# Patient Record
Sex: MALE | Race: WHITE | NOT HISPANIC OR LATINO
[De-identification: names, ages, dates, MRNs, and addresses within clinical notes are randomized per-mention and may not be internally consistent; named-entity substitution may affect disease eponyms.]

---

## 2017-02-06 ENCOUNTER — APPOINTMENT (OUTPATIENT)
Dept: MRI IMAGING | Facility: CLINIC | Age: 57
End: 2017-02-06

## 2017-02-06 ENCOUNTER — OUTPATIENT (OUTPATIENT)
Dept: OUTPATIENT SERVICES | Facility: HOSPITAL | Age: 57
LOS: 1 days | End: 2017-02-06
Payer: COMMERCIAL

## 2017-02-06 DIAGNOSIS — Z00.8 ENCOUNTER FOR OTHER GENERAL EXAMINATION: ICD-10-CM

## 2017-02-06 PROCEDURE — 73721 MRI JNT OF LWR EXTRE W/O DYE: CPT

## 2017-05-11 ENCOUNTER — OUTPATIENT (OUTPATIENT)
Dept: OUTPATIENT SERVICES | Facility: HOSPITAL | Age: 57
LOS: 1 days | End: 2017-05-11
Payer: COMMERCIAL

## 2017-05-11 VITALS
OXYGEN SATURATION: 98 % | DIASTOLIC BLOOD PRESSURE: 80 MMHG | HEIGHT: 66 IN | TEMPERATURE: 89 F | SYSTOLIC BLOOD PRESSURE: 123 MMHG | WEIGHT: 315 LBS | HEART RATE: 73 BPM | RESPIRATION RATE: 14 BRPM

## 2017-05-11 DIAGNOSIS — S83.242D OTHER TEAR OF MEDIAL MENISCUS, CURRENT INJURY, LEFT KNEE, SUBSEQUENT ENCOUNTER: ICD-10-CM

## 2017-05-11 DIAGNOSIS — M17.12 UNILATERAL PRIMARY OSTEOARTHRITIS, LEFT KNEE: ICD-10-CM

## 2017-05-11 DIAGNOSIS — Z01.818 ENCOUNTER FOR OTHER PREPROCEDURAL EXAMINATION: ICD-10-CM

## 2017-05-11 DIAGNOSIS — S83.209A UNSPECIFIED TEAR OF UNSPECIFIED MENISCUS, CURRENT INJURY, UNSPECIFIED KNEE, INITIAL ENCOUNTER: ICD-10-CM

## 2017-05-11 LAB
ANION GAP SERPL CALC-SCNC: 7 MMOL/L — SIGNIFICANT CHANGE UP (ref 5–17)
BUN SERPL-MCNC: 14 MG/DL — SIGNIFICANT CHANGE UP (ref 7–23)
CALCIUM SERPL-MCNC: 9.4 MG/DL — SIGNIFICANT CHANGE UP (ref 8.4–10.5)
CHLORIDE SERPL-SCNC: 100 MMOL/L — SIGNIFICANT CHANGE UP (ref 96–108)
CO2 SERPL-SCNC: 30 MMOL/L — SIGNIFICANT CHANGE UP (ref 22–31)
CREAT SERPL-MCNC: 0.74 MG/DL — SIGNIFICANT CHANGE UP (ref 0.5–1.3)
GLUCOSE SERPL-MCNC: 186 MG/DL — HIGH (ref 70–99)
HBA1C BLD-MCNC: 7.8 % — HIGH (ref 4–5.6)
HCT VFR BLD CALC: 45.3 % — SIGNIFICANT CHANGE UP (ref 39–50)
HGB BLD-MCNC: 14.8 G/DL — SIGNIFICANT CHANGE UP (ref 13–17)
MCHC RBC-ENTMCNC: 25.8 PG — LOW (ref 27–34)
MCHC RBC-ENTMCNC: 32.7 GM/DL — SIGNIFICANT CHANGE UP (ref 32–36)
MCV RBC AUTO: 78.9 FL — LOW (ref 80–100)
PLATELET # BLD AUTO: 248 K/UL — SIGNIFICANT CHANGE UP (ref 150–400)
POTASSIUM SERPL-MCNC: 4.5 MMOL/L — SIGNIFICANT CHANGE UP (ref 3.5–5.3)
POTASSIUM SERPL-SCNC: 4.5 MMOL/L — SIGNIFICANT CHANGE UP (ref 3.5–5.3)
RBC # BLD: 5.74 M/UL — SIGNIFICANT CHANGE UP (ref 4.2–5.8)
RBC # FLD: 13.4 % — SIGNIFICANT CHANGE UP (ref 10.3–14.5)
SODIUM SERPL-SCNC: 137 MMOL/L — SIGNIFICANT CHANGE UP (ref 135–145)
WBC # BLD: 8.4 K/UL — SIGNIFICANT CHANGE UP (ref 3.8–10.5)
WBC # FLD AUTO: 8.4 K/UL — SIGNIFICANT CHANGE UP (ref 3.8–10.5)

## 2017-05-11 PROCEDURE — 85027 COMPLETE CBC AUTOMATED: CPT

## 2017-05-11 PROCEDURE — 83036 HEMOGLOBIN GLYCOSYLATED A1C: CPT

## 2017-05-11 PROCEDURE — 93005 ELECTROCARDIOGRAM TRACING: CPT

## 2017-05-11 PROCEDURE — G0463: CPT

## 2017-05-11 PROCEDURE — 93010 ELECTROCARDIOGRAM REPORT: CPT | Mod: NC

## 2017-05-11 PROCEDURE — 80048 BASIC METABOLIC PNL TOTAL CA: CPT

## 2017-05-11 PROCEDURE — 36415 COLL VENOUS BLD VENIPUNCTURE: CPT

## 2017-05-11 RX ORDER — METFORMIN HYDROCHLORIDE 850 MG/1
1 TABLET ORAL
Qty: 0 | Refills: 0 | COMMUNITY

## 2017-05-11 NOTE — H&P PST ADULT - PROBLEM SELECTOR PLAN 1
Left knee arthroscopy  Medical clearance   Pre op instruction   LENIN precautions   Finger stick on admit

## 2017-05-11 NOTE — H&P PST ADULT - PMH
Hypertension  Dx: 6-7 years ago  Obesity  s/p gastric bypass  LENIN (Obstructive Sleep Apnea)  Sleep study done 2008, uses CPAP at home  Type 2 diabetes mellitus Hypertension    Morbid obesity with BMI of 50.0-59.9, adult    LENIN (Obstructive Sleep Apnea)  Sleep study done 2015  uses CPAP at home  Type 2 diabetes mellitus    Venous insufficiency of both lower extremities

## 2017-05-11 NOTE — H&P PST ADULT - HISTORY OF PRESENT ILLNESS
This is a 57 y/o male who presents with complaint of left knee pain and buckling for the  past 5 months . pain is intermittent and increased pain with climbing stairs and activities  Tried cortisone injection with good relief , however pain continued , MRI revealed meniscus tear . scheduled for left knee arthroscopy

## 2017-05-11 NOTE — H&P PST ADULT - PSH
Gastric Bypass  1998.  Patient lost 225 lbs and has regained 80 lbs in the last 6 years.  H/O ventral hernia repair  08/24/12 incarcerated ventral hernia repair with mesh.  History of Abdominoplasty  2001  History of Appendectomy  1982  Incisional Hernia Repair  2005  S/P Cholecystectomy    Umbilical Hernia Repair  2002 Gastric Bypass  1998.  Patient lost 225 lbs and has regained 125 bs in the last 9years.  H/O ventral hernia repair  08/24/12 incarcerated ventral hernia repair with mesh.  History of Abdominoplasty  2001  History of Appendectomy  1982  Incisional Hernia Repair  2005  S/P Cholecystectomy    Umbilical Hernia Repair  2002

## 2017-05-11 NOTE — H&P PST ADULT - VENOUS THROMBOEMBOLISM CURRENT STATUS
major surgery, including arthroscopic and laparoscopic (greater than 1 hr)/swollen legs/varicose veins

## 2017-05-11 NOTE — H&P PST ADULT - FAMILY HISTORY
Mother  Still living? No  Family history of diabetes mellitus in mother, Age at diagnosis: Age Unknown     Father  Still living? Yes, Estimated age: 81-90  Family history of diabetes mellitus in father, Age at diagnosis: Age Unknown

## 2017-05-23 NOTE — ASU DISCHARGE PLAN (ADULT/PEDIATRIC). - SPECIAL INSTRUCTIONS
ice packs to surgical site 20 minutes on and 20 minutes off while awake for next 24 hours  m,eds  Take an over the counter stool softener like Colace to avoid constipation while taking a narcotic for pain Follow instructions in the attached brochure for bandage care, ice paks, and exercises.  You may walk with full weight on [ LEft ]  knee, use a cane / crutches to assist as needed.  See the Surgeon in the office for removal of your stitches in 10-14 days.  Do not drive a car when taking pain medication.  Call the Surgeon for fever, severe pain, or redness around the incisions, fall/injury to operative leg.

## 2017-05-23 NOTE — ASU DISCHARGE PLAN (ADULT/PEDIATRIC). - ACTIVITY LEVEL
elevate extremity/no sports/gym/weight bearing as tolerated/no heavy lifting weight bearing as tolerated/elevate extremity/no sports/gym/no exercise/no heavy lifting

## 2017-05-23 NOTE — ASU DISCHARGE PLAN (ADULT/PEDIATRIC). - MEDICATION SUMMARY - MEDICATIONS TO TAKE
I will START or STAY ON the medications listed below when I get home from the hospital:    acetaminophen-oxycodone 325 mg-5 mg oral tablet  -- 1 tab(s) by mouth every 4 to 6 hours, As Needed -Mild & Moderate Knee Pain. 2 tabs for severe pain MDD:5  -- Dx: S/P Left Knee Arthroscopy  -- Indication: For Surgical Pain    Ecotrin Adult Low Strength 81 mg oral delayed release tablet  -- 1 tab(s) by mouth once a day  -- Indication: For Heart    losartan 25 mg oral tablet  -- 1 tab(s) by mouth once a day  -- Indication: For BP    metFORMIN 500 mg oral tablet  -- 1 tab(s) by mouth once a day  -- Indication: For DM    MiraLax oral powder for reconstitution  -- 1 packet(s) by mouth once a day (at bedtime), As Needed only if constipated  -- Indication: For Constipation    Vitamin D3 5000 intl units oral capsule  -- 1 cap(s) by mouth once a day  -- Indication: For Vitamin

## 2017-05-26 NOTE — ASU PATIENT PROFILE, ADULT - PMH
Hypertension    Morbid obesity with BMI of 50.0-59.9, adult    LENIN (Obstructive Sleep Apnea)  Sleep study done 2015  uses CPAP at home  Type 2 diabetes mellitus    Venous insufficiency of both lower extremities

## 2017-05-26 NOTE — ASU PATIENT PROFILE, ADULT - PSH
Gastric Bypass  1998.  Patient lost 225 lbs and has regained 125 bs in the last 9years.  H/O ventral hernia repair  08/24/12 incarcerated ventral hernia repair with mesh.  History of Abdominoplasty  2001  History of Appendectomy  1982  Incisional Hernia Repair  2005  S/P Cholecystectomy    Umbilical Hernia Repair  2002

## 2017-05-30 ENCOUNTER — OUTPATIENT (OUTPATIENT)
Dept: OUTPATIENT SERVICES | Facility: HOSPITAL | Age: 57
LOS: 1 days | Discharge: HOME | End: 2017-05-30
Payer: COMMERCIAL

## 2017-05-30 ENCOUNTER — RESULT REVIEW (OUTPATIENT)
Age: 57
End: 2017-05-30

## 2017-05-30 ENCOUNTER — TRANSCRIPTION ENCOUNTER (OUTPATIENT)
Age: 57
End: 2017-05-30

## 2017-05-30 VITALS
SYSTOLIC BLOOD PRESSURE: 133 MMHG | DIASTOLIC BLOOD PRESSURE: 68 MMHG | RESPIRATION RATE: 20 BRPM | HEIGHT: 66 IN | TEMPERATURE: 98 F | WEIGHT: 315 LBS | HEART RATE: 79 BPM | OXYGEN SATURATION: 95 %

## 2017-05-30 VITALS — RESPIRATION RATE: 20 BRPM | OXYGEN SATURATION: 94 % | HEART RATE: 91 BPM

## 2017-05-30 DIAGNOSIS — S83.242D OTHER TEAR OF MEDIAL MENISCUS, CURRENT INJURY, LEFT KNEE, SUBSEQUENT ENCOUNTER: ICD-10-CM

## 2017-05-30 DIAGNOSIS — M17.12 UNILATERAL PRIMARY OSTEOARTHRITIS, LEFT KNEE: ICD-10-CM

## 2017-05-30 PROCEDURE — 29880 ARTHRS KNE SRG MNISECTMY M&L: CPT | Mod: LT

## 2017-05-30 PROCEDURE — 88304 TISSUE EXAM BY PATHOLOGIST: CPT | Mod: 26

## 2017-05-30 PROCEDURE — 88304 TISSUE EXAM BY PATHOLOGIST: CPT

## 2017-05-30 PROCEDURE — 97161 PT EVAL LOW COMPLEX 20 MIN: CPT | Mod: CI,CI

## 2017-05-30 RX ORDER — SODIUM CHLORIDE 9 MG/ML
1000 INJECTION, SOLUTION INTRAVENOUS
Qty: 0 | Refills: 0 | Status: DISCONTINUED | OUTPATIENT
Start: 2017-05-30 | End: 2017-05-30

## 2017-05-30 RX ORDER — MEPERIDINE HYDROCHLORIDE 50 MG/ML
12.5 INJECTION INTRAMUSCULAR; INTRAVENOUS; SUBCUTANEOUS
Qty: 0 | Refills: 0 | Status: DISCONTINUED | OUTPATIENT
Start: 2017-05-30 | End: 2017-05-30

## 2017-05-30 RX ORDER — HYDROMORPHONE HYDROCHLORIDE 2 MG/ML
0.5 INJECTION INTRAMUSCULAR; INTRAVENOUS; SUBCUTANEOUS
Qty: 0 | Refills: 0 | Status: DISCONTINUED | OUTPATIENT
Start: 2017-05-30 | End: 2017-05-30

## 2017-05-30 RX ORDER — ONDANSETRON 8 MG/1
4 TABLET, FILM COATED ORAL ONCE
Qty: 0 | Refills: 0 | Status: DISCONTINUED | OUTPATIENT
Start: 2017-05-30 | End: 2017-05-30

## 2017-05-30 RX ORDER — VANCOMYCIN HCL 1 G
2250 VIAL (EA) INTRAVENOUS ONCE
Qty: 0 | Refills: 0 | Status: COMPLETED | OUTPATIENT
Start: 2017-05-30 | End: 2017-05-30

## 2017-05-30 RX ORDER — OXYCODONE HYDROCHLORIDE 5 MG/1
1 TABLET ORAL
Qty: 60 | Refills: 0
Start: 2017-05-30

## 2017-05-30 RX ADMIN — HYDROMORPHONE HYDROCHLORIDE 0.5 MILLIGRAM(S): 2 INJECTION INTRAMUSCULAR; INTRAVENOUS; SUBCUTANEOUS at 15:50

## 2017-05-30 RX ADMIN — SODIUM CHLORIDE 100 MILLILITER(S): 9 INJECTION, SOLUTION INTRAVENOUS at 15:50

## 2017-05-30 RX ADMIN — HYDROMORPHONE HYDROCHLORIDE 0.5 MILLIGRAM(S): 2 INJECTION INTRAMUSCULAR; INTRAVENOUS; SUBCUTANEOUS at 16:00

## 2017-05-30 NOTE — PHYSICAL THERAPY INITIAL EVALUATION ADULT - ADDITIONAL COMMENTS
coop 1st floor. 2 BRIANNE w/ HR and no steps inside. Pt does not have any DME. Pt drives. Px grade 2/10.

## 2017-05-30 NOTE — BRIEF OPERATIVE NOTE - PROCEDURE
Partial lateral meniscectomy of left knee  05/30/2017    Active  SOVALDO  Partial medial meniscectomy of left knee  05/30/2017    Active  OSVALDO  Left knee arthroscopy with debridement  05/30/2017    Active  OSVALDO

## 2017-05-30 NOTE — BRIEF OPERATIVE NOTE - POST-OP DX
Acute lateral meniscal tear, left, sequela  05/30/2017    Active  Syd Woo  Acute medial meniscal tear, left, sequela  05/30/2017    Active  Syd Woo  Other secondary osteoarthritis of left knee  05/30/2017    Active  Syd Woo

## 2018-03-21 ENCOUNTER — APPOINTMENT (OUTPATIENT)
Dept: VASCULAR SURGERY | Facility: CLINIC | Age: 58
End: 2018-03-21
Payer: COMMERCIAL

## 2018-03-21 VITALS
TEMPERATURE: 97.3 F | DIASTOLIC BLOOD PRESSURE: 84 MMHG | SYSTOLIC BLOOD PRESSURE: 168 MMHG | HEIGHT: 66 IN | HEART RATE: 75 BPM | WEIGHT: 315 LBS | BODY MASS INDEX: 50.62 KG/M2

## 2018-03-21 DIAGNOSIS — I83.10 VARICOSE VEINS OF UNSPECIFIED LOWER EXTREMITY WITH INFLAMMATION: ICD-10-CM

## 2018-03-21 DIAGNOSIS — L85.3 XEROSIS CUTIS: ICD-10-CM

## 2018-03-21 PROCEDURE — 93970 EXTREMITY STUDY: CPT

## 2018-03-21 PROCEDURE — 99243 OFF/OP CNSLTJ NEW/EST LOW 30: CPT

## 2018-03-21 PROCEDURE — 93979 VASCULAR STUDY: CPT

## 2018-04-18 ENCOUNTER — APPOINTMENT (OUTPATIENT)
Dept: VASCULAR SURGERY | Facility: CLINIC | Age: 58
End: 2018-04-18

## 2018-11-26 PROBLEM — E11.9 TYPE 2 DIABETES MELLITUS WITHOUT COMPLICATIONS: Chronic | Status: ACTIVE | Noted: 2017-05-11

## 2018-11-26 PROBLEM — I87.2 VENOUS INSUFFICIENCY (CHRONIC) (PERIPHERAL): Chronic | Status: ACTIVE | Noted: 2017-05-11

## 2018-11-26 PROBLEM — E66.01 MORBID (SEVERE) OBESITY DUE TO EXCESS CALORIES: Chronic | Status: ACTIVE | Noted: 2017-05-11

## 2019-01-02 ENCOUNTER — APPOINTMENT (OUTPATIENT)
Dept: ORTHOPEDIC SURGERY | Facility: CLINIC | Age: 59
End: 2019-01-02

## 2019-01-21 ENCOUNTER — APPOINTMENT (OUTPATIENT)
Dept: ORTHOPEDIC SURGERY | Facility: CLINIC | Age: 59
End: 2019-01-21
Payer: COMMERCIAL

## 2019-01-21 VITALS — BODY MASS INDEX: 50.62 KG/M2 | WEIGHT: 315 LBS | HEIGHT: 66 IN

## 2019-01-21 DIAGNOSIS — K46.9 UNSPECIFIED ABDOMINAL HERNIA W/OUT OBSTRUCTION OR GANGRENE: ICD-10-CM

## 2019-01-21 DIAGNOSIS — Z86.79 PERSONAL HISTORY OF OTHER DISEASES OF THE CIRCULATORY SYSTEM: ICD-10-CM

## 2019-01-21 DIAGNOSIS — Z71.9 COUNSELING, UNSPECIFIED: ICD-10-CM

## 2019-01-21 DIAGNOSIS — Z87.19 PERSONAL HISTORY OF OTHER DISEASES OF THE DIGESTIVE SYSTEM: ICD-10-CM

## 2019-01-21 PROCEDURE — 20610 DRAIN/INJ JOINT/BURSA W/O US: CPT | Mod: LT

## 2019-01-21 PROCEDURE — 99214 OFFICE O/P EST MOD 30 MIN: CPT | Mod: 25

## 2019-01-21 PROCEDURE — 73562 X-RAY EXAM OF KNEE 3: CPT | Mod: LT

## 2019-04-08 ENCOUNTER — APPOINTMENT (OUTPATIENT)
Dept: ORTHOPEDIC SURGERY | Facility: CLINIC | Age: 59
End: 2019-04-08
Payer: COMMERCIAL

## 2019-04-08 PROCEDURE — 20611 DRAIN/INJ JOINT/BURSA W/US: CPT | Mod: LT

## 2019-04-08 PROCEDURE — 99214 OFFICE O/P EST MOD 30 MIN: CPT | Mod: 25

## 2019-04-08 PROCEDURE — 73562 X-RAY EXAM OF KNEE 3: CPT | Mod: RT

## 2019-04-29 ENCOUNTER — APPOINTMENT (OUTPATIENT)
Dept: ORTHOPEDIC SURGERY | Facility: CLINIC | Age: 59
End: 2019-04-29
Payer: COMMERCIAL

## 2019-04-29 VITALS — WEIGHT: 315 LBS | BODY MASS INDEX: 50.62 KG/M2 | HEIGHT: 66 IN

## 2019-04-29 PROCEDURE — 20611 DRAIN/INJ JOINT/BURSA W/US: CPT | Mod: RT

## 2019-04-29 NOTE — HISTORY OF PRESENT ILLNESS
[de-identified] : Patient presents with right knee pain and is here for a Monovisc injection into the right knee. He previously received a Monovisc injection into the left knee 3 weeks ago and he has started feeling relief. He is already feeling 50% better in the left knee. Meanwhile, he feels significant pain in the right knee that radiates into the shin area. He is a brittle diabetic, so we would like to avoid cortisone injections. He has been trying to lose weight.

## 2019-04-29 NOTE — DISCUSSION/SUMMARY
[de-identified] : He elected to receive a Monovisc injection injection into the right knee, he tolerated it well. He was instructed on ice and range of motion. He will avoid walking long distances. He is aware that he is a candidate for left total knee replacement. I emphasized the importance of weight loss, especially before receiving a total knee replacement. In the interim, he may use ice and anti-inflammatories for pain. He is a brittle diabetic, and we would like to avoid cortisone injections. Follow-up in 6 weeks.\par \par Planned surgery and surgical risks/benefits of the procedure have been discussed in detail with the patient. A model of the implant and brand/type of implant being used was discussed & demonstrated to the patient. Patient was given options to go to a rehab center or to go home after surgery. Recent studies show that going home after surgery has a higher success rate. \par The natural history and treatment of degenerative arthritis was discussed with the patient at length today. The spectrum of treatment including nonoperative modalities to surgical intervention was elucidated. Noninvasive and nonoperative treatment modalities include weight reduction, activity modification with low impact exercise, as needed use of acetaminophen or anti-inflammatory medications if tolerated, glucosamine/chondroitin supplements, and physical therapy. Further treatments can include corticosteroid injection and the use of viscosupplementation with hyaluronic acid injections. Definitive surgical treatment can certainly include total joint arthroplasty also. The risks and benefits of each treatment options was discussed and all questions were answered.\par In view of lack of adequate pain relief with conservative (non-surgical) management protocol including physical therapy, home exercises, weight loss, activity modification, NSAIDS; the patient is recommended to consider a staged bilateral Total Knee Replacement.\par The risks, benefits, alternatives, implications, complications including but not limited to pain, stiffness, bleeding, limp, wound breakdown, infection, bone fracture, nerve and vascular compromise, implant wear and durability issues and rehabilitation were discussed and relevant questions were addressed. The possibility of recurrent pain, no improvement in pain and actual worsening of the pain were also mentioned in conversation with the patient. Medical complications related to the patient's general medical health including deep vein thrombosis, pulmonary embolus, heart attack, stroke, death and other complications from anesthesia were discussed as well. The patient wishes to proceed and will undergo preoperative medical evaluation and clearance, attend the preoperative educational class and will schedule surgery appropriately.\par Anticoagulation prophylaxis medication options to address risks of deep vein thrombosis and pulmonary embolism were discussed and weighed against the risks of bleeding and wound healing complications. The patient elected aspirin/coumadin prophylaxis with mechanical modalities.\par \par \par

## 2019-04-29 NOTE — PHYSICAL EXAM
[de-identified] : Constitutional\par o Appearance : well-nourished, well developed, alert, in no acute distress \par Head and Face\par o Head :\par ¦ Inspection : atraumatic, normocephalic\par o Face :\par ¦ Inspection : no visible rash or discoloration\par Lymphatic\par o Epitrochlear Nodes : no lymphadenopathy \par o Popliteal Nodes : no palpable nodes present \par o Supraclavicular Nodes : no supraclavicular nodes present \par o Preauricular Nodes : no preauricular nodes present \par o Additional Nodes : no additional adenopathy present \par Skin and Subcutaneous Tissue \par o Head and Neck :\par ¦ Face : no facial lesions \par o Trunk :\par ¦ Back : no rashes present, no lesions present, no areas of discoloration \par Body Hair : body hair distribution normal for age\par Neurologic\par o Mental Status Examination :\par ¦ Orientation : alert and oriented X 3\par o Coordination : finger-to-nose-to-finger testing normal bilaterally, heel-shin cerebellar test within normal limits bilaterally \par Psychiatric\par o Mood and Affect: mood normal, affect appropriate \par Cardiovascular\par o Peripheral Vascular System :\par ¦ Femoral Artery : pulse 2+\par ¦ Dorsalis Pedis Artery : pulse 2+\par ¦ Posterior Tibial Artery : pulse 2+\par \par Right Lower Extremity\par o Buttock : no tenderness, swelling or deformities \par o Right Hip :\par ¦ Inspection/Palpation : no tenderness, swelling or deformities\par ¦ Range of Motion : full and painless in all planes, no crepitance\par ¦ Stability : joint stability intact\par ¦ Strength : extension, flexion, adduction, abduction, internal rotation and external rotation 4+/5 \par \par o Right Knee :\par ¦ Inspection/Palpation : medial compartment tenderness to palpation, no swelling, venous stasis change\par ¦ Range of Motion : active flexion and extension full with pain on full flexion, no crepitance\par ¦ Stability : no valgus or varus instability present on provocative testing\par ¦ Strength : flexion and extension 5/5\par ¦ Tests and Signs : negative Anterior Drawer, negative Lachman, negative Kade \par \par Left Lower Extremity\par o Buttock : no tenderness, swelling or deformities \par o Left Hip :\par ¦ Inspection/Palpation : no tenderness, no swelling or deformities\par ¦ Range of Motion : full and painless in all planes, no crepitance\par ¦ Stability : joint stability intact\par ¦ Strength : extension, flexion, adduction, abduction, internal rotation and external rotation 4+/5\par \par o Left Knee :\par ¦ Inspection/Palpation : mild medial and lateral compartment tenderness to palpation, no swelling, incisions well healed, venous stasis change\par ¦ Range of Motion : active flexion and extension, with pain on full flexion, no crepitance\par ¦ Stability : no valgus or varus instability present on provocative testing\par ¦ Strength : flexion and extension 5/5\par ¦ Tests and Signs : negative Anterior Drawer, negative Lachman, negative Kade\par \par Gait and Station:\par Gait: normal gait, no significant extremity swelling or lymphedema, trophic changes bilaterally\par \par Radiology:\par o Right Knee: Standing AP, lateral, and tunnel views were obtained and reveal moderate medial and patellofemoral osteoarthritis  \par \par Injection:\par o Knee : Indication- knee osteoarthritis, Anatomic location- right knee intra-articular joint space, Spray - area was sterilized with Betadine and alcohol and anesthetized with Ethyl Chloride , needle used-20G, Medications given- 4 cc's Monovisc, and patient tolerated it well. This was done under ultrasound guidance.

## 2019-05-20 ENCOUNTER — APPOINTMENT (OUTPATIENT)
Dept: ORTHOPEDIC SURGERY | Facility: CLINIC | Age: 59
End: 2019-05-20
Payer: COMMERCIAL

## 2019-05-20 VITALS — WEIGHT: 315 LBS | BODY MASS INDEX: 50.62 KG/M2 | HEIGHT: 66 IN

## 2019-05-20 PROCEDURE — 99214 OFFICE O/P EST MOD 30 MIN: CPT

## 2019-06-10 ENCOUNTER — APPOINTMENT (OUTPATIENT)
Dept: ORTHOPEDIC SURGERY | Facility: CLINIC | Age: 59
End: 2019-06-10
Payer: COMMERCIAL

## 2019-06-10 VITALS — HEIGHT: 66 IN | WEIGHT: 315 LBS | BODY MASS INDEX: 50.62 KG/M2

## 2019-06-10 PROCEDURE — 99213 OFFICE O/P EST LOW 20 MIN: CPT

## 2019-08-28 ENCOUNTER — APPOINTMENT (OUTPATIENT)
Dept: ORTHOPEDIC SURGERY | Facility: CLINIC | Age: 59
End: 2019-08-28
Payer: COMMERCIAL

## 2019-08-28 VITALS — WEIGHT: 315 LBS | BODY MASS INDEX: 50.62 KG/M2 | HEIGHT: 66 IN

## 2019-08-28 PROCEDURE — 20610 DRAIN/INJ JOINT/BURSA W/O US: CPT | Mod: LT

## 2019-08-28 PROCEDURE — 99213 OFFICE O/P EST LOW 20 MIN: CPT | Mod: 25

## 2019-08-28 NOTE — PHYSICAL EXAM
[de-identified] : Constitutional\par o Appearance : well-nourished, well developed, alert, in no acute distress \par Head and Face\par o Head :\par ¦ Inspection : atraumatic, normocephalic\par o Face :\par ¦ Inspection : no visible rash or discoloration\par Respiratory\par o Respiratory Effort: breathing unlabored \par Neurologic\par o Mental Status Examination :\par ¦ Orientation : alert and oriented X 3\par Psychiatric\par o Mood and Affect: mood normal, affect appropriate\par Cardiovascular\par o Observation/Palpation : - no swelling\par Lymphatic\par o Additional Nodes : No palpable lymph nodes present\par \par Right Lower Extremity\par o Buttock : no tenderness, swelling or deformities \par o Right Hip :\par    - Inspection/Palpation : no tenderness, no swelling or deformities\par    - Range of Motion : full and painless in all planes, no crepitance\par    - Stability : joint stability intact\par    - Strength : extension, flexion, adduction, abduction, internal rotation and external rotation 5/5 \par    - Tests: Diane’s test negative\par o Right Knee :\par ¦ Inspection/Palpation : mild medial compartment  tenderness to palpation, no swelling\par ¦ Range of Motion : active flexion and extension full and painless, no crepitance\par ¦ Stability : no valgus or varus instability present on provocative testing\par ¦ Strength : flexion and extension 5/5\par ¦ Tests and Signs : negative Anterior Drawer, negative Lachman, negative Kade\par \par Left Lower Extremity\par o Buttock : no tenderness, swelling or deformities \par o Left Hip :\par    - Inspection/Palpation : no tenderness, no swelling or deformities\par    - Range of Motion : full and painless in all planes, no crepitance\par    - Stability : joint stability intact\par    - Strength : extension, flexion, adduction, abduction, internal rotation and external rotation 5/5 \par    - Tests: Diane’s test negative\par o Left Knee :\par ¦ Inspection/Palpation : medial compartment  and lateral compartment tenderness to palpation, no swelling\par ¦ Range of Motion : 5-115 with pain. \par ¦ Stability : no valgus or varus instability present on provocative testing\par ¦ Strength : flexion and extension 5/5\par ¦ Tests and Signs : negative Anterior Drawer, negative Lachman, negative Kade\par \par Gait and Station:\par Gait: gait normal, no significant extremity swelling or lymphedema, good proprioception and balance\par \par \par o Knee injection : Indication- knee osteoarthritis, Anatomic location- left intra-articular joint space, Spray - area was sterilized with Betadine and alcohol and anesthetized with Ethyl Chloride , needle used-20G, Medications given- 5cc's lidocaine, 0.5cc's kenalog, 0.5 cc's dexamethasone, and patient tolerated it well.\par \par

## 2019-08-28 NOTE — HISTORY OF PRESENT ILLNESS
[de-identified] : 59 year old male presents with right knee pain. Pt received viscosupplementation into both of his knees. He received a viscosupplementation in his left knee on 4/8/2019 and another one in his right knee on 4/29/2019. He states significant improvement of his symptoms. Pt states that walking, ascending and descending stairs, and getting our of his car are all difficult. He c/o that his left knee is worse than his right one when he gets out of the car. He denies any swelling and feels much better. He is aware that he needs to loose weight. He has a hx of DM

## 2019-08-28 NOTE — DISCUSSION/SUMMARY
[de-identified] : I discussed the underlying pathophysiology of the patient's condition in great detail with the patient.  I gave the cortisone injection into the patient's left knee, and he tolerated it well. I told him  to take it easy for the rest of the day, and to continue ROM exercises. The use of ice and rest was also reviewed with the patient. I stressed the importance of continued weight loss and its benefits to the patient’s joints and overall health. \par \par FU 3 months

## 2019-08-28 NOTE — ADDENDUM
[FreeTextEntry1] : I, Tran Wheleer, acted solely as a scribe for Dr. Steve Manning on this date 08/28/2019 \par All medical record entries made by the Scribe were at my, Dr. Steve Manning, direction and personally dictated by me on 08/28/2019 . I have reviewed the chart and agree that the record accurately reflects my personal performance of the history, physical exam, assessment and plan. I have also personally directed, reviewed, and agreed with the chart.

## 2019-10-28 ENCOUNTER — APPOINTMENT (OUTPATIENT)
Dept: ORTHOPEDIC SURGERY | Facility: CLINIC | Age: 59
End: 2019-10-28
Payer: COMMERCIAL

## 2019-10-28 VITALS — HEIGHT: 66 IN | WEIGHT: 315 LBS | BODY MASS INDEX: 50.62 KG/M2

## 2019-10-28 PROCEDURE — 20610 DRAIN/INJ JOINT/BURSA W/O US: CPT | Mod: RT

## 2019-10-28 PROCEDURE — 99213 OFFICE O/P EST LOW 20 MIN: CPT | Mod: 25

## 2019-12-30 ENCOUNTER — APPOINTMENT (OUTPATIENT)
Dept: ORTHOPEDIC SURGERY | Facility: CLINIC | Age: 59
End: 2019-12-30
Payer: COMMERCIAL

## 2019-12-30 PROCEDURE — 20611 DRAIN/INJ JOINT/BURSA W/US: CPT | Mod: RT

## 2019-12-30 PROCEDURE — 99213 OFFICE O/P EST LOW 20 MIN: CPT | Mod: 25

## 2020-01-20 ENCOUNTER — APPOINTMENT (OUTPATIENT)
Dept: ORTHOPEDIC SURGERY | Facility: CLINIC | Age: 60
End: 2020-01-20
Payer: COMMERCIAL

## 2020-01-20 VITALS — HEIGHT: 66 IN | BODY MASS INDEX: 50.62 KG/M2 | WEIGHT: 315 LBS

## 2020-01-20 PROCEDURE — 20611 DRAIN/INJ JOINT/BURSA W/US: CPT | Mod: RT

## 2020-01-20 PROCEDURE — 99213 OFFICE O/P EST LOW 20 MIN: CPT | Mod: 25

## 2020-03-02 ENCOUNTER — APPOINTMENT (OUTPATIENT)
Dept: ORTHOPEDIC SURGERY | Facility: CLINIC | Age: 60
End: 2020-03-02
Payer: COMMERCIAL

## 2020-03-02 PROCEDURE — 99214 OFFICE O/P EST MOD 30 MIN: CPT

## 2020-04-20 ENCOUNTER — APPOINTMENT (OUTPATIENT)
Dept: ORTHOPEDIC SURGERY | Facility: CLINIC | Age: 60
End: 2020-04-20
Payer: COMMERCIAL

## 2020-04-20 PROCEDURE — 99214 OFFICE O/P EST MOD 30 MIN: CPT | Mod: 25

## 2020-04-20 PROCEDURE — 20610 DRAIN/INJ JOINT/BURSA W/O US: CPT | Mod: RT

## 2020-06-01 ENCOUNTER — APPOINTMENT (OUTPATIENT)
Dept: ORTHOPEDIC SURGERY | Facility: CLINIC | Age: 60
End: 2020-06-01

## 2020-09-23 ENCOUNTER — APPOINTMENT (OUTPATIENT)
Dept: ORTHOPEDIC SURGERY | Facility: CLINIC | Age: 60
End: 2020-09-23
Payer: COMMERCIAL

## 2020-09-23 PROCEDURE — 99214 OFFICE O/P EST MOD 30 MIN: CPT

## 2020-09-23 PROCEDURE — 73562 X-RAY EXAM OF KNEE 3: CPT | Mod: 50

## 2020-10-26 ENCOUNTER — APPOINTMENT (OUTPATIENT)
Dept: ORTHOPEDIC SURGERY | Facility: CLINIC | Age: 60
End: 2020-10-26
Payer: COMMERCIAL

## 2020-10-26 DIAGNOSIS — E66.01 MORBID (SEVERE) OBESITY DUE TO EXCESS CALORIES: ICD-10-CM

## 2020-10-26 PROCEDURE — 20611 DRAIN/INJ JOINT/BURSA W/US: CPT | Mod: RT

## 2020-10-26 PROCEDURE — 99072 ADDL SUPL MATRL&STAF TM PHE: CPT

## 2020-11-02 ENCOUNTER — APPOINTMENT (OUTPATIENT)
Dept: ORTHOPEDIC SURGERY | Facility: CLINIC | Age: 60
End: 2020-11-02
Payer: COMMERCIAL

## 2020-11-02 PROCEDURE — 20611 DRAIN/INJ JOINT/BURSA W/US: CPT | Mod: LT

## 2020-11-02 PROCEDURE — 99072 ADDL SUPL MATRL&STAF TM PHE: CPT

## 2020-12-14 ENCOUNTER — APPOINTMENT (OUTPATIENT)
Dept: ORTHOPEDIC SURGERY | Facility: CLINIC | Age: 60
End: 2020-12-14
Payer: COMMERCIAL

## 2020-12-14 VITALS — HEIGHT: 66 IN | BODY MASS INDEX: 50.62 KG/M2 | WEIGHT: 315 LBS

## 2020-12-14 PROCEDURE — 99072 ADDL SUPL MATRL&STAF TM PHE: CPT

## 2020-12-14 PROCEDURE — 99214 OFFICE O/P EST MOD 30 MIN: CPT

## 2021-03-15 ENCOUNTER — APPOINTMENT (OUTPATIENT)
Dept: ORTHOPEDIC SURGERY | Facility: CLINIC | Age: 61
End: 2021-03-15
Payer: COMMERCIAL

## 2021-03-15 PROCEDURE — 99072 ADDL SUPL MATRL&STAF TM PHE: CPT

## 2021-03-15 PROCEDURE — 99213 OFFICE O/P EST LOW 20 MIN: CPT

## 2021-03-15 NOTE — ADDENDUM
[FreeTextEntry1] : I, Carlos Larsen, acted solely as a scribe for Dr. Steve Manning on this date 03/15/2021.\par All medical record entries made by the Scribe were at my, Dr. Steve Manning, direction and personally dictated by me on 03/15/2021. I have reviewed the chart and agree that the record accurately reflects my personal performance of the history, physical exam, assessment and plan. I have also personally directed, reviewed, and agreed with the chart.

## 2021-03-15 NOTE — PHYSICAL EXAM
[de-identified] : Constitutional\par o Appearance : well-nourished, well developed, alert, in no acute distress \par Head and Face\par o Head :\par ¦ Inspection : atraumatic, normocephalic\par o Face :\par ¦ Inspection : no visible rash or discoloration\par Respiratory\par o Respiratory Effort: breathing unlabored \par Neurologic\par o Mental Status Examination :\par ¦ Orientation : alert and oriented X 3\par Psychiatric\par o Mood and Affect: mood normal, affect appropriate\par Cardiovascular\par o Observation/Palpation : - no swelling\par Lymphatic\par o Additional Nodes : No palpable lymph nodes present\par \par Right Lower Extremity\par o Buttock : no tenderness, swelling or deformities \par o Right Hip :\par    - Inspection/Palpation : no tenderness, no swelling or deformities\par    - Range of Motion : full and painless in all planes, no crepitance\par    - Stability : joint stability intact\par    - Strength : extension, flexion, adduction, abduction, internal rotation and external rotation, 4+/5 \par    - Tests: Diane’s test negative\par \par o Right Knee :\par ¦ Inspection/Palpation : moderate medial and no lateral compartment tenderness to palpation, no swelling\par ¦ Range of Motion : 0-130°, stiffness with full flexion, no crepitance, decreased patellofemoral glide \par ¦ Stability : no valgus or varus instability present on provocative testing\par ¦ Strength : flexion and extension 4+/5\par ¦ Tests and Signs : negative Anterior Drawer, negative Lachman, negative Kade\par \par Left Lower Extremity\par o Buttock : no tenderness, swelling or deformities \par o Left Hip :\par    - Inspection/Palpation : no tenderness, no swelling or deformities\par    - Range of Motion : full and painless in all planes, no crepitance\par    - Stability : joint stability intact\par    - Strength : extension, flexion, adduction, abduction, internal rotation and external rotation, 4+/5 \par    - Tests: Diane’s test negative\par \par o Left Knee :\par ¦ Inspection/Palpation : moderate medial and no lateral compartment tenderness to palpation, no swelling\par ¦ Range of Motion : 0-110° with pain on full flexion, decreased patellofemoral glide \par ¦ Stability : no valgus or varus instability present on provocative testing\par ¦ Strength : flexion and extension 4+/5\par ¦ Tests and Signs : negative Anterior Drawer, negative Lachman, negative Kade\par \par o Peripheral Vascular System :\par ¦ Dorsalis Pedis Artery : pulse 2+ bilaterally\par \par Gait and Station:\par Gait: heel/toe on the right, stiff on the left, no significant extremity swelling or lymphedema, trophic changes in both lower legs

## 2021-03-15 NOTE — DISCUSSION/SUMMARY
[de-identified] : I went over the pathophysiology of the patient's symptoms in great detail with the patient. We discussed the use of ice, Tylenol and anti-inflammatories to relieve pain. At-home strengthening, and stretching exercises were discussed and demonstrated with the patient. He should focus on light weight and high repetition exercises. I stressed the importance of weight loss and its benefits to the patient’s joints and overall health. Viscosupplementation was discussed as a solution to the patient's symptoms, and we are requesting authorization for Durolane for both knees. He is due for another right knee injection after 4/26/2021, and a left knee injection after 5/2/2021. All of his questions were answered. He understands and consents to the plan. \par \par FU 6 weeks.\par after viscosupplementation authorization\par after focusing on losing weight.\par \par The patient is an 60 year old male with bone on bone arthritis of their bilateral knees. Based upon the patient's continued symptoms and failure to respond to conservative treatment I have recommended a total knee replacement for this patient. A long discussion took place with the patient describing what a total joint replacement is and what the procedure would entail. A total knee model, similar to the implant that will be used during the operation was utilized to demonstrate and to discuss the various bearing surfaces of the implants. The hospitalization and post-operative care and rehabilitation were also discussed. The use of perioperative antibiotics and DVT prophylaxis were discussed. The risk, benefits and alternatives to a surgical intervention were discussed at length with the patient. The patient was also advised of risks related to the medical comorbidities and elevated body mass index (BMI). A lengthy discussion took place to review the most common complications including but not limited to: deep vein thrombosis, pulmonary embolus, heart attack, stroke, infection, wound breakdown, numbness, damage to nerves, tendon, muscles, arteries or other blood vessels, death and other possible complications from anesthesia. The patient was told that we will take steps to minimize these risks by using sterile technique, antibiotics and DVT prophylaxis when appropriate and follow the patient postoperatively in the office setting to monitor progress. The possibility of recurrent pain, no improvement in pain and actual worsening of pain were also discussed with the patient.\par \par The discharge plan of care focused on the patient going home following surgery. I encouraged the patient to make the necessary arrangements to have someone stay with them when they are discharged home. Following discharge, a home care nurse will visit the patient. They will open your home care case and request home physical therapy services. Home physical therapy will commence following discharge provided it is appropriate and covered by his health insurance benefit plan.\par \par The risks, benefits and alternative treatment options of total joint replacement were reviewed with the patient at great length. All questions were answered to the satisfaction of the patient. The patient participated in an interactive discussion of the total knee replacement implant planned for their surgery with questions answered. The patient agreed with the treatment plan, and has decided to move forward with the elective total knee replacement as planned.\par \par ESPINOZA CUNNINGHAM was seen face to face and needs a commode for bedside use as the patient has no ability to acces the bathroom in their home. The patient also requires a rolling walker as this is needed for activities of daily living within their home secondary to the diagnosis of osteoarthritis.

## 2021-03-15 NOTE — HISTORY OF PRESENT ILLNESS
[de-identified] : 60 year old male presents with bilateral knee pain, left worse than right. He had a right knee Durolane injection on 10/26/2020, and a left knee Durolane injection on 11/02/2020. He notes 40-50% relief after these injections, but feels that his previous injections may have been more helpful. He notes discomfort at rest and difficulty when standing from a seated position. He cannot fully bend his left knee. He denies any buckling, catching or swelling. He states that he is doing well and does not feel that a cortisone injection is needed at this time. He has been trying to lose weight but has not been that successful. Pmhx: He has a hx of DM.\par \par Radiology Results done 9/23/2020 revealed:\par o Right Knee : Standing AP,lateral and tunnel views of the knee were obtained and revealed essentially bone on bone in the medial compartment with severe patellofemoral arthritis.\par o Left Knee : Standing AP,lateral and tunnel views of the knee were obtained and revealed essentially bone on bone in the medial compartment with severe patellofemoral arthritis.

## 2021-07-26 ENCOUNTER — APPOINTMENT (OUTPATIENT)
Dept: ORTHOPEDIC SURGERY | Facility: CLINIC | Age: 61
End: 2021-07-26
Payer: COMMERCIAL

## 2021-07-26 VITALS
HEIGHT: 66 IN | SYSTOLIC BLOOD PRESSURE: 135 MMHG | BODY MASS INDEX: 48.21 KG/M2 | WEIGHT: 300 LBS | HEART RATE: 75 BPM | DIASTOLIC BLOOD PRESSURE: 85 MMHG

## 2021-07-26 DIAGNOSIS — S70.02XA CONTUSION OF LEFT HIP, INITIAL ENCOUNTER: ICD-10-CM

## 2021-07-26 DIAGNOSIS — M43.16 SPONDYLOLISTHESIS, LUMBAR REGION: ICD-10-CM

## 2021-07-26 PROCEDURE — 72170 X-RAY EXAM OF PELVIS: CPT

## 2021-07-26 PROCEDURE — 73562 X-RAY EXAM OF KNEE 3: CPT | Mod: LT

## 2021-07-26 PROCEDURE — 20611 DRAIN/INJ JOINT/BURSA W/US: CPT | Mod: RT

## 2021-07-26 PROCEDURE — 99214 OFFICE O/P EST MOD 30 MIN: CPT | Mod: 25

## 2021-07-26 PROCEDURE — 72100 X-RAY EXAM L-S SPINE 2/3 VWS: CPT

## 2021-07-26 PROCEDURE — 99072 ADDL SUPL MATRL&STAF TM PHE: CPT

## 2021-07-26 NOTE — ADDENDUM
[FreeTextEntry1] : I, Carlos Larsen, acted solely as a scribe for Dr. Steve Manning on this date 07/26/2021.\par All medical record entries made by the Scribe were at my, Dr. Steve Manning, direction and personally dictated by me on 07/26/2021. I have reviewed the chart and agree that the record accurately reflects my personal performance of the history, physical exam, assessment and plan. I have also personally directed, reviewed, and agreed with the chart.

## 2021-07-26 NOTE — PHYSICAL EXAM
[de-identified] : Constitutional\par o Appearance : well-nourished, well developed, alert, in no acute distress \par Head and Face\par o Head :\par ¦ Inspection : atraumatic, normocephalic\par o Face :\par ¦ Inspection : no visible rash or discoloration\par Respiratory\par o Respiratory Effort: breathing unlabored \par Neurologic\par o Mental Status Examination :\par ¦ Orientation : alert and oriented X 3\par Psychiatric\par o Mood and Affect: mood normal, affect appropriate\par Cardiovascular\par o Observation/Palpation : - no swelling\par Lymphatic\par o Additional Nodes : No palpable lymph nodes present\par \par Lumbosacral Spine\par o Inspection : no visible rash or discoloration\par o Palpation : moderate left paraspinal musculature tenderness, no sciatic notch tenderness\par o Range of Motion : extension and sidebending cause left paraspinal discomfort\par o Muscle Strength : paraspinal muscle strength and tone within normal limits\par o Muscle Tone : paraspinal muscle strength and tone within normal limits\par o Tests: straight leg test negative and ANISA test negative bilaterally \par \par Right Lower Extremity\par o Buttock : no tenderness, swelling or deformities \par o Right Hip :\par    - Inspection/Palpation : no tenderness, no swelling or deformities\par    - Range of Motion : full flexion, slight limitation of internal rotation, no crepitance\par    - Stability : joint stability intact\par    - Strength : extension, flexion, adduction, abduction, internal rotation and external rotation, 5/5 \par    - Tests: Diane’s test negative\par \par o Right Knee :\par ¦ Inspection/Palpation : moderate medial compartment tenderness to palpation, no swelling\par ¦ Range of Motion : 0-130° with pain on full flexion, no crepitance, decreased patellofemoral glide \par ¦ Stability : no valgus or varus instability present on provocative testing\par ¦ Strength : flexion and extension 5/5\par ¦ Tests and Signs : negative Anterior Drawer, negative Lachman, negative Kade\par \par Left Lower Extremity\par o Buttock : no tenderness, swelling or deformities \par o Left Hip :\par    - Inspection/Palpation : no tenderness, no swelling or deformities\par    - Range of Motion : full flexion, slight limitation of rotation, no crepitance\par    - Stability : joint stability intact\par    - Strength : extension, flexion, adduction, abduction, internal rotation and external rotation, 5/5 \par    - Tests: Diane’s test negative\par \par o Left Knee :\par ¦ Inspection/Palpation : moderate medial compartment tenderness to palpation, no swelling\par ¦ Range of Motion : 0-100° with pain on full flexion, decreased patellofemoral glide \par ¦ Stability : no valgus or varus instability present on provocative testing\par ¦ Strength : flexion and extension 5/5\par ¦ Tests and Signs : negative Anterior Drawer, negative Lachman, negative Kade\par \par o Peripheral Vascular System :\par ¦ Dorsalis Pedis Artery : pulse 2+ bilaterally\par \par Gait and Station:\par Gait: stiff on the left, no significant extremity swelling or lymphedema, trophic changes in both lower legs\par \par Radiology Results\par o Lumbosacral Spine : AP and lateral views were obtained and revealed a grade 1 spondylolisthesis of L5 on S1 with diffuse facet arthropathy, no obvious compression fracture.\par o Hip and Pelvis: AP pelvis was obtained and revealed mild degenerative arthritis of both hips.\par o Right Knee : Standing AP, lateral and tunnel views of the knee were obtained and revealed significant medial and patellofemoral arthritis.\par o Left Knee : Standing AP, lateral and tunnel views of the knee were obtained and revealed almost bone on bone in the medial compartment with moderate patellofemoral arthritis.\par \par o Right Knee injection : Indication- knee osteoarthritis, Anatomic location- right intra-articular joint space, Spray - area was sterilized with Betadine and alcohol and anesthetized with Ethyl Chloride, needle used-20G, Medications given- 3cc's Durolane under Ultrasound guidance. \par oLot# [id number]   oExp: 2023-11-30

## 2021-07-26 NOTE — DISCUSSION/SUMMARY
[de-identified] : I discussed the underlying pathophysiology of the patient's condition in great detail with the patient. I went over the patient's x-rays with them in great detail. The extent of the patient’s arthritis was discussed in great detail with them. We discussed the use of ice, Tylenol and anti-inflammatories to relieve pain. I stressed the importance of continued weight loss and its benefits to the patient’s joints and overall health. At this time, he will start a course of physical therapy for his low back for strengthening and flexibility. A prescription for physical therapy was provided. The patient elected to receive a Durolane injection into his right knee today, and tolerated it well. I instructed the pt on ROM exercises, and told them to take it easy. The use of ice and rest was reviewed with the patient. The patient may resume activities tomorrow. I reminded the patient that it takes 4 to 6 weeks after the injection to feel symptom relief. All of his questions were answered. He understands and consents to the plan.\par \par FU in 1 week.\par after a right knee Durolane injection today (07/26/2021).\par for a left knee Durolane injection.\par after undergoing PT for his low back.

## 2021-07-26 NOTE — HISTORY OF PRESENT ILLNESS
[de-identified] : 60 year old male presents with bilateral knee pain, right currently worse than left. He received bilateral knee Durolane injections over 8 months ago that gave him 40-50% relief. He presents today complaining of worsening right knee pain. His left knee is typically worse than his right knee. He notes right knee locking and clicking that started recently. He presents for a right knee Durolane injection today (07/26/2021). He is also complaining of intermittent left hip pain that started a couple of weeks go after he fell down 1-2 steps. He describes left-sided lower back pain. He is using ice and a TENS machine with relief. He understands the importance of weight loss. He states he has lost some more weight, and is down 40 pounds overall since he started coming to the office. Pmhx: He has a hx of DM. His BMI is 48.4 (weight= 300 pounds, height = 5 foot 6 inches). \par \par Radiology Results done 9/23/2020 revealed:\par o Right Knee : Standing AP,lateral and tunnel views of the knee were obtained and revealed essentially bone on bone in the medial compartment with severe patellofemoral arthritis.\par o Left Knee : Standing AP,lateral and tunnel views of the knee were obtained and revealed essentially bone on bone in the medial compartment with severe patellofemoral arthritis.

## 2021-08-04 ENCOUNTER — APPOINTMENT (OUTPATIENT)
Dept: ORTHOPEDIC SURGERY | Facility: CLINIC | Age: 61
End: 2021-08-04
Payer: COMMERCIAL

## 2021-08-04 PROCEDURE — 20611 DRAIN/INJ JOINT/BURSA W/US: CPT | Mod: LT

## 2021-08-04 NOTE — DISCUSSION/SUMMARY
[de-identified] : I went over the pathophysiology of the patient's symptoms in great detail with the patient. The patient elected to receive a Durolane injection into his left knee today, and tolerated it well. I instructed the pt on ROM exercises, and told them to take it easy. The use of ice and rest was reviewed with the patient. The patient may resume activities tomorrow. I reminded the patient that it takes 4 to 6 weeks after the injection to feel symptom relief. All of his questions were answered. He understands and consents to the plan. \par \par FU 6 weeks.\par after a left knee Durolane injection today (08/04/2021).

## 2021-08-04 NOTE — PHYSICAL EXAM
[de-identified] : Constitutional\par o Appearance : well-nourished, well developed, alert, in no acute distress \par Head and Face\par o Head :\par ¦ Inspection : atraumatic, normocephalic\par o Face :\par ¦ Inspection : no visible rash or discoloration\par Respiratory\par o Respiratory Effort: breathing unlabored \par Neurologic\par o Mental Status Examination :\par ¦ Orientation : alert and oriented X 3\par Psychiatric\par o Mood and Affect: mood normal, affect appropriate\par Cardiovascular\par o Observation/Palpation : - no swelling\par Lymphatic\par o Additional Nodes : No palpable lymph nodes present\par \par Lumbosacral Spine\par o Inspection : no visible rash or discoloration\par o Palpation : moderate left paraspinal musculature tenderness, no sciatic notch tenderness\par o Range of Motion : extension and sidebending cause left paraspinal discomfort\par o Muscle Strength : paraspinal muscle strength and tone within normal limits\par o Muscle Tone : paraspinal muscle strength and tone within normal limits\par o Tests: straight leg test negative and ANISA test negative bilaterally \par \par Right Lower Extremity\par o Buttock : no tenderness, swelling or deformities \par o Right Hip :\par    - Inspection/Palpation : no tenderness, no swelling or deformities\par    - Range of Motion : full flexion, slight limitation of internal rotation, no crepitance\par    - Stability : joint stability intact\par    - Strength : extension, flexion, adduction, abduction, internal rotation and external rotation, 5/5 \par    - Tests: Diane’s test negative\par \par o Right Knee :\par ¦ Inspection/Palpation : moderate medial compartment tenderness to palpation, no swelling\par ¦ Range of Motion : 0-130° with pain on full flexion, no crepitance, decreased patellofemoral glide \par ¦ Stability : no valgus or varus instability present on provocative testing\par ¦ Strength : flexion and extension 5/5\par ¦ Tests and Signs : negative Anterior Drawer, negative Lachman, negative Kade\par \par Left Lower Extremity\par o Buttock : no tenderness, swelling or deformities \par o Left Hip :\par    - Inspection/Palpation : no tenderness, no swelling or deformities\par    - Range of Motion : full flexion, slight limitation of rotation, no crepitance\par    - Stability : joint stability intact\par    - Strength : extension, flexion, adduction, abduction, internal rotation and external rotation, 5/5 \par    - Tests: Diane’s test negative\par \par o Left Knee :\par ¦ Inspection/Palpation : moderate medial compartment tenderness to palpation, no swelling\par ¦ Range of Motion : 0-100° with pain on full flexion, decreased patellofemoral glide \par ¦ Stability : no valgus or varus instability present on provocative testing\par ¦ Strength : flexion and extension 5/5\par ¦ Tests and Signs : negative Anterior Drawer, negative Lachman, negative Kade\par \par o Peripheral Vascular System :\par ¦ Dorsalis Pedis Artery : pulse 2+ bilaterally\par \par Gait and Station:\par Gait: stiff on the left, no significant extremity swelling or lymphedema, trophic changes in both lower legs\par \par o Left Knee injection : Indication- knee osteoarthritis, Anatomic location- left intra-articular joint space, Spray - area was sterilized with Betadine and alcohol and anesthetized with Ethyl Chloride, needle used-20G, Medications given- 3cc's Durolane under Ultrasound guidance. \par oLot# [id number]   oExp: 2023-11-30

## 2021-08-04 NOTE — HISTORY OF PRESENT ILLNESS
[de-identified] : 60 year old male presents with bilateral knee pain, right worse than left. He notes right knee locking and clicking that started recently. He received a right knee Durolane injection on 7/26/2021. He notes no symptoms from the injection. He notes no swelling or bucking. He presents for a left knee Durolane injection today (08/04/2021). He is also complaining of intermittent left hip pain that started a couple of weeks go after he fell down 1-2 steps. He describes left-sided lower back pain. He is using ice and a TENS machine with relief. He is starting PT for his low back. He understands the importance of weight loss. He states he has lost some more weight, and is down 40 pounds overall since he started coming to the office. His BMI is 48.4 (weight= 300 pounds, height = 5 foot 6 inches). Pmhx: He is diabetic.\par \par Radiology Results taken on 7/26/21:\par o Lumbosacral Spine : AP and lateral views were obtained and revealed a grade 1 spondylolisthesis of L5 on S1 with diffuse facet arthropathy, no obvious compression fracture.\par o Hip and Pelvis: AP pelvis was obtained and revealed mild degenerative arthritis of both hips.\par o Right Knee : Standing AP, lateral and tunnel views of the knee were obtained and revealed significant medial and patellofemoral arthritis.\par o Left Knee : Standing AP, lateral and tunnel views of the knee were obtained and revealed almost bone on bone in the medial compartment with moderate patellofemoral arthritis.

## 2021-08-04 NOTE — ADDENDUM
[FreeTextEntry1] : I, Carlos Larsen, acted solely as a scribe for Dr. Steve Manning on this date 08/04/2021.\par All medical record entries made by the Scribe were at my, Dr. Steve Manning, direction and personally dictated by me on 08/04/2021. I have reviewed the chart and agree that the record accurately reflects my personal performance of the history, physical exam, assessment and plan. I have also personally directed, reviewed, and agreed with the chart.

## 2021-09-23 ENCOUNTER — APPOINTMENT (OUTPATIENT)
Dept: ORTHOPEDIC SURGERY | Facility: CLINIC | Age: 61
End: 2021-09-23
Payer: COMMERCIAL

## 2021-09-23 PROCEDURE — 99213 OFFICE O/P EST LOW 20 MIN: CPT

## 2021-09-23 NOTE — HISTORY OF PRESENT ILLNESS
[de-identified] : 61 year old male presents with bilateral knee pain, left worse than right. He notes right knee locking and clicking. He received Monovisc injections into his right knee on 7/26/2021, and into his left knee on 8/4/2021. He reports 75% improvement from these injections. He notes his right knee "pops" every once in a while. It is more surprising than painful. He denies buckling or giving out. He understands the importance of weight loss. He states he has lost some more weight, and is down 40 pounds overall since he started coming to the office. His BMI is 48.4 (weight= 300 pounds, height = 5 foot 6 inches). Pmhx: He is diabetic.\par \par Radiology Results taken on 7/26/21:\par o Lumbosacral Spine : AP and lateral views were obtained and revealed a grade 1 spondylolisthesis of L5 on S1 with diffuse facet arthropathy, no obvious compression fracture.\par o Hip and Pelvis: AP pelvis was obtained and revealed mild degenerative arthritis of both hips.\par o Right Knee : Standing AP, lateral and tunnel views of the knee were obtained and revealed significant medial and patellofemoral arthritis.\par o Left Knee : Standing AP, lateral and tunnel views of the knee were obtained and revealed almost bone on bone in the medial compartment with moderate patellofemoral arthritis.

## 2021-09-23 NOTE — ADDENDUM
[FreeTextEntry1] : I, Carlos Larsen, acted solely as a scribe for Dr. Steve Manning on this date 09/23/2021.\par All medical record entries made by the Scribe were at my, Dr. Steve Manning, direction and personally dictated by me on 09/23/2021. I have reviewed the chart and agree that the record accurately reflects my personal performance of the history, physical exam, assessment and plan. I have also personally directed, reviewed, and agreed with the chart.

## 2021-09-23 NOTE — DISCUSSION/SUMMARY
[de-identified] : I went over the pathophysiology of the patient's symptoms in great detail with the patient. I advised him to keep himself active. I stressed the importance of continued weight loss and its benefits to the patient’s joints and overall health. He needs to avoid high-impact activities such as running and jumping. I recommend alternative activities such as riding a stationary bike on low tension, or the elliptical. All of his questions were answered. He understands and consents to the plan.\par \par FU 3 months.

## 2021-09-23 NOTE — PHYSICAL EXAM
[de-identified] : Constitutional\par o Appearance : well-nourished, well developed, alert, in no acute distress \par Head and Face\par o Head :\par ¦ Inspection : atraumatic, normocephalic\par o Face :\par ¦ Inspection : no visible rash or discoloration\par Respiratory\par o Respiratory Effort: breathing unlabored \par Neurologic\par o Mental Status Examination :\par ¦ Orientation : alert and oriented X 3\par Psychiatric\par o Mood and Affect: mood normal, affect appropriate\par Cardiovascular\par o Observation/Palpation : - no swelling\par Lymphatic\par o Additional Nodes : No palpable lymph nodes present\par \par Lumbosacral Spine\par o Inspection : no visible rash or discoloration\par o Palpation : moderate left paraspinal musculature tenderness, no sciatic notch tenderness\par o Range of Motion : extension and sidebending cause left paraspinal discomfort\par o Muscle Strength : paraspinal muscle strength and tone within normal limits\par o Muscle Tone : paraspinal muscle strength and tone within normal limits\par o Tests: straight leg test negative and ANISA test negative bilaterally \par \par Right Lower Extremity\par o Buttock : no tenderness, swelling or deformities \par o Right Hip :\par    - Inspection/Palpation : no tenderness, no swelling or deformities\par    - Range of Motion : full flexion, slight limitation of internal rotation, no crepitance\par    - Stability : joint stability intact\par    - Strength : extension, flexion, adduction, abduction, internal rotation and external rotation, 5/5 \par    - Tests: Diane’s test negative\par \par o Right Knee :\par ¦ Inspection/Palpation : no lateral or medial compartment tenderness to palpation, no swelling, trophic changes\par ¦ Range of Motion : FROM, pain with full flexion, no crepitance, decreased patellofemoral glide \par ¦ Stability : no valgus or varus instability present on provocative testing\par ¦ Strength : flexion and extension 5/5\par ¦ Tests and Signs : negative Anterior Drawer, negative Lachman, negative Kade\par \par Left Lower Extremity\par o Buttock : no tenderness, swelling or deformities \par o Left Hip :\par    - Inspection/Palpation : no tenderness, no swelling or deformities\par    - Range of Motion : full flexion, slight limitation of rotation, no crepitance\par    - Stability : joint stability intact\par    - Strength : extension, flexion, adduction, abduction, internal rotation and external rotation, 5/5 \par    - Tests: Diane’s test negative\par \par o Left Knee :\par ¦ Inspection/Palpation : medial compartment tenderness to palpation, no swelling, trophic changes\par ¦ Range of Motion : 0-110° with discomfort past 110°, no crepitance, good patellofemoral glide \par ¦ Stability : no valgus or varus instability present on provocative testing\par ¦ Strength : flexion and extension 5/5\par ¦ Tests and Signs : negative Anterior Drawer, negative Lachman, negative Kade\par \par o Peripheral Vascular System :\par ¦ Dorsalis Pedis Artery : pulse 2+ bilaterally\par \par Gait and Station:\par Gait: stiff on the left, no significant extremity swelling or lymphedema, trophic changes in both lower legs

## 2021-12-20 ENCOUNTER — NON-APPOINTMENT (OUTPATIENT)
Age: 61
End: 2021-12-20

## 2021-12-23 ENCOUNTER — APPOINTMENT (OUTPATIENT)
Dept: ORTHOPEDIC SURGERY | Facility: CLINIC | Age: 61
End: 2021-12-23
Payer: COMMERCIAL

## 2021-12-23 PROCEDURE — 99213 OFFICE O/P EST LOW 20 MIN: CPT

## 2021-12-23 NOTE — DISCUSSION/SUMMARY
[de-identified] : I went over the pathophysiology of the patient's symptoms in great detail with the patient. I informed him that he is due for another Monovisc injection in the right knee after 01/26/2022 and the left knee 08/04/2022. We are requesting authorization for Durolane for the patient's bilateral knees. I stressed the importance of continued weight loss and its benefits to the patient’s joints and overall health. All of his questions were answered. He understands and consents to the plan.\par \par FU any time after 01/26/2022 for right knee viscosupplementation after it is authorized.

## 2021-12-23 NOTE — ADDENDUM
[FreeTextEntry1] : I, Carlos Larsen, acted solely as a scribe for Dr. Steve Manning on this date 12/23/2021.\par All medical record entries made by the Scribe were at my, Dr. Steve Manning, direction and personally dictated by me on 12/23/2021. I have reviewed the chart and agree that the record accurately reflects my personal performance of the history, physical exam, assessment and plan. I have also personally directed, reviewed, and agreed with the chart.

## 2021-12-23 NOTE — HISTORY OF PRESENT ILLNESS
[de-identified] : 61 year old male presents with bilateral knee pain, left worse than right. He notes right knee locking and clicking. He received Durolane injections into his right knee on 7/26/21, and into his left knee on 8/4/21. He reports 75% improvement from these injections. He notes his right knee "pops" every once in a while. It is more surprising than painful. He denies buckling or giving out. He takes Tylenol PM occasionally. \par He understands the importance of weight loss. He states he has lost some more weight, and is down 60 pounds overall since he started coming to the office. His BMI is 42.8 (weight= 265 pounds, height = 5 foot 6 inches). Pmhx of DM.\par \par Radiology Results taken on 7/26/21:\par o Lumbosacral Spine : AP and lateral views were obtained and revealed a grade 1 spondylolisthesis of L5 on S1 with diffuse facet arthropathy, no obvious compression fracture.\par o Hip and Pelvis: AP pelvis was obtained and revealed mild degenerative arthritis of both hips.\par o Right Knee : Standing AP, lateral and tunnel views of the knee were obtained and revealed significant medial and patellofemoral arthritis.\par o Left Knee : Standing AP, lateral and tunnel views of the knee were obtained and revealed almost bone on bone in the medial compartment with moderate patellofemoral arthritis.

## 2021-12-23 NOTE — PHYSICAL EXAM
[de-identified] : Constitutional\par o Appearance : well-nourished, well developed, alert, in no acute distress \par Head and Face\par o Head :\par ¦ Inspection : atraumatic, normocephalic\par o Face :\par ¦ Inspection : no visible rash or discoloration\par Respiratory\par o Respiratory Effort: breathing unlabored \par Neurologic\par o Mental Status Examination :\par ¦ Orientation : alert and oriented X 3\par Psychiatric\par o Mood and Affect: mood normal, affect appropriate\par Cardiovascular\par o Observation/Palpation : - no swelling\par Lymphatic\par o Additional Nodes : No palpable lymph nodes present\par \par Lumbosacral Spine\par o Inspection : no visible rash or discoloration\par o Palpation : moderate left paraspinal musculature tenderness, no sciatic notch tenderness\par o Range of Motion : extension and sidebending cause left paraspinal discomfort\par o Muscle Strength : paraspinal muscle strength and tone within normal limits\par o Muscle Tone : paraspinal muscle strength and tone within normal limits\par o Tests: straight leg test negative and ANISA test negative bilaterally \par \par Right Lower Extremity\par o Buttock : no tenderness, swelling or deformities \par o Right Hip :\par    - Inspection/Palpation : no tenderness, no swelling or deformities\par    - Range of Motion : full flexion, slight limitation of internal rotation, no crepitance\par    - Stability : joint stability intact\par    - Strength : extension, flexion, adduction, abduction, internal rotation and external rotation, 5/5 \par    - Tests: Diane’s test negative\par \par o Right Knee :\par ¦ Inspection/Palpation : no lateral or medial compartment tenderness to palpation, no swelling, trophic changes\par ¦ Range of Motion : FROM, mild discomfort with full flexion, no crepitance, good patellofemoral glide \par ¦ Stability : no valgus or varus instability present on provocative testing\par ¦ Strength : flexion and extension 5/5\par ¦ Tests and Signs : negative Anterior Drawer, negative Lachman, negative Kade\par \par Left Lower Extremity\par o Buttock : no tenderness, swelling or deformities \par o Left Hip :\par    - Inspection/Palpation : no tenderness, no swelling or deformities\par    - Range of Motion : full flexion, slight limitation of rotation, no crepitance\par    - Stability : joint stability intact\par    - Strength : extension, flexion, adduction, abduction, internal rotation and external rotation, 5/5 \par    - Tests: Diane’s test negative\par \par o Left Knee :\par ¦ Inspection/Palpation : medial compartment tenderness to palpation, no swelling, trophic changes\par ¦ Range of Motion : 0-110° with discomfort past 110°, decreased patellofemoral glide with crepitance\par ¦ Stability : no valgus or varus instability present on provocative testing\par ¦ Strength : flexion and extension 5/5\par ¦ Tests and Signs : negative Anterior Drawer, negative Lachman, negative Kade\par \par o Peripheral Vascular System :\par ¦ Dorsalis Pedis Artery : pulse 2+ bilaterally\par \par Gait and Station:\par Gait: stiff on the left, no significant extremity swelling or lymphedema, trophic changes of both legs, no foot drop

## 2021-12-26 ENCOUNTER — FORM ENCOUNTER (OUTPATIENT)
Age: 61
End: 2021-12-26

## 2022-02-17 ENCOUNTER — INPATIENT (INPATIENT)
Facility: HOSPITAL | Age: 62
LOS: 0 days | Discharge: HOME | End: 2022-02-18
Attending: INTERNAL MEDICINE | Admitting: INTERNAL MEDICINE
Payer: COMMERCIAL

## 2022-02-17 VITALS
SYSTOLIC BLOOD PRESSURE: 142 MMHG | HEART RATE: 84 BPM | DIASTOLIC BLOOD PRESSURE: 82 MMHG | OXYGEN SATURATION: 96 % | RESPIRATION RATE: 16 BRPM | TEMPERATURE: 98 F

## 2022-02-17 DIAGNOSIS — R94.39 ABNORMAL RESULT OF OTHER CARDIOVASCULAR FUNCTION STUDY: ICD-10-CM

## 2022-02-17 LAB
ANION GAP SERPL CALC-SCNC: 10 MMOL/L — SIGNIFICANT CHANGE UP (ref 7–14)
BUN SERPL-MCNC: 13 MG/DL — SIGNIFICANT CHANGE UP (ref 7–23)
CALCIUM SERPL-MCNC: 9.3 MG/DL — SIGNIFICANT CHANGE UP (ref 8.4–10.5)
CHLORIDE SERPL-SCNC: 101 MMOL/L — SIGNIFICANT CHANGE UP (ref 98–107)
CO2 SERPL-SCNC: 28 MMOL/L — SIGNIFICANT CHANGE UP (ref 22–31)
CREAT SERPL-MCNC: 0.73 MG/DL — SIGNIFICANT CHANGE UP (ref 0.5–1.3)
GLUCOSE SERPL-MCNC: 139 MG/DL — HIGH (ref 70–99)
HCT VFR BLD CALC: 46.1 % — SIGNIFICANT CHANGE UP (ref 39–50)
HGB BLD-MCNC: 15.4 G/DL — SIGNIFICANT CHANGE UP (ref 13–17)
MCHC RBC-ENTMCNC: 28.3 PG — SIGNIFICANT CHANGE UP (ref 27–34)
MCHC RBC-ENTMCNC: 33.4 GM/DL — SIGNIFICANT CHANGE UP (ref 32–36)
MCV RBC AUTO: 84.7 FL — SIGNIFICANT CHANGE UP (ref 80–100)
NRBC # BLD: 0 /100 WBCS — SIGNIFICANT CHANGE UP
NRBC # FLD: 0 K/UL — SIGNIFICANT CHANGE UP
PLATELET # BLD AUTO: 200 K/UL — SIGNIFICANT CHANGE UP (ref 150–400)
POTASSIUM SERPL-MCNC: 3.8 MMOL/L — SIGNIFICANT CHANGE UP (ref 3.5–5.3)
POTASSIUM SERPL-SCNC: 3.8 MMOL/L — SIGNIFICANT CHANGE UP (ref 3.5–5.3)
RBC # BLD: 5.44 M/UL — SIGNIFICANT CHANGE UP (ref 4.2–5.8)
RBC # FLD: 13.7 % — SIGNIFICANT CHANGE UP (ref 10.3–14.5)
SODIUM SERPL-SCNC: 139 MMOL/L — SIGNIFICANT CHANGE UP (ref 135–145)
WBC # BLD: 6.42 K/UL — SIGNIFICANT CHANGE UP (ref 3.8–10.5)
WBC # FLD AUTO: 6.42 K/UL — SIGNIFICANT CHANGE UP (ref 3.8–10.5)

## 2022-02-17 PROCEDURE — 93010 ELECTROCARDIOGRAM REPORT: CPT

## 2022-02-17 RX ORDER — TICAGRELOR 90 MG/1
1 TABLET ORAL
Qty: 60 | Refills: 0
Start: 2022-02-17 | End: 2022-03-18

## 2022-02-17 RX ORDER — SODIUM CHLORIDE 9 MG/ML
500 INJECTION INTRAMUSCULAR; INTRAVENOUS; SUBCUTANEOUS
Refills: 0 | Status: DISCONTINUED | OUTPATIENT
Start: 2022-02-17 | End: 2022-02-18

## 2022-02-17 RX ORDER — DEXTROSE 50 % IN WATER 50 %
25 SYRINGE (ML) INTRAVENOUS ONCE
Refills: 0 | Status: DISCONTINUED | OUTPATIENT
Start: 2022-02-17 | End: 2022-02-18

## 2022-02-17 RX ORDER — POLYETHYLENE GLYCOL 3350 17 G/17G
1 POWDER, FOR SOLUTION ORAL
Qty: 0 | Refills: 0 | DISCHARGE

## 2022-02-17 RX ORDER — LOSARTAN POTASSIUM 100 MG/1
50 TABLET, FILM COATED ORAL DAILY
Refills: 0 | Status: DISCONTINUED | OUTPATIENT
Start: 2022-02-17 | End: 2022-02-18

## 2022-02-17 RX ORDER — ATORVASTATIN CALCIUM 80 MG/1
40 TABLET, FILM COATED ORAL AT BEDTIME
Refills: 0 | Status: DISCONTINUED | OUTPATIENT
Start: 2022-02-17 | End: 2022-02-18

## 2022-02-17 RX ORDER — DEXTROSE 50 % IN WATER 50 %
15 SYRINGE (ML) INTRAVENOUS ONCE
Refills: 0 | Status: DISCONTINUED | OUTPATIENT
Start: 2022-02-17 | End: 2022-02-18

## 2022-02-17 RX ORDER — SODIUM CHLORIDE 9 MG/ML
3 INJECTION INTRAMUSCULAR; INTRAVENOUS; SUBCUTANEOUS EVERY 8 HOURS
Refills: 0 | Status: DISCONTINUED | OUTPATIENT
Start: 2022-02-17 | End: 2022-02-18

## 2022-02-17 RX ORDER — GLUCAGON INJECTION, SOLUTION 0.5 MG/.1ML
1 INJECTION, SOLUTION SUBCUTANEOUS ONCE
Refills: 0 | Status: DISCONTINUED | OUTPATIENT
Start: 2022-02-17 | End: 2022-02-18

## 2022-02-17 RX ORDER — INSULIN LISPRO 100/ML
VIAL (ML) SUBCUTANEOUS
Refills: 0 | Status: DISCONTINUED | OUTPATIENT
Start: 2022-02-17 | End: 2022-02-18

## 2022-02-17 RX ORDER — SODIUM CHLORIDE 9 MG/ML
1000 INJECTION, SOLUTION INTRAVENOUS
Refills: 0 | Status: DISCONTINUED | OUTPATIENT
Start: 2022-02-17 | End: 2022-02-18

## 2022-02-17 RX ORDER — LOSARTAN POTASSIUM 100 MG/1
1 TABLET, FILM COATED ORAL
Qty: 0 | Refills: 0 | DISCHARGE

## 2022-02-17 RX ORDER — HYDROCHLOROTHIAZIDE 25 MG
12.5 TABLET ORAL DAILY
Refills: 0 | Status: DISCONTINUED | OUTPATIENT
Start: 2022-02-17 | End: 2022-02-18

## 2022-02-17 RX ORDER — METFORMIN HYDROCHLORIDE 850 MG/1
1 TABLET ORAL
Qty: 0 | Refills: 0 | DISCHARGE

## 2022-02-17 RX ORDER — DEXTROSE 50 % IN WATER 50 %
12.5 SYRINGE (ML) INTRAVENOUS ONCE
Refills: 0 | Status: DISCONTINUED | OUTPATIENT
Start: 2022-02-17 | End: 2022-02-18

## 2022-02-17 RX ORDER — ASPIRIN/CALCIUM CARB/MAGNESIUM 324 MG
81 TABLET ORAL DAILY
Refills: 0 | Status: DISCONTINUED | OUTPATIENT
Start: 2022-02-18 | End: 2022-02-18

## 2022-02-17 RX ORDER — TICAGRELOR 90 MG/1
90 TABLET ORAL EVERY 12 HOURS
Refills: 0 | Status: DISCONTINUED | OUTPATIENT
Start: 2022-02-18 | End: 2022-02-18

## 2022-02-17 RX ADMIN — SODIUM CHLORIDE 100 MILLILITER(S): 9 INJECTION INTRAMUSCULAR; INTRAVENOUS; SUBCUTANEOUS at 16:40

## 2022-02-17 RX ADMIN — SODIUM CHLORIDE 3 MILLILITER(S): 9 INJECTION INTRAMUSCULAR; INTRAVENOUS; SUBCUTANEOUS at 14:21

## 2022-02-17 NOTE — H&P CARDIOLOGY - REVIEW OF SYSTEMS
The patient denies chest pain, palpitations, dizziness, presyncope, syncope,  headache, visual disturbances, CVA, PE, DVT, LENIN, abdominal pain, N/V/D/C, hematochezia, melena, dysuria, hematuria, fever, chills.

## 2022-02-17 NOTE — H&P CARDIOLOGY - NSICDXPASTMEDICALHX_GEN_ALL_CORE_FT
PAST MEDICAL HISTORY:  Hypertension     Morbid obesity with BMI of 50.0-59.9, adult     LENIN (Obstructive Sleep Apnea) Sleep study done 2015  uses CPAP at home    Type 2 diabetes mellitus     Venous insufficiency of both lower extremities      PAST MEDICAL HISTORY:  Anemia     Hypertension     Morbid obesity with BMI of 50.0-59.9, adult     LENIN (Obstructive Sleep Apnea) Sleep study done 2015  uses CPAP at home    Type 2 diabetes mellitus     Venous insufficiency of both lower extremities

## 2022-02-17 NOTE — PROVIDER CONTACT NOTE (MEDICATION) - ACTION/TREATMENT ORDERED:
Test script sent to [facility], copay $30. The patient is aware and agrrees to pay.   Coupon for Brilinta is given.  Please fill the prescription to the pharmacy preferred by the patient.

## 2022-02-17 NOTE — CHART NOTE - NSCHARTNOTEFT_GEN_A_CORE
ESPINOZA CUNNINGHAM   61y Male s/p cath Rt radial site check. Dressing is clean/dry/intact. Site is without hematoma or bleeding. RUE: Radial & ulnar pulses 2+. Patient denies any pain, numbness, tingling, CP, SOB.   VSS. Will continue to monitor.     Vital Signs Last 24 Hrs  T(C): 36.8 (17 Feb 2022 18:44), Max: 36.8 (17 Feb 2022 18:44)  T(F): 98.2 (17 Feb 2022 18:44), Max: 98.2 (17 Feb 2022 18:44)  HR: 84 (17 Feb 2022 18:44) (84 - 84)  BP: 142/82 (17 Feb 2022 18:44) (142/82 - 142/82)  BP(mean): --  RR: 16 (17 Feb 2022 18:44) (16 - 16)  SpO2: 96% (17 Feb 2022 18:44) (96% - 96%)  Pulses palpable, cap refill <2 sec.     Magalis Reeves PA-C  Department of Medicine - Night Coverage  [facility]  In House Pager [phone] ESPINO[facility] CUNNING[place]AM   61y Male s/p cath - Rt radial site check. Dressing is clean/dry/intact. Site is without hematoma or bleeding. RUE: Radial & ulnar pulses 2+. Patient denies any pain, numbness, tingling, CP, SOB.   VSS. Will continue to monitor.     Vital Signs Last 24 Hrs  T(C): 36.8 (17 Feb 2022 18:44), Max: 36.8 (17 Feb 2022 18:44)  T(F): 98.2 (17 Feb 2022 18:44), Max: 98.2 (17 Feb 2022 18:44)  HR: 84 (17 Feb 2022 18:44) (84 - 84)  BP: 142/82 (17 Feb 2022 18:44) (142/82 - 142/82)  BP(mean): --  RR: 16 (17 Feb 2022 18:44) (16 - 16)  SpO2: 96% (17 Feb 2022 18:44) (96% - 96%)  Pulses palpable, cap refill <2 sec.     Magalis Reeves PA-C  Department of Medicine - Night Coverage  [facility]  In House Pager [phone]

## 2022-02-17 NOTE — H&P CARDIOLOGY - NSICDXFAMILYHX_GEN_ALL_CORE_FT
FAMILY HISTORY:  Father  Still living? Yes, Estimated age: 81-90  Family history of diabetes mellitus in father, Age at diagnosis: Age Unknown    Mother  Still living? No  Family history of diabetes mellitus in mother, Age at diagnosis: Age Unknown

## 2022-02-17 NOTE — H&P CARDIOLOGY - HISTORY OF PRESENT ILLNESS
61 y.o. male presents today for elective cardiac catheterization.  61 y.o. male presents today for elective cardiac catheterization. The patient c/o SOB with exertion (climbing stairs), started a few years ago, attributes the symptoms to being overweight. The patient denies chest pain,  palpitations, dizziness, presyncope, syncope,  headache, visual disturbances, CVA, PE, DVT, LENIN, abdominal pain, N/V/D/C, hematochezia, melena, dysuria, hematuria, fever, chills. The patient was evaluated by a cardiologist was found to have abnormal stress test, recommended to have cardiac catheterization.

## 2022-02-17 NOTE — PROVIDER CONTACT NOTE (MEDICATION) - SITUATION
The patient is s/p cardiac cath, LCx 90%=> stent x 1, Om1 80%=> stent x 1, needs to be on DAPT, ASA 81 mg PO daily and Brilinta 90 mg PO BID

## 2022-02-17 NOTE — H&P CARDIOLOGY - NSICDXPASTSURGICALHX_GEN_ALL_CORE_FT
PAST SURGICAL HISTORY:  Gastric Bypass 1998.  Patient lost 225 lbs and has regained 125 bs in the last 9years.    H/O ventral hernia repair 08/24/12 incarcerated ventral hernia repair with mesh.    History of Abdominoplasty 2001    History of Appendectomy 1982    Incisional Hernia Repair 2005    S/P Cholecystectomy     Umbilical Hernia Repair 2002

## 2022-02-18 ENCOUNTER — TRANSCRIPTION ENCOUNTER (OUTPATIENT)
Age: 62
End: 2022-02-18

## 2022-02-18 VITALS
HEART RATE: 70 BPM | DIASTOLIC BLOOD PRESSURE: 83 MMHG | OXYGEN SATURATION: 99 % | SYSTOLIC BLOOD PRESSURE: 136 MMHG | TEMPERATURE: 98 F | RESPIRATION RATE: 18 BRPM

## 2022-02-18 LAB
ANION GAP SERPL CALC-SCNC: 12 MMOL/L — SIGNIFICANT CHANGE UP (ref 7–14)
BUN SERPL-MCNC: 11 MG/DL — SIGNIFICANT CHANGE UP (ref 7–23)
CALCIUM SERPL-MCNC: 8.9 MG/DL — SIGNIFICANT CHANGE UP (ref 8.4–10.5)
CHLORIDE SERPL-SCNC: 104 MMOL/L — SIGNIFICANT CHANGE UP (ref 98–107)
CO2 SERPL-SCNC: 23 MMOL/L — SIGNIFICANT CHANGE UP (ref 22–31)
CREAT SERPL-MCNC: 0.67 MG/DL — SIGNIFICANT CHANGE UP (ref 0.5–1.3)
GLUCOSE SERPL-MCNC: 110 MG/DL — HIGH (ref 70–99)
HCT VFR BLD CALC: 43.3 % — SIGNIFICANT CHANGE UP (ref 39–50)
HGB BLD-MCNC: 14.4 G/DL — SIGNIFICANT CHANGE UP (ref 13–17)
MAGNESIUM SERPL-MCNC: 2 MG/DL — SIGNIFICANT CHANGE UP (ref 1.6–2.6)
MCHC RBC-ENTMCNC: 28.4 PG — SIGNIFICANT CHANGE UP (ref 27–34)
MCHC RBC-ENTMCNC: 33.3 GM/DL — SIGNIFICANT CHANGE UP (ref 32–36)
MCV RBC AUTO: 85.4 FL — SIGNIFICANT CHANGE UP (ref 80–100)
NRBC # BLD: 0 /100 WBCS — SIGNIFICANT CHANGE UP
NRBC # FLD: 0 K/UL — SIGNIFICANT CHANGE UP
PHOSPHATE SERPL-MCNC: 3.3 MG/DL — SIGNIFICANT CHANGE UP (ref 2.5–4.5)
PLATELET # BLD AUTO: 179 K/UL — SIGNIFICANT CHANGE UP (ref 150–400)
POTASSIUM SERPL-MCNC: 3.7 MMOL/L — SIGNIFICANT CHANGE UP (ref 3.5–5.3)
POTASSIUM SERPL-SCNC: 3.7 MMOL/L — SIGNIFICANT CHANGE UP (ref 3.5–5.3)
RBC # BLD: 5.07 M/UL — SIGNIFICANT CHANGE UP (ref 4.2–5.8)
RBC # FLD: 13.9 % — SIGNIFICANT CHANGE UP (ref 10.3–14.5)
SODIUM SERPL-SCNC: 139 MMOL/L — SIGNIFICANT CHANGE UP (ref 135–145)
WBC # BLD: 7.87 K/UL — SIGNIFICANT CHANGE UP (ref 3.8–10.5)
WBC # FLD AUTO: 7.87 K/UL — SIGNIFICANT CHANGE UP (ref 3.8–10.5)

## 2022-02-18 RX ORDER — ATORVASTATIN CALCIUM 80 MG/1
1 TABLET, FILM COATED ORAL
Qty: 30 | Refills: 0
Start: 2022-02-18 | End: 2022-03-19

## 2022-02-18 RX ORDER — METOPROLOL TARTRATE 50 MG
1 TABLET ORAL
Qty: 30 | Refills: 0
Start: 2022-02-18 | End: 2022-03-19

## 2022-02-18 RX ORDER — METFORMIN HYDROCHLORIDE 850 MG/1
1 TABLET ORAL
Qty: 0 | Refills: 0 | DISCHARGE

## 2022-02-18 RX ORDER — ATORVASTATIN CALCIUM 80 MG/1
1 TABLET, FILM COATED ORAL
Qty: 0 | Refills: 0 | DISCHARGE

## 2022-02-18 RX ORDER — METOPROLOL TARTRATE 50 MG
25 TABLET ORAL
Refills: 0 | Status: DISCONTINUED | OUTPATIENT
Start: 2022-02-18 | End: 2022-02-18

## 2022-02-18 RX ADMIN — Medication 25 MILLIGRAM(S): at 12:34

## 2022-02-18 RX ADMIN — Medication 81 MILLIGRAM(S): at 12:30

## 2022-02-18 RX ADMIN — SODIUM CHLORIDE 3 MILLILITER(S): 9 INJECTION INTRAMUSCULAR; INTRAVENOUS; SUBCUTANEOUS at 06:35

## 2022-02-18 NOTE — CONSULT NOTE ADULT - ASSESSMENT
[facility] 2/17/22:   LCx 90% s/p PCI, OM1 80% s/p PCI     a/p    61 y.o. male presents today for elective cardiac catheterization.     #CAD s/p PCI x2  -cv stable   -s/p LHC: LCx 90%=> stent x1, OM1 80%=> stent x1  -c/w asa, Brilinta, statin  -sbp stable - low dose Toprol XL started    #HTN  -sbp stable  -c/w current meds  -bb added as above    dvt ppx  plan discussed with ACP  DCP for today  pt to f/u w outpt cardio Dr. Diehl

## 2022-02-18 NOTE — DISCHARGE NOTE NURSING/CASE MANAGEMENT/SOCIAL WORK - NSDCPEFALRISK_GEN_ALL_CORE
For information on Fall & Injury Prevention, visit: [email or web address] OR  [email or web address] OR  https://www.cdc.gov/steadi/patient.html

## 2022-02-18 NOTE — CONSULT NOTE ADULT - TIME BILLING
Patient seen and examined.  Agree with above NP note.  cv stable post pci   cont DAP  cont bb  outpt f/u dr ortiz

## 2022-02-18 NOTE — CONSULT NOTE ADULT - SUBJECTIVE AND OBJECTIVE BOX
CARDIOLOGY CONSULT - Dr. Godinez         HPI:  61 y.o. male presents today for elective cardiac catheterization. The patient c/o SOB with exertion (climbing stairs), started a few years ago, attributes the symptoms to being overweight. The patient denies chest pain,  palpitations, dizziness, presyncope, syncope,  headache, visual disturbances, CVA, PE, DVT, LENIN, abdominal pain, N/V/D/C, hematochezia, melena, dysuria, hematuria, fever, chills. The patient was evaluated by a cardiologist was found to have abnormal stress test, recommended to have cardiac catheterization.    (17 Feb 2022 11:23)      PAST MEDICAL & SURGICAL HISTORY:  Hypertension    LENIN (Obstructive Sleep Apnea)  Sleep study done 2015  uses CPAP at home    Type 2 diabetes mellitus    Morbid obesity with BMI of 50.0-59.9, adult    Venous insufficiency of both lower extremities    Anemia    History of Appendectomy  1982    Gastric Bypass  1998.  Patient lost 225 lbs and has regained 125 bs in the last 9years.    Umbilical Hernia Repair  2002    History of Abdominoplasty  2001    Incisional Hernia Repair  2005    S/P Cholecystectomy    H/O ventral hernia repair  08/24/12 incarcerated ventral hernia repair with mesh.            PREVIOUS DIAGNOSTIC TESTING:    [ ] Echocardiogram:  [ ]  Catheterization:  [ ] Stress Test:  	    MEDICATIONS:  Home Medications:  atorvastatin 10 mg oral tablet: 1 tab(s) orally once a day (17 Feb 2022 14:09)  Ecotrin Adult Low Strength 81 mg oral delayed release tablet: 1 tab(s) orally once a day (17 Feb 2022 11:25)  losartan-hydrochlorothiazide 50 mg-12.5 mg oral tablet: 1 tab(s) orally once a day (17 Feb 2022 14:09)  metFORMIN 500 mg oral tablet: 1 tab(s) orally 2 times a day (17 Feb 2022 14:09)  Vitamin D3 5000 intl units oral capsule: 1 cap(s) orally once a day (17 Feb 2022 11:25)      MEDICATIONS  (STANDING):  aspirin enteric coated 81 milliGRAM(s) Oral daily  atorvastatin 40 milliGRAM(s) Oral at bedtime  dextrose 40% Gel 15 Gram(s) Oral once  dextrose 5%. 1000 milliLiter(s) (50 mL/Hr) IV Continuous <Continuous>  dextrose 5%. 1000 milliLiter(s) (100 mL/Hr) IV Continuous <Continuous>  dextrose 50% Injectable 25 Gram(s) IV Push once  dextrose 50% Injectable 12.5 Gram(s) IV Push once  dextrose 50% Injectable 25 Gram(s) IV Push once  glucagon  Injectable 1 milliGRAM(s) IntraMuscular once  hydrochlorothiazide 12.5 milliGRAM(s) Oral daily  insulin lispro (ADMELOG) corrective regimen sliding scale   SubCutaneous three times a day before meals  losartan 50 milliGRAM(s) Oral daily  metoprolol succinate ER 25 milliGRAM(s) Oral <User Schedule>  sodium chloride 0.9% lock flush 3 milliLiter(s) IV Push every 8 hours  sodium chloride 0.9%. 500 milliLiter(s) (100 mL/Hr) IV Continuous <Continuous>  ticagrelor 90 milliGRAM(s) Oral every 12 hours      FAMILY HISTORY:  Family history of diabetes mellitus in mother (Mother)  heart attack, ovarian cancer    Family history of diabetes mellitus in father (Father)        SOCIAL HISTORY:    [ ] Non-smoker  [ ] Smoker  [ ] Alcohol    Allergies    penicillin (Hives; Urticaria; Rash)    Intolerances    	    REVIEW OF SYSTEMS:  CONSTITUTIONAL: No fever, weight loss, or fatigue  EYES: No eye pain, visual disturbances, or discharge  ENMT:  No difficulty hearing, tinnitus, vertigo; No sinus or throat pain  NECK: No pain or stiffness  RESPIRATORY: No cough, wheezing, chills or hemoptysis; No Shortness of Breath  CARDIOVASCULAR: No chest pain, palpitations, passing out, dizziness, or leg swelling  GASTROINTESTINAL: No abdominal or epigastric pain. No nausea, vomiting, or hematemesis; No diarrhea or constipation. No melena or hematochezia.  GENITOURINARY: No dysuria, frequency, hematuria, or incontinence  NEUROLOGICAL: No headaches, memory loss, loss of strength, numbness, or tremors  SKIN: No itching, burning, rashes, or lesions   	    [ ] All others negative	  [ ] Unable to obtain    PHYSICAL EXAM:  T(C): 36.7 (02-18-22 @ 06:45), Max: 36.9 (02-18-22 @ 02:44)  HR: 75 (02-18-22 @ 06:45) (71 - 84)  BP: 140/86 (02-18-22 @ 06:45) (125/72 - 142/82)  RR: 18 (02-18-22 @ 06:45) (16 - 18)  SpO2: 97% (02-18-22 @ 06:45) (96% - 97%)  Wt(kg): --  I&O's Summary      Appearance: Normal	  Psychiatry: A & O x 3, Mood & affect appropriate  HEENT:   Normal oral mucosa, PERRL, EOMI	  Lymphatic: No lymphadenopathy  Cardiovascular: Normal S1 S2,RRR, No JVD, No murmurs  Respiratory: Lungs clear to auscultation	  Gastrointestinal:  Soft, Non-tender, + BS	  Skin: No rashes, No ecchymoses, No cyanosis	  Neurologic: Non-focal  Extremities: Normal range of motion, No clubbing, cyanosis or edema  Vascular: Peripheral pulses palpable 2+ bilaterally    TELEMETRY: 	    ECG:  	  RADIOLOGY:  OTHER: 	  	  LABS:	 	    CARDIAC MARKERS:                                  14.4   7.87  )-----------( 179      ( 18 Feb 2022 06:54 )             43.3     02-18    139  |  104  |  11  ----------------------------<  110<H>  3.7   |  23  |  0.67    Ca    8.9      18 Feb 2022 06:54  Phos  3.3     02-18  Mg     2.00     02-18        proBNP:   Lipid Profile:   HgA1c:   TSH:

## 2022-02-18 NOTE — DISCHARGE NOTE PROVIDER - NSDCMRMEDTOKEN_GEN_ALL_CORE_FT
atorvastatin 40 mg oral tablet: 1 tab(s) orally once a day (at bedtime)  Brilinta (ticagrelor) 90 mg oral tablet: 1 tab(s) orally 2 times a day   Ecotrin Adult Low Strength 81 mg oral delayed release tablet: 1 tab(s) orally once a day  losartan-hydrochlorothiazide 50 mg-12.5 mg oral tablet: 1 tab(s) orally once a day  metFORMIN 500 mg oral tablet: 1 tab(s) orally 2 times a day  restart 2/20  metoprolol succinate 25 mg oral tablet, extended release: 1 tab(s) orally once a day   Vitamin D3 5000 intl units oral capsule: 1 cap(s) orally once a day

## 2022-02-18 NOTE — DISCHARGE NOTE NURSING/CASE MANAGEMENT/SOCIAL WORK - PATIENT PORTAL LINK FT
You can access the FollowMyHealth Patient Portal offered by [facility] by registering at the following website: [email or web address]. By joining [facility]’s FollowMyHealth portal, you will also be able to view your health information using other applications (apps) compatible with our system.

## 2022-02-18 NOTE — DISCHARGE NOTE PROVIDER - NSDCCPCAREPLAN_GEN_ALL_CORE_FT
PRINCIPAL DISCHARGE DIAGNOSIS  Diagnosis: CAD (coronary artery disease)  Assessment and Plan of Treatment: You underwent a cardiac catherization during your stay with Dr. Godinez.  You had stents placed  Continue brilinta and aspirin  Increase lipitor dose to 40mg oral daily  Start metoprolol as ordered  Follow up with Dr. Diehl

## 2022-02-18 NOTE — DISCHARGE NOTE PROVIDER - HOSPITAL COURSE
61 y.o. male presents today for elective cardiac catheterization.     #CAD s/p PCI x2  -cv stable   -s/p LHC: LCx 90%=> stent x1, OM1 80%=> stent x1  -c/w asa, Brilinta, statin  -sbp stable - low dose Toprol XL started    #HTN  -sbp stable  -c/w current meds  -bb added as above    dvt ppx  plan discussed with ACP  DCP for today  pt to f/u w outpt cardio Dr. Diehl

## 2022-02-19 ENCOUNTER — EMERGENCY (EMERGENCY)
Facility: HOSPITAL | Age: 62
LOS: 1 days | Discharge: HOME | End: 2022-02-19
Attending: EMERGENCY MEDICINE | Admitting: EMERGENCY MEDICINE
Payer: COMMERCIAL

## 2022-02-19 VITALS
HEART RATE: 65 BPM | HEIGHT: 66 IN | SYSTOLIC BLOOD PRESSURE: 142 MMHG | OXYGEN SATURATION: 98 % | DIASTOLIC BLOOD PRESSURE: 83 MMHG | RESPIRATION RATE: 17 BRPM | TEMPERATURE: 97 F

## 2022-02-19 VITALS
HEART RATE: 79 BPM | DIASTOLIC BLOOD PRESSURE: 80 MMHG | TEMPERATURE: 98 F | RESPIRATION RATE: 16 BRPM | SYSTOLIC BLOOD PRESSURE: 135 MMHG | OXYGEN SATURATION: 98 %

## 2022-02-19 PROBLEM — D64.9 ANEMIA, UNSPECIFIED: Chronic | Status: ACTIVE | Noted: 2022-02-17

## 2022-02-19 LAB
ALBUMIN SERPL ELPH-MCNC: 3.8 G/DL — SIGNIFICANT CHANGE UP (ref 3.3–5)
ALP SERPL-CCNC: 69 U/L — SIGNIFICANT CHANGE UP (ref 40–120)
ALT FLD-CCNC: 19 U/L — SIGNIFICANT CHANGE UP (ref 4–41)
ANION GAP SERPL CALC-SCNC: 12 MMOL/L — SIGNIFICANT CHANGE UP (ref 7–14)
AST SERPL-CCNC: 14 U/L — SIGNIFICANT CHANGE UP (ref 4–40)
BASOPHILS # BLD AUTO: 0.05 K/UL — SIGNIFICANT CHANGE UP (ref 0–0.2)
BASOPHILS NFR BLD AUTO: 0.6 % — SIGNIFICANT CHANGE UP (ref 0–2)
BILIRUB SERPL-MCNC: 0.6 MG/DL — SIGNIFICANT CHANGE UP (ref 0.2–1.2)
BUN SERPL-MCNC: 17 MG/DL — SIGNIFICANT CHANGE UP (ref 7–23)
CALCIUM SERPL-MCNC: 9.3 MG/DL — SIGNIFICANT CHANGE UP (ref 8.4–10.5)
CHLORIDE SERPL-SCNC: 100 MMOL/L — SIGNIFICANT CHANGE UP (ref 98–107)
CO2 SERPL-SCNC: 24 MMOL/L — SIGNIFICANT CHANGE UP (ref 22–31)
CREAT SERPL-MCNC: 0.66 MG/DL — SIGNIFICANT CHANGE UP (ref 0.5–1.3)
EOSINOPHIL # BLD AUTO: 0.17 K/UL — SIGNIFICANT CHANGE UP (ref 0–0.5)
EOSINOPHIL NFR BLD AUTO: 2 % — SIGNIFICANT CHANGE UP (ref 0–6)
FLUAV AG NPH QL: SIGNIFICANT CHANGE UP
FLUBV AG NPH QL: SIGNIFICANT CHANGE UP
GLUCOSE SERPL-MCNC: 117 MG/DL — HIGH (ref 70–99)
HCT VFR BLD CALC: 46.7 % — SIGNIFICANT CHANGE UP (ref 39–50)
HGB BLD-MCNC: 15.6 G/DL — SIGNIFICANT CHANGE UP (ref 13–17)
IANC: 6.31 K/UL — SIGNIFICANT CHANGE UP (ref 1.5–8.5)
IMM GRANULOCYTES NFR BLD AUTO: 0.2 % — SIGNIFICANT CHANGE UP (ref 0–1.5)
LYMPHOCYTES # BLD AUTO: 1.29 K/UL — SIGNIFICANT CHANGE UP (ref 1–3.3)
LYMPHOCYTES # BLD AUTO: 15.3 % — SIGNIFICANT CHANGE UP (ref 13–44)
MCHC RBC-ENTMCNC: 28.4 PG — SIGNIFICANT CHANGE UP (ref 27–34)
MCHC RBC-ENTMCNC: 33.4 GM/DL — SIGNIFICANT CHANGE UP (ref 32–36)
MCV RBC AUTO: 84.9 FL — SIGNIFICANT CHANGE UP (ref 80–100)
MONOCYTES # BLD AUTO: 0.57 K/UL — SIGNIFICANT CHANGE UP (ref 0–0.9)
MONOCYTES NFR BLD AUTO: 6.8 % — SIGNIFICANT CHANGE UP (ref 2–14)
NEUTROPHILS # BLD AUTO: 6.31 K/UL — SIGNIFICANT CHANGE UP (ref 1.8–7.4)
NEUTROPHILS NFR BLD AUTO: 75.1 % — SIGNIFICANT CHANGE UP (ref 43–77)
NRBC # BLD: 0 /100 WBCS — SIGNIFICANT CHANGE UP
NRBC # FLD: 0 K/UL — SIGNIFICANT CHANGE UP
PLATELET # BLD AUTO: 199 K/UL — SIGNIFICANT CHANGE UP (ref 150–400)
POTASSIUM SERPL-MCNC: 3.8 MMOL/L — SIGNIFICANT CHANGE UP (ref 3.5–5.3)
POTASSIUM SERPL-SCNC: 3.8 MMOL/L — SIGNIFICANT CHANGE UP (ref 3.5–5.3)
PROT SERPL-MCNC: 6.8 G/DL — SIGNIFICANT CHANGE UP (ref 6–8.3)
RBC # BLD: 5.5 M/UL — SIGNIFICANT CHANGE UP (ref 4.2–5.8)
RBC # FLD: 13.5 % — SIGNIFICANT CHANGE UP (ref 10.3–14.5)
RSV RNA NPH QL NAA+NON-PROBE: SIGNIFICANT CHANGE UP
SARS-COV-2 RNA SPEC QL NAA+PROBE: SIGNIFICANT CHANGE UP
SODIUM SERPL-SCNC: 136 MMOL/L — SIGNIFICANT CHANGE UP (ref 135–145)
TROPONIN T, HIGH SENSITIVITY RESULT: 48 NG/L — SIGNIFICANT CHANGE UP
TROPONIN T, HIGH SENSITIVITY RESULT: 48 NG/L — SIGNIFICANT CHANGE UP
WBC # BLD: 8.41 K/UL — SIGNIFICANT CHANGE UP (ref 3.8–10.5)
WBC # FLD AUTO: 8.41 K/UL — SIGNIFICANT CHANGE UP (ref 3.8–10.5)

## 2022-02-19 PROCEDURE — 71045 X-RAY EXAM CHEST 1 VIEW: CPT | Mod: 26

## 2022-02-19 PROCEDURE — 99285 EMERGENCY DEPT VISIT HI MDM: CPT

## 2022-02-19 RX ORDER — SODIUM CHLORIDE 9 MG/ML
500 INJECTION INTRAMUSCULAR; INTRAVENOUS; SUBCUTANEOUS ONCE
Refills: 0 | Status: COMPLETED | OUTPATIENT
Start: 2022-02-19 | End: 2022-02-19

## 2022-02-19 RX ADMIN — SODIUM CHLORIDE 500 MILLILITER(S): 9 INJECTION INTRAMUSCULAR; INTRAVENOUS; SUBCUTANEOUS at 14:25

## 2022-02-19 NOTE — ED PROVIDER NOTE - OBJECTIVE STATEMENT
61 year old male with PMH CAD s/p stent x 2, HTN, DM presents with lightheadedness and fatigue today. Pt reports intermittent dizziness described as lightheadedness, fatigue, and yawning today. Denies any fevers, chest pain, shortness of breath, abdominal pain, vomiting, diarrhea, headache, vision change, numbness, weakness, or rash. Denies any worsening with position change or exertion. Denies any additional complaints.

## 2022-02-19 NOTE — ED PROVIDER NOTE - NS ED ROS FT
Review of Systems    Constitutional: (-) fever, (-) chills, (+) fatigue  HEENT: (-) sore throat, (-) hearing loss, (-) nasal congestion  Cardiovascular: (-) chest pain, (-) syncope  Respiratory: (-) cough, (-) shortness of breath  Gastrointestinal: (-) vomiting, (-) diarrhea, (-) abdominal pain  Musculoskeletal: (-) neck pain, (-) back pain, (-) joint pain  Integumentary: (-) rash, (-) edema, (-) wound  Neurological: (-) headache, (-) altered mental status    Except as documented in the HPI, all other systems are negative.

## 2022-02-19 NOTE — ED PROVIDER NOTE - PHYSICAL EXAMINATION
CONSTITUTIONAL: non-toxic, well appearing  SKIN: no rash, no petechiae.  EYES: pink conjunctiva, anicteric  NECK: Supple; no meningismus, no JVD  CARD: RRR, no murmurs, equal radial pulses bilaterally 2+  RESP: CTAB, no respiratory distress  ABD: Soft, non-tender, non-distended, no peritoneal signs  EXT: Normal ROM x4. No edema.   NEURO: Alert, oriented. Neuro exam nonfocal.  PSYCH: Cooperative, appropriate.

## 2022-02-19 NOTE — ED PROVIDER NOTE - ATTENDING CONTRIBUTION TO CARE
Da: I have seen and examined the patient face to face, have reviewed and addended the HPI, PE and a/p as necessary.     60 yo M with recent CAD s/p PCI to LCx 90% s/p PCI, OM1 80% s/p PCI on 2/18, HTN, DM, a/w lightheadedness and fatigue.  Pt reports feeling intermittent dizziness and lightheadedness with associated fatigue and yawning today.  Pt s/p cath on 2/18 as per Dr. Godinez was started on metoprolol on discharge.  Denies any fevers, chest pain, shortness of breath, abdominal pain, vomiting, diarrhea, headache, vision change, numbness, weakness, or rash. Denies any worsening with position change or exertion. Denies any additional complaints.    GEN - NAD; well appearing; A+O x3; non-toxic appearing  CARD -s1s2, RRR, no M,G,R;   PULM - CTA b/l, symmetric breath sounds;   ABD -  +BS, ND, NT, soft, no guarding, no rebound, no masses;   BACK - no CVA tenderness, Normal  spine;   EXT - symmetric pulses, 2+ dp, capillary refill < 2 seconds, no cyanosis, no edema;   NEURO - alert, CN 2-12 intact, reflexes nl, sensation nl, coordination nl, finger to nose nl, romberg negative, motor 5/5 RUE/LUE/RLE/LLE/EHL/Plantar flexion, no pronator drift, gait nl       60 yo M with recent CAD s/p PCI to LCx 90% s/p PCI, OM1 80% s/p PCI on 2/18, HTN, DM, a/w lightheadedness and fatigue, no findings on exam including neuro exam wnl, vital signs stable with no orthostatic symptoms. Likely 2/2 initiation of metoprolol.  Will check cbc, cmp, troponin.  Reassess.  Discussed case with Dr. Godinez who recommending d/c metoprolol with possible bradycardia and hypotension related to beta blocker initiation.

## 2022-02-19 NOTE — ED PROVIDER NOTE - NSFOLLOWUPINSTRUCTIONS_ED_ALL_ED_FT
Dizziness    Dizziness can manifest as a feeling of unsteadiness or light-headedness. You may feel like you are about to faint. This condition can be caused by a number of things, including medicines, dehydration, or illness. Drink enough fluid to keep your urine clear or pale yellow. Do not drink alcohol and limit your caffeine intake. Avoid quick or sudden movements.  Rise slowly from chairs and steady yourself until you feel okay. In the morning, first sit up on the side of the bed.    Stop taking metoprolol as discussed.     SEEK IMMEDIATE MEDICAL CARE IF YOU HAVE ANY OF THE FOLLOWING SYMPTOMS: vomiting, changes in your vision or speech, weakness in your arms or legs, trouble speaking or swallowing, chest pain, abdominal pain, shortness of breath, sweating, bleeding, headache, neck pain, or fever.

## 2022-02-19 NOTE — ED ADULT NURSE NOTE - OBJECTIVE STATEMENT
received pt in room 3, 61 yr/o male A+Ox4, ambulatory at baseline. PMH of HTN, DMII received pt in room 3, 61 yr/o male A+Ox4, ambulatory at baseline. PMH of HTN, DMII, and recent cardiac cath with 3 stents placed (discharged form [facility] yesterday). presented to the ED C/O lightheadedness and palpitations. pt states onset of symptoms were today, no neuro deficits noted. pt denies chest pain, states palpitations are intermittent and have no specific triggers, states he feels SOB at times that resolved with deep breathing. VSS, denies abdominal pain N/V/D/C. right AC 20g placed, labs drawn and sent. medications given as ordered. will continue to monitor.

## 2022-02-19 NOTE — CONSULT NOTE ADULT - ASSESSMENT
A/P    61 year old male with PMH CAD s/p stent x 2 2/17/22, HTN, DM presents with lightheadedness and fatigue today      #fatigue, lightheadedness  -sx likely secondary to new beta blocker, possible orthostatic issues   -also with likely higher vagal tone after recent cath and some anxiety  -in er sbp 130-150's, hr > 60  -resolved sx  -no chest pain, ecg abnl   -f/u initial labs, r/o e lyte abnl, low na  -will send home off toprol   -no headache  -no concern for acs, acute effusion based on sx and hemodynamics    d/w pt and roomate at bedside      has some mild dyspnea, intermittent likely secondary to brilinta  if persists will change to plavix    d/w er team       d/c home after labs      60 minutes spent on total encounter; more than 50% of the visit was spent counseling and/or coordinating care by the attending physician.

## 2022-02-19 NOTE — ED PROVIDER NOTE - CLINICAL SUMMARY MEDICAL DECISION MAKING FREE TEXT BOX
Javier-PGY3: 61 year old male with PMH CAD s/p stent x 2, HTN, DM presents with lightheadedness and fatigue today. Pt reports intermittent dizziness described as lightheadedness, fatigue, and yawning today. Denies any fevers, chest pain, shortness of breath, abdominal pain, vomiting, diarrhea, headache, vision change, numbness, weakness, or rash. Denies any worsening with position change or exertion. No evidence of a cardiac arrythmia such as Brugada, WPW, HOCM, long or short QT.  Neurologic exam is nonfocal, not c/w CVA or primary neurologic abnormality. Will obtain labs r/o anemia, symptomatic treatment, d/w cards with dispo pending workup.

## 2022-02-19 NOTE — CONSULT NOTE ADULT - SUBJECTIVE AND OBJECTIVE BOX
CARDIOLOGY CONSULT NOTE - DR. GERMAN    HPI:    61 year old male with PMH CAD s/p stent x 2, HTN, DM presents with lightheadedness and fatigue today. Pt reports intermittent dizziness described as lightheadedness, fatigue, and yawning today. Denies any fevers, chest pain, shortness of breath, abdominal pain, vomiting, diarrhea, headache, vision change, numbness, weakness, or rash. Denies any worsening with position change or exertion.       PAST MEDICAL & SURGICAL HISTORY:  Hypertension    LENIN (Obstructive Sleep Apnea)  Sleep study done 2015  uses CPAP at home    Type 2 diabetes mellitus    Morbid obesity with BMI of 50.0-59.9, adult    Venous insufficiency of both lower extremities    Anemia    History of Appendectomy  1982    Gastric Bypass  1998.  Patient lost 225 lbs and has regained 125 bs in the last 9years.    Umbilical Hernia Repair  2002    History of Abdominoplasty  2001    Incisional Hernia Repair  2005    S/P Cholecystectomy    H/O ventral hernia repair  08/24/12 incarcerated ventral hernia repair with mesh.          PREVIOUS DIAGNOSTIC TESTING:    [ ] Echocardiogram:  [ ]  Catheterization:  [ ] Stress Test:  	    MEDICATIONS:    Home Medications:  Ecotrin Adult Low Strength 81 mg oral delayed release tablet: 1 tab(s) orally once a day (17 Feb 2022 11:25)  losartan-hydrochlorothiazide 50 mg-12.5 mg oral tablet: 1 tab(s) orally once a day (17 Feb 2022 14:09)  metFORMIN 500 mg oral tablet: 1 tab(s) orally 2 times a day  restart 2/20 (18 Feb 2022 11:22)  Vitamin D3 5000 intl units oral capsule: 1 cap(s) orally once a day (17 Feb 2022 11:25)      MEDICATIONS  (STANDING):      FAMILY HISTORY:  Family history of diabetes mellitus in mother (Mother)  heart attack, ovarian cancer    Family history of diabetes mellitus in father (Father)        SOCIAL HISTORY:    [ ] Non-smoker  [ ] Smoker  [ ] Alcohol    Allergies    penicillin (Hives; Urticaria; Rash)    Intolerances    	    REVIEW OF SYSTEMS:  CONSTITUTIONAL: No fever, weight loss, or fatigue  EYES: No eye pain, visual disturbances, or discharge  ENMT:  No difficulty hearing, tinnitus, vertigo; No sinus or throat pain  NECK: No pain or stiffness  RESPIRATORY: No cough, wheezing, chills or hemoptysis; No Shortness of Breath  CARDIOVASCULAR: as HPI  GASTROINTESTINAL: No abdominal or epigastric pain. No nausea, vomiting, or hematemesis; No diarrhea or constipation. No melena or hematochezia.  GENITOURINARY: No dysuria, frequency, hematuria, or incontinence  NEUROLOGICAL: No headaches, memory loss, loss of strength, numbness, or tremors  SKIN: No itching, burning, rashes, or lesions   	  [ ] All others negative	  [ ] Unable to obtain    PHYSICAL EXAM:    T(C): 36.8 (02-19-22 @ 14:15), Max: 36.8 (02-19-22 @ 14:15)  HR: 79 (02-19-22 @ 14:15) (65 - 79)  BP: 135/80 (02-19-22 @ 14:15) (135/80 - 142/83)  RR: 16 (02-19-22 @ 14:15) (16 - 17)  SpO2: 98% (02-19-22 @ 14:15) (98% - 98%)  Wt(kg): --  I&O's Summary    Daily Height in cm: 167.64 (19 Feb 2022 13:45)    Daily     Appearance: Normal	  Psychiatry: A & O x 3, Mood & affect appropriate  HEENT:   Normal oral mucosa, PERRL, EOMI	  Lymphatic: No lymphadenopathy  Cardiovascular: Normal S1 S2,RRR, No JVD, No murmurs  Respiratory: Lungs clear to auscultation	  Gastrointestinal:  Soft, Non-tender, + BS	  Skin: No rashes, No ecchymoses, No cyanosis	  Neurologic: Non-focal  Extremities: Normal range of motion, No clubbing, cyanosis or edema  Vascular: Peripheral pulses palpable 2+ bilaterally  cath site c/d/i  2+ rad pulse    TELEMETRY: 	    ECG:  	sr, no acute ischemic abln   RADIOLOGY:  OTHER: 	  	  LABS:	 	    CARDIAC MARKERS:        proBNP:     Lipid Profile:   HgA1c:   TSH:                           15.6   8.41  )-----------( 199      ( 19 Feb 2022 14:37 )             46.7     02-19    136  |  100  |  x   ----------------------------<  x   3.8   |  x   |  x     Ca    8.9      18 Feb 2022 06:54  Phos  3.3     02-18  Mg     2.00     02-18          Creatinine, Serum: 0.67 mg/dL (02-18-22 @ 06:54)  Creatinine, Serum: 0.73 mg/dL (02-17-22 @ 11:44)        ASSESSMENT/PLAN:

## 2022-02-19 NOTE — ED PROVIDER NOTE - NSICDXPASTMEDICALHX_GEN_ALL_CORE_FT
PAST MEDICAL HISTORY:  Anemia     Hypertension     Morbid obesity with BMI of 50.0-59.9, adult     LENIN (Obstructive Sleep Apnea) Sleep study done 2015  uses CPAP at home    Type 2 diabetes mellitus     Venous insufficiency of both lower extremities

## 2022-02-19 NOTE — ED PROVIDER NOTE - PATIENT PORTAL LINK FT
You can access the FollowMyHealth Patient Portal offered by [facility] by registering at the following website: [email or web address]. By joining [employer]’s FollowMyHealth portal, you will also be able to view your health information using other applications (apps) compatible with our system.

## 2022-02-19 NOTE — ED PROVIDER NOTE - PROGRESS NOTE DETAILS
Javier-PGY3: pt seen by cardiologist Dr. Godinez, advised to hold beta blocker. Labs/imaging discussed with pt, troponin negative x 2. Will DC with cards follow up and return precautions. Pt understood and agreeable with plan.

## 2022-02-19 NOTE — ED ADULT NURSE NOTE - NSIMPLEMENTINTERV_GEN_ALL_ED
Implemented All Fall Risk Interventions:  [place] to call system. Call bell, personal items and telephone within reach. Instruct patient to call for assistance. Room bathroom lighting operational. Non-slip footwear when patient is off stretcher. Physically safe environment: no spills, clutter or unnecessary equipment. Stretcher in lowest position, wheels locked, appropriate side rails in place. Provide visual cue, wrist band, yellow gown, etc. Monitor gait and stability. Monitor for mental status changes and reorient to person, place, and time. Review medications for side effects contributing to fall risk. Reinforce activity limits and safety measures with patient and family.

## 2022-03-24 ENCOUNTER — APPOINTMENT (OUTPATIENT)
Dept: ORTHOPEDIC SURGERY | Facility: CLINIC | Age: 62
End: 2022-03-24
Payer: COMMERCIAL

## 2022-03-24 PROCEDURE — 73564 X-RAY EXAM KNEE 4 OR MORE: CPT | Mod: LT

## 2022-03-24 PROCEDURE — 99213 OFFICE O/P EST LOW 20 MIN: CPT | Mod: 25

## 2022-03-24 PROCEDURE — 20611 DRAIN/INJ JOINT/BURSA W/US: CPT | Mod: LT

## 2022-03-24 NOTE — HISTORY OF PRESENT ILLNESS
[de-identified] : 61 year old male presents with bilateral knee pain. He notes right knee locking and clicking. He notes his right knee "pops" every once in a while. It is more surprising than painful. He denies buckling or catching. He takes Tylenol PM occasionally. He presents for a left knee Durolane injection today (03/24/2022).\par He understands the importance of weight loss. He states he has lost some more weight, and is down 60 pounds overall since he started coming to the office. His BMI is 42.8 (weight= 265 pounds, height = 5 foot 6 inches). Pmhx of DM.\par \par Radiology Results taken on 7/26/21:\par o Lumbosacral Spine : AP and lateral views were obtained and revealed a grade 1 spondylolisthesis of L5 on S1 with diffuse facet arthropathy, no obvious compression fracture.\par o Hip and Pelvis: AP pelvis was obtained and revealed mild degenerative arthritis of both hips.

## 2022-03-24 NOTE — DISCUSSION/SUMMARY
[de-identified] : I discussed the underlying pathophysiology of the patient's condition in great detail with the patient. I went over the patient's x-rays with them in great detail. The patient elected to receive a Durolane injection into his knee today, and tolerated it well. I instructed the pt on ROM exercises, and told them to take it easy. The use of ice and rest was reviewed with the patient. The patient may resume activities tomorrow. I reminded the patient that it takes 4 to 6 weeks after the injection to feel symptom relief. All of his questions were answered. He understands and consents to the plan. \par \par FU next week for a right knee Durolane injection.\par after a left knee Durolane injection today (03/24/2022).

## 2022-03-24 NOTE — ADDENDUM
[FreeTextEntry1] : I, Carlos Larsen, acted solely as a scribe for Dr. Steve Manning on this date 03/24/2022.\par All medical record entries made by the Scribe were at my, Dr. Steve Manning, direction and personally dictated by me on 03/24/2022. I have reviewed the chart and agree that the record accurately reflects my personal performance of the history, physical exam, assessment and plan. I have also personally directed, reviewed, and agreed with the chart.

## 2022-03-24 NOTE — PHYSICAL EXAM
[de-identified] : Constitutional\par o Appearance : well-nourished, well developed, alert, in no acute distress \par Head and Face\par o Head :\par ¦ Inspection : atraumatic, normocephalic\par o Face :\par ¦ Inspection : no visible rash or discoloration\par Respiratory\par o Respiratory Effort: breathing unlabored \par Neurologic\par o Mental Status Examination :\par ¦ Orientation : alert and oriented X 3\par Psychiatric\par o Mood and Affect: mood normal, affect appropriate\par Cardiovascular\par o Observation/Palpation : - no swelling\par Lymphatic\par o Additional Nodes : No palpable lymph nodes present\par \par Lumbosacral Spine\par o Inspection : no visible rash or discoloration\par o Palpation : moderate left paraspinal musculature tenderness, no sciatic notch tenderness\par o Range of Motion : extension and sidebending cause left paraspinal discomfort\par o Muscle Strength : paraspinal muscle strength and tone within normal limits\par o Muscle Tone : paraspinal muscle strength and tone within normal limits\par o Tests: straight leg test negative and ANISA test negative bilaterally \par \par Right Lower Extremity\par o Buttock : no tenderness, swelling or deformities \par o Right Hip :\par    - Inspection/Palpation : no tenderness, no swelling or deformities\par    - Range of Motion : full flexion, slight limitation of internal rotation, no crepitance\par    - Stability : joint stability intact\par    - Strength : extension, flexion, adduction, abduction, internal rotation and external rotation, 5/5 \par    - Tests: Diane’s test negative\par \par o Right Knee :\par ¦ Inspection/Palpation : mild posteromedial joint line tenderness, no swelling, trophic changes\par ¦ Range of Motion : 0-120° without discomfort, no crepitance, good patellofemoral glide \par ¦ Stability : no valgus or varus instability present on provocative testing\par ¦ Strength : flexion and extension 5/5\par ¦ Tests and Signs : negative Anterior Drawer, negative Lachman, negative Kade\par \par Left Lower Extremity\par o Buttock : no tenderness, swelling or deformities \par o Left Hip :\par    - Inspection/Palpation : no tenderness, no swelling or deformities\par    - Range of Motion : full flexion, slight limitation of rotation, no crepitance\par    - Stability : joint stability intact\par    - Strength : extension, flexion, adduction, abduction, internal rotation and external rotation, 5/5 \par    - Tests: Diane’s test negative\par \par o Left Knee :\par ¦ Inspection/Palpation : mild posteromedial joint line tenderness to palpation, no swelling, trophic changes\par ¦ Range of Motion : 3-110° with pain, slightly decreased patellofemoral glide with crepitance\par ¦ Stability : no valgus or varus instability present on provocative testing\par ¦ Strength : flexion and extension 5/5\par ¦ Tests and Signs : negative Anterior Drawer, negative Lachman, negative Kade\par \par o Peripheral Vascular System :\par ¦ Dorsalis Pedis Artery : pulse 2+ bilaterally\par \par Gait and Station:\par Gait: stiff on the left, no significant extremity swelling or lymphedema, trophic changes of both legs, no foot drop\par \par Radiology Results:\par o Right Knee : Standing AP, lateral, tunnel, and skyline views of the knee were obtained and revealed significant medial compartment narrowing.\par o Left Knee : Standing AP, lateral, tunnel, and skyline views of the knee were obtained and revealed significant medial and patellofemoral arthritis.\par \par o Left Knee injection : Indication- knee osteoarthritis, Anatomic location- left intra-articular joint space, Spray - area was sterilized with Betadine and alcohol and anesthetized with Ethyl Chloride, needle used-20G, Medications given- 3cc's Durolane under Ultrasound guidance. The patient tolerated the procedure well.  Santyot# [id number]   oExp: 2024-10-31

## 2022-04-14 ENCOUNTER — APPOINTMENT (OUTPATIENT)
Dept: ORTHOPEDIC SURGERY | Facility: CLINIC | Age: 62
End: 2022-04-14
Payer: COMMERCIAL

## 2022-04-14 PROCEDURE — 20611 DRAIN/INJ JOINT/BURSA W/US: CPT | Mod: RT

## 2022-04-14 NOTE — PHYSICAL EXAM
[de-identified] : Constitutional\par o Appearance : well-nourished, well developed, alert, in no acute distress \par Head and Face\par o Head :\par ¦ Inspection : atraumatic, normocephalic\par o Face :\par ¦ Inspection : no visible rash or discoloration\par Respiratory\par o Respiratory Effort: breathing unlabored \par Neurologic\par o Mental Status Examination :\par ¦ Orientation : alert and oriented X 3\par Psychiatric\par o Mood and Affect: mood normal, affect appropriate\par Cardiovascular\par o Observation/Palpation : - no swelling\par Lymphatic\par o Additional Nodes : No palpable lymph nodes present\par \par Lumbosacral Spine\par o Inspection : no visible rash or discoloration\par o Palpation : moderate left paraspinal musculature tenderness, no sciatic notch tenderness\par o Range of Motion : extension and sidebending cause left paraspinal discomfort\par o Muscle Strength : paraspinal muscle strength and tone within normal limits\par o Muscle Tone : paraspinal muscle strength and tone within normal limits\par o Tests: straight leg test negative and ANISA test negative bilaterally \par \par Right Lower Extremity\par o Buttock : no tenderness, swelling or deformities \par o Right Hip :\par    - Inspection/Palpation : no tenderness, no swelling or deformities\par    - Range of Motion : full flexion, slight limitation of internal rotation, no crepitance\par    - Stability : joint stability intact\par    - Strength : extension, flexion, adduction, abduction, internal rotation and external rotation, 5/5 \par    - Tests: Diane’s test negative\par \par o Right Knee :\par ¦ Inspection/Palpation : mild posteromedial joint line tenderness, no swelling, trophic changes\par ¦ Range of Motion : FROM with discomfort on full flexion, no crepitance, good patellofemoral glide \par ¦ Stability : no valgus or varus instability present on provocative testing\par ¦ Strength : flexion and extension 5/5\par ¦ Tests and Signs : negative Anterior Drawer, negative Lachman, negative Kade\par \par Left Lower Extremity\par o Buttock : no tenderness, swelling or deformities \par o Left Hip :\par    - Inspection/Palpation : no tenderness, no swelling or deformities\par    - Range of Motion : full flexion, slight limitation of rotation, no crepitance\par    - Stability : joint stability intact\par    - Strength : extension, flexion, adduction, abduction, internal rotation and external rotation, 5/5 \par    - Tests: Diane’s test negative\par \par o Left Knee :\par ¦ Inspection/Palpation : mild posteromedial joint line tenderness to palpation, no swelling, trophic changes\par ¦ Range of Motion : 3-110° with pain, slightly decreased patellofemoral glide with crepitance\par ¦ Stability : no valgus or varus instability present on provocative testing\par ¦ Strength : flexion and extension 5/5\par ¦ Tests and Signs : negative Anterior Drawer, negative Lachman, negative Kade\par \par o Peripheral Vascular System :\par ¦ Dorsalis Pedis Artery : pulse 2+ bilaterally\par \par Gait and Station:\par Gait: stiff on the left, no significant extremity swelling or lymphedema, trophic changes of both legs, no foot drop\par \par o Right Knee injection : Indication- knee osteoarthritis, Anatomic location- right intra-articular joint space, Spray - area was sterilized with Betadine and alcohol and anesthetized with Ethyl Chloride, needle used-20G, Medications given- 3cc's Durolane under Ultrasound guidance. The patient tolerated the procedure well.  Daniel# [id number]    o Exp: 2024-08-31

## 2022-04-14 NOTE — HISTORY OF PRESENT ILLNESS
[de-identified] : 61 year old male presents with bilateral knee pain. He notes right knee locking and clicking. He notes his right knee "pops" every once in a while. It is more surprising than painful. He denies buckling or catching. He takes Tylenol PM occasionally. He received a left knee Durolane injection on 3/24/2022. He presents for a right knee Durolane injection today (04/14/2022).\par He understands the importance of weight loss. He states he has lost some more weight, and is down 60 pounds overall since he started coming to the office. His BMI is 42.8 (weight= 265 pounds, height = 5 foot 6 inches). Pmhx of DM. He had 3 stents put in about 2 months ago and has been started on Plavix in addition to baby aspirin.\par \par Radiology Results taken on 3/24/2022:\par o Right Knee : Standing AP, lateral, tunnel, and skyline views of the knee were obtained and revealed significant medial compartment narrowing.\par o Left Knee : Standing AP, lateral, tunnel, and skyline views of the knee were obtained and revealed significant medial and patellofemoral arthritis.\par \par Radiology Results taken on 7/26/21:\par o Lumbosacral Spine : AP and lateral views were obtained and revealed a grade 1 spondylolisthesis of L5 on S1 with diffuse facet arthropathy, no obvious compression fracture.\par o Hip and Pelvis: AP pelvis was obtained and revealed mild degenerative arthritis of both hips.

## 2022-04-14 NOTE — ADDENDUM
[FreeTextEntry1] : I, Carlos Larsen, acted solely as a scribe for Dr. Steve Manning on this date 04/14/2022.\par All medical record entries made by the Scribe were at my, Dr. Steve Manning, direction and personally dictated by me on 04/14/2022. I have reviewed the chart and agree that the record accurately reflects my personal performance of the history, physical exam, assessment and plan. I have also personally directed, reviewed, and agreed with the chart.

## 2022-05-04 ENCOUNTER — EMERGENCY (EMERGENCY)
Facility: HOSPITAL | Age: 62
LOS: 1 days | Discharge: HOME | End: 2022-05-04
Attending: EMERGENCY MEDICINE | Admitting: EMERGENCY MEDICINE
Payer: COMMERCIAL

## 2022-05-04 VITALS
RESPIRATION RATE: 18 BRPM | TEMPERATURE: 98 F | SYSTOLIC BLOOD PRESSURE: 129 MMHG | OXYGEN SATURATION: 99 % | HEART RATE: 68 BPM | DIASTOLIC BLOOD PRESSURE: 63 MMHG

## 2022-05-04 VITALS
HEART RATE: 87 BPM | TEMPERATURE: 98 F | DIASTOLIC BLOOD PRESSURE: 75 MMHG | RESPIRATION RATE: 16 BRPM | OXYGEN SATURATION: 96 % | SYSTOLIC BLOOD PRESSURE: 134 MMHG | HEIGHT: 66 IN

## 2022-05-04 LAB
ALBUMIN SERPL ELPH-MCNC: 3.9 G/DL — SIGNIFICANT CHANGE UP (ref 3.3–5)
ALP SERPL-CCNC: 62 U/L — SIGNIFICANT CHANGE UP (ref 40–120)
ALT FLD-CCNC: 23 U/L — SIGNIFICANT CHANGE UP (ref 4–41)
ANION GAP SERPL CALC-SCNC: 9 MMOL/L — SIGNIFICANT CHANGE UP (ref 7–14)
AST SERPL-CCNC: 19 U/L — SIGNIFICANT CHANGE UP (ref 4–40)
BASOPHILS # BLD AUTO: 0.03 K/UL — SIGNIFICANT CHANGE UP (ref 0–0.2)
BASOPHILS NFR BLD AUTO: 0.4 % — SIGNIFICANT CHANGE UP (ref 0–2)
BILIRUB SERPL-MCNC: 0.6 MG/DL — SIGNIFICANT CHANGE UP (ref 0.2–1.2)
BUN SERPL-MCNC: 17 MG/DL — SIGNIFICANT CHANGE UP (ref 7–23)
CALCIUM SERPL-MCNC: 9.4 MG/DL — SIGNIFICANT CHANGE UP (ref 8.4–10.5)
CHLORIDE SERPL-SCNC: 102 MMOL/L — SIGNIFICANT CHANGE UP (ref 98–107)
CK MB CFR SERPL CALC: 2 NG/ML — SIGNIFICANT CHANGE UP
CO2 SERPL-SCNC: 29 MMOL/L — SIGNIFICANT CHANGE UP (ref 22–31)
CREAT SERPL-MCNC: 0.64 MG/DL — SIGNIFICANT CHANGE UP (ref 0.5–1.3)
EGFR: 109 ML/MIN/1.73M2 — SIGNIFICANT CHANGE UP
EOSINOPHIL # BLD AUTO: 0.06 K/UL — SIGNIFICANT CHANGE UP (ref 0–0.5)
EOSINOPHIL NFR BLD AUTO: 0.8 % — SIGNIFICANT CHANGE UP (ref 0–6)
GLUCOSE SERPL-MCNC: 116 MG/DL — HIGH (ref 70–99)
HCT VFR BLD CALC: 45.2 % — SIGNIFICANT CHANGE UP (ref 39–50)
HGB BLD-MCNC: 15.2 G/DL — SIGNIFICANT CHANGE UP (ref 13–17)
IANC: 5.4 K/UL — SIGNIFICANT CHANGE UP (ref 1.8–7.4)
IMM GRANULOCYTES NFR BLD AUTO: 0.3 % — SIGNIFICANT CHANGE UP (ref 0–1.5)
LYMPHOCYTES # BLD AUTO: 1.58 K/UL — SIGNIFICANT CHANGE UP (ref 1–3.3)
LYMPHOCYTES # BLD AUTO: 20.7 % — SIGNIFICANT CHANGE UP (ref 13–44)
MCHC RBC-ENTMCNC: 28.5 PG — SIGNIFICANT CHANGE UP (ref 27–34)
MCHC RBC-ENTMCNC: 33.6 GM/DL — SIGNIFICANT CHANGE UP (ref 32–36)
MCV RBC AUTO: 84.6 FL — SIGNIFICANT CHANGE UP (ref 80–100)
MONOCYTES # BLD AUTO: 0.55 K/UL — SIGNIFICANT CHANGE UP (ref 0–0.9)
MONOCYTES NFR BLD AUTO: 7.2 % — SIGNIFICANT CHANGE UP (ref 2–14)
NEUTROPHILS # BLD AUTO: 5.4 K/UL — SIGNIFICANT CHANGE UP (ref 1.8–7.4)
NEUTROPHILS NFR BLD AUTO: 70.6 % — SIGNIFICANT CHANGE UP (ref 43–77)
NRBC # BLD: 0 /100 WBCS — SIGNIFICANT CHANGE UP
NRBC # FLD: 0 K/UL — SIGNIFICANT CHANGE UP
NT-PROBNP SERPL-SCNC: 106 PG/ML — SIGNIFICANT CHANGE UP
PLATELET # BLD AUTO: 187 K/UL — SIGNIFICANT CHANGE UP (ref 150–400)
POTASSIUM SERPL-MCNC: 4.1 MMOL/L — SIGNIFICANT CHANGE UP (ref 3.5–5.3)
POTASSIUM SERPL-SCNC: 4.1 MMOL/L — SIGNIFICANT CHANGE UP (ref 3.5–5.3)
PROT SERPL-MCNC: 7 G/DL — SIGNIFICANT CHANGE UP (ref 6–8.3)
RBC # BLD: 5.34 M/UL — SIGNIFICANT CHANGE UP (ref 4.2–5.8)
RBC # FLD: 13.4 % — SIGNIFICANT CHANGE UP (ref 10.3–14.5)
SARS-COV-2 RNA SPEC QL NAA+PROBE: SIGNIFICANT CHANGE UP
SODIUM SERPL-SCNC: 140 MMOL/L — SIGNIFICANT CHANGE UP (ref 135–145)
TROPONIN T, HIGH SENSITIVITY RESULT: 19 NG/L — SIGNIFICANT CHANGE UP
TROPONIN T, HIGH SENSITIVITY RESULT: 9 NG/L — SIGNIFICANT CHANGE UP
WBC # BLD: 7.64 K/UL — SIGNIFICANT CHANGE UP (ref 3.8–10.5)
WBC # FLD AUTO: 7.64 K/UL — SIGNIFICANT CHANGE UP (ref 3.8–10.5)

## 2022-05-04 PROCEDURE — 93010 ELECTROCARDIOGRAM REPORT: CPT | Mod: NC

## 2022-05-04 PROCEDURE — 71046 X-RAY EXAM CHEST 2 VIEWS: CPT | Mod: 26

## 2022-05-04 PROCEDURE — 99285 EMERGENCY DEPT VISIT HI MDM: CPT | Mod: 25

## 2022-05-04 RX ORDER — SODIUM CHLORIDE 9 MG/ML
1000 INJECTION INTRAMUSCULAR; INTRAVENOUS; SUBCUTANEOUS ONCE
Refills: 0 | Status: COMPLETED | OUTPATIENT
Start: 2022-05-04 | End: 2022-05-04

## 2022-05-04 RX ORDER — LOSARTAN POTASSIUM 100 MG/1
1 TABLET, FILM COATED ORAL
Qty: 14 | Refills: 0
Start: 2022-05-04 | End: 2022-05-17

## 2022-05-04 RX ADMIN — SODIUM CHLORIDE 1000 MILLILITER(S): 9 INJECTION INTRAMUSCULAR; INTRAVENOUS; SUBCUTANEOUS at 18:36

## 2022-05-04 NOTE — ED PROVIDER NOTE - CRANIAL NERVE AND PUPILLARY EXAM
no ischemic changes./cranial nerves 2-12 intact/cough reflex intact/corneal reflex intact/central and peripheral vision intact/central vision intact

## 2022-05-04 NOTE — ED PROVIDER NOTE - OBJECTIVE STATEMENT
this is a 61 y.o male with a PMhx of morbidly obese, DM, HTN, HLD, CAD on aspirin plavix s/p 3 stents, presented to the ED complaining of having a episode of lightheadedness earlier today, he states that he was at work sitting when he had the episode, it lasted approx an hour and then resolved, he went to the  where he had a ekg and was told to come to the ED. Patient had a angiogram 3 months prior where he had 3 stents placed. Patient denies having any chest pain, SOB, HUDSON, fever, chills, bodyaches, headaches, or dizziness, Pt states that the dizziness is similar to the last time he had the stent placed.

## 2022-05-04 NOTE — ED PROVIDER NOTE - PROGRESS NOTE DETAILS
Juhi BAIRD: Patient states he is feeling much better. Juhi BAIRD: Delta trop is 10. Plan to discuss with Dr. Godinez as patient wants to be discharged and states he feels fine. Ford, PGY3 - trop 9> 19. pt reassessed, states feels better, denies cp dizziness lightheadedness sob palpitations. ambulating w/ stable gait in the ED. pt wants to go home. discussed delta trop w/ Dr. Rogelio Godinez, okay w/ discharge, recommend hold HCTZ, continue losartan until f/u w/ Dr. Diehl (cards) in the next 48 hours. pt verbalized understanding agrees with plan all questions answered

## 2022-05-04 NOTE — ED PROVIDER NOTE - NS ED ATTENDING STATEMENT MOD
This was a shared visit with the MONICA. I reviewed and verified the documentation and independently performed the documented:

## 2022-05-04 NOTE — ED ADULT TRIAGE NOTE - CHIEF COMPLAINT QUOTE
Pt arrives via ambulance c/o episode of dizziness earlier today. Pt reports he went to urgent care to be evaluated-was told he had abnormal EKG and was sent to ER for further eval. Pt currently denies dizziness, CP, SOB, blurry vision, n/v, numbness/tingling. PMHx: 3 cardiac stents on plavix, DM2. Pt arrives via ambulance c/o episode of dizziness earlier today. Pt reports he went to urgent care to be evaluated-was told he had abnormal EKG and was sent to ER for further eval. Pt currently denies dizziness, CP, SOB, blurry vision, n/v, numbness/tingling. PMHx: 3 cardiac stents on plavix, DM2.  ,pt has 22 G in left anterior hand placed by ems.

## 2022-05-04 NOTE — ED PROVIDER NOTE - CARE PROVIDER_API CALL
You Diehl (MD)  Cardiovascular Disease; Internal Medicine  [address], [address]  [place] [address]  Phone: [phone]  Fax: [phone]  Follow Up Time: Urgent

## 2022-05-04 NOTE — ED ADULT NURSE NOTE - NSIMPLEMENTINTERV_GEN_ALL_ED
Implemented All Universal Safety Interventions:  [place] to call system. Call bell, personal items and telephone within reach. Instruct patient to call for assistance. Room bathroom lighting operational. Non-slip footwear when patient is off stretcher. Physically safe environment: no spills, clutter or unnecessary equipment. Stretcher in lowest position, wheels locked, appropriate side rails in place.

## 2022-05-04 NOTE — ED ADULT NURSE NOTE - OBJECTIVE STATEMENT
Receive pt. in ER room 21 alert and oriented x 4, presenting to the ER with complaints of dizziness. Pt. stated " I was sitting at my desk today and I felt dizzy". Place on cardiac monitor labs sent, medicated as ordered. No c/o dizziness at present, call bell place within reach.

## 2022-05-04 NOTE — ED PROVIDER NOTE - ATTENDING APP SHARED VISIT CONTRIBUTION OF CARE
Patient is a 60 yo M with history of HTN, type 2 DM, LENIN with CPAP, venous insufficiency, CAD with 3 stents, gastric bypass, most recently had stent placement in Feb 2022 (p LHC: LCx 90%=> stent x1, OM1 80%=> stent x1), here for evaluation of lightheadedness. Patient states he was at work when he felt lightheaded. He became concerned and went to urgent care and was sent in for evaluation. Patient reports he felt lightheaded for 1.5 hours. No chest pain, shortness of breath, fevers, vomiting. He states he felt lightheaded in Feb and ended up having stents so wanted to get checked out.    VS noted  Gen. no acute distress, Non toxic   HEENT: EOMI, mmm  Lungs: CTAB/L no C/ W /R   CVS: RRR   Abd; Soft non tender, non distended   Ext: no edema, chronic lower extremity venous insufficiency skin changes, no calf tenderness  Skin: no rash  Neuro AAOx3 non focal clear speech  a/p: lightheadedness - ekg is non-ischemic. Plan for labs including trop, CXR. Will check orthostatics and give IVF.   - Juhi BAIRD

## 2022-05-04 NOTE — ED PROVIDER NOTE - NSFOLLOWUPINSTRUCTIONS_ED_ALL_ED_FT
Rest and stay well hydrated.     Follow-up with Dr. Diehl in the next 48 hours for recheck. Bring your results from today with you. Call to discuss.     Hold your hydrochlorothiazide until seen by Dr. Diehl. Continue losartan as prescribed.     Return to the ED for any dizziness, chest pain, difficulty breathing or any new concerning symptoms.

## 2022-05-04 NOTE — ED PROVIDER NOTE - PATIENT PORTAL LINK FT
You can access the FollowMyHealth Patient Portal offered by [facility] by registering at the following website: [email or web address]. By joining [employer]’s FollowMyHealth portal, you will also be able to view your health information using other applications (apps) compatible with our system.

## 2022-05-04 NOTE — ED PROVIDER NOTE - NS ED MD DISPO DISCHARGE CCDA
Daily Note     Today's date: 3/7/2022  Patient name: Ladonna Collier  : 1936  MRN: [id number]  Referring provider: Tamir Vieira MD  Dx:   Encounter Diagnosis     ICD-10-CM    1  Balance disorder  R26 89    2  Fall, initial encounter  [address]  XXXA        Start Time: 4  2/15 2/21 3/2 3/7                                                             Patient/Caregiver provided printed discharge information.

## 2022-05-04 NOTE — ED PROVIDER NOTE - CLINICAL SUMMARY MEDICAL DECISION MAKING FREE TEXT BOX
this is a 61 y.o male with a PMhx of morbidly obese, DM, HTN, HLD, CAD on aspirin plavix s/p 3 stents, presented to the ED complaining of having a episode of lightheadedness earlier [id number], trop, chest xray, labs, reassess

## 2022-05-04 NOTE — ED ADULT NURSE NOTE - CHIEF COMPLAINT QUOTE
Pt arrives via ambulance c/o episode of dizziness earlier today. Pt reports he went to urgent care to be evaluated-was told he had abnormal EKG and was sent to ER for further eval. Pt currently denies dizziness, CP, SOB, blurry vision, n/v, numbness/tingling. PMHx: 3 cardiac stents on plavix, DM2.  ,pt has 22 G in left anterior hand placed by ems.

## 2022-05-04 NOTE — ED PROVIDER NOTE - NSFOLLOWUPCLINICS_GEN_ALL_ED_FT
[facility] - Primary Care  Primary Care  [address] [place] [place] [address]  Phone: [phone]  Fax:

## 2022-05-26 ENCOUNTER — APPOINTMENT (OUTPATIENT)
Dept: ORTHOPEDIC SURGERY | Facility: CLINIC | Age: 62
End: 2022-05-26
Payer: COMMERCIAL

## 2022-05-26 DIAGNOSIS — I87.8 OTHER SPECIFIED DISORDERS OF VEINS: ICD-10-CM

## 2022-05-26 DIAGNOSIS — M47.816 SPONDYLOSIS W/OUT MYELOPATHY OR RADICULOPATHY, LUMBAR REGION: ICD-10-CM

## 2022-05-26 PROCEDURE — 99214 OFFICE O/P EST MOD 30 MIN: CPT

## 2022-05-26 NOTE — DISCUSSION/SUMMARY
[de-identified] : I went over the pathophysiology of the patient's symptoms in great detail with the patient. I informed him that he is due for Durolane in the left knee after 09/24/2022 and right after 10/14/2022. I advised him to keep his legs strong, keep exercising, and keep working on losing weight. He should follow-up in 3-4 months. All of his questions were answered. He understands and consents to the plan.\par \par FU in 3-4 months.

## 2022-05-26 NOTE — PHYSICAL EXAM
[de-identified] : Constitutional\par o Appearance : well-nourished, well developed, alert, in no acute distress \par Head and Face\par o Head :\par ¦ Inspection : atraumatic, normocephalic\par o Face :\par ¦ Inspection : no visible rash or discoloration\par Respiratory\par o Respiratory Effort: breathing unlabored \par Neurologic\par o Mental Status Examination :\par ¦ Orientation : alert and oriented X 3\par Psychiatric\par o Mood and Affect: mood normal, affect appropriate\par Cardiovascular\par o Observation/Palpation : - no swelling\par Lymphatic\par o Additional Nodes : No palpable lymph nodes present\par \par Lumbosacral Spine\par o Inspection : no visible rash or discoloration\par o Palpation : moderate left paraspinal musculature tenderness, no sciatic notch tenderness\par o Range of Motion : extension and sidebending cause left paraspinal discomfort\par o Muscle Strength : paraspinal muscle strength and tone within normal limits\par o Muscle Tone : paraspinal muscle strength and tone within normal limits\par o Tests: straight leg test negative and ANISA test negative bilaterally \par \par Right Lower Extremity\par o Buttock : no tenderness, swelling or deformities \par o Right Hip :\par    - Inspection/Palpation : no tenderness, no swelling or deformities\par    - Range of Motion : full flexion, slight limitation of internal rotation, no crepitance\par    - Stability : joint stability intact\par    - Strength : extension, flexion, adduction, abduction, internal rotation and external rotation, 5/5 \par    - Tests: Diane’s test negative\par \par o Right Knee :\par ¦ Inspection/Palpation : minimal medial compartment tenderness, no swelling, trophic changes\par ¦ Range of Motion : 0-120°, no crepitance, good patellofemoral glide \par ¦ Stability : no valgus or varus instability present on provocative testing\par ¦ Strength : flexion and extension 5/5\par ¦ Tests and Signs : negative Anterior Drawer, negative Lachman, negative Kade\par \par Left Lower Extremity\par o Buttock : no tenderness, swelling or deformities \par o Left Hip :\par    - Inspection/Palpation : no tenderness, no swelling or deformities\par    - Range of Motion : full flexion, slight limitation of rotation, no crepitance\par    - Stability : joint stability intact\par    - Strength : extension, flexion, adduction, abduction, internal rotation and external rotation, 5/5 \par    - Tests: Diane’s test negative\par \par o Left Knee :\par ¦ Inspection/Palpation : mild medial compartment tenderness, no swelling, trophic changes\par ¦ Range of Motion : 0-115°, slightly decreased patellofemoral glide with crepitance\par ¦ Stability : no valgus or varus instability present on provocative testing\par ¦ Strength : flexion and extension 5/5\par ¦ Tests and Signs : negative Anterior Drawer, negative Lachman, negative Kade\par \par o Peripheral Vascular System :\par ¦ Dorsalis Pedis Artery : pulse 2+ bilaterally\par \par Gait and Station:\par Gait: stiff on the left, no significant extremity swelling or lymphedema, trophic changes of both legs, no foot drop

## 2022-05-26 NOTE — ADDENDUM
[FreeTextEntry1] : I, Carlos Larsen, acted solely as a scribe for Dr. Steve Manning on this date 05/26/2022.\par All medical record entries made by the Scribe were at my, Dr. Steve Manning, direction and personally dictated by me on 05/26/2022. I have reviewed the chart and agree that the record accurately reflects my personal performance of the history, physical exam, assessment and plan. I have also personally directed, reviewed, and agreed with the chart.

## 2022-05-26 NOTE — HISTORY OF PRESENT ILLNESS
[de-identified] : 61 year old male presents with bilateral knee pain, left worse than right. He denies buckling or catching. He takes Tylenol PM occasionally. He received Durolane in the left knee on 03/24/22 and in the right knee on 04/14/22. He thinks the gel shots helped and both knees feel about 20% better overall. He can live with his current symptoms.\par He understands the importance of weight loss. He states he has lost some more weight. His BMI is 40.3 weight= 250 pounds, height = 5 foot 6 inches). Pmhx of DM. He had 3 stents put a few months ago and has been started on Plavix in addition to baby aspirin.\par \par Radiology Results taken on 3/24/2022:\par o Right Knee : Standing AP, lateral, tunnel, and skyline views of the knee were obtained and revealed significant medial compartment narrowing.\par o Left Knee : Standing AP, lateral, tunnel, and skyline views of the knee were obtained and revealed significant medial and patellofemoral arthritis.\par \par Radiology Results taken on 7/26/21:\par o Lumbosacral Spine : AP and lateral views were obtained and revealed a grade 1 spondylolisthesis of L5 on S1 with diffuse facet arthropathy, no obvious compression fracture.\par o Hip and Pelvis: AP pelvis was obtained and revealed mild degenerative arthritis of both hips.

## 2022-09-28 ENCOUNTER — APPOINTMENT (OUTPATIENT)
Dept: ORTHOPEDIC SURGERY | Facility: CLINIC | Age: 62
End: 2022-09-28

## 2022-09-28 PROCEDURE — 99213 OFFICE O/P EST LOW 20 MIN: CPT

## 2022-09-28 PROCEDURE — 73564 X-RAY EXAM KNEE 4 OR MORE: CPT | Mod: 50

## 2022-09-28 NOTE — ADDENDUM
[FreeTextEntry1] : I, Carlos Larsen, acted solely as a scribe for Dr. Steve Manning on this date 09/28/2022.\par \par All medical record entries made by the Scribe were at my, Dr. Steve Manning, direction and personally dictated by me on 09/28/2022. I have reviewed the chart and agree that the record accurately reflects my personal performance of the history, physical exam, assessment and plan. I have also personally directed, reviewed, and agreed with the chart.

## 2022-09-28 NOTE — DISCUSSION/SUMMARY
[de-identified] : I discussed the underlying pathophysiology of the patient's condition in great detail with them. I went over the patient's x-rays with them in great detail. We discussed the use of ice to relieve pain. I advised him to keep his legs strong and keep working on losing weight. I informed him that he is eligible for Durolane injections every 6 months as per insurance guidelines (left knee after 09/24/22 and right knee after 10/14/22). We are requesting authorization for bilateral knee Durolane injections. Follow-up after prior authorization is obtained. All of their questions were answered. They understand and consent to the plan. \par \par FU after viscosupplementation authorization.

## 2022-09-28 NOTE — HISTORY OF PRESENT ILLNESS
[de-identified] : 62 year old male presents with bilateral knee pain, L>R. He notes the left knee is always bad and the right is starting to catch up. He reports buckling episodes of the right knee. He received Durolane injections 5-6 months ago (L 3/24/22; R 4/14/22). He thinks the gel shots gave him significant symptom relief. He required less use of Tylenol and could comfortably perform ADLs. The relief is wearing off and he is interested in repeat Durolane injections. He is diabetic and notes that cortisone injections caused a blood sugar spike in the past. \par He understands the importance of weight loss. He notes gaining 8-10 lbs over the summer. His BMI is around 42.0 (260 lbs, 5' 6"). PMHx: DM; 3 stents and was started on Plavix in addition to ASA 81mg.\par

## 2022-09-28 NOTE — PHYSICAL EXAM
[de-identified] : Constitutional\par o Appearance : well-nourished, well developed, alert, in no acute distress \par Head and Face\par o Head :\par ¦ Inspection : atraumatic, normocephalic\par o Face :\par ¦ Inspection : no visible rash or discoloration\par Respiratory\par o Respiratory Effort: breathing unlabored \par Neurologic\par o Mental Status Examination :\par ¦ Orientation : alert and oriented X 3\par Psychiatric\par o Mood and Affect: mood normal, affect appropriate\par Cardiovascular\par o Observation/Palpation : - no swelling\par Lymphatic\par o Additional Nodes : No palpable lymph nodes present\par \par Lumbosacral Spine\par o Inspection : no visible rash or discoloration\par o Palpation : moderate left paraspinal musculature tenderness, no sciatic notch tenderness\par o Range of Motion : extension and sidebending cause left paraspinal discomfort\par o Muscle Strength : paraspinal muscle strength and tone within normal limits\par o Muscle Tone : paraspinal muscle strength and tone within normal limits\par o Tests: straight leg test negative and ANISA test negative bilaterally \par \par Right Lower Extremity\par o Buttock : no tenderness, swelling or deformities \par o Right Hip :\par    - Inspection/Palpation : no tenderness, no swelling or deformities\par    - Range of Motion : full flexion, slight limitation of internal rotation, no crepitance\par    - Stability : joint stability intact\par    - Strength : extension, flexion, adduction, abduction, internal rotation and external rotation, 5/5 \par    - Tests: Diane’s test negative\par \par o Right Knee :\par ¦ Inspection/Palpation : medial compartment tenderness, no swelling, trophic changes\par ¦ Range of Motion : 0-115°, no crepitance, good patellofemoral glide \par ¦ Stability : no valgus or varus instability present on provocative testing\par ¦ Strength : flexion and extension 5/5\par ¦ Tests and Signs : negative Anterior Drawer, negative Lachman, negative Kade\par \par Left Lower Extremity\par o Buttock : no tenderness, swelling or deformities \par o Left Hip :\par    - Inspection/Palpation : no tenderness, no swelling or deformities\par    - Range of Motion : full flexion, slight limitation of rotation, no crepitance\par    - Stability : joint stability intact\par    - Strength : extension, flexion, adduction, abduction, internal rotation and external rotation, 5/5 \par    - Tests: Diane’s test negative\par \par o Left Knee :\par ¦ Inspection/Palpation : medial compartment tenderness, no swelling, trophic changes\par ¦ Range of Motion : 0-115°, slightly decreased patellofemoral glide with crepitance\par ¦ Stability : no valgus or varus instability present on provocative testing\par ¦ Strength : flexion and extension 5/5\par ¦ Tests and Signs : negative Anterior Drawer, negative Lachman, negative Kade\par \par o Peripheral Vascular System :\par ¦ Dorsalis Pedis Artery : pulse 2+ bilaterally\par \par Gait and Station:\par Gait: stiff on the left, no significant extremity swelling or lymphedema, trophic changes of both legs, no foot drop\par \par Radiology Results:\par o Right Knee : Standing AP, lateral, tunnel, and skyline views of the knee were obtained and revealed significant medial and patellofemoral arthritis.\par o Left Knee : Standing AP, lateral, tunnel, and skyline views of the knee were obtained and revealed significant medial and patellofemoral arthritis.

## 2022-10-04 ENCOUNTER — FORM ENCOUNTER (OUTPATIENT)
Age: 62
End: 2022-10-04

## 2022-12-21 ENCOUNTER — APPOINTMENT (OUTPATIENT)
Dept: ORTHOPEDIC SURGERY | Facility: CLINIC | Age: 62
End: 2022-12-21

## 2022-12-21 PROCEDURE — 20611 DRAIN/INJ JOINT/BURSA W/US: CPT | Mod: RT

## 2022-12-21 PROCEDURE — 99213 OFFICE O/P EST LOW 20 MIN: CPT | Mod: 25

## 2022-12-28 ENCOUNTER — APPOINTMENT (OUTPATIENT)
Dept: ORTHOPEDIC SURGERY | Facility: CLINIC | Age: 62
End: 2022-12-28

## 2022-12-28 PROCEDURE — 20611 DRAIN/INJ JOINT/BURSA W/US: CPT | Mod: LT

## 2022-12-28 NOTE — HISTORY OF PRESENT ILLNESS
[de-identified] : 62 year old male presents for a LEFT knee Durolane injection today (12/28/22). He was last seen in the office 1 week ago at which time he received a Durolane injection to the right knee. He reports to have gotten relief from the right knee injection last week. \par He understands the importance of weight loss. His BMI is around 42.0 (260 lbs, 5' 6"). PMHx: DM; 3 stents and was started on Plavix in addition to ASA 81mg.\par \par Radiology Results 9/28/2022:\par o Right Knee : Standing AP, lateral, tunnel, and skyline views of the knee were obtained and revealed significant medial and patellofemoral arthritis.\par o Left Knee : Standing AP, lateral, tunnel, and skyline views of the knee were obtained and revealed significant medial and patellofemoral arthritis.

## 2022-12-28 NOTE — DISCUSSION/SUMMARY
[de-identified] : I went over the pathophysiology of the patient's symptoms in great detail with the patient. The patient elected to receive a Durolane injection into his left knee today, and tolerated it well. I instructed the pt on ROM exercises, and told them to take it easy. The use of ice and rest was reviewed with the patient. The patient may resume activities tomorrow. I reminded the patient that it takes 4 to 6 weeks after the injection to feel symptom relief.  He was instructed to continue with weight loss. All of his questions were answered. He understands and consents to the plan.\par \par FU in 6 weeks for bilateral knees after the LEFT Durolane injection today on 12/28/22.

## 2022-12-28 NOTE — PHYSICAL EXAM
[de-identified] : Constitutional\par o Appearance : well-nourished, well developed, alert, in no acute distress \par Head and Face\par o Head :\par ¦ Inspection : atraumatic, normocephalic\par o Face :\par ¦ Inspection : no visible rash or discoloration\par Respiratory\par o Respiratory Effort: breathing unlabored \par Neurologic\par o Mental Status Examination :\par ¦ Orientation : alert and oriented X 3\par Psychiatric\par o Mood and Affect: mood normal, affect appropriate\par Cardiovascular\par o Observation/Palpation : - no swelling\par Lymphatic\par o Additional Nodes : No palpable lymph nodes present\par \par Lumbosacral Spine\par o Inspection : no visible rash or discoloration\par o Palpation : moderate left paraspinal musculature tenderness, no sciatic notch tenderness\par o Range of Motion : extension and sidebending cause left paraspinal discomfort\par o Muscle Strength : paraspinal muscle strength and tone within normal limits\par o Muscle Tone : paraspinal muscle strength and tone within normal limits\par o Tests: straight leg test negative and ANISA test negative bilaterally \par \par Right Lower Extremity\par o Buttock : no tenderness, swelling or deformities \par o Right Hip :\par    - Inspection/Palpation : no tenderness, no swelling or deformities\par    - Range of Motion : full flexion, slight limitation of internal rotation, no crepitance\par    - Stability : joint stability intact\par    - Strength : extension, flexion, adduction, abduction, internal rotation and external rotation, 5/5 \par    - Tests: Daine’s test negative\par \par o Right Knee :\par ¦ Inspection/Palpation : medial compartment tenderness, no swelling, trophic changes\par ¦ Range of Motion : 0-125°, no crepitance, good patellofemoral glide \par ¦ Stability : no valgus or varus instability present on provocative testing\par ¦ Strength : flexion and extension 5/5\par ¦ Tests and Signs : negative Anterior Drawer, negative Lachman, negative Kade\par \par Left Lower Extremity\par o Buttock : no tenderness, swelling or deformities \par o Left Hip :\par    - Inspection/Palpation : no tenderness, no swelling or deformities\par    - Range of Motion : full flexion, slight limitation of rotation, no crepitance\par    - Stability : joint stability intact\par    - Strength : extension, flexion, adduction, abduction, internal rotation and external rotation, 5/5 \par    - Tests: Diane’s test negative\par \par o Left Knee :\par ¦ Inspection/Palpation : medial compartment tenderness, no swelling, trophic changes\par ¦ Range of Motion : 0-120°, slightly decreased patellofemoral glide with crepitance\par ¦ Stability : no valgus or varus instability present on provocative testing\par ¦ Strength : flexion and extension 5/5\par ¦ Tests and Signs : negative Anterior Drawer, negative Lachman, negative Kade\par \par o Peripheral Vascular System :\par ¦ Dorsalis Pedis Artery : pulse 2+ bilaterally\par \par Gait and Station:\par Gait: stiff on the left, no significant extremity swelling or lymphedema, trophic changes of both legs, no foot drop\par \par o Left Knee injection : Indication- knee osteoarthritis; Anatomic location- right intra-articular joint space; Spray - area was sterilized with Betadine and alcohol and anesthetized with Ethyl Chloride; needle used-20G, Medications given- 3cc's Durolane under Ultrasound guidance. The patient tolerated the procedure well.  ot#: [id number]  oExp: 2025-05-31

## 2022-12-28 NOTE — ADDENDUM
[FreeTextEntry1] : I, Malena Briseno wrote this note acting as a scribe for Dr. Steve Manning on Dec 28, 2022.\par \par All medical record entries made by the Scribe were at my,  Dr. Steve Manning MD., direction and personally dictated by me on 12/28/2022. I have personally reviewed the chart and agree that the record accurately reflects my personal performance of the history, physical exam, assessment and plan.\par \par

## 2023-02-15 ENCOUNTER — APPOINTMENT (OUTPATIENT)
Dept: ORTHOPEDIC SURGERY | Facility: CLINIC | Age: 63
End: 2023-02-15
Payer: COMMERCIAL

## 2023-02-15 PROCEDURE — 99213 OFFICE O/P EST LOW 20 MIN: CPT

## 2023-03-28 ENCOUNTER — INPATIENT (INPATIENT)
Facility: HOSPITAL | Age: 63
LOS: 1 days | Discharge: HOME | End: 2023-03-30
Attending: INTERNAL MEDICINE | Admitting: INTERNAL MEDICINE
Payer: COMMERCIAL

## 2023-03-28 VITALS
SYSTOLIC BLOOD PRESSURE: 156 MMHG | DIASTOLIC BLOOD PRESSURE: 82 MMHG | OXYGEN SATURATION: 98 % | TEMPERATURE: 98 F | RESPIRATION RATE: 16 BRPM | HEART RATE: 79 BPM

## 2023-03-28 DIAGNOSIS — R06.02 SHORTNESS OF BREATH: ICD-10-CM

## 2023-03-28 DIAGNOSIS — R07.9 CHEST PAIN, UNSPECIFIED: ICD-10-CM

## 2023-03-28 DIAGNOSIS — I10 ESSENTIAL (PRIMARY) HYPERTENSION: ICD-10-CM

## 2023-03-28 DIAGNOSIS — E78.5 HYPERLIPIDEMIA, UNSPECIFIED: ICD-10-CM

## 2023-03-28 DIAGNOSIS — R42 DIZZINESS AND GIDDINESS: ICD-10-CM

## 2023-03-28 DIAGNOSIS — Z29.9 ENCOUNTER FOR PROPHYLACTIC MEASURES, UNSPECIFIED: ICD-10-CM

## 2023-03-28 DIAGNOSIS — I25.10 ATHEROSCLEROTIC HEART DISEASE OF NATIVE CORONARY ARTERY WITHOUT ANGINA PECTORIS: ICD-10-CM

## 2023-03-28 DIAGNOSIS — E11.9 TYPE 2 DIABETES MELLITUS WITHOUT COMPLICATIONS: ICD-10-CM

## 2023-03-28 LAB
FLUAV AG NPH QL: SIGNIFICANT CHANGE UP
FLUBV AG NPH QL: SIGNIFICANT CHANGE UP
RSV RNA NPH QL NAA+NON-PROBE: SIGNIFICANT CHANGE UP
SARS-COV-2 RNA SPEC QL NAA+PROBE: SIGNIFICANT CHANGE UP
TROPONIN T, HIGH SENSITIVITY RESULT: 9 NG/L — SIGNIFICANT CHANGE UP

## 2023-03-28 PROCEDURE — 71046 X-RAY EXAM CHEST 2 VIEWS: CPT | Mod: 26

## 2023-03-28 PROCEDURE — 99223 1ST HOSP IP/OBS HIGH 75: CPT

## 2023-03-28 PROCEDURE — 99285 EMERGENCY DEPT VISIT HI MDM: CPT

## 2023-03-28 RX ORDER — LOSARTAN POTASSIUM 100 MG/1
50 TABLET, FILM COATED ORAL DAILY
Refills: 0 | Status: DISCONTINUED | OUTPATIENT
Start: 2023-03-28 | End: 2023-03-30

## 2023-03-28 RX ORDER — ACETAMINOPHEN 500 MG
650 TABLET ORAL EVERY 6 HOURS
Refills: 0 | Status: DISCONTINUED | OUTPATIENT
Start: 2023-03-28 | End: 2023-03-30

## 2023-03-28 RX ORDER — SODIUM CHLORIDE 9 MG/ML
1000 INJECTION, SOLUTION INTRAVENOUS
Refills: 0 | Status: DISCONTINUED | OUTPATIENT
Start: 2023-03-28 | End: 2023-03-30

## 2023-03-28 RX ORDER — CLOPIDOGREL BISULFATE 75 MG/1
75 TABLET, FILM COATED ORAL DAILY
Refills: 0 | Status: DISCONTINUED | OUTPATIENT
Start: 2023-03-28 | End: 2023-03-30

## 2023-03-28 RX ORDER — GLUCAGON INJECTION, SOLUTION 0.5 MG/.1ML
1 INJECTION, SOLUTION SUBCUTANEOUS ONCE
Refills: 0 | Status: DISCONTINUED | OUTPATIENT
Start: 2023-03-28 | End: 2023-03-30

## 2023-03-28 RX ORDER — CHOLECALCIFEROL (VITAMIN D3) 125 MCG
5000 CAPSULE ORAL DAILY
Refills: 0 | Status: DISCONTINUED | OUTPATIENT
Start: 2023-03-28 | End: 2023-03-30

## 2023-03-28 RX ORDER — LOSARTAN/HYDROCHLOROTHIAZIDE 100MG-25MG
1 TABLET ORAL
Qty: 0 | Refills: 0 | DISCHARGE

## 2023-03-28 RX ORDER — DEXTROSE 50 % IN WATER 50 %
15 SYRINGE (ML) INTRAVENOUS ONCE
Refills: 0 | Status: DISCONTINUED | OUTPATIENT
Start: 2023-03-28 | End: 2023-03-30

## 2023-03-28 RX ORDER — ENOXAPARIN SODIUM 100 MG/ML
40 INJECTION SUBCUTANEOUS EVERY 24 HOURS
Refills: 0 | Status: DISCONTINUED | OUTPATIENT
Start: 2023-03-28 | End: 2023-03-28

## 2023-03-28 RX ORDER — ATORVASTATIN CALCIUM 80 MG/1
40 TABLET, FILM COATED ORAL AT BEDTIME
Refills: 0 | Status: DISCONTINUED | OUTPATIENT
Start: 2023-03-28 | End: 2023-03-30

## 2023-03-28 RX ORDER — ASPIRIN/CALCIUM CARB/MAGNESIUM 324 MG
81 TABLET ORAL DAILY
Refills: 0 | Status: DISCONTINUED | OUTPATIENT
Start: 2023-03-28 | End: 2023-03-30

## 2023-03-28 RX ORDER — DEXTROSE 50 % IN WATER 50 %
25 SYRINGE (ML) INTRAVENOUS ONCE
Refills: 0 | Status: DISCONTINUED | OUTPATIENT
Start: 2023-03-28 | End: 2023-03-30

## 2023-03-28 RX ORDER — DEXTROSE 50 % IN WATER 50 %
12.5 SYRINGE (ML) INTRAVENOUS ONCE
Refills: 0 | Status: DISCONTINUED | OUTPATIENT
Start: 2023-03-28 | End: 2023-03-30

## 2023-03-28 RX ORDER — INSULIN LISPRO 100/ML
VIAL (ML) SUBCUTANEOUS
Refills: 0 | Status: DISCONTINUED | OUTPATIENT
Start: 2023-03-28 | End: 2023-03-30

## 2023-03-28 RX ORDER — METFORMIN HYDROCHLORIDE 850 MG/1
1 TABLET ORAL
Qty: 0 | Refills: 0 | DISCHARGE

## 2023-03-28 RX ORDER — INSULIN LISPRO 100/ML
VIAL (ML) SUBCUTANEOUS AT BEDTIME
Refills: 0 | Status: DISCONTINUED | OUTPATIENT
Start: 2023-03-28 | End: 2023-03-30

## 2023-03-28 RX ADMIN — Medication 650 MILLIGRAM(S): at 23:13

## 2023-03-28 RX ADMIN — CLOPIDOGREL BISULFATE 75 MILLIGRAM(S): 75 TABLET, FILM COATED ORAL at 23:12

## 2023-03-28 RX ADMIN — ATORVASTATIN CALCIUM 40 MILLIGRAM(S): 80 TABLET, FILM COATED ORAL at 23:12

## 2023-03-28 RX ADMIN — Medication 650 MILLIGRAM(S): at 23:43

## 2023-03-28 RX ADMIN — Medication 81 MILLIGRAM(S): at 23:12

## 2023-03-28 RX ADMIN — LOSARTAN POTASSIUM 50 MILLIGRAM(S): 100 TABLET, FILM COATED ORAL at 23:12

## 2023-03-28 NOTE — H&P ADULT - PROBLEM SELECTOR PLAN 7
DVT ppx: Lovenox  Diet: Consistent carb and DASH/TLC diet  Disposition: Pending clinical course  Code Status: Full code

## 2023-03-28 NOTE — H&P ADULT - RESPIRATORY RATE (BREATHS/MIN)
14 Spironolactone Counseling: Patient advised regarding risks of diarrhea, abdominal pain, hyperkalemia, birth defects (for female patients), liver toxicity and renal toxicity. The patient may need blood work to monitor liver and kidney function and potassium levels while on therapy. The patient verbalized understanding of the proper use and possible adverse effects of spironolactone.  All of the patient's questions and concerns were addressed.

## 2023-03-28 NOTE — ED ADULT NURSE NOTE - OBJECTIVE STATEMENT
Patient is A&OX4, awake and alert. pt coming to ED from home regardng SOB, headache, and dizziness that started 5 days ago. According to pt headache constant, generalized, denies any vision changes, weakness, no slurred speech noted. SOB occurs during rest and ambulation. Pt states he has intermitted lightheadedness during rest and ambulation for one year now,pt states not so much "dizzy" but rather feels lightheaded. Patient currently denies SOB and chest pain. PMH of three cardiac stents in placed. lung sounds clear BL, respirations are even and unlabored, on room air. heart tones audible and regular NSR on tele. ABD is soft, nontender, and nondistended. Bowel sounds present and active in all 4 quads. 20G IV placed to RAC.

## 2023-03-28 NOTE — ED PROVIDER NOTE - ATTENDING CONTRIBUTION TO CARE
THIS PATIENT'S CARE WAS PARTIALLY ON DOWNTIME PAPER CHARTING AND PARTIALLY IN THIS ELECTRONIC MEDICAL RECORD.  SEE BOTH FOR COMPLETE INFORMATION.    Dr. Cowan:  This is a 63 yo male with HTN, HLD, DM, CAD with stents, in ED with dizziness and SOB for 4 days.  Pt reports abdominal stress last week and need for cath, has not had yet.  On exam pt well appearing, in NAD, heart RRR, lungs CTAB, abd NTND, extremities without swelling, strength 5/5 in all extremities and skin without rash.  EKG without acute ischemic events.

## 2023-03-28 NOTE — H&P ADULT - ASSESSMENT
62M PMhx CAD s/p 2 stents (distal LCx and OM1 2/2022), HTN, T2DM, HLD presents with dizziness and headache of unclear etiology with additional complaint of headache. 62M PMHx CAD s/p 2 stents (distal LCx and OM1 2/2022), HTN, T2DM, HLD presents with dizziness and headache of unclear etiology with additional complaint of headache.

## 2023-03-28 NOTE — H&P ADULT - PROBLEM SELECTOR PLAN 2
- Intermittent, occurring without provocation regardless of activity. May be related to restrictive physiology due to body habitus  - CXR unremarkable, proBNP 54 - no clear cardiac or pulmonary pathology to account for symptoms  - Obtain results of recent outpatient stress test records ([facility] Cardiologist Dr. Diehl - [phone]). Pending cardiology evaluation (Dr. Godinez)  - Patient currently saturating well/not hypoxic; monitor respiratory status - Intermittent, occurring without provocation regardless of activity. May be related to restrictive physiology due to body habitus  - CXR unremarkable, proBNP 54 - no clear cardiac or pulmonary pathology to account for symptoms  - Will hold off on TTE given recent stress test. Will obtain results of recent outpatient stress test records ([facility] Cardiologist Dr. Diehl - [phone]). Pending cardiology evaluation (Dr. Godinez)  - Patient currently saturating well/not hypoxic; monitor respiratory status

## 2023-03-28 NOTE — H&P ADULT - PROBLEM SELECTOR PLAN 6
- Home regimen includes atorvastatin 40  - C/w atorvastatin  - Lipid profile in AM - Home regimen includes atorvastatin 40  - C/w atorvastatin 40  - Lipid profile in AM

## 2023-03-28 NOTE — H&P ADULT - HISTORY OF PRESENT ILLNESS
62M PMhx CAD s/p 2 stents (distal LCx and OM1 2/2022), HTN, T2DM, HLD presents with dizziness.    In the ED:  VS: -156/76-82, HR 65-79, RR 14-16, O2 Sat 97-98, Tmax 98.3  Labs: CBC WNL. Coags - INR 1.13. CMP - BUN/Cr 19/0.68. Trop 8, CK 2.2. RVP negative.  Imaging: CXR unremarkable. 62M PMhx CAD s/p 2 stents (distal LCx and OM1 2/2022), HTN, T2DM, HLD presents with dizziness. Patient describes the dizziness as a "brain fog" with intermittent sensation of blood draining from his head that is intermittent and not temporally related to any positional movement. He denies a sensation of "the room spinning" or feeling unsteady on his feet. He states that he has had this feeling since he had his stents placed last year; underwent evaluation with a Neurologist and obtaining an MRI shortly thereafter which was negative per him. The patient states that the symptoms acutely worsened 2 weeks ago after he had a stress test. He also complains of new headache starting over the same time period, described as a 5/10 dull pain at the top of the front of the head, usually occurring at night but occasionally during the day as well, partially relieved by Tylenol. He denies any neurological deficits or abnormal sensorium. Lastly, he indicates several episodes of acute SOB, sometimes without exertion. He denies CP, N/V, swelling, difficulties with lying flat, numbness, tingling, visual or hearing issues. The aforementioned stress test, he says, was abnormal and he requires an angiogram for further assessment.    In the ED:  VS: -156/76-82, HR 65-79, RR 14-16, O2 Sat 97-98, Tmax 98.3  Labs: CBC WNL. Coags - INR 1.13. CMP - BUN/Cr 19/0.68. Trop 8 --> 9, CK 2.2. RVP negative.  Imaging: CXR unremarkable.  EKG: NSR @ 75 bpm, QTc 417. 62M PMHx CAD s/p 2 stents (distal LCx and OM1 2/2022), HTN, T2DM, HLD presents with dizziness and headache. Patient describes the dizziness as a "brain fog" with intermittent sensation of blood draining from his head that is intermittent and not temporally related to any positional movement. He denies a sensation of "the room spinning" or feeling unsteady on his feet. He states that he has had this feeling since he had his stents placed last year; underwent evaluation with a Neurologist and obtaining an MRI shortly thereafter which was negative per him. The patient states that the symptoms acutely worsened 2 weeks ago after he had a stress test. He also complains of new headache starting over the same time period, described as a 5/10 dull pain at the top of the front of the head, usually occurring at night but occasionally during the day as well, partially relieved by Tylenol. He denies any neurological deficits or abnormal sensorium. Lastly, he indicates several episodes of acute SOB, sometimes without exertion. He denies CP, N/V, swelling, difficulties with lying flat, numbness, tingling, visual or hearing issues. The aforementioned stress test, he says, was abnormal and he requires an angiogram for further assessment.    In the ED:  VS: -156/76-82, HR 65-79, RR 14-16, O2 Sat 97-98, Tmax 98.3  Labs: CBC WNL. Coags - INR 1.13. CMP - BUN/Cr 19/0.68. Trop 8 --> 9, CK 2.2. RVP negative.  Imaging: CXR unremarkable.  EKG: NSR @ 75 bpm, QTc 417.

## 2023-03-28 NOTE — H&P ADULT - PROBLEM SELECTOR PLAN 1
- Complaint of dizziness described as chronic brain fog (s/p unremarkable w/u 2022) and flushing with acute onset of predominantly nocturnal headaches. No other neurological symptoms noted or present on examination  - Will obtain CT scan to rule out intracranial pathology (stroke, tumor)  - Monitor for persistent symptoms  - PT evaluation - Complaint of dizziness described as chronic brain fog (s/p unremarkable w/u 2022) and flushing with acute onset of predominantly nocturnal headaches. No other neurological symptoms noted or present on examination  - Will obtain CT scan to rule out intracranial pathology (stroke, tumor)  - Monitor for persistent symptoms  - PT evaluation given nature of complaint

## 2023-03-28 NOTE — ED PROVIDER NOTE - CONSIDERATION OF ADMISSION OBSERVATION
Consideration of Admission/Observation pt with abnormal stress test and need for angiogram, in ED with symptoms concerning for ACS; plan to check labs and likely admit for further cardiac treatment

## 2023-03-28 NOTE — H&P ADULT - PROBLEM SELECTOR PLAN 5
- On presentation, POCT FS 140s  - Home regimen includes metformin 500 daily  - Initiate ISS; monitor FS for goal 140-180  - A1c in AM

## 2023-03-28 NOTE — ED PROVIDER NOTE - DATE/TIME 1
28-Mar-2023 14:14
[facility] [id number] [place] [address]  Phone: [phone]  Fax:   Follow Up Time: Urgent

## 2023-03-28 NOTE — ED ADULT NURSE REASSESSMENT NOTE - NS ED NURSE REASSESS COMMENT FT1
Patient is A&OX4, awake and alert. Pt breathing spontaneous and unlabored. Denies SOB and chest pain at this time, blurred vision, weakness.  NSR on tele, on room air. Bed lowest position. call bell within reach. Will continue to monitor.

## 2023-03-28 NOTE — ED PROVIDER NOTE - OBJECTIVE STATEMENT
62-year-old male past medical history of hypertension, hyperlipidemia, diabetes, 3 cardiac stents on aspirin and Plavix presenting with 4 days of dizziness associated with some shortness of breath and headaches.  Denies dizziness to be more lightheadedness rather than vertiginous.  Denies any head trauma or syncopal episodes.  States he had a stress test done last week with Dr. Jose.  States he supposed to go for an angiogram this Thursday due to deep abnormal findings on stress test. 62-year-old male past medical history of hypertension, hyperlipidemia, diabetes, 3 cardiac stents on aspirin and Plavix presenting with 4 days of dizziness associated with some shortness of breath and headaches.  Denies dizziness to be more lightheadedness rather than vertiginous.  Denies any head trauma or syncopal episodes.  States he had a stress test done last week with Dr. Jose.  States he supposed to go for an angiogram this Thursday due to abnormal findings on stress test. States he has been feeling like this for < 1 year however it has worsened this past week. Denies any URI sxs, nausea, vomiting, diarrhea. DR. SOLIS:  THE CARE OF THIS PATIENT OCCURRED DURING A COMPUTER DOWNTIME.  PLEASE SEE THIS ELECTRONIC RECORD AS WELL AS THE PAPER DOWNTIME CHART FOR COMPLETE INFORMATION.    MY ATTENDING ATTESTATION HERE APPLIES TO BOTH THE ELECTRONIC MEDICAL RECORD AS WELL AS THE PAPER CHART FOR THE SAME ED VISIT.     62-year-old male past medical history of hypertension, hyperlipidemia, diabetes, 3 cardiac stents on aspirin and Plavix presenting with 4 days of dizziness associated with some shortness of breath and headaches.  Denies dizziness to be more lightheadedness rather than vertiginous.  Denies any head trauma or syncopal episodes.  States he had a stress test done last week with Dr. Jose.  States he supposed to go for an angiogram this Thursday due to abnormal findings on stress test. States he has been feeling like this for < 1 year however it has worsened this past week. Denies any URI sxs, nausea, vomiting, diarrhea.

## 2023-03-28 NOTE — ED PROVIDER NOTE - PROGRESS NOTE DETAILS
Deacon BAIRD (PGY-3)  endorsed to dr. donovan wilburn Pavel Welch DO (PGY1)  this is a hybrid chart - partially completed on paper chart. patient TBA to Dr. Godinez

## 2023-03-28 NOTE — H&P ADULT - PROBLEM SELECTOR PLAN 3
- S/p stents to LCx and OM1 2/2022  - Home regimen includes ASA and Plavix  - C/w home regimen  - Troponins here 8 --> 9. EKG - NSR at 75 bpm, QTc 417  - Obtain results of recent outpatient stress test records (as above)  - Pending Cardiology evaluation (Dr. Godinez)  - Monitor for any cardiac symptoms

## 2023-03-29 LAB
A1C WITH ESTIMATED AVERAGE GLUCOSE RESULT: 6 % — HIGH (ref 4–5.6)
ANION GAP SERPL CALC-SCNC: 13 MMOL/L — SIGNIFICANT CHANGE UP (ref 7–14)
BUN SERPL-MCNC: 17 MG/DL — SIGNIFICANT CHANGE UP (ref 7–23)
CALCIUM SERPL-MCNC: 9.1 MG/DL — SIGNIFICANT CHANGE UP (ref 8.4–10.5)
CHLORIDE SERPL-SCNC: 102 MMOL/L — SIGNIFICANT CHANGE UP (ref 98–107)
CHOLEST SERPL-MCNC: 120 MG/DL — SIGNIFICANT CHANGE UP
CO2 SERPL-SCNC: 24 MMOL/L — SIGNIFICANT CHANGE UP (ref 22–31)
CREAT SERPL-MCNC: 0.62 MG/DL — SIGNIFICANT CHANGE UP (ref 0.5–1.3)
EGFR: 109 ML/MIN/1.73M2 — SIGNIFICANT CHANGE UP
ESTIMATED AVERAGE GLUCOSE: 126 — SIGNIFICANT CHANGE UP
GLUCOSE SERPL-MCNC: 127 MG/DL — HIGH (ref 70–99)
HCV AB S/CO SERPL IA: 0.64 S/CO — SIGNIFICANT CHANGE UP (ref 0–0.99)
HCV AB SERPL-IMP: SIGNIFICANT CHANGE UP
HDLC SERPL-MCNC: 51 MG/DL — SIGNIFICANT CHANGE UP
LIPID PNL WITH DIRECT LDL SERPL: 45 MG/DL — SIGNIFICANT CHANGE UP
MAGNESIUM SERPL-MCNC: 1.9 MG/DL — SIGNIFICANT CHANGE UP (ref 1.6–2.6)
NON HDL CHOLESTEROL: 69 MG/DL — SIGNIFICANT CHANGE UP
PHOSPHATE SERPL-MCNC: 3.4 MG/DL — SIGNIFICANT CHANGE UP (ref 2.5–4.5)
POTASSIUM SERPL-MCNC: 3.9 MMOL/L — SIGNIFICANT CHANGE UP (ref 3.5–5.3)
POTASSIUM SERPL-SCNC: 3.9 MMOL/L — SIGNIFICANT CHANGE UP (ref 3.5–5.3)
SODIUM SERPL-SCNC: 139 MMOL/L — SIGNIFICANT CHANGE UP (ref 135–145)
TRIGL SERPL-MCNC: 122 MG/DL — SIGNIFICANT CHANGE UP

## 2023-03-29 PROCEDURE — 70450 CT HEAD/BRAIN W/O DYE: CPT | Mod: 26

## 2023-03-29 RX ORDER — LANOLIN ALCOHOL/MO/W.PET/CERES
3 CREAM (GRAM) TOPICAL AT BEDTIME
Refills: 0 | Status: DISCONTINUED | OUTPATIENT
Start: 2023-03-29 | End: 2023-03-30

## 2023-03-29 RX ORDER — ENOXAPARIN SODIUM 100 MG/ML
40 INJECTION SUBCUTANEOUS EVERY 12 HOURS
Refills: 0 | Status: DISCONTINUED | OUTPATIENT
Start: 2023-03-29 | End: 2023-03-30

## 2023-03-29 RX ADMIN — ATORVASTATIN CALCIUM 40 MILLIGRAM(S): 80 TABLET, FILM COATED ORAL at 21:42

## 2023-03-29 RX ADMIN — Medication 650 MILLIGRAM(S): at 22:16

## 2023-03-29 RX ADMIN — Medication 650 MILLIGRAM(S): at 21:46

## 2023-03-29 RX ADMIN — ENOXAPARIN SODIUM 40 MILLIGRAM(S): 100 INJECTION SUBCUTANEOUS at 21:46

## 2023-03-29 RX ADMIN — Medication 3 MILLIGRAM(S): at 21:46

## 2023-03-29 RX ADMIN — CLOPIDOGREL BISULFATE 75 MILLIGRAM(S): 75 TABLET, FILM COATED ORAL at 21:41

## 2023-03-29 RX ADMIN — Medication 81 MILLIGRAM(S): at 21:41

## 2023-03-29 NOTE — CHART NOTE - NSCHARTNOTEFT_GEN_A_CORE
[facility] NIGHT MEDICINE COVERAGE.    Pt went for cath procedure today with Right radial artery accessed. Pt denies SOB, CP, swelling, edema, paresthesia distal to site or pain at site. Site checked. No ecchymosis, hematoma, or swelling within access point. Active and passive ROM of R shoulder, R elbow, R wrist, and fingers without deficits. Motor and Sensory intact. 2+ peripheral pulses intact. Capillary refill less than 2 seconds. Pt educated to look out for bruising, swelling, tingling, and numbness of site/distal to site and notify RN. Will continue to monitor overnight.     Rola LUO  Medicine [id number]

## 2023-03-29 NOTE — PHYSICAL THERAPY INITIAL EVALUATION ADULT - ADDITIONAL COMMENTS
Pt lives in a co-op with a roommate, +2 steps to enter, no additional steps inside to negotiate. Pt was independent with all functional mobility and ADLs prior to admission.     Pt left seated at edge of bed, all lines intact, all needs in reach, bed alarm set, in NAD. SUSANNA Gaxiola aware.

## 2023-03-29 NOTE — PHYSICAL THERAPY INITIAL EVALUATION ADULT - PERTINENT HX OF CURRENT PROBLEM, REHAB EVAL
62 year old Male (A&Ox4 at baseline, reliable historian) PMhx CAD s/p 2 stents presents with dizziness and headache of unclear etiology with additional complaint of headache.

## 2023-03-29 NOTE — PHYSICAL THERAPY INITIAL EVALUATION ADULT - GENERAL OBSERVATIONS, REHAB EVAL
Pt received ambulating from bathroom in room, all lines intact, in NAD. Pt agreeable to participate in PT evaluation.

## 2023-03-30 ENCOUNTER — TRANSCRIPTION ENCOUNTER (OUTPATIENT)
Age: 63
End: 2023-03-30

## 2023-03-30 VITALS
DIASTOLIC BLOOD PRESSURE: 83 MMHG | TEMPERATURE: 97 F | HEART RATE: 63 BPM | SYSTOLIC BLOOD PRESSURE: 141 MMHG | RESPIRATION RATE: 17 BRPM | OXYGEN SATURATION: 96 %

## 2023-03-30 LAB
ANION GAP SERPL CALC-SCNC: 11 MMOL/L — SIGNIFICANT CHANGE UP (ref 7–14)
BUN SERPL-MCNC: 16 MG/DL — SIGNIFICANT CHANGE UP (ref 7–23)
CALCIUM SERPL-MCNC: 8.8 MG/DL — SIGNIFICANT CHANGE UP (ref 8.4–10.5)
CHLORIDE SERPL-SCNC: 104 MMOL/L — SIGNIFICANT CHANGE UP (ref 98–107)
CO2 SERPL-SCNC: 24 MMOL/L — SIGNIFICANT CHANGE UP (ref 22–31)
CREAT SERPL-MCNC: 0.65 MG/DL — SIGNIFICANT CHANGE UP (ref 0.5–1.3)
EGFR: 108 ML/MIN/1.73M2 — SIGNIFICANT CHANGE UP
GLUCOSE SERPL-MCNC: 124 MG/DL — HIGH (ref 70–99)
HCT VFR BLD CALC: 46.3 % — SIGNIFICANT CHANGE UP (ref 39–50)
HGB BLD-MCNC: 15.3 G/DL — SIGNIFICANT CHANGE UP (ref 13–17)
MAGNESIUM SERPL-MCNC: 2.1 MG/DL — SIGNIFICANT CHANGE UP (ref 1.6–2.6)
MCHC RBC-ENTMCNC: 28.2 PG — SIGNIFICANT CHANGE UP (ref 27–34)
MCHC RBC-ENTMCNC: 33 GM/DL — SIGNIFICANT CHANGE UP (ref 32–36)
MCV RBC AUTO: 85.3 FL — SIGNIFICANT CHANGE UP (ref 80–100)
NRBC # BLD: 0 /100 WBCS — SIGNIFICANT CHANGE UP (ref 0–0)
NRBC # FLD: 0 K/UL — SIGNIFICANT CHANGE UP (ref 0–0)
PHOSPHATE SERPL-MCNC: 3.2 MG/DL — SIGNIFICANT CHANGE UP (ref 2.5–4.5)
PLATELET # BLD AUTO: 174 K/UL — SIGNIFICANT CHANGE UP (ref 150–400)
POTASSIUM SERPL-MCNC: 3.9 MMOL/L — SIGNIFICANT CHANGE UP (ref 3.5–5.3)
POTASSIUM SERPL-SCNC: 3.9 MMOL/L — SIGNIFICANT CHANGE UP (ref 3.5–5.3)
RBC # BLD: 5.43 M/UL — SIGNIFICANT CHANGE UP (ref 4.2–5.8)
RBC # FLD: 13.1 % — SIGNIFICANT CHANGE UP (ref 10.3–14.5)
SODIUM SERPL-SCNC: 139 MMOL/L — SIGNIFICANT CHANGE UP (ref 135–145)
WBC # BLD: 5.93 K/UL — SIGNIFICANT CHANGE UP (ref 3.8–10.5)
WBC # FLD AUTO: 5.93 K/UL — SIGNIFICANT CHANGE UP (ref 3.8–10.5)

## 2023-03-30 RX ADMIN — ENOXAPARIN SODIUM 40 MILLIGRAM(S): 100 INJECTION SUBCUTANEOUS at 05:40

## 2023-03-30 RX ADMIN — LOSARTAN POTASSIUM 50 MILLIGRAM(S): 100 TABLET, FILM COATED ORAL at 05:41

## 2023-03-30 RX ADMIN — Medication 5000 UNIT(S): at 13:19

## 2023-03-30 NOTE — DISCHARGE NOTE PROVIDER - CARE PROVIDER_API CALL
Alfonzo Stone)  Internal Medicine  [address] [place] [address]  Phone: [phone]  Fax: [phone]  Established Patient  Follow Up Time: 1 week    You Diehl)  Cardiovascular Disease; Internal Medicine  [address] [place] [address], [address]  [place] [address]  Phone: [phone]  Fax: [phone]  Established Patient  Follow Up Time: 2 weeks

## 2023-03-30 NOTE — DISCHARGE NOTE NURSING/CASE MANAGEMENT/SOCIAL WORK - NSDCPETBCESMAN_GEN_ALL_CORE
"   Patient:   Richard Silva            MRN: [id number]            FIN: [id number]               Age:   [address] years     Sex:  FEMALE     :  28   Associated Diagnoses:   None   Author:   JENNIFER BOWERS        Call received at 9:01 AM from Casi Montes,  care manager for the unit. Patient's granddaughter, Aubrey Schwab, is on the unit to take the patient home. Granddaughter states she does not wish to wait for the patient to be reevaluated by physician. Per care manager, she stated, ""she has her schedule, we have ours, we just want to take her home. \""  The family just wanted her to stay the night in the hospital because they did not have an overnight caregiver for the patient last night, and she had received pain medications in the emergency department. Granddaughter states that they now have a 24/7 caregiver for the patient. She refused visiting nurse and home physical therapy; home O2 study also refused. Care manager called the patient's POA, patient's other granddaughter Micaela Michael. Micaela Michael verified that she and Yasemin abi work  together as patient's caregivers. Yasemin alex was given information on Dr. Venkat Lyn, should they decide that patient would benefit from neurocognitive testing. Medication reconciliation done. Medications for cholesterol and blood pressure were discontinued due to their lack of efficacy in improving quality of life or prolonging life in the frail elderly. Metformin was previously discontinued. Discharge order completed and discharge instructions given.                      Electronically Signed On 2017 12:28  __________________________________________________   JENNIFER Bassett    " If you are a smoker, it is important for your health to stop smoking. Please be aware that second hand smoke is also harmful.

## 2023-03-30 NOTE — DISCHARGE NOTE NURSING/CASE MANAGEMENT/SOCIAL WORK - NSDCPEFALRISK_GEN_ALL_CORE
For information on Fall & Injury Prevention, visit: [email or web address] OR  [email or web address] OR  https://www.cdc.gov/steadi/patient.html

## 2023-03-30 NOTE — DISCHARGE NOTE PROVIDER - NSDCCPCAREPLAN_GEN_ALL_CORE_FT
PRINCIPAL DISCHARGE DIAGNOSIS  Diagnosis: Atypical chest pain  Assessment and Plan of Treatment: Take medications as instructed on discharge  Follow up with your PCP within 1 week and Cardiologist in 2 weeks.      SECONDARY DISCHARGE DIAGNOSES  Diagnosis: Dizziness  Assessment and Plan of Treatment: Take medications as instructed on discharge  Follow up with your PCP within 1 week    Diagnosis: CAD (coronary artery disease)  Assessment and Plan of Treatment: Take medications as instructed on discharge  Follow up with your PCP within 1 week and Cardiologist in 2 weeks.    Diagnosis: Hypertension  Assessment and Plan of Treatment: Take medications as instructed on discharge  Follow up with your PCP within 1 week    Diagnosis: Type 2 diabetes mellitus  Assessment and Plan of Treatment: Take medications as instructed on discharge  Follow up with your PCP within 1 week    Diagnosis: Shortness of breath  Assessment and Plan of Treatment: Take medications as instructed on discharge  Follow up with your PCP within 1 week

## 2023-03-30 NOTE — DISCHARGE NOTE PROVIDER - PROVIDER TOKENS
PROVIDER:[TOKEN:[[id number]],FOLLOWUP:[1 week],ESTABLISHEDPATIENT:[T]],PROVIDER:[TOKEN:[[id number]],FOLLOWUP:[2 weeks],ESTABLISHEDPATIENT:[T]]

## 2023-03-30 NOTE — DISCHARGE NOTE NURSING/CASE MANAGEMENT/SOCIAL WORK - PATIENT PORTAL LINK FT
You can access the FollowMyHealth Patient Portal offered by [facility] by registering at the following website: [email or web address]. By joining [facility]’s FollowMyHealth portal, you will also be able to view your health information using other applications (apps) compatible with our system.

## 2023-03-30 NOTE — CONSULT NOTE ADULT - SUBJECTIVE AND OBJECTIVE BOX
CARDIOLOGY CONSULT NOTE - DR. GERMAN    HPI:  62M PMHx CAD s/p 2 stents (distal LCx and OM1 2/2022), HTN, T2DM, HLD presents with dizziness and headache. Patient describes the dizziness as a "brain fog" with intermittent sensation of blood draining from his head that is intermittent and not temporally related to any positional movement. He denies a sensation of "the room spinning" or feeling unsteady on his feet. He states that he has had this feeling since he had his stents placed last year; underwent evaluation with a Neurologist and obtaining an MRI shortly thereafter which was negative per him. The patient states that the symptoms acutely worsened 2 weeks ago after he had a stress test. He also complains of new headache starting over the same time period, described as a 5/10 dull pain at the top of the front of the head, usually occurring at night but occasionally during the day as well, partially relieved by Tylenol. He denies any neurological deficits or abnormal sensorium. Lastly, he indicates several episodes of acute SOB, sometimes without exertion. He denies CP, N/V, swelling, difficulties with lying flat, numbness, tingling, visual or hearing issues. The aforementioned stress test, he says, was abnormal and he requires an angiogram for further assessment.    In the ED:  VS: -156/76-82, HR 65-79, RR 14-16, O2 Sat 97-98, Tmax 98.3  Labs: CBC WNL. Coags - INR 1.13. CMP - BUN/Cr 19/0.68. Trop 8 --> 9, CK 2.2. RVP negative.  Imaging: CXR unremarkable.  EKG: NSR @ 75 bpm, QTc 417. (28 Mar 2023 17:25)    feels better  no sx  no chestpain, dizziness, palps    s/p cath     PAST MEDICAL & SURGICAL HISTORY:  Hypertension      LENIN (Obstructive Sleep Apnea)  Sleep study done 2015  uses CPAP at home      Type 2 diabetes mellitus      Morbid obesity with BMI of 50.0-59.9, adult      Venous insufficiency of both lower extremities      Anemia      History of Appendectomy  1982      Gastric Bypass  1998.  Patient lost 225 lbs and has regained 125 bs in the last 9years.      Umbilical Hernia Repair  2002      History of Abdominoplasty  2001      Incisional Hernia Repair  2005      S/P Cholecystectomy      H/O ventral hernia repair  08/24/12 incarcerated ventral hernia repair with mesh.            PREVIOUS DIAGNOSTIC TESTING:    [ ] Echocardiogram:  [ ]  Catheterization:  [ ] Stress Test:  	    MEDICATIONS:    Home Medications:  Ecotrin Adult Low Strength 81 mg oral delayed release tablet: 1 tab(s) orally once a day (28 Mar 2023 22:56)  metFORMIN 500 mg oral tablet: 1 orally once a day (28 Mar 2023 22:56)  Plavix 75 mg oral tablet: 1 orally once a day (28 Mar 2023 22:56)  Vitamin D3 5000 intl units oral capsule: 1 cap(s) orally once a day (28 Mar 2023 22:56)      MEDICATIONS  (STANDING):  aspirin enteric coated 81 milliGRAM(s) Oral daily  atorvastatin 40 milliGRAM(s) Oral at bedtime  cholecalciferol 5000 Unit(s) Oral daily  clopidogrel Tablet 75 milliGRAM(s) Oral daily  dextrose 5%. 1000 milliLiter(s) (50 mL/Hr) IV Continuous <Continuous>  dextrose 5%. 1000 milliLiter(s) (100 mL/Hr) IV Continuous <Continuous>  dextrose 50% Injectable 25 Gram(s) IV Push once  dextrose 50% Injectable 12.5 Gram(s) IV Push once  dextrose 50% Injectable 25 Gram(s) IV Push once  enoxaparin Injectable 40 milliGRAM(s) SubCutaneous every 12 hours  glucagon  Injectable 1 milliGRAM(s) IntraMuscular once  insulin lispro (ADMELOG) corrective regimen sliding scale   SubCutaneous three times a day before meals  insulin lispro (ADMELOG) corrective regimen sliding scale   SubCutaneous at bedtime  losartan 50 milliGRAM(s) Oral daily      FAMILY HISTORY:  Family history of diabetes mellitus in mother (Mother)  heart attack, ovarian cancer    Family history of diabetes mellitus in father (Father)        SOCIAL HISTORY:    [ ] Non-smoker  [ ] Smoker  [ ] Alcohol    Allergies    penicillin (Hives; Urticaria; Rash)    Intolerances    	    REVIEW OF SYSTEMS:  CONSTITUTIONAL: No fever, weight loss, or fatigue  EYES: No eye pain, visual disturbances, or discharge  ENMT:  No difficulty hearing, tinnitus, vertigo; No sinus or throat pain  NECK: No pain or stiffness  RESPIRATORY: No cough, wheezing, chills or hemoptysis; No Shortness of Breath  CARDIOVASCULAR: as HPI  GASTROINTESTINAL: No abdominal or epigastric pain. No nausea, vomiting, or hematemesis; No diarrhea or constipation. No melena or hematochezia.  GENITOURINARY: No dysuria, frequency, hematuria, or incontinence  NEUROLOGICAL: No headaches, memory loss, loss of strength, numbness, or tremors  SKIN: No itching, burning, rashes, or lesions   	  [ ] All others negative	  [ ] Unable to obtain    PHYSICAL EXAM:    T(C): 36.6 (03-30-23 @ 05:35), Max: 36.7 (03-29-23 @ 14:00)  HR: 60 (03-30-23 @ 05:35) (60 - 77)  BP: 130/70 (03-30-23 @ 05:35) (124/77 - 137/78)  RR: 18 (03-30-23 @ 05:35) (18 - 18)  SpO2: 98% (03-30-23 @ 05:35) (97% - 100%)  Wt(kg): --  I&O's Summary    29 Mar 2023 07:01  -  30 Mar 2023 07:00  --------------------------------------------------------  IN: 320 mL / OUT: 0 mL / NET: 320 mL      Daily     Daily     Appearance: Normal	  Psychiatry: A & O x 3, Mood & affect appropriate  HEENT:   Normal oral mucosa, PERRL, EOMI	  Lymphatic: No lymphadenopathy  Cardiovascular: Normal S1 S2,RRR, No JVD, No murmurs  Respiratory: Lungs clear to auscultation	  Gastrointestinal:  Soft, Non-tender, + BS	  Skin: No rashes, No ecchymoses, No cyanosis	  Neurologic: Non-focal  Extremities: Normal range of motion, No clubbing, cyanosis or edema  Vascular: Peripheral pulses palpable 2+ bilaterally    TELEMETRY: 	    ECG:  	  RADIOLOGY:  OTHER: 	  	  LABS:	 	    CARDIAC MARKERS:        proBNP:     Lipid Profile:   HgA1c:   TSH:                           15.3   5.93  )-----------( 174      ( 30 Mar 2023 06:16 )             46.3     03-30    139  |  104  |  16  ----------------------------<  124<H>  3.9   |  24  |  0.65    Ca    8.8      30 Mar 2023 06:16  Phos  3.2     03-30  Mg     2.10     03-30    TPro  6.7  /  Alb  4.0  /  TBili  0.5  /  DBili  x   /  AST  18  /  ALT  23  /  AlkPhos  55  03-28    PT/INR - ( 28 Mar 2023 13:45 )   PT: 13.1 sec;   INR: 1.13 ratio         PTT - ( 28 Mar 2023 13:45 )  PTT:34.7 sec    Creatinine, Serum: 0.65 mg/dL (03-30-23 @ 06:16)  Creatinine, Serum: 0.62 mg/dL (03-29-23 @ 05:55)  Creatinine, Serum: 0.68 mg/dL (03-28-23 @ 13:45)        ASSESSMENT/PLAN:

## 2023-03-30 NOTE — DISCHARGE NOTE PROVIDER - NSDCMRMEDTOKEN_GEN_ALL_CORE_FT
atorvastatin 40 mg oral tablet: 1 tab(s) orally once a day (at bedtime)  Ecotrin Adult Low Strength 81 mg oral delayed release tablet: 1 tab(s) orally once a day  losartan 50 mg oral tablet: 1 tab(s) orally once a day   metFORMIN 500 mg oral tablet: 1 orally once a day  Plavix 75 mg oral tablet: 1 orally once a day  Vitamin D3 5000 intl units oral capsule: 1 cap(s) orally once a day

## 2023-03-30 NOTE — DISCHARGE NOTE PROVIDER - NSDCFUSCHEDAPPT_GEN_ALL_CORE_FT
Steve Manning  [facility]  ORTHOSURG [address] [facility]  Scheduled Appointment: 06/15/2023

## 2023-03-30 NOTE — DISCHARGE NOTE PROVIDER - HOSPITAL COURSE
HPI:  62M PMHx CAD s/p 2 stents (distal LCx and OM1 2/2022), HTN, T2DM, HLD presents with dizziness and headache. Patient describes the dizziness as a "brain fog" with intermittent sensation of blood draining from his head that is intermittent and not temporally related to any positional movement. He denies a sensation of "the room spinning" or feeling unsteady on his feet. He states that he has had this feeling since he had his stents placed last year; underwent evaluation with a Neurologist and obtaining an MRI shortly thereafter which was negative per him. The patient states that the symptoms acutely worsened 2 weeks ago after he had a stress test. He also complains of new headache starting over the same time period, described as a 5/10 dull pain at the top of the front of the head, usually occurring at night but occasionally during the day as well, partially relieved by Tylenol. He denies any neurological deficits or abnormal sensorium. Lastly, he indicates several episodes of acute SOB, sometimes without exertion. He denies CP, N/V, swelling, difficulties with lying flat, numbness, tingling, visual or hearing issues. The aforementioned stress test, he says, was abnormal and he requires an angiogram for further assessment.    In the ED:  VS: -156/76-82, HR 65-79, RR 14-16, O2 Sat 97-98, Tmax 98.3  Labs: CBC WNL. Coags - INR 1.13. CMP - BUN/Cr 19/0.68. Trop 8 --> 9, CK 2.2. RVP negative.  Imaging: CXR unremarkable.  EKG: NSR @ 75 bpm, QTc 417. (28 Mar 2023 17:25)      HOSPITAL COURSE:  Vital Signs Last 24 Hrs  T(C): 36.3 (30 Mar 2023 12:58), Max: 36.7 (29 Mar 2023 14:00)  T(F): 97.3 (30 Mar 2023 12:58), Max: 98.1 (29 Mar 2023 14:00)  HR: 63 (30 Mar 2023 12:58) (60 - 77)  BP: 141/83 (30 Mar 2023 12:58) (124/77 - 141/83)  BP(mean): --  RR: 17 (30 Mar 2023 12:58) (17 - 18)  SpO2: 96% (30 Mar 2023 12:58) (96% - 100%)    Parameters below as of 30 Mar 2023 12:58  Patient On (Oxygen Delivery Method): room air    Dizziness  - CT Head non-con : Negative   -resolved    Shortness of Breath  s/p C 3/29: Mild-moderate nonobstructive CAD of the left cirumflex and left anterior descending arteries. Prior stent to the OM1 is widely patent.         Patient currently denies chest pain, sob, palpitations, dizziness or lightheadedness. Case d/w attending ( Dr. Godinez): Pt to be dc'd home and F/U with his PCP and Cardiologist as instructed.

## 2023-03-30 NOTE — CONSULT NOTE ADULT - ASSESSMENT
A/P    62M PMHx CAD s/p 2 stents (distal LCx and OM1 2/2022), HTN, T2DM, HLD presents with dizziness and headache of unclear etiology with additional complaint of headache.    #Dizziness.   -head ct normal   -resolved sx  -chronic vagal type symptoms   -behavioral modifications    #Dyspnea  -intermittent, at rest, resolve spontaneously  -likely anxiety driven  -cath with no new disease  -recent tte ok  -no clinical HF     #CAD (coronary artery disease).   -S/p stents to LCx and OM1 2/2022  -cath with no new obs CAD to account for sx   -cont current tx    #Hypertension.   -cont home losartan.  -permissive htn for chronic dizziness        stable for s/c home today   cath site ok  outp f/u dr ortiz  d/w acp     82 minutes spent on total encounter; more than 50% of the visit was spent counseling and/or coordinating care by the attending physician.

## 2023-06-15 ENCOUNTER — APPOINTMENT (OUTPATIENT)
Dept: ORTHOPEDIC SURGERY | Facility: CLINIC | Age: 63
End: 2023-06-15
Payer: COMMERCIAL

## 2023-06-15 VITALS — BODY MASS INDEX: 43.39 KG/M2 | HEIGHT: 66 IN | WEIGHT: 270 LBS

## 2023-06-15 DIAGNOSIS — E66.01 MORBID (SEVERE) OBESITY DUE TO EXCESS CALORIES: ICD-10-CM

## 2023-06-15 PROCEDURE — 99213 OFFICE O/P EST LOW 20 MIN: CPT

## 2023-06-15 PROCEDURE — 73564 X-RAY EXAM KNEE 4 OR MORE: CPT | Mod: RT

## 2023-06-15 NOTE — PHYSICAL EXAM
[de-identified] : Constitutional\par o Appearance : well-nourished, well developed, alert, in no acute distress \par Head and Face\par o Head :\par ¦ Inspection : atraumatic, normocephalic\par o Face :\par ¦ Inspection : no visible rash or discoloration\par Respiratory\par o Respiratory Effort: breathing unlabored \par Neurologic\par o Mental Status Examination :\par ¦ Orientation : alert and oriented X 3\par Psychiatric\par o Mood and Affect: mood normal, affect appropriate\par Cardiovascular\par o Observation/Palpation : - no swelling\par Lymphatic\par o Additional Nodes : No palpable lymph nodes present\par \par Lumbosacral Spine\par o Inspection : no visible rash or discoloration\par o Palpation : moderate left paraspinal musculature tenderness, no sciatic notch tenderness\par o Range of Motion : extension and sidebending cause left paraspinal discomfort\par o Muscle Strength : paraspinal muscle strength and tone within normal limits\par o Muscle Tone : paraspinal muscle strength and tone within normal limits\par o Tests: straight leg test negative and ANISA test negative bilaterally \par \par Right Lower Extremity\par o Buttock : no tenderness, swelling or deformities \par o Right Hip :\par    - Inspection/Palpation : no tenderness, no swelling or deformities\par    - Range of Motion : full flexion, full rotation, no crepitance\par    - Stability : joint stability intact\par    - Strength : extension, flexion, adduction, abduction, internal rotation and external rotation, 5/5 \par    - Tests: Diane’s test negative\par \par o Right Knee :\par ¦ Inspection/Palpation : medial compartment tenderness, no swelling, trophic changes\par ¦ Range of Motion : 0-120°, no crepitance, good patellofemoral glide \par ¦ Stability : no valgus or varus instability present on provocative testing\par ¦ Strength : flexion and extension 5/5\par ¦ Tests and Signs : negative Anterior Drawer, negative Lachman, negative Kade\par \par Left Lower Extremity\par o Buttock : no tenderness, swelling or deformities \par o Left Hip :\par    - Inspection/Palpation : no tenderness, no swelling or deformities\par    - Range of Motion : full flexion, full rotation, no crepitance\par    - Stability : joint stability intact\par    - Strength : extension, flexion, adduction, abduction, internal rotation and external rotation, 5/5 \par    - Tests: Diane’s test negative\par \par o Left Knee :\par ¦ Inspection/Palpation : medial compartment tenderness, no swelling, trophic changes\par ¦ Range of Motion : 0-117°, decreased patellofemoral glide with crepitance\par ¦ Stability : no valgus or varus instability present on provocative testing\par ¦ Strength : flexion and extension 5/5\par ¦ Tests and Signs : negative Anterior Drawer, negative Lachman, negative Kade\par \par o Peripheral Vascular System :\par ¦ Dorsalis Pedis Artery : pulse 2+ bilaterally\par \par Gait and Station:\par Gait: gait normal, no significant extremity swelling or lymphedema, trophic changes of both legs, no foot drop\par \par Radiology Results \par o Right Knee : Standing AP, lateral and tunnel views of the knee were obtained and revealed moderate medial and patellofemoral arthritis \par o Left Knee : Standing AP, lateral and tunnel views of the knee were obtained and revealed moderate medial and severe patellofemoral arthritis

## 2023-06-15 NOTE — ADDENDUM
[FreeTextEntry1] : I, NIESHACANDIS JORDAN, acted solely as a scribe for Dr. Steve Manning on this date 06/15/2023.\par \par All medical record entries made by the Scribe were at my, Dr. Steve Manning, direction and personally dictated by me on 06/15/2023. I have reviewed the chart and agree that the record accurately reflects my personal performance of the history, physical exam, assessment and plan. I have also personally directed, reviewed, and agreed with the chart.

## 2023-06-15 NOTE — DISCUSSION/SUMMARY
[de-identified] : I discussed the underlying pathophysiology of the patient's condition in great detail with the patient. I went over the patient's x-rays with them in great detail. I informed the patient they are due for a repeat gel injection any time after [6/21/23 R] [6/28/23 L] Viscosupplementation was discussed as a solution to the patient's symptoms, and we are requesting durolane for both knees. \par \par All of their questions were answered. They understand and consent to the plan. \par \par FU after gel injection authorization.

## 2023-06-15 NOTE — HISTORY OF PRESENT ILLNESS
[de-identified] : 62 year old male presents with bilateral knee pain (R>L). He received bilateral Durolane injections 6 months ago (R 12/21/22; L 12/28/22). He thinks these injections gave him some relief. He complains of intermittent pain. Notes an episode of right knee pain causing him to limp that resolved after sitting for 40 minutes. He complains of more frequent and worsening stiffness. He denies any swelling or buckling. He takes Tylenol for pain control. He has been very successful with viscosupplementation injections in the past. He has been doing this for years and has prevented a knee replacement for him. \par He understands the importance of weight loss. His BMI is around 42.0 (260 lbs, 5' 6"). PMHx: DM; 3 stents and was started on Plavix in addition to ASA 81mg.\par \par

## 2023-07-18 NOTE — H&P CARDIOLOGY - EXTREMITIES COMMENTS
Received request via: Pharmacy    Was the patient seen in the last year in this department? Yes    Does the patient have an active prescription (recently filled or refills available) for medication(s) requested? No    Does the patient have intermediate Plus and need 100 day supply (blood pressure, diabetes and cholesterol meds only)? Patient does not have SCP  
+ brownish discoloration b/l pretibial areas

## 2023-08-03 ENCOUNTER — APPOINTMENT (OUTPATIENT)
Dept: ORTHOPEDIC SURGERY | Facility: CLINIC | Age: 63
End: 2023-08-03
Payer: COMMERCIAL

## 2023-08-03 PROCEDURE — 20611 DRAIN/INJ JOINT/BURSA W/US: CPT | Mod: RT

## 2023-08-03 NOTE — DISCUSSION/SUMMARY
[de-identified] : I went over the pathophysiology of the patient's symptoms in great detail with the patient. The patient elected to receive a Durolane injection into his knee right today, and tolerated it well. I instructed the patient on ROM exercises, and told them to take it easy. The use of ice and rest was reviewed with the patient. The patient may resume activities tomorrow. I reminded the patient that it takes 4 to 6 weeks after the final injection to feel symptom relief.   All of their questions were answered.  He was given the name of an internist as he needs a new doctor they understand and consent to the plan.   FU next week for a left Durolane injection.

## 2023-08-03 NOTE — PHYSICAL EXAM
[de-identified] : Constitutional o Appearance : well-nourished, well developed, alert, in no acute distress  Head and Face o Head :  Inspection : atraumatic, normocephalic o Face :  Inspection : no visible rash or discoloration Respiratory o Respiratory Effort: breathing unlabored  Neurologic o Mental Status Examination :  Orientation : alert and oriented X 3 Psychiatric o Mood and Affect: mood normal, affect appropriate Cardiovascular o Observation/Palpation : - no swelling Lymphatic o Additional Nodes : No palpable lymph nodes present  Lumbosacral Spine o Inspection : no visible rash or discoloration o Palpation : moderate left paraspinal musculature tenderness, no sciatic notch tenderness o Range of Motion : extension and sidebending cause left paraspinal discomfort o Muscle Strength : paraspinal muscle strength and tone within normal limits o Muscle Tone : paraspinal muscle strength and tone within normal limits o Tests: straight leg test negative and ANISA test negative bilaterally   Right Lower Extremity o Buttock : no tenderness, swelling or deformities  o Right Hip :    - Inspection/Palpation : no tenderness, no swelling or deformities    - Range of Motion : full flexion, full rotation, no crepitance    - Stability : joint stability intact    - Strength : extension, flexion, adduction, abduction, internal rotation and external rotation, 5/5     - Tests: Diane's test negative  o Right Knee :  Inspection/Palpation : medial compartment tenderness, no swelling, trophic changes  Range of Motion : 0-125, no crepitance, good patellofemoral glide   Stability : no valgus or varus instability present on provocative testing  Strength : flexion and extension 5/5  Tests and Signs : negative Anterior Drawer, negative Lachman, negative Kade  Left Lower Extremity o Buttock : no tenderness, swelling or deformities  o Left Hip :    - Inspection/Palpation : no tenderness, no swelling or deformities    - Range of Motion : full flexion, full rotation, no crepitance    - Stability : joint stability intact    - Strength : extension, flexion, adduction, abduction, internal rotation and external rotation, 5/5     - Tests: Diane's test negative  o Left Knee :  Inspection/Palpation : medial compartment tenderness, no swelling, trophic changes  Range of Motion : 0-117, decreased patellofemoral glide with crepitance  Stability : no valgus or varus instability present on provocative testing  Strength : flexion and extension 5/5  Tests and Signs : negative Anterior Drawer, negative Lachman, negative Kade  o Peripheral Vascular System :  Dorsalis Pedis Artery : pulse 2+ bilaterally  Gait and Station: Gait: gait normal, no significant extremity swelling or lymphedema, trophic changes of both legs, no foot drop  o Knee injection : Indication- right knee osteoarthritis, Anatomic location- right intra-articular joint space, Spray - area was sterilized with Betadine and alcohol and anesthetized with Ethyl Chloride , needle used-20G, Medications given- 3cc's Durolane under Ultrasound guidance. The patient tolerated the procedure well. Lot#[id number]# [id number]

## 2023-08-03 NOTE — HISTORY OF PRESENT ILLNESS
[de-identified] : 62-year-old male presents for a right knee Durolane injection today 08/03/023.  (R>L). He notes his right knee ronna occasionally.  PMHx: DM; 3 stents and was started on Plavix in addition to ASA 81mg.  Radiology Results 06/15/2023 o Right Knee : Standing AP, lateral and tunnel views of the knee were obtained and revealed moderate medial and patellofemoral arthritis  o Left Knee : Standing AP, lateral and tunnel views of the knee were obtained and revealed moderate medial and severe patellofemoral arthritis

## 2023-08-10 ENCOUNTER — APPOINTMENT (OUTPATIENT)
Dept: ORTHOPEDIC SURGERY | Facility: CLINIC | Age: 63
End: 2023-08-10
Payer: COMMERCIAL

## 2023-08-10 PROCEDURE — 99213 OFFICE O/P EST LOW 20 MIN: CPT | Mod: 25

## 2023-08-10 PROCEDURE — 20611 DRAIN/INJ JOINT/BURSA W/US: CPT | Mod: LT

## 2023-08-10 NOTE — HISTORY OF PRESENT ILLNESS
[de-identified] : 63-year-old male presents for a left knee Durolane injection today 08/10/2023. He had a right knee Durolane injection on 08/03/023 and reports no problems at this time.  (R>L). He denies any swelling or buckling. PMHx: DM; 3 stents and was started on Plavix in addition to ASA 81mg.  Radiology Results 06/15/2023 o Right Knee : Standing AP, lateral and tunnel views of the knee were obtained and revealed moderate medial and patellofemoral arthritis  o Left Knee : Standing AP, lateral and tunnel views of the knee were obtained and revealed moderate medial and severe patellofemoral arthritis

## 2023-08-10 NOTE — DISCUSSION/SUMMARY
[de-identified] : I went over the pathophysiology of the patient's symptoms in great detail with the patient. The patient elected to receive a Durolane injection into his left knee today, and tolerated it well. I instructed the patient on ROM exercises, and told them to take it easy. The use of ice and rest was reviewed with the patient. The patient may resume activities tomorrow. I reminded the patient that it takes 4 to 6 weeks after the final injection to feel symptom relief.   All of their questions were answered. They understand and consent to the plan.   FU in 6 weeks.

## 2023-08-10 NOTE — PHYSICAL EXAM
[de-identified] : Constitutional o Appearance : well-nourished, well developed, alert, in no acute distress  Head and Face o Head :  Inspection : atraumatic, normocephalic o Face :  Inspection : no visible rash or discoloration Respiratory o Respiratory Effort: breathing unlabored  Neurologic o Mental Status Examination :  Orientation : alert and oriented X 3 Psychiatric o Mood and Affect: mood normal, affect appropriate Cardiovascular o Observation/Palpation : - no swelling Lymphatic o Additional Nodes : No palpable lymph nodes present  Lumbosacral Spine o Inspection : no visible rash or discoloration o Palpation : moderate left paraspinal musculature tenderness, no sciatic notch tenderness o Range of Motion : extension and sidebending cause left paraspinal discomfort o Muscle Strength : paraspinal muscle strength and tone within normal limits o Muscle Tone : paraspinal muscle strength and tone within normal limits o Tests: straight leg test negative and ANISA test negative bilaterally   Right Lower Extremity o Buttock : no tenderness, swelling or deformities  o Right Hip :    - Inspection/Palpation : no tenderness, no swelling or deformities    - Range of Motion : full flexion, full rotation, no crepitance    - Stability : joint stability intact    - Strength : extension, flexion, adduction, abduction, internal rotation and external rotation, 5/5     - Tests: Diane's test negative  o Right Knee :  Inspection/Palpation : medial compartment tenderness, no swelling, trophic changes  Range of Motion : 0-125, no crepitance, good patellofemoral glide   Stability : no valgus or varus instability present on provocative testing  Strength : flexion and extension 5/5  Tests and Signs : negative Anterior Drawer, negative Lachman, negative Kade  Left Lower Extremity o Buttock : no tenderness, swelling or deformities  o Left Hip :    - Inspection/Palpation : no tenderness, no swelling or deformities    - Range of Motion : full flexion, full rotation, no crepitance    - Stability : joint stability intact    - Strength : extension, flexion, adduction, abduction, internal rotation and external rotation, 5/5     - Tests: Diane's test negative  o Left Knee :  Inspection/Palpation : medial compartment tenderness, no swelling, trophic changes,   Range of Motion : 0-120 decreased patellofemoral glide without crepitance  Stability : no valgus or varus instability present on provocative testing  Strength : flexion and extension 5/5  Tests and Signs : negative Anterior Drawer, negative Lachman, negative Kade  o Peripheral Vascular System :  Dorsalis Pedis Artery : pulse 2+ bilaterally  Gait and Station: Gait: gait normal, no significant extremity swelling or lymphedema, trophic changes of both legs, no foot drop  o Knee injection : Indication- left knee osteoarthritis, Anatomic location- left intra-articular joint space, Spray - area was sterilized with Betadine and alcohol and anesthetized with Ethyl Chloride , needle used-20G, Medications given- 3cc's Durolane under Ultrasound guidance. The patient tolerated the procedure well. Lot#[id number] Exp# [id number]

## 2023-09-07 NOTE — ASU PATIENT PROFILE, ADULT - CAREGIVER PHONE NUMBER
We received remote transmission from patient's monitor at home last night around 7:30 pm. Transmission shows normal sensing and pacing function. EP physician will review. Does note appear to be in AF but is in trigeminy. See interrogation in [facility] for more details. [phone]

## 2023-09-27 ENCOUNTER — APPOINTMENT (OUTPATIENT)
Dept: ORTHOPEDIC SURGERY | Facility: CLINIC | Age: 63
End: 2023-09-27
Payer: COMMERCIAL

## 2023-09-27 VITALS — BODY MASS INDEX: 43.39 KG/M2 | WEIGHT: 270 LBS | HEIGHT: 66 IN

## 2023-09-27 PROCEDURE — 99214 OFFICE O/P EST MOD 30 MIN: CPT

## 2023-09-27 PROCEDURE — 72052 X-RAY EXAM NECK SPINE 6/>VWS: CPT

## 2023-10-06 ENCOUNTER — APPOINTMENT (OUTPATIENT)
Dept: VASCULAR SURGERY | Facility: CLINIC | Age: 63
End: 2023-10-06

## 2023-10-09 ENCOUNTER — APPOINTMENT (OUTPATIENT)
Dept: VASCULAR SURGERY | Facility: CLINIC | Age: 63
End: 2023-10-09
Payer: COMMERCIAL

## 2023-10-09 VITALS — HEART RATE: 69 BPM | DIASTOLIC BLOOD PRESSURE: 79 MMHG | SYSTOLIC BLOOD PRESSURE: 146 MMHG

## 2023-10-09 VITALS
TEMPERATURE: 98.4 F | SYSTOLIC BLOOD PRESSURE: 132 MMHG | HEIGHT: 66 IN | HEART RATE: 70 BPM | DIASTOLIC BLOOD PRESSURE: 72 MMHG | BODY MASS INDEX: 44.2 KG/M2 | WEIGHT: 275 LBS

## 2023-10-09 PROCEDURE — 93970 EXTREMITY STUDY: CPT

## 2023-10-09 PROCEDURE — 99203 OFFICE O/P NEW LOW 30 MIN: CPT

## 2023-11-09 ENCOUNTER — APPOINTMENT (OUTPATIENT)
Dept: ORTHOPEDIC SURGERY | Facility: CLINIC | Age: 63
End: 2023-11-09
Payer: COMMERCIAL

## 2023-11-09 PROCEDURE — 99214 OFFICE O/P EST MOD 30 MIN: CPT

## 2023-12-18 ENCOUNTER — APPOINTMENT (OUTPATIENT)
Dept: ORTHOPEDIC SURGERY | Facility: CLINIC | Age: 63
End: 2023-12-18
Payer: COMMERCIAL

## 2023-12-18 VITALS — HEIGHT: 66 IN | BODY MASS INDEX: 43.39 KG/M2 | WEIGHT: 270 LBS

## 2023-12-18 PROCEDURE — 99213 OFFICE O/P EST LOW 20 MIN: CPT

## 2023-12-18 NOTE — ADDENDUM
[FreeTextEntry1] : I, NIESHA JORDAN, acted solely as a scribe for Dr. Steve Manning on this date 12/18/2023.  All medical record entries made by the Scribe were at my, Dr. Steve Manning, direction and personally dictated by me on 12/18/2023. I have reviewed the chart and agree that the record accurately reflects my personal performance of the history, physical exam, assessment and plan. I have also personally directed, reviewed, and agreed with the chart.

## 2023-12-18 NOTE — PHYSICAL EXAM
[de-identified] : Constitutional o Appearance : well-nourished, well developed, alert, in no acute distress  Head and Face o Head :  Inspection : atraumatic, normocephalic o Face :  Inspection : no visible rash or discoloration Respiratory o Respiratory Effort: breathing unlabored  Neurologic o Sensation : Normal sensation  Psychiatric o Mood and Affect: mood normal, affect appropriate  Lymphatic o Additional Nodes : No palpable lymph nodes present   Cervical Spine o Inspection/Palpation :  Inspection : alignment midline, normal degree of lordosis present  Skin : normal appearance, no masses or tenderness, trachea midline  Palpation : left paracervical left side worse than right, right parascapular tenderness  o Range of Motion : limited rotation right side worse than left arc of motion full in all planes, no crepitance or pain with ROM o Tests: Negative Spurlings test,   Right Upper Extremity o Right Shoulder :  Inspection/Palpation : no tenderness, no swelling or deformities  Range of Motion : full and painless in all planes, no crepitance  Strength : forward elevation 5/5, IR 5/5, ER 5/5, ER at 90 of abduction 5/5, supraspinatus 5/5, adduction 5/5, abduction 5/5, biceps/triceps 5/5,  5/5  Stability : no joint instability on provocative testing   Tests: Watson negative, Neer negative, Landy negative, drop arm test negative, [place]'s test negative  Left Upper Extremity o Left Shoulder :  Inspection/Palpation : no tenderness, no swelling or deformities  Range of Motion : full and painless in all planes, no crepitance  Strength : forward elevation 5/5, IR 5/5, ER 5/5, ER at 90 of abduction 5/5, supraspinatus 5/5, adduction 5/5, abduction 5/5, biceps/triceps 5/5,  5/5  Stability : no joint instability on provocative testing   Tests: Watson negative, Neer negative, Landy negative, drop arm test negative, [place]'s test negative  Lumbosacral Spine o Inspection : no visible rash or discoloration o Palpation : moderate left paraspinal musculature tenderness, no sciatic notch tenderness o Range of Motion : extension and sidebending cause left paraspinal discomfort o Muscle Strength : paraspinal muscle strength and tone within normal limits o Muscle Tone : paraspinal muscle strength and tone within normal limits o Tests: straight leg test negative and ANISA test negative bilaterally   Right Lower Extremity o Buttock : no tenderness, swelling or deformities  o Right Hip :    - Inspection/Palpation : no tenderness, no swelling or deformities    - Range of Motion : full flexion, full rotation, no crepitance    - Stability : joint stability intact    - Strength : extension, flexion, adduction, abduction, internal rotation and external rotation, 5/5     - Tests: Diane's test negative  o Right Knee :  Inspection/Palpation : medial compartment tenderness, no swelling, trophic changes  Range of Motion : 0-120, no crepitance, good patellofemoral glide   Stability : no valgus or varus instability present on provocative testing  Strength : flexion and extension 5/5  Tests and Signs : negative Anterior Drawer, negative Lachman, negative Kade  Left Lower Extremity o Buttock : no tenderness, swelling or deformities  o Left Hip :    - Inspection/Palpation : no tenderness, no swelling or deformities    - Range of Motion : full flexion, full rotation, no crepitance    - Stability : joint stability intact    - Strength : extension, flexion, adduction, abduction, internal rotation and external rotation, 5/5     - Tests: Diane's test negative  o Left Knee :  Inspection/Palpation : medial compartment tenderness, no swelling, trophic changes,   Range of Motion : 0-120 decreased patellofemoral glide without crepitance  Stability : no valgus or varus instability present on provocative testing  Strength : flexion and extension 5/5  Tests and Signs : negative Anterior Drawer, negative Lachman, negative Kade  o Peripheral Vascular System :  Dorsalis Pedis Artery : pulse 2+ bilaterally  Gait and Station: Gait: normal sensation to light touch , no significant extremity swelling or lymphedema, trophic changes of both legs, no foot drop

## 2023-12-18 NOTE — HISTORY OF PRESENT ILLNESS
[de-identified] : 63-year-old male presents for a left knee follow-up and neck pain. He states he is continuing attending PT and states the PT is helping. He states he is ready to stop PT and can do at-home exercises for his neck. He states he has raised his computer screen and that has helped his neck.  He had a left knee Durolane injection on 08/10/2023. He had a right knee Durolane injection on 08/03/023 and reports no problems at this time.  (R>L). He denies any swelling or buckling. I informed the patient they are due for a repeat gel injection any time after 02/3/2024 and 2/20/2024 PMHx: DM; 3 stents and was started on Plavix in addition to ASA 81mg.  MRI CERVICAL SPINE WO IV CONTRAST done in July  straighten cervical lordosis. Vertebral body heights appear normal. Cervical spinal cord appears interictally normal. C3- C4 and C5-6 and C6-7. Disc height narrowing, anterior buldge adjacent anterior marginal osteophytes formation. Scattered discogenic degenerative endplate marrow signal changes, most prominenlty adjavent to the C5-6 and C6-7 disc  Radiology Results 06/15/2023 o Right Knee : Standing AP, lateral and tunnel views of the knee were obtained and revealed moderate medial and patellofemoral arthritis  o Left Knee : Standing AP, lateral and tunnel views of the knee were obtained and revealed moderate medial and severe patellofemoral arthritis

## 2023-12-18 NOTE — DISCUSSION/SUMMARY
[de-identified] : I went over the pathophysiology of the patient's symptoms in great detail with the patient. He should avoid extending his neck beyond a neutral position as much as possible. I discussed ergonomic solutions with the patient. We discussed the use of ice, Tylenol and anti-inflammatories to relieve pain. At-home strengthening exercises were discussed and demonstrated with the patient. Viscosupplementation was discussed as a solution to the patient's symptoms, and we are requesting Durolane for both knees   All of their questions were answered. They understand and consent to the plan.   FU as needed for the neck  FU in February for his knees.

## 2024-01-25 NOTE — H&P PST ADULT - GASTROINTESTINAL DETAILS
Upon my evaluation, this patient had a high probability of imminent or lifethreatening deterioration due to COPD, which required my direct attention, intervention, and personal management.     I have personally provided XXXX minutes of critical care time exclusive of time spent on separately billable procedures. Time includes review of laboratory data, radiology results, discussion with consultants, and monitoring for potential decompensation. Interventions were performed as documented above.    - Shaquille Young MD, Emergency Medicine and Medical Toxicology Attending. soft/normal/nontender

## 2024-02-06 ENCOUNTER — APPOINTMENT (OUTPATIENT)
Dept: ORTHOPEDIC SURGERY | Facility: CLINIC | Age: 64
End: 2024-02-06
Payer: COMMERCIAL

## 2024-02-06 VITALS — HEIGHT: 66 IN | BODY MASS INDEX: 43.39 KG/M2 | WEIGHT: 270 LBS

## 2024-02-06 PROCEDURE — 99214 OFFICE O/P EST MOD 30 MIN: CPT

## 2024-02-06 NOTE — PHYSICAL EXAM
[de-identified] : Constitutional o Appearance : well-nourished, well developed, alert, in no acute distress  Head and Face o Head :  Inspection : atraumatic, normocephalic o Face :  Inspection : no visible rash or discoloration Respiratory o Respiratory Effort: breathing unlabored  Neurologic o Sensation : Normal sensation  Psychiatric o Mood and Affect: mood normal, affect appropriate  Lymphatic o Additional Nodes : No palpable lymph nodes present   Cervical Spine o Inspection/Palpation :  Inspection : alignment midline, normal degree of lordosis present  Skin : normal appearance, no masses or tenderness, trachea midline  Palpation : left paracervical left side worse than right, right parascapular tenderness  o Range of Motion : limited rotation right side worse than left,  o Tests: Negative Spurlings test,   Right Upper Extremity o Right Shoulder :  Inspection/Palpation : no tenderness, no swelling or deformities  Range of Motion : full and painless in all planes, no crepitance  Strength : forward elevation 5/5, IR 5/5, ER 5/5, ER at 90 of abduction 5/5, supraspinatus 5/5, adduction 5/5, abduction 5/5, biceps/triceps 5/5,  5/5  Stability : no joint instability on provocative testing   Tests: Watson negative, Neer negative, Landy negative, drop arm test negative, Concordia's test negative  Left Upper Extremity o Left Shoulder :  Inspection/Palpation : no tenderness, no swelling or deformities  Range of Motion : full and painless in all planes, no crepitance  Strength : forward elevation 5/5, IR 5/5, ER 5/5, ER at 90 of abduction 5/5, supraspinatus 5/5, adduction 5/5, abduction 5/5, biceps/triceps 5/5,  5/5  Stability : no joint instability on provocative testing   Tests: Watson negative, Neer negative, Landy negative, drop arm test negative, Concordia's test negative  Lumbosacral Spine o Inspection : no visible rash or discoloration o Palpation : moderate left paraspinal musculature tenderness, no sciatic notch tenderness o Range of Motion : extension and sidebending cause left paraspinal discomfort o Muscle Strength : paraspinal muscle strength and tone within normal limits o Muscle Tone : paraspinal muscle strength and tone within normal limits o Tests: straight leg test negative and ANISA test negative bilaterally   Right Lower Extremity o Buttock : no tenderness, swelling or deformities  o Right Hip :    - Inspection/Palpation : no tenderness, no swelling or deformities    - Range of Motion : full flexion, full rotation, no crepitance    - Stability : joint stability intact    - Strength : extension, flexion, adduction, abduction, internal rotation and external rotation, 5/5     - Tests: Diane's test negative  o Right Knee :  Inspection/Palpation : medial compartment tenderness, no swelling, trophic changes  Range of Motion : 0-125, no crepitance, good patellofemoral glide   Stability : no valgus or varus instability present on provocative testing  Strength : flexion and extension 5/5  Tests and Signs : negative Anterior Drawer, negative Lachman, negative Kade  Left Lower Extremity o Buttock : no tenderness, swelling or deformities  o Left Hip :    - Inspection/Palpation : no tenderness, no swelling or deformities    - Range of Motion : full flexion, full rotation, no crepitance    - Stability : joint stability intact    - Strength : extension, flexion, adduction, abduction, internal rotation and external rotation, 5/5     - Tests: Diane's test negative  o Left Knee :  Inspection/Palpation : medial compartment tenderness, no swelling, trophic changes,   Range of Motion : 0-125 decreased patellofemoral glide without crepitance  Stability : no valgus or varus instability present on provocative testing  Strength : flexion and extension 5/5  Tests and Signs : negative Anterior Drawer, negative Lachman, negative Kade  o Peripheral Vascular System :  Dorsalis Pedis Artery : pulse 2+ bilaterally  Gait and Station: Gait: normal sensation to light touch , hyper trophic changes of both legs,  no significant extremity swelling or lymphedema, trophic changes of both legs, no foot drop

## 2024-02-06 NOTE — HISTORY OF PRESENT ILLNESS
[de-identified] : 63-year-old male presents for bilateral knee pain. He states he is having some bilateral knee pain that is due to the cold weather. He had a left knee Durolane injection on 08/10/2023. He had a right knee Durolane injection on 08/03/023 and reports no problems. He states his weight is stable at this time. He denies losing or gaining weight. He denies any swelling or buckling. I informed the patient they are due for a repeat gel injection any time after 02/3/2024 and 2/10/2024. Patient denies that he has any numbness or tingling. He would like a new PT script. He states he is having stiffness. He is sleeping on his side.  PMHx: DM; 3 stents and was started on Plavix in addition to ASA 81mg.  MRI CERVICAL SPINE WO IV CONTRAST done in July  straighten cervical lordosis. Vertebral body heights appear normal. Cervical spinal cord appears interictally normal. C3- C4 and C5-6 and C6-7. Disc height narrowing, anterior buldge adjacent anterior marginal osteophytes formation. Scattered discogenic degenerative endplate marrow signal changes, most prominenlty adjavent to the C5-6 and C6-7 disc  Radiology Results 06/15/2023 o Right Knee : Standing AP, lateral and tunnel views of the knee were obtained and revealed moderate medial and patellofemoral arthritis  o Left Knee : Standing AP, lateral and tunnel views of the knee were obtained and revealed moderate medial and severe patellofemoral arthritis

## 2024-02-06 NOTE — ADDENDUM
[FreeTextEntry1] : I, NIESHA JORDAN, acted solely as a scribe for Dr. Steve Manning on this date 02/06/2024.  All medical record entries made by the Scribe were at my, Dr. Steve Manning, direction and personally dictated by me on 02/06/2024. I have reviewed the chart and agree that the record accurately reflects my personal performance of the history, physical exam, assessment and plan. I have also personally directed, reviewed, and agreed with the chart.								 .

## 2024-02-06 NOTE — DISCUSSION/SUMMARY
[de-identified] : I went over the pathophysiology of the patient's symptoms in great detail with the patient. Viscosupplementation was discussed as a solution to the patient's symptoms, and we are requesting Durolane for both knees. At this time, he will start a course of physical therapy for strengthening and flexibility. A prescription was provided.  All of their questions were answered. They understand and consent to the plan.   FU after authorization.

## 2024-03-01 ENCOUNTER — OUTPATIENT (OUTPATIENT)
Dept: INPATIENT UNIT | Facility: HOSPITAL | Age: 64
LOS: 1 days | End: 2024-03-01
Payer: COMMERCIAL

## 2024-03-01 ENCOUNTER — TRANSCRIPTION ENCOUNTER (OUTPATIENT)
Age: 64
End: 2024-03-01

## 2024-03-01 VITALS — WEIGHT: 270.07 LBS | HEIGHT: 66 IN

## 2024-03-01 DIAGNOSIS — I35.9 NONRHEUMATIC AORTIC VALVE DISORDER, UNSPECIFIED: ICD-10-CM

## 2024-03-01 LAB
ANION GAP SERPL CALC-SCNC: 12 MMOL/L — SIGNIFICANT CHANGE UP (ref 5–17)
BASE EXCESS BLDA CALC-SCNC: 1 MMOL/L — SIGNIFICANT CHANGE UP (ref -2–3)
BASE EXCESS BLDMV CALC-SCNC: 1.6 MMOL/L — SIGNIFICANT CHANGE UP (ref -3–3)
BLOOD GAS MIXED - LACTATE: 1.1 MMOL/L — SIGNIFICANT CHANGE UP (ref 0.5–2)
BUN SERPL-MCNC: 16 MG/DL — SIGNIFICANT CHANGE UP (ref 7–23)
CALCIUM SERPL-MCNC: 9.6 MG/DL — SIGNIFICANT CHANGE UP (ref 8.4–10.5)
CHLORIDE SERPL-SCNC: 100 MMOL/L — SIGNIFICANT CHANGE UP (ref 96–108)
CO2 SERPL-SCNC: 25 MMOL/L — SIGNIFICANT CHANGE UP (ref 22–31)
COHGB MFR BLDA: 0.7 % — SIGNIFICANT CHANGE UP
COHGB MFR BLDMV: 1.1 % — SIGNIFICANT CHANGE UP
CREAT SERPL-MCNC: 0.69 MG/DL — SIGNIFICANT CHANGE UP (ref 0.5–1.3)
EGFR: 105 ML/MIN/1.73M2 — SIGNIFICANT CHANGE UP
GLUCOSE BLDC GLUCOMTR-MCNC: 105 MG/DL — HIGH (ref 70–99)
GLUCOSE BLDC GLUCOMTR-MCNC: 124 MG/DL — HIGH (ref 70–99)
GLUCOSE BLDC GLUCOMTR-MCNC: 95 MG/DL — SIGNIFICANT CHANGE UP (ref 70–99)
GLUCOSE SERPL-MCNC: 117 MG/DL — HIGH (ref 70–99)
HCO3 BLDA-SCNC: 26 — SIGNIFICANT CHANGE UP (ref 21–28)
HCO3 BLDMV-SCNC: 27 — SIGNIFICANT CHANGE UP (ref 20–28)
HCT VFR BLD CALC: 46.5 % — SIGNIFICANT CHANGE UP (ref 39–50)
HGB BLD-MCNC: 16 G/DL — SIGNIFICANT CHANGE UP (ref 13–17)
HGB BLDA-MCNC: 14.6 G/DL — SIGNIFICANT CHANGE UP (ref 12.6–17.4)
HGB FLD-MCNC: 15.4 G/DL — SIGNIFICANT CHANGE UP (ref 12.6–17.4)
LACTATE BLDA-MCNC: 0.7 MMOL/L — SIGNIFICANT CHANGE UP (ref 0.5–2)
MCHC RBC-ENTMCNC: 28.6 PG — SIGNIFICANT CHANGE UP (ref 27–34)
MCHC RBC-ENTMCNC: 34.4 GM/DL — SIGNIFICANT CHANGE UP (ref 32–36)
MCV RBC AUTO: 83 FL — SIGNIFICANT CHANGE UP (ref 80–100)
METHGB MFR BLDA: 0.2 % — SIGNIFICANT CHANGE UP
METHGB MFR BLDMV: 0.1 % — SIGNIFICANT CHANGE UP (ref 0–1.5)
NRBC # BLD: 0 /100 WBCS — SIGNIFICANT CHANGE UP (ref 0–0)
O2 CT VFR BLD CALC: 48 MMHG — SIGNIFICANT CHANGE UP (ref 30–65)
OXYHGB MFR BLDA: 95 % — SIGNIFICANT CHANGE UP (ref 90–95)
OXYHGB MFR BLDMV: 79 % — LOW (ref 90–95)
PCO2 BLDA: 41 MMHG — SIGNIFICANT CHANGE UP (ref 35–48)
PCO2 BLDMV: 42 MMHG — SIGNIFICANT CHANGE UP (ref 30–65)
PH BLDA: 7.41 — SIGNIFICANT CHANGE UP (ref 7.35–7.45)
PH BLDMV: 7.41 — SIGNIFICANT CHANGE UP (ref 7.32–7.45)
PLATELET # BLD AUTO: 200 K/UL — SIGNIFICANT CHANGE UP (ref 150–400)
PO2 BLDA: 83 MMHG — SIGNIFICANT CHANGE UP (ref 83–108)
POTASSIUM SERPL-MCNC: 3.9 MMOL/L — SIGNIFICANT CHANGE UP (ref 3.5–5.3)
POTASSIUM SERPL-SCNC: 3.9 MMOL/L — SIGNIFICANT CHANGE UP (ref 3.5–5.3)
RBC # BLD: 5.6 M/UL — SIGNIFICANT CHANGE UP (ref 4.2–5.8)
RBC # FLD: 12.9 % — SIGNIFICANT CHANGE UP (ref 10.3–14.5)
SAO2 % BLD: 80 % — SIGNIFICANT CHANGE UP (ref 60–90)
SAO2 % BLDA: 95.8 % — SIGNIFICANT CHANGE UP (ref 94–98)
SODIUM SERPL-SCNC: 137 MMOL/L — SIGNIFICANT CHANGE UP (ref 135–145)
WBC # BLD: 8.19 K/UL — SIGNIFICANT CHANGE UP (ref 3.8–10.5)
WBC # FLD AUTO: 8.19 K/UL — SIGNIFICANT CHANGE UP (ref 3.8–10.5)

## 2024-03-01 RX ORDER — METFORMIN HYDROCHLORIDE 850 MG/1
1 TABLET ORAL
Refills: 0 | DISCHARGE

## 2024-03-01 RX ORDER — SODIUM CHLORIDE 9 MG/ML
1000 INJECTION, SOLUTION INTRAVENOUS
Refills: 0 | Status: DISCONTINUED | OUTPATIENT
Start: 2024-03-01 | End: 2024-03-02

## 2024-03-01 RX ORDER — INSULIN LISPRO 100/ML
VIAL (ML) SUBCUTANEOUS
Refills: 0 | Status: DISCONTINUED | OUTPATIENT
Start: 2024-03-01 | End: 2024-03-02

## 2024-03-01 RX ORDER — DEXTROSE 50 % IN WATER 50 %
15 SYRINGE (ML) INTRAVENOUS ONCE
Refills: 0 | Status: DISCONTINUED | OUTPATIENT
Start: 2024-03-01 | End: 2024-03-02

## 2024-03-01 RX ORDER — CLOPIDOGREL BISULFATE 75 MG/1
1 TABLET, FILM COATED ORAL
Refills: 0 | DISCHARGE

## 2024-03-01 RX ORDER — GLUCAGON INJECTION, SOLUTION 0.5 MG/.1ML
1 INJECTION, SOLUTION SUBCUTANEOUS ONCE
Refills: 0 | Status: DISCONTINUED | OUTPATIENT
Start: 2024-03-01 | End: 2024-03-02

## 2024-03-01 RX ORDER — INSULIN LISPRO 100/ML
VIAL (ML) SUBCUTANEOUS AT BEDTIME
Refills: 0 | Status: DISCONTINUED | OUTPATIENT
Start: 2024-03-01 | End: 2024-03-02

## 2024-03-01 RX ORDER — ATORVASTATIN CALCIUM 80 MG/1
40 TABLET, FILM COATED ORAL AT BEDTIME
Refills: 0 | Status: DISCONTINUED | OUTPATIENT
Start: 2024-03-01 | End: 2024-03-02

## 2024-03-01 RX ORDER — CLOPIDOGREL BISULFATE 75 MG/1
1 TABLET, FILM COATED ORAL
Qty: 90 | Refills: 3
Start: 2024-03-01 | End: 2025-02-23

## 2024-03-01 RX ORDER — DEXTROSE 50 % IN WATER 50 %
25 SYRINGE (ML) INTRAVENOUS ONCE
Refills: 0 | Status: DISCONTINUED | OUTPATIENT
Start: 2024-03-01 | End: 2024-03-02

## 2024-03-01 RX ORDER — DEXTROSE 50 % IN WATER 50 %
12.5 SYRINGE (ML) INTRAVENOUS ONCE
Refills: 0 | Status: DISCONTINUED | OUTPATIENT
Start: 2024-03-01 | End: 2024-03-02

## 2024-03-01 RX ORDER — LOSARTAN POTASSIUM 100 MG/1
100 TABLET, FILM COATED ORAL DAILY
Refills: 0 | Status: DISCONTINUED | OUTPATIENT
Start: 2024-03-01 | End: 2024-03-02

## 2024-03-01 RX ORDER — SODIUM CHLORIDE 9 MG/ML
1000 INJECTION INTRAMUSCULAR; INTRAVENOUS; SUBCUTANEOUS
Refills: 0 | Status: DISCONTINUED | OUTPATIENT
Start: 2024-03-01 | End: 2024-03-02

## 2024-03-01 RX ORDER — CLOPIDOGREL BISULFATE 75 MG/1
75 TABLET, FILM COATED ORAL DAILY
Refills: 0 | Status: DISCONTINUED | OUTPATIENT
Start: 2024-03-02 | End: 2024-03-02

## 2024-03-01 RX ORDER — CHOLECALCIFEROL (VITAMIN D3) 125 MCG
1 CAPSULE ORAL
Qty: 0 | Refills: 0 | DISCHARGE

## 2024-03-01 RX ORDER — ASPIRIN/CALCIUM CARB/MAGNESIUM 324 MG
81 TABLET ORAL DAILY
Refills: 0 | Status: DISCONTINUED | OUTPATIENT
Start: 2024-03-01 | End: 2024-03-02

## 2024-03-01 RX ADMIN — ATORVASTATIN CALCIUM 40 MILLIGRAM(S): 80 TABLET, FILM COATED ORAL at 21:23

## 2024-03-01 RX ADMIN — SODIUM CHLORIDE 100 MILLILITER(S): 9 INJECTION INTRAMUSCULAR; INTRAVENOUS; SUBCUTANEOUS at 19:05

## 2024-03-01 NOTE — H&P CARDIOLOGY - HISTORY OF PRESENT ILLNESS
63M with PMH of HTN, HLD, DM2 (last A1c 6.0), LENIN, Vertigo presents for R/LHC. Reports lightheadedness, fatigue, and progressively worsening SOB on exertion with mild activity for couple of weeks. Patient was evaluated by Cards, and underwent TTE with mild LV dysfunction with mild valvular disorders with calcification. Patient is now referred to Dr. Bonilla for further evaluation with R/LHC. Denies lightheadedness, HA, abdominal pain, N/V, hematuria, BRBPR, melena, syncope, or any other complaints. No implanted cardiac devices. Having a long conversation, patient c/o mild dyspnea, but no CP or palpitation.     Cards: Dr. You Diehl  TTE 2/27/24 - Mild LV dysfunction with EF 48%, LVH, Hypokinesis of apical cap, dilated LA, mild AS, AR, MR, TR   63M with PMH of HTN, HLD, CAD s/p PCI, DM2 (last A1c 6.0), LENIN on CPAP, Vertigo presents for R/LHC. Reports lightheadedness, fatigue, and progressively worsening SOB on exertion with mild activity for couple of weeks. Patient was evaluated by Cards, and underwent TTE with mild LV dysfunction with mild valvular disorders with calcification. Patient is now referred to Dr. Bonilla for further evaluation with R/LHC. Denies lightheadedness, HA, abdominal pain, N/V, hematuria, BRBPR, melena, syncope, or any other complaints. No implanted cardiac devices. Having a long conversation, patient c/o mild dyspnea, but no CP or palpitation.     Cards: Dr. You Diehl  TTE 2/27/24 - Mild LV dysfunction with EF 48%, LVH, Hypokinesis of apical cap, dilated LA, mild AS, AR, MR, TR

## 2024-03-01 NOTE — ASU PATIENT PROFILE, ADULT - FALL HARM RISK - UNIVERSAL INTERVENTIONS
Bed in lowest position, wheels locked, appropriate side rails in place/Call bell, personal items and telephone in reach/Instruct patient to call for assistance before getting out of bed or chair/Non-slip footwear when patient is out of bed/[place] to call system/Physically safe environment - no spills, clutter or unnecessary equipment/Purposeful Proactive Rounding/Room/bathroom lighting operational, light cord in reach

## 2024-03-01 NOTE — DISCHARGE NOTE PROVIDER - CARE PROVIDER_API CALL
You Diehl [facility]  Cardiovascular Disease  [address] [place] [address], [address]  [place] [address]  Phone: [phone]  Fax: [phone]  Follow Up Time: 2 weeks

## 2024-03-01 NOTE — DISCHARGE NOTE PROVIDER - NSDCQMAMI_CARD_ALL_CORE
Reason for Disposition   Patient already left for the hospital/clinic.    Protocols used: NO CONTACT OR DUPLICATE CONTACT CALL-A-AH     No

## 2024-03-01 NOTE — H&P CARDIOLOGY - VENOUS THROMBOEMBOLISM
84yo M with h/o CAD s/p stending and valve replacements and a blood dyscrasia disorder presents today with flu like illness. pt describes fever, chill, productive cough, fatigue, and nasal congestion. pt notes he has coughing spells that last up to an hour. symptoms have been ongoing x 3 weeks and his PMD saw him on a televisit this morning and recommended he come to the ER for evaluation and covid testing. denies cp, sob, vomiting, abd pain, dyspnea on exertion, urinary complaints, dizziness, headache, focal weakness. no treatment pta.
no

## 2024-03-01 NOTE — DISCHARGE NOTE PROVIDER - NSDCFUADDINST_GEN_ALL_CORE_FT

## 2024-03-01 NOTE — DISCHARGE NOTE PROVIDER - NSDCFUSCHEDAPPT_GEN_ALL_CORE_FT
[facility]  VASCULAR [address]  Scheduled Appointment: 04/12/2024    Layne Lopez  [facility]  VASCULAR [address]  Scheduled Appointment: 04/12/2024

## 2024-03-01 NOTE — DISCHARGE NOTE PROVIDER - NSDCCPCAREPLAN_GEN_ALL_CORE_FT
PRINCIPAL DISCHARGE DIAGNOSIS  Diagnosis: CAD (coronary artery disease)  Assessment and Plan of Treatment: Coronary artery disease is a condition where the arteries the supply the heart muscle get clogged with fatty deposits & puts you at risk for a heart attack. You underwent cardiac catheterization and one stent was placed to Left circumflex artery via right radial artery   Call your doctor if you have any new pain, pressure, or discomfort in the center of your chest, pain, tingling or discomfort in arms, back, neck, jaw, or stomach, shortness of breath, nausea, vomiting, burping or heartburn, sweating, cold and clammy skin, racing or abnormal heartbeat for more than 10 minutes or if they keep coming & going.  Call 911 and do not tr to get to hospital by care  You can help yourself with lifestyle changes (quitting smoking if you smoke), eat lots of fruits & vegetables & low fat dairy products, not a lot of meat & fatty foods, walk or some form of physical activity most days of the week, lose weight if you are overweight  Take your cardiac medication as prescribed to lower cholesterol, to lower blood pressure, aspirin to prevent blood clots, and diabetes control  Make sure to keep appointments with doctor for cardiac follow up care        SECONDARY DISCHARGE DIAGNOSES  Diagnosis: HTN (hypertension)  Assessment and Plan of Treatment: Hypertension, also known simply as "high blood pressure" is very common, however can lead to many significant complications if left uncontrolled. When the blood pressure is elevated, the force the blood puts on the walls of the arteries is high and can lead to artery damage. Also, when the heart muscle has to pump blood against a high blood pressure, it thickens and enlarges, just like any muscle does when it has to do more work (think of a weight ). When the blood pressure is very high, people may feel a headache or tired. Some people can feel pounding in their head or have blurry vision. Hearing the heart beating in the ear especially at night can be a sign of high blood pressure. Eventually, symptoms of stroke, heart attack, heart failure or irregular heartbeats can occur  - Exercise: Doing cardiovascular exercise such as running, biking or swimming at least 30 minutes per day most days of the week is recommended to help keep blood pressure healthy  - Lose weight: Maintaining a normal BMI (body mass index) is very important in keeping blood pressure readings normal   - Avoid salt: Sodium in the diet increases the blood pressure in many ways. Salt comes in many foods, so just because you don't add salt to your food it does not mean that you are eating a low salt diet. Read labels and keep sodium intake to less than 2000 mg per day   - Avoid alcohol: Even 1 or 2 alcoholic drinks can significantly increase blood pressure   - DASH Diet: The DASH diet has been shown to reduce blood pressure   - Take all medication as prescribed.   - Follow up with your medical doctor for routine blood pressure monitoring at your next visit.   - Notify your doctor if you have any of the following symptoms:    - Dizziness, Lightheadedness, Blurry vision, Headache, Chest pain, Shortness of breath      Diagnosis: HLD (hyperlipidemia)  Assessment and Plan of Treatment: Cholesterol is a substance that is found in the blood. Everyone has some. It is needed for good health. The problem is, people sometimes have too much cholesterol. Compared with people with normal cholesterol, people with high cholesterol have a higher risk of heart attack, stroke, and other health problems. The higher your cholesterol, the higher your risk of these problems.  Not everyone who has high cholesterol needs medicines. Your doctor will decide if you need them based on your age, family history, and other health concerns.  The medicines most often used to treat high cholesterol are called "statins."  You should probably take a statin if you:   - Already had a heart attack or stroke   - Have known heart disease   - Have diabetes   - Have a condition called" peripheral artery disease," which makes it painful to walk, and happens when the arteries in your legs get clogged with fatty deposits   - Have an "abdominal aortic aneurysm," which is a widening of the main artery in the belly  Most people with any of the conditions listed above should take a statin no matter what their cholesterol level is.  If your doctor or nurse prescribes a statin, it's important to keep taking it. The medicine might not make you feel any different. But it can help prevent heart attack, stroke, and death  You can help lower your cholesterol by doing these things:   - You can lower your LDL, or "bad," cholesterol by avoiding red meat, butter, fried foods, cheese, and other foods that have a lot of saturated fat.   - You can lower triglycerides by avoiding sugary foods, fried foods, and excess alcohol.   - If you are overweight, it can help to lose weight. Your doctor or nurse can help you do this in a healthy way.   - Try to get regular physical activity. Even gentle forms of exercise, like walking, are good for your health.  Even if these steps do little to change your cholesterol, they can improve your health in many other ways      Diagnosis: DM (diabetes mellitus)  Assessment and Plan of Treatment: Make sure you get your HgA1c checked every three months.  If you take oral diabetes medications, check your blood glucose two times a day.  If you take insulin, check your blood glucose before meals and at bedtime.  It's important not to skip any meals.  Keep a log of your blood glucose results and always take it with you to your doctor appointments.  Keep a list of your current medications including injectables and over the counter medications and bring this medication list with you to all your doctor appointments.  If you have not seen your opthalmologist this year call for appointment.  Check your feet daily for redness, sores, or openings. Do not self treat. If no improvement in two days call your primary care physician for an appointment.  Low blood sugar (hypoglycemia) is a blood sugar below 70mg/dl. Check your blood sugar if you feel signs/symptoms of hypoglycemia. If your blood sugar is below 70 take 15 grams of carbohydrates (ex 4 oz of apple juice, 3-4 glucosr tablets, or 4-6 oz of regular soda) wait 15 minutes and repeat blood sugar to make sure it comes up above 70.  If your blood sugar is above 70 and you are due for a meal, have a meal.  If you are not due for a meal have a snack.  This snack helps keeps your blood sugar at a safe range.

## 2024-03-01 NOTE — DISCHARGE NOTE PROVIDER - NSDCCPTREATMENT_GEN_ALL_CORE_FT
PRINCIPAL PROCEDURE  Procedure: Left heart catheterization  Findings and Treatment:   Cath Lab Report    Diagnostic Cardiologist:       Ilan Bonilla MD   Interventional Cardiologist:   Ilan Bonilla MD   Fellow:                        Jia Haynes MD   Referring Physician:           You Diehl MD   Procedures Performed   Procedures:               1.    Arterial Access - Right Radial   2.    Venous Access - Right Brachial   3.    Diagnostic Coronary Angiography   4.    RHC   5.    Ultrasound Guided Access   6.    Left Heart Cath   7.    PCI: SHARON   Indications:                CCS Class II   Lab Visit Indication:      worsening angina   PCI Status:                elective   Conclusions:   LCX: Patent OM1 stent, patent circumflex stent with 75% stenosis at  distal edge of old stent s/p PCI with new SHARON  LM: Luminal irregularities   LAD: Proximal stenosis 30%, luminal disease throughout   RCA: Luminal irregularities    RHC: see report. Normal pressures   Recommendations:   ASA and plavix for 6-12 months

## 2024-03-01 NOTE — DISCHARGE NOTE PROVIDER - NSDCMRMEDTOKEN_GEN_ALL_CORE_FT
atorvastatin 40 mg oral tablet: 1 tab(s) orally once a day (at bedtime)  clopidogrel 75 mg oral tablet: 1 tab(s) orally once a day  Ecotrin Adult Low Strength 81 mg oral delayed release tablet: 1 tab(s) orally once a day  losartan 100 mg oral tablet: 1 tab(s) orally once a day  Mounjaro 10 mg/0.5 mL subcutaneous solution: 10 milligram(s) subcutaneously once a week Every Mondays  Vitamin D3 5000 intl units oral capsule: 1 cap(s) orally once a day

## 2024-03-01 NOTE — DISCHARGE NOTE PROVIDER - HOSPITAL COURSE
HPI:  63M with PMH of HTN, HLD, CAD s/p PCI, DM2 (last A1c 6.0), LENIN on CPAP, Vertigo presents for R/LHC. Reports lightheadedness, fatigue, and progressively worsening SOB on exertion with mild activity for couple of weeks. Patient was evaluated by Cards, and underwent TTE with mild LV dysfunction with mild valvular disorders with calcification. Patient is now referred to Dr. Bonilla for further evaluation with R/LHC. Denies lightheadedness, HA, abdominal pain, N/V, hematuria, BRBPR, melena, syncope, or any other complaints. No implanted cardiac devices. Having a long conversation, patient c/o mild dyspnea, but no CP or palpitation.     Cards: Dr. You Diehl  TTE 2/27/24 - Mild LV dysfunction with EF 48%, LVH, Hypokinesis of apical cap, dilated LA, mild AS, AR, MR, TR   (01 Mar 2024 15:14)    Patient successfully underwent R/LHC - 1 SHARON to LCx via RRA and RBV with WNL filling pressure   access site stable without bleeding, edema, or hematoma  Continue Aspirin, Plavix  Staying overnight and discharge planning in AM

## 2024-03-02 VITALS
RESPIRATION RATE: 16 BRPM | OXYGEN SATURATION: 97 % | DIASTOLIC BLOOD PRESSURE: 71 MMHG | SYSTOLIC BLOOD PRESSURE: 141 MMHG | HEART RATE: 78 BPM | TEMPERATURE: 98 F

## 2024-03-02 PROCEDURE — 82803 BLOOD GASES ANY COMBINATION: CPT

## 2024-03-02 PROCEDURE — 36415 COLL VENOUS BLD VENIPUNCTURE: CPT

## 2024-03-02 PROCEDURE — 93460 R&L HRT ART/VENTRICLE ANGIO: CPT

## 2024-03-02 PROCEDURE — C9600: CPT | Mod: LC

## 2024-03-02 PROCEDURE — C1769: CPT

## 2024-03-02 PROCEDURE — 85027 COMPLETE CBC AUTOMATED: CPT

## 2024-03-02 PROCEDURE — C1874: CPT

## 2024-03-02 PROCEDURE — 93005 ELECTROCARDIOGRAM TRACING: CPT

## 2024-03-02 PROCEDURE — C1887: CPT

## 2024-03-02 PROCEDURE — 82962 GLUCOSE BLOOD TEST: CPT

## 2024-03-02 PROCEDURE — C1725: CPT

## 2024-03-02 PROCEDURE — 80048 BASIC METABOLIC PNL TOTAL CA: CPT

## 2024-03-02 PROCEDURE — C1894: CPT

## 2024-03-02 RX ADMIN — LOSARTAN POTASSIUM 100 MILLIGRAM(S): 100 TABLET, FILM COATED ORAL at 06:26

## 2024-03-02 RX ADMIN — Medication 81 MILLIGRAM(S): at 06:26

## 2024-03-02 RX ADMIN — CLOPIDOGREL BISULFATE 75 MILLIGRAM(S): 75 TABLET, FILM COATED ORAL at 06:26

## 2024-03-02 NOTE — PROGRESS NOTE ADULT - SUBJECTIVE AND OBJECTIVE BOX
[facility] INVASIVE CARDIOLOGY- (Moreno, Michael, Denzel, Augustin, Vale, Vijay, Twin, Cholo, Lamont, Irene)   CARDIAC CATH LAB, ACP TEAM   [phone]    CHIEF COMPLAINT: Patient is a 63y old  Male who presents with a chief complaint of SOB, fatigue, lightheadedness    HPI: 63M with PMH of HTN, HLD, CAD s/p PCI, DM2 (last A1c 6.0), LENIN on CPAP, Vertigo presents for R/LHC. Reports lightheadedness, fatigue, and progressively worsening SOB on exertion with mild activity for couple of weeks. Patient was evaluated by Cards, and underwent TTE with mild LV dysfunction with mild valvular disorders with calcification. Patient is now referred to Dr. Bonilla for further evaluation with R/LHC. Denies lightheadedness, HA, abdominal pain, N/V, hematuria, BRBPR, melena, syncope, or any other complaints. No implanted cardiac devices. Having a long conversation, patient c/o mild dyspnea, but no CP or palpitation.     Cards: Dr. You Diehl  TTE 2/27/24 - Mild LV dysfunction with EF 48%, LVH, Hypokinesis of apical cap, dilated LA, mild AS, AR, MR, TR    Subjective/Observations: patient seen and examined.  denies chest pain, dyspena, dizziness, palpitations, N&V, HA    Review of Systems all WNL except below indicated:    Constitutional: [ ] Fever [ ] Chills [ ] Fatigue [ ] Weight change   HEENT: [ ] Blurred vision [ ] Eye Pain [ ] Headache [ ] Runny nose [ ] Sore Throat   Respiratory: [ ] Cough [ ] Wheezing [ ] Shortness of breath  Cardiovascular: [ ] Chest Pain [ ] Palpitations [ ] HUDSON [ ] PND [ ] Orthopnea  Gastrointestinal: [ ] Abdominal Pain [ ] Diarrhea [ ] Constipation [ ] Hemorrhoids [ ] Nausea [ ] Vomiting  Genitourinary: [ ] Nocturia [ ] Dysuria [ ] Incontinence  Extremities: [ ] Swelling [ ] Joint Pain  Neurologic: [ ] Focal deficit [ ] Paresthesias [ ] Syncope  Lymphatic: [ ] Swelling [ ] Lymphadenopathy   Skin: [ ] Rash [ ] Ecchymoses [ ] Wounds [ ] Lesions  Psychiatry: [ ] Depression [ ] Suicidal/Homicidal Ideation [ ] Anxiety [ ] Sleep Disturbances  [ ] 10 point review of systems is otherwise negative except as mentioned above            [ ]Unable to obtain    PAST MEDICAL & SURGICAL HISTORY:  Hypertension    LENIN (Obstructive Sleep Apnea)  Sleep study done 2015  uses CPAP at home    Type 2 diabetes mellitus    Morbid obesity with BMI of 50.0-59.9, adult    Venous insufficiency of both lower extremities    Anemia    History of Appendectomy  1982    Gastric Bypass  1998.  Patient lost 225 lbs and has regained 125 bs in the last 9years.    Umbilical Hernia Repair  2002    History of Abdominoplasty  2001    Incisional Hernia Repair  2005    S/P Cholecystectomy    H/O ventral hernia repair  08/24/12 incarcerated ventral hernia repair with mesh.      MEDICATIONS  (STANDING):  aspirin enteric coated 81 milliGRAM(s) Oral daily  atorvastatin 40 milliGRAM(s) Oral at bedtime  clopidogrel Tablet 75 milliGRAM(s) Oral daily  glucagon  Injectable 1 milliGRAM(s) IntraMuscular once  insulin lispro (ADMELOG) corrective regimen sliding scale   SubCutaneous three times a day before meals  insulin lispro (ADMELOG) corrective regimen sliding scale   SubCutaneous at bedtime  losartan 100 milliGRAM(s) Oral daily  sodium chloride 0.9%. 1000 milliLiter(s) (100 mL/Hr) IV Continuous <Continuous>    MEDICATIONS  (PRN):  dextrose Oral Gel 15 Gram(s) Oral once PRN Blood Glucose LESS THAN 70 milliGRAM(s)/deciliter      Allergies    penicillin (Hives; Urticaria; Rash)    Intolerances      Vital Signs Last 24 Hrs  T(C): 36.4 (01 Mar 2024 23:20), Max: 36.5 (01 Mar 2024 15:27)  T(F): 97.5 (01 Mar 2024 23:20), Max: 97.7 (01 Mar 2024 15:27)  HR: 74 (02 Mar 2024 00:20) (71 - 99)  BP: 164/73 (02 Mar 2024 00:20) (128/70 - 168/68)  BP(mean): 105 (02 Mar 2024 00:20) (97 - 114)  RR: 16 (02 Mar 2024 00:20) (14 - 18)  SpO2: 93% (02 Mar 2024 00:20) (92% - 99%)    Parameters below as of 02 Mar 2024 01:20  Patient On (Oxygen Delivery Method): room air    I&O's Summary    Weight (kg): 122.5 (03-01 @ 15:27)    FOCUSED PHYSICAL EXAM:  Pulmonary: Non-labored, breath sounds are clear bilaterally, No wheezing, rales or rhonchi  Cardiovascular: Regular, S1 and S2, No murmurs, rubs, gallops or clicks  cath site: right radial stable w/o bleeding or hematoma, soft, + pulses     LABS: All Labs Reviewed:                        16.0   8.19  )-----------( 200      ( 01 Mar 2024 15:37 )             46.5     01 Mar 2024 15:37    137    |  100    |  16     ----------------------------<  117    3.9     |  25     |  0.69     Ca    9.6        01 Mar 2024 15:37    RESULTS:    TELE interpretation: SR 70s    ECHO:  TTE 2/27/24 - Mild LV dysfunction with EF 48%, LVH, Hypokinesis of apical cap, dilated LA, mild AS, AR, MR, TR    CATH REPORT:  Study Date:     03/01/2024   Cath Lab Report    Diagnostic Cardiologist:       Ilan Bonilla MD   Interventional Cardiologist:   Ilan Bonilla MD   Fellow:                        Jia Haynes MD   Referring Physician:           You Diehl MD     Procedures Performed   1. Arterial Access - Right Radial   2.    Venous Access - Right Brachial   3.    Diagnostic Coronary Angiography   4.    RHC   5.    Ultrasound Guided Access   6.    Left Heart Cath   7.    PCI: SHARON     Indications:                CCS Class II   Lab Visit Indication:      worsening angina   PCI Status:                elective     Conclusions:   LCX: Patent OM1 stent, patent circumflex stent with 75% stenosis at  distal edge of old stent s/p PCI with new SHARON    LM: Luminal irregularities   LAD: Proximal stenosis 30%, luminal disease throughout   RCA: Luminal irregularities    RHC: see report. Normal pressures   Recommendations:     ASA and plavix for 6-12 months

## 2024-03-02 NOTE — PROGRESS NOTE ADULT - ASSESSMENT
HPI: 63M with PMH of HTN, HLD, CAD s/p PCI, DM2 (last A1c 6.0), LENIN on CPAP, Vertigo presents for R/LHC. Reports lightheadedness, fatigue, and progressively worsening SOB on exertion with mild activity for couple of weeks. Patient was evaluated by Cards, and underwent TTE with mild LV dysfunction with mild valvular disorders with calcification. Patient is now referred to Dr. Bonilla for further evaluation with R/LHC. Denies lightheadedness, HA, abdominal pain, N/V, hematuria, BRBPR, melena, syncope, or any other complaints. No implanted cardiac devices. Having a long conversation, patient c/o mild dyspnea, but no CP or palpitation.     Cards: Dr. You Diehl  TTE 2/27/24 - Mild LV dysfunction with EF 48%, LVH, Hypokinesis of apical cap, dilated LA, mild AS, AR, MR, TR    # CAD  3/1 s/p SHARON x1 to LCx via RRA  Right wrist stable w/o bleeding or hematoma; site soft, non tender  Right radial pulse palpable +2  Denies chest pain, denies right wrist/arm/hand:  pain, numbness, or tingling   Cont DAPT- Aspirin 81 mg, Plavix 75 mg daily  Cont antihypertensives- Losartan 100 mg  Cont statin- Atorvastatin 40 mg  Monitor telemetry  Keep Mg >2 K >4  F/u appt in 2 weeks post dc with oupt cardiologist  Plan for discharge if site and condition remain stable    # HTN  Continue Losartan 100 mg daily  Monitor BP  DASH diet      - Reviewed and reinforced with patient:  wound care instructions, activities dos and donts, medication compliance specifically antiplatelet therapy given stent/s.    - Patient aware to take DAPT  as prescribed and DO NOT STOP taking without consulting cardiologist first or STENT/s WILL CLOSE  - Reviewed and reinforced with patient:  site complications ( eg: bleeding, excruciating pain at the procedural site, large sweliing-golf ball size-  extremity numbness, tingling, temperature change), or CHEST PAIN; pt aware that if any of those occur he/she must call cardiologist IMMEDIATELY or 911 or go to nearest emergency room   - Reviewed and reinforced a heart healthy diet, Smoking Cessation  - Patient verbalizes understanding of ALL OF THE ABOVE, and gives positive feedback       Daniel Bolden [facility]  Invasive Cardiology  Ext [phone]

## 2024-03-02 NOTE — DISCHARGE NOTE NURSING/CASE MANAGEMENT/SOCIAL WORK - NSDCPEFALRISK_GEN_ALL_CORE
For information on Fall & Injury Prevention, visit: [email or web address] OR  [email or web address] OR  https://www.cdc.gov/steadi/patient.html

## 2024-03-02 NOTE — DISCHARGE NOTE NURSING/CASE MANAGEMENT/SOCIAL WORK - PATIENT PORTAL LINK FT
You can access the FollowMyHealth Patient Portal offered by [facility] by registering at the following website: [email or web address]. By joining [facility]’s FollowMyHealth portal, you will also be able to view your health information using other applications (apps) compatible with our system.

## 2024-03-04 LAB
HGB FLD-MCNC: 8.2 G/DL — LOW (ref 12.6–17.4)
HGB FLD-MCNC: 8.3 G/DL — LOW (ref 12.6–17.4)
OXYHGB MFR BLDMV: 87.4 % — LOW (ref 90–95)
OXYHGB MFR BLDMV: 87.7 % — LOW (ref 90–95)
SAO2 % BLD: 89.2 % — SIGNIFICANT CHANGE UP (ref 60–90)
SAO2 % BLD: 90.5 % — HIGH (ref 60–90)

## 2024-03-06 NOTE — ED PROVIDER NOTE - EKG ADDITIONAL QUESTION - PERFORMED INDEPENDENT VISUALIZATION
ROSANA Fagan   [place] [address] [facility] [place]  [address]     PATIENT NAME: Yadira Montes  : 2004  DATE: 3/6/24  MRN: [id number]      Billing Provider: ROSANA Fagan  Level of Service: HI OFFICE/OUTPT VISIT, EST, LEVL III, 20-29 MIN  Patient PCP Information       Provider PCP Type    ROSANA Fagan General            Reason for Visit / Chief Complaint: Headache (Pt c/o headaches since last Tuesday )       Update PCP  Update Chief Complaint         History of Present Illness / Problem Focused Workflow     Yadira Montes presents to the clinic with Headache (Pt c/o headaches since last Tuesday )     19-year-old female presents with mother for right-sided migraine, history of migraines, has been going on since last Tuesday.  She sees a neurologist in [place], but they called last week and they been booked so the call back was delayed.  They talked with them a couple of days ago and told that they could come back for injection Toradol and Zofran or Phenergan, but suggested to try PCP because it may be quicker and easier for them instead of coming to [place].  She typically takes naproxen to help relieve her headaches, it has eased up at times throughout the past week but has never gone away.  She has occasional nausea.  Denies phonophobia but does have photophobia.  She does have auras with some migraines but not with this 1.  LMP x7 days ago.  No other symptoms.    Headache   Associated symptoms include nausea and photophobia. Pertinent negatives include no abdominal pain or vomiting.       Review of Systems     Review of Systems   Constitutional: Negative.    HENT: Negative.     Eyes:  Positive for photophobia. Negative for visual disturbance.   Respiratory: Negative.     Cardiovascular: Negative.    Gastrointestinal:  Positive for nausea. Negative for abdominal pain and vomiting.   Endocrine: Negative.    Musculoskeletal: Negative.    Integumentary:  Negative.   Neurological:   Positive for headaches.   Psychiatric/Behavioral: Negative.     All other systems reviewed and are negative.       Medical / Social / Family History     Past Medical History:   Diagnosis Date    Allergy     Headache     Vision abnormalities        History reviewed. No pertinent surgical history.    Social History  Ms. Montes  reports that she has never smoked. She has never used smokeless tobacco. She reports that she does not drink alcohol and does not use drugs.    Family History  Ms.'s Montes family history includes Hypertension in her father; Thyroid disease in her father.    Medications and Allergies     Medications  Outpatient Medications Marked as Taking for the 3/6/24 encounter (Office Visit) with Meri Pa FNP   Medication Sig Dispense Refill    midodrine (PROAMATINE) 10 MG tablet Take 10 mg by mouth 3 (three) times daily.      naproxen (NAPROSYN) 375 MG tablet Take 375 mg by mouth 2 (two) times daily as needed.      vitamin E 100 UNIT capsule Take 100 Units by mouth once daily.      zonisamide (ZONEGRAN) 100 MG Cap Take 200 mg by mouth.       Current Facility-Administered Medications for the 3/6/24 encounter (Office Visit) with Meri Pa FNP   Medication Dose Route Frequency Provider Last Rate Last Admin    [COMPLETED] ketorolac injection 15 mg  15 mg Intramuscular 1 time in Clinic/HOD Meri Pa FNP   15 mg at 03/06/24 1141    [COMPLETED] promethazine injection 25 mg  0.5 mg/kg Intramuscular 1 time in Clinic/HOD Meri Pa FNP   25 mg at 03/06/24 1142       Allergies  Review of patient's allergies indicates:   Allergen Reactions    Amoxicillin Nausea And Vomiting and Rash       Physical Examination     Vitals:    03/06/24 1106   BP: 111/76   Pulse: 94   Temp: 98.2 °F (36.8 °C)     Physical Exam  Vitals and nursing note reviewed.   Constitutional:       Appearance: Normal appearance. She is normal weight.   HENT:      Head: Normocephalic and atraumatic.      Mouth/Throat:      Mouth: Mucous  membranes are moist.      Pharynx: Oropharynx is clear.   Eyes:      Extraocular Movements: Extraocular movements intact.      Conjunctiva/sclera: Conjunctivae normal.      Pupils: Pupils are equal, round, and reactive to light.   Cardiovascular:      Rate and Rhythm: Normal rate and regular rhythm.      Pulses: Normal pulses.      Heart sounds: Normal heart sounds.   Pulmonary:      Effort: Pulmonary effort is normal.      Breath sounds: Normal breath sounds.   Musculoskeletal:         General: Normal range of motion.      Cervical back: Normal range of motion and neck supple.   Skin:     General: Skin is warm and dry.      Capillary Refill: Capillary refill takes less than 2 seconds.   Neurological:      General: No focal deficit present.      Mental Status: She is alert and oriented to person, place, and time. Mental status is at baseline.      GCS: GCS eye subscore is 4. GCS verbal subscore is 5. GCS motor subscore is 6.      Cranial Nerves: Cranial nerves 2-12 are intact.      Sensory: Sensation is intact.      Motor: Motor function is intact.      Coordination: Coordination is intact.   Psychiatric:         Mood and Affect: Mood normal.         Behavior: Behavior normal.         Thought Content: Thought content normal.         Judgment: Judgment normal.              Assessment and Plan (including Health Maintenance)      Problem List  Smart Sets  Document Outside HM   :    Plan:   Intractable migraine without aura and with status migrainosus  -     ketorolac injection 15 mg  -     promethazine injection 25 mg           Health Maintenance Due   Topic Date Due    Hepatitis B Vaccines (1 of 3 - 3-dose series) Never done    IPV Vaccines (1 of 3 - 4-dose series) Never done    COVID-19 Vaccine (1) Never done    Hepatitis A Vaccines (1 of 2 - 2-dose series) Never done    MMR Vaccines (1 of 2 - Standard series) Never done    Varicella Vaccines (1 of 2 - 2-dose childhood series) Never done    DTaP/Tdap/Td Vaccines (2 -  Td or Tdap) 08/15/2018    HIV Screening  Never done    Meningococcal Vaccine (1 - 2-dose series) Never done       Problem List Items Addressed This Visit    None  Visit Diagnoses       Intractable migraine without aura and with status migrainosus    -  Primary    Relevant Medications    ketorolac injection 15 mg (Completed)    promethazine injection 25 mg (Completed)            The patient has no Health Maintenance topics of status Not Due    Future Appointments   Date Time Provider Department Center   12/9/2024 10:00 AM Meri Pa FNP [facility]        There are no Patient Instructions on file for this visit.  Follow up if symptoms worsen or fail to improve.     Signature:  ROSANA Fagan      Date of encounter: 3/6/24     Yes

## 2024-04-01 ENCOUNTER — APPOINTMENT (OUTPATIENT)
Dept: ORTHOPEDIC SURGERY | Facility: CLINIC | Age: 64
End: 2024-04-01
Payer: COMMERCIAL

## 2024-04-01 PROCEDURE — 73564 X-RAY EXAM KNEE 4 OR MORE: CPT | Mod: RT

## 2024-04-01 PROCEDURE — 99213 OFFICE O/P EST LOW 20 MIN: CPT | Mod: 25

## 2024-04-01 PROCEDURE — 20611 DRAIN/INJ JOINT/BURSA W/US: CPT | Mod: RT

## 2024-04-01 NOTE — PHYSICAL EXAM
[de-identified] : Constitutional o Appearance : well-nourished, well developed, alert, in no acute distress  Head and Face o Head :  Inspection : atraumatic, normocephalic o Face :  Inspection : no visible rash or discoloration Respiratory o Respiratory Effort: breathing unlabored  Neurologic o Sensation : Normal sensation  Psychiatric o Mood and Affect: mood normal, affect appropriate  Lymphatic o Additional Nodes : No palpable lymph nodes present   Cervical Spine o Inspection/Palpation :  Inspection : alignment midline, normal degree of lordosis present  Skin : normal appearance, no masses or tenderness, trachea midline  Palpation : left paracervical left side worse than right, right parascapular tenderness  o Range of Motion : limited rotation right side worse than left,  o Tests: Negative Spurlings test,   Right Upper Extremity o Right Shoulder :  Inspection/Palpation : no tenderness, no swelling or deformities  Range of Motion : full and painless in all planes, no crepitance  Strength : forward elevation 5/5, IR 5/5, ER 5/5, ER at 90 of abduction 5/5, supraspinatus 5/5, adduction 5/5, abduction 5/5, biceps/triceps 5/5,  5/5  Stability : no joint instability on provocative testing   Tests: Watson negative, Neer negative, Landy negative, drop arm test negative, Guayama's test negative  Left Upper Extremity o Left Shoulder :  Inspection/Palpation : no tenderness, no swelling or deformities  Range of Motion : full and painless in all planes, no crepitance  Strength : forward elevation 5/5, IR 5/5, ER 5/5, ER at 90 of abduction 5/5, supraspinatus 5/5, adduction 5/5, abduction 5/5, biceps/triceps 5/5,  5/5  Stability : no joint instability on provocative testing   Tests: Watson negative, Neer negative, Landy negative, drop arm test negative, Guayama's test negative  Lumbosacral Spine o Inspection : no visible rash or discoloration o Palpation : moderate left paraspinal musculature tenderness, no sciatic notch tenderness o Range of Motion : extension and sidebending cause left paraspinal discomfort o Muscle Strength : paraspinal muscle strength and tone within normal limits o Muscle Tone : paraspinal muscle strength and tone within normal limits o Tests: straight leg test negative and ANISA test negative bilaterally   Right Lower Extremity o Buttock : no tenderness, swelling or deformities  o Right Hip :    - Inspection/Palpation : no tenderness, no swelling or deformities    - Range of Motion : full flexion, full rotation, no crepitance    - Stability : joint stability intact    - Strength : extension, flexion, adduction, abduction, internal rotation and external rotation, 5/5     - Tests: Diane's test negative  o Right Knee :  Inspection/Palpation : medial compartment tenderness, no swelling, trophic changes  Range of Motion : 0-125, no crepitance, good patellofemoral glide   Stability : no valgus or varus instability present on provocative testing  Strength : flexion and extension 5/5  Tests and Signs : negative Anterior Drawer, negative Lachman, negative Kade  Left Lower Extremity o Buttock : no tenderness, swelling or deformities  o Left Hip :    - Inspection/Palpation : no tenderness, no swelling or deformities    - Range of Motion : full flexion, full rotation, no crepitance    - Stability : joint stability intact    - Strength : extension, flexion, adduction, abduction, internal rotation and external rotation, 5/5     - Tests: Diane's test negative  o Left Knee :  Inspection/Palpation : medial compartment tenderness, no swelling, trophic changes,   Range of Motion : 0-125 decreased patellofemoral glide without crepitance  Stability : no valgus or varus instability present on provocative testing  Strength : flexion and extension 5/5  Tests and Signs : negative Anterior Drawer, negative Lachman, negative Kade  o Peripheral Vascular System :  Dorsalis Pedis Artery : pulse 2+ bilaterally  Gait and Station: Gait: normal sensation to light touch , hyper trophic changes of both legs,  no significant extremity swelling or lymphedema, trophic changes of both legs, no foot drop  Radiology Results  o Right Knee : Standing AP, lateral and tunnel views of the knee were obtained and revealed severe medial and patellofemoral arthritis, no significant change from X-rays of June 2023 o Left Knee : Standing AP, lateral and tunnel views of the knee were obtained and revealed severe medial and patellofemoral arthritis   o Knee injection : Indication- right knee osteoarthritis, Anatomic location- right intra-articular joint space, Spray - area was sterilized with Betadine and alcohol and anesthetized with Ethyl Chloride , needle used-20G, Medications given- 3cc's Durolane under Ultrasound guidance. The patient tolerated the procedure well.

## 2024-04-01 NOTE — ADDENDUM
[FreeTextEntry1] : I, NIESHA JORDAN, acted solely as a scribe for Dr. Steve Manning on this date 04/01/2024.  All medical record entries made by the Scribe were at my, Dr. Steve Manning, direction and personally dictated by me on 04/01/2024. I have reviewed the chart and agree that the record accurately reflects my personal performance of the history, physical exam, assessment and plan. I have also personally directed, reviewed, and agreed with the chart.								 						 						 .

## 2024-04-01 NOTE — DISCUSSION/SUMMARY
[de-identified] : I went over the pathophysiology of the patient's symptoms in great detail with the patient. I discussed the underlying pathophysiology of the patient's condition in great detail with the patient. I went over the patient's x-rays with them in great detail. The patient elected to receive a Durolane injection into his right knee today, and tolerated it well. I instructed the patient on ROM exercises, and told them to take it easy. The use of ice and rest was reviewed with the patient. The patient may resume activities tomorrow. I reminded the patient that it takes 4 to 6 weeks after the final injection to feel symptom relief.   All of their questions were answered. They understand and consent to the plan.   FU next Monday for a left knee gel and we will get new cervical spine X-rays, AP lateral and obliques upon his return.

## 2024-04-01 NOTE — HISTORY OF PRESENT ILLNESS
[de-identified] : 63-year-old male presents for a right knee Durolane injection today 4/1/2024. He denies any swelling or buckling. He states he continues to have neck pain. He states physical therapy is not helping his neck at this time. He states he is on Plavix and aspirin. He notes he got a new stent put in recently.  PMHx: DM; 4 stents and was started on Plavix in addition to ASA 81mg.  MRI CERVICAL SPINE WO IV CONTRAST done in July  straighten cervical lordosis. Vertebral body heights appear normal. Cervical spinal cord appears interictally normal. C3- C4 and C5-6 and C6-7. Disc height narrowing, anterior buldge adjacent anterior marginal osteophytes formation. Scattered discogenic degenerative endplate marrow signal changes, most prominenlty adjavent to the C5-6 and C6-7 disc  Radiology Results 06/15/2023 o Right Knee : Standing AP, lateral and tunnel views of the knee were obtained and revealed moderate medial and patellofemoral arthritis  o Left Knee : Standing AP, lateral and tunnel views of the knee were obtained and revealed moderate medial and severe patellofemoral arthritis

## 2024-04-08 ENCOUNTER — APPOINTMENT (OUTPATIENT)
Dept: ORTHOPEDIC SURGERY | Facility: CLINIC | Age: 64
End: 2024-04-08
Payer: COMMERCIAL

## 2024-04-08 VITALS — BODY MASS INDEX: 43.39 KG/M2 | HEIGHT: 66 IN | WEIGHT: 270 LBS

## 2024-04-08 DIAGNOSIS — M48.02 SPINAL STENOSIS, CERVICAL REGION: ICD-10-CM

## 2024-04-08 PROCEDURE — 72040 X-RAY EXAM NECK SPINE 2-3 VW: CPT

## 2024-04-08 PROCEDURE — 20611 DRAIN/INJ JOINT/BURSA W/US: CPT | Mod: LT

## 2024-04-08 PROCEDURE — 99214 OFFICE O/P EST MOD 30 MIN: CPT | Mod: 25

## 2024-04-08 NOTE — ADDENDUM
[FreeTextEntry1] : I, NIESHA JORDAN, acted solely as a scribe for Dr. Steve Manning on this date 04/08/2024.  All medical record entries made by the Scribe were at my, Dr. Steve Manning, direction and personally dictated by me on 04/08/2024. I have reviewed the chart and agree that the record accurately reflects my personal performance of the history, physical exam, assessment and plan. I have also personally directed, reviewed, and agreed with the chart.								 							 						 						 .

## 2024-04-08 NOTE — PHYSICAL EXAM
[de-identified] : Constitutional o Appearance : well-nourished, well developed, alert, in no acute distress  Head and Face o Head :  Inspection : atraumatic, normocephalic o Face :  Inspection : no visible rash or discoloration Respiratory o Respiratory Effort: breathing unlabored  Neurologic o Sensation : Normal sensation  Psychiatric o Mood and Affect: mood normal, affect appropriate  Lymphatic o Additional Nodes : No palpable lymph nodes present   Cervical Spine o Inspection/Palpation :  Inspection : alignment midline, normal degree of lordosis present  Skin : normal appearance, no masses or tenderness, trachea midline  Palpation : right and left paracervical tenderness   o Range of Motion : limited rotation right side worse than left, limited extension but full flexion,  o Tests: Negative Spurlings test,   Right Upper Extremity o Right Shoulder :  Inspection/Palpation : no tenderness, no swelling or deformities  Range of Motion : full and painless in all planes, no crepitance  Strength : forward elevation 5/5, IR 5/5, ER 5/5, ER at 90 of abduction 5/5, supraspinatus 5/5, adduction 5/5, abduction 5/5, biceps/triceps 5/5,  5/5  Stability : no joint instability on provocative testing   Tests: Watson negative, Neer negative, Landy negative, drop arm test negative, Presidio's test negative  Left Upper Extremity o Left Shoulder :  Inspection/Palpation : no tenderness, no swelling or deformities  Range of Motion : full and painless in all planes, no crepitance  Strength : forward elevation 5/5, IR 5/5, ER 5/5, ER at 90 of abduction 5/5, supraspinatus 5/5, adduction 5/5, abduction 5/5, biceps/triceps 5/5,  5/5  Stability : no joint instability on provocative testing   Tests: Watson negative, Neer negative, Landy negative, drop arm test negative, Presidio's test negative  Lumbosacral Spine o Inspection : no visible rash or discoloration o Palpation : moderate left paraspinal musculature tenderness, no sciatic notch tenderness o Range of Motion : extension and sidebending cause left paraspinal discomfort o Muscle Strength : paraspinal muscle strength and tone within normal limits o Muscle Tone : paraspinal muscle strength and tone within normal limits o Tests: straight leg test negative and ANISA test negative bilaterally   Right Lower Extremity o Buttock : no tenderness, swelling or deformities  o Right Hip :    - Inspection/Palpation : no tenderness, no swelling or deformities    - Range of Motion : full flexion, full rotation, no crepitance    - Stability : joint stability intact    - Strength : extension, flexion, adduction, abduction, internal rotation and external rotation, 5/5     - Tests: Diane's test negative  o Right Knee :  Inspection/Palpation : medial compartment tenderness, no swelling, trophic changes  Range of Motion : 0-125, no crepitance, good patellofemoral glide   Stability : no valgus or varus instability present on provocative testing  Strength : flexion and extension 5/5  Tests and Signs : negative Anterior Drawer, negative Lachman, negative Kade  Left Lower Extremity o Buttock : no tenderness, swelling or deformities  o Left Hip :    - Inspection/Palpation : no tenderness, no swelling or deformities    - Range of Motion : full flexion, full rotation, no crepitance    - Stability : joint stability intact    - Strength : extension, flexion, adduction, abduction, internal rotation and external rotation, 5/5     - Tests: Diane's test negative  o Left Knee :  Inspection/Palpation : medial compartment tenderness, no swelling, trophic changes,   Range of Motion : 0-125 decreased patellofemoral glide without crepitance  Stability : no valgus or varus instability present on provocative testing  Strength : flexion and extension 5/5  Tests and Signs : negative Anterior Drawer, negative Lachman, negative Kade  o Peripheral Vascular System :  Dorsalis Pedis Artery : pulse 2+ bilaterally  Gait and Station: Gait: normal sensation to light touch , hyper trophic changes of both legs,  no significant extremity swelling or lymphedema, trophic changes of both legs, no foot drop  Radiology Results  o Cervical Spine : AP and lateral views were obtained and revealed straightening of the normal cervical lordosis, degenerative disc disease at C5-6 with mild foraminal stenosis at C5-6 and C6-7   o Knee injection : Indication- left knee osteoarthritis, Anatomic location- left intra-articular joint space, Spray - area was sterilized with Betadine and alcohol and anesthetized with Ethyl Chloride , needle used-20G, Medications given- 3cc's Durolane under Ultrasound guidance. The patient tolerated the procedure well. Lot#[id number] Exp#[id number]

## 2024-04-08 NOTE — HISTORY OF PRESENT ILLNESS
[de-identified] : 63-year-old male presents for a left knee Durolane injection today 4/8/2024. He had a right knee Durolane injection on 4/1/2024. He denies any swelling or buckling. He states he continues to have right sided neck pain. He states his neck feels really stiff. He states he feels a pop when he rotates his neck to the right. He states the popping relives the pain for a few seconds. Patient denies that he has any numbness or tingling into his fingers. He states physical therapy is helping his neck at this time. He states he is on Plavix and aspirin. He notes he got a new stent put in recently.  PMHx: DM; 4 stents and was started on Plavix in addition to ASA 81mg.  MRI CERVICAL SPINE WO IV CONTRAST done in July  straighten cervical lordosis. Vertebral body heights appear normal. Cervical spinal cord appears interictally normal. C3- C4 and C5-6 and C6-7. Disc height narrowing, anterior buldge adjacent anterior marginal osteophytes formation. Scattered discogenic degenerative endplate marrow signal changes, most prominenlty adjavent to the C5-6 and C6-7 disc  Radiology Results 06/15/2023 o Right Knee : Standing AP, lateral and tunnel views of the knee were obtained and revealed moderate medial and patellofemoral arthritis  o Left Knee : Standing AP, lateral and tunnel views of the knee were obtained and revealed moderate medial and severe patellofemoral arthritis

## 2024-04-08 NOTE — DISCUSSION/SUMMARY
[de-identified] : I went over the pathophysiology of the patient's symptoms in great detail with the patient. I discussed the underlying pathophysiology of the patient's condition in great detail with the patient. I went over the patient's x-rays with them in great detail. The patient elected to receive a Durolane injection into his left knee today, and tolerated it well. I instructed the patient on ROM exercises, and told them to take it easy. The use of ice and rest was reviewed with the patient. The patient may resume activities tomorrow. I reminded the patient that it takes 4 to 6 weeks after the final injection to feel symptom relief. He should avoid extending his neck beyond a neutral position as much as possible. I discussed ergonomic solutions with the patient. I am recommending the patient continues to go to physical therapy to obtain increases in their strength and mobility. A prescription was provided. We are prescribing 10mg Flexeril at this time. A prescription was provided.   All of their questions were answered. They understand and consent to the plan.   FU in 6 weeks.

## 2024-04-25 ENCOUNTER — APPOINTMENT (OUTPATIENT)
Dept: ORTHOPEDIC SURGERY | Facility: CLINIC | Age: 64
End: 2024-04-25

## 2024-05-02 ENCOUNTER — INPATIENT (INPATIENT)
Facility: HOSPITAL | Age: 64
LOS: 10 days | Discharge: HOME HEALTH | DRG: 313 | End: 2024-05-13
Attending: THORACIC SURGERY (CARDIOTHORACIC VASCULAR SURGERY) | Admitting: INTERNAL MEDICINE
Payer: COMMERCIAL

## 2024-05-02 VITALS
HEART RATE: 79 BPM | SYSTOLIC BLOOD PRESSURE: 145 MMHG | HEIGHT: 66 IN | TEMPERATURE: 98 F | WEIGHT: 270.07 LBS | RESPIRATION RATE: 22 BRPM | OXYGEN SATURATION: 96 % | DIASTOLIC BLOOD PRESSURE: 78 MMHG

## 2024-05-02 DIAGNOSIS — R07.9 CHEST PAIN, UNSPECIFIED: ICD-10-CM

## 2024-05-02 LAB
ALBUMIN SERPL ELPH-MCNC: 4.2 G/DL — SIGNIFICANT CHANGE UP (ref 3.3–5)
ALP SERPL-CCNC: 83 U/L — SIGNIFICANT CHANGE UP (ref 40–120)
ALT FLD-CCNC: 23 U/L — SIGNIFICANT CHANGE UP (ref 10–45)
ANION GAP SERPL CALC-SCNC: 13 MMOL/L — SIGNIFICANT CHANGE UP (ref 5–17)
APTT BLD: 33.9 SEC — SIGNIFICANT CHANGE UP (ref 24.5–35.6)
AST SERPL-CCNC: 21 U/L — SIGNIFICANT CHANGE UP (ref 10–40)
BASOPHILS # BLD AUTO: 0.04 K/UL — SIGNIFICANT CHANGE UP (ref 0–0.2)
BASOPHILS NFR BLD AUTO: 0.6 % — SIGNIFICANT CHANGE UP (ref 0–2)
BILIRUB SERPL-MCNC: 0.8 MG/DL — SIGNIFICANT CHANGE UP (ref 0.2–1.2)
BLD GP AB SCN SERPL QL: NEGATIVE — SIGNIFICANT CHANGE UP
BUN SERPL-MCNC: 14 MG/DL — SIGNIFICANT CHANGE UP (ref 7–23)
CALCIUM SERPL-MCNC: 9.7 MG/DL — SIGNIFICANT CHANGE UP (ref 8.4–10.5)
CHLORIDE SERPL-SCNC: 101 MMOL/L — SIGNIFICANT CHANGE UP (ref 96–108)
CO2 SERPL-SCNC: 26 MMOL/L — SIGNIFICANT CHANGE UP (ref 22–31)
CREAT SERPL-MCNC: 0.76 MG/DL — SIGNIFICANT CHANGE UP (ref 0.5–1.3)
EGFR: 102 ML/MIN/1.73M2 — SIGNIFICANT CHANGE UP
EOSINOPHIL # BLD AUTO: 0.11 K/UL — SIGNIFICANT CHANGE UP (ref 0–0.5)
EOSINOPHIL NFR BLD AUTO: 1.7 % — SIGNIFICANT CHANGE UP (ref 0–6)
FLUAV AG NPH QL: SIGNIFICANT CHANGE UP
FLUBV AG NPH QL: SIGNIFICANT CHANGE UP
GLUCOSE BLDC GLUCOMTR-MCNC: 122 MG/DL — HIGH (ref 70–99)
GLUCOSE BLDC GLUCOMTR-MCNC: 123 MG/DL — HIGH (ref 70–99)
GLUCOSE BLDC GLUCOMTR-MCNC: 170 MG/DL — HIGH (ref 70–99)
GLUCOSE SERPL-MCNC: 131 MG/DL — HIGH (ref 70–99)
HCT VFR BLD CALC: 47.5 % — SIGNIFICANT CHANGE UP (ref 39–50)
HGB BLD-MCNC: 15.8 G/DL — SIGNIFICANT CHANGE UP (ref 13–17)
IMM GRANULOCYTES NFR BLD AUTO: 0.3 % — SIGNIFICANT CHANGE UP (ref 0–0.9)
INR BLD: 1.1 RATIO — SIGNIFICANT CHANGE UP (ref 0.85–1.18)
LYMPHOCYTES # BLD AUTO: 1.45 K/UL — SIGNIFICANT CHANGE UP (ref 1–3.3)
LYMPHOCYTES # BLD AUTO: 21.8 % — SIGNIFICANT CHANGE UP (ref 13–44)
MCHC RBC-ENTMCNC: 27.7 PG — SIGNIFICANT CHANGE UP (ref 27–34)
MCHC RBC-ENTMCNC: 33.3 GM/DL — SIGNIFICANT CHANGE UP (ref 32–36)
MCV RBC AUTO: 83.2 FL — SIGNIFICANT CHANGE UP (ref 80–100)
MONOCYTES # BLD AUTO: 0.49 K/UL — SIGNIFICANT CHANGE UP (ref 0–0.9)
MONOCYTES NFR BLD AUTO: 7.4 % — SIGNIFICANT CHANGE UP (ref 2–14)
NEUTROPHILS # BLD AUTO: 4.55 K/UL — SIGNIFICANT CHANGE UP (ref 1.8–7.4)
NEUTROPHILS NFR BLD AUTO: 68.2 % — SIGNIFICANT CHANGE UP (ref 43–77)
NRBC # BLD: 0 /100 WBCS — SIGNIFICANT CHANGE UP (ref 0–0)
PLATELET # BLD AUTO: 210 K/UL — SIGNIFICANT CHANGE UP (ref 150–400)
POTASSIUM SERPL-MCNC: 4.4 MMOL/L — SIGNIFICANT CHANGE UP (ref 3.5–5.3)
POTASSIUM SERPL-SCNC: 4.4 MMOL/L — SIGNIFICANT CHANGE UP (ref 3.5–5.3)
PROT SERPL-MCNC: 7.9 G/DL — SIGNIFICANT CHANGE UP (ref 6–8.3)
PROTHROM AB SERPL-ACNC: 11.5 SEC — SIGNIFICANT CHANGE UP (ref 9.5–13)
RBC # BLD: 5.71 M/UL — SIGNIFICANT CHANGE UP (ref 4.2–5.8)
RBC # FLD: 13.1 % — SIGNIFICANT CHANGE UP (ref 10.3–14.5)
RH IG SCN BLD-IMP: POSITIVE — SIGNIFICANT CHANGE UP
RH IG SCN BLD-IMP: POSITIVE — SIGNIFICANT CHANGE UP
RSV RNA NPH QL NAA+NON-PROBE: SIGNIFICANT CHANGE UP
SARS-COV-2 RNA SPEC QL NAA+PROBE: SIGNIFICANT CHANGE UP
SODIUM SERPL-SCNC: 140 MMOL/L — SIGNIFICANT CHANGE UP (ref 135–145)
TROPONIN T, HIGH SENSITIVITY RESULT: 9 NG/L — SIGNIFICANT CHANGE UP (ref 0–51)
WBC # BLD: 6.66 K/UL — SIGNIFICANT CHANGE UP (ref 3.8–10.5)
WBC # FLD AUTO: 6.66 K/UL — SIGNIFICANT CHANGE UP (ref 3.8–10.5)

## 2024-05-02 PROCEDURE — 99285 EMERGENCY DEPT VISIT HI MDM: CPT

## 2024-05-02 PROCEDURE — 71046 X-RAY EXAM CHEST 2 VIEWS: CPT | Mod: 26

## 2024-05-02 RX ORDER — LOSARTAN POTASSIUM 100 MG/1
100 TABLET, FILM COATED ORAL DAILY
Refills: 0 | Status: DISCONTINUED | OUTPATIENT
Start: 2024-05-02 | End: 2024-05-03

## 2024-05-02 RX ORDER — INSULIN LISPRO 100/ML
VIAL (ML) SUBCUTANEOUS
Refills: 0 | Status: DISCONTINUED | OUTPATIENT
Start: 2024-05-02 | End: 2024-05-06

## 2024-05-02 RX ORDER — DEXTROSE 50 % IN WATER 50 %
25 SYRINGE (ML) INTRAVENOUS ONCE
Refills: 0 | Status: DISCONTINUED | OUTPATIENT
Start: 2024-05-02 | End: 2024-05-06

## 2024-05-02 RX ORDER — DEXTROSE 10 % IN WATER 10 %
125 INTRAVENOUS SOLUTION INTRAVENOUS ONCE
Refills: 0 | Status: DISCONTINUED | OUTPATIENT
Start: 2024-05-02 | End: 2024-05-06

## 2024-05-02 RX ORDER — ATORVASTATIN CALCIUM 80 MG/1
40 TABLET, FILM COATED ORAL AT BEDTIME
Refills: 0 | Status: DISCONTINUED | OUTPATIENT
Start: 2024-05-02 | End: 2024-05-06

## 2024-05-02 RX ORDER — GLUCAGON INJECTION, SOLUTION 0.5 MG/.1ML
1 INJECTION, SOLUTION SUBCUTANEOUS ONCE
Refills: 0 | Status: DISCONTINUED | OUTPATIENT
Start: 2024-05-02 | End: 2024-05-06

## 2024-05-02 RX ORDER — DEXTROSE 50 % IN WATER 50 %
15 SYRINGE (ML) INTRAVENOUS ONCE
Refills: 0 | Status: DISCONTINUED | OUTPATIENT
Start: 2024-05-02 | End: 2024-05-06

## 2024-05-02 RX ORDER — INSULIN LISPRO 100/ML
VIAL (ML) SUBCUTANEOUS AT BEDTIME
Refills: 0 | Status: DISCONTINUED | OUTPATIENT
Start: 2024-05-02 | End: 2024-05-06

## 2024-05-02 RX ORDER — CLOPIDOGREL BISULFATE 75 MG/1
75 TABLET, FILM COATED ORAL DAILY
Refills: 0 | Status: DISCONTINUED | OUTPATIENT
Start: 2024-05-02 | End: 2024-05-03

## 2024-05-02 RX ORDER — SODIUM CHLORIDE 9 MG/ML
1000 INJECTION INTRAMUSCULAR; INTRAVENOUS; SUBCUTANEOUS
Refills: 0 | Status: DISCONTINUED | OUTPATIENT
Start: 2024-05-02 | End: 2024-05-06

## 2024-05-02 RX ORDER — SODIUM CHLORIDE 9 MG/ML
250 INJECTION INTRAMUSCULAR; INTRAVENOUS; SUBCUTANEOUS ONCE
Refills: 0 | Status: COMPLETED | OUTPATIENT
Start: 2024-05-02 | End: 2024-05-02

## 2024-05-02 RX ORDER — ASPIRIN/CALCIUM CARB/MAGNESIUM 324 MG
81 TABLET ORAL DAILY
Refills: 0 | Status: DISCONTINUED | OUTPATIENT
Start: 2024-05-02 | End: 2024-05-06

## 2024-05-02 RX ORDER — DEXTROSE 50 % IN WATER 50 %
12.5 SYRINGE (ML) INTRAVENOUS ONCE
Refills: 0 | Status: DISCONTINUED | OUTPATIENT
Start: 2024-05-02 | End: 2024-05-06

## 2024-05-02 RX ORDER — SODIUM CHLORIDE 9 MG/ML
1000 INJECTION, SOLUTION INTRAVENOUS
Refills: 0 | Status: DISCONTINUED | OUTPATIENT
Start: 2024-05-02 | End: 2024-05-06

## 2024-05-02 RX ADMIN — Medication 1: at 18:23

## 2024-05-02 RX ADMIN — CLOPIDOGREL BISULFATE 75 MILLIGRAM(S): 75 TABLET, FILM COATED ORAL at 21:09

## 2024-05-02 RX ADMIN — LOSARTAN POTASSIUM 100 MILLIGRAM(S): 100 TABLET, FILM COATED ORAL at 21:09

## 2024-05-02 RX ADMIN — ATORVASTATIN CALCIUM 40 MILLIGRAM(S): 80 TABLET, FILM COATED ORAL at 21:09

## 2024-05-02 RX ADMIN — Medication 81 MILLIGRAM(S): at 21:09

## 2024-05-02 NOTE — H&P ADULT - HISTORY OF PRESENT ILLNESS
63M with PMH of HTN, HLD, CAD s/p PCI, DM2 (last A1c 6.0), LENIN on CPAP, Vertigo presents with chest pain on exertion and dyspnea Had-cath-3/1/24--LCX: Patent OM1 stent, patent circumflex stent with 75% stenosis at distal edge of old stent s/p PCI-1 SHARON to LCx via RRA and RBV with WNL filling pressure discharged on DAPT Of note lab report on 3/4 reported Hgb 8.1 from 16 on 3/1-/Lab error,patient denies melena bleeding.        CARDIAC STUDIES:  TTE 2/27/24 - Mild LV dysfunction with EF 48%, LVH, Hypokinesis of apical cap, dilated LA, mild AS, AR, MR, TR  CATH 3/01/2024-LCX: Patent OM1 stent, patent circumflex stent with 75% stenosis at distal edge of old stent s/p PCI with new SHARON  CATH 3/29/2023-Mild-moderate nonobstructive CAD of the left cirumflex and leftanterior descending arteries. Prior stent to the OM1 is widelypatent.

## 2024-05-02 NOTE — ED PROVIDER NOTE - CLINICAL SUMMARY MEDICAL DECISION MAKING FREE TEXT BOX
Abner Perez is a 63 y.o. M PMHx significant for  HTN, HLD, CAD s/p PCI, T2DM, LENIN on CPAP, Vertigo, cardiac cath with stent placed 03/01/24 on Aspirin 81 mg, Plavix 75 mg daily, prompted to present to ED by cardiologist 1 week of dyspnea with intermittent mid-sternal CP with occasional radiation to the left shoulder. PE nonfocal.  Given HPI, recent stent, c/f possible coronary vessel occlusion, ACS.  During interview Dr. Major present in room who will admit patient to his service. Will obtain CBC, CMP, coags, type and screen, EKG, CXR, troponin to evaluate for cardiac etiology.  Will admit to Dr. Major service and monitor for acute changes in status.

## 2024-05-02 NOTE — H&P ADULT - TIME BILLING
- Review of records, telemetry, vital signs and daily labs.   - General and cardiovascular physical examination.  - Generation of cardiovascular treatment plan.  - Coordination of care.      Patient was seen and examined by me on 05/02/2024,interim events noted,labs and radiology studies reviewed.  Wilfred Major MD,FACC.  [address] [place], [place] [id number].  [phone]

## 2024-05-02 NOTE — H&P ADULT - NSHPPHYSICALEXAM_GEN_ALL_CORE
Physical Exam:  · Constitutional  well-groomed; no distress  · Respiratory  clear to auscultation bilaterally; no wheezes; no rales; no rhonchi  · Cardiovascular  regular rate and rhythm; S1 S2 present; no gallops; no rub; no murmur  · Gastrointestinal  soft; nontender; nondistended; normal active bowel sounds  · Neurological  cranial nerves II-XII intact; sensation intact  · Skin  warm and dry  · Skin Comments  +Chronic skin changes b/l, no overt pitting edema noted  · Musculoskeletal  normal; normal gait; ROM intact; strength 5/5 bilateral upper extremities; strength 5/5 bilateral lower extremities  · Psychiatric  normal affect; alert and oriented x3; normal behavior

## 2024-05-02 NOTE — ED ADULT TRIAGE NOTE - CHIEF COMPLAINT QUOTE
cardiac stent placed at [facility] 1 month ago, endorses midsternal non radiating chest pain x1 week, endorses 2 years of dizziness and SOB worse over the last week  Denies n/v

## 2024-05-02 NOTE — H&P ADULT - ASSESSMENT
63M with PMH of HTN, HLD, CAD s/p PCI, DM2 (last A1c 6.0), LENIN on CPAP, Vertigo presents with chest pain on exertion and dyspnea Had-cath-3/1/24--LCX: Patent OM1 stent, patent circumflex stent with 75% stenosis at distal edge of old stent s/p PCI-1 SHARON to LCx via RRA and RBV with WNL filling pressure discharged on DAPT Of note lab report on 3/4 reported Hgb 8.1 from 16 on 3/1-/Lab error,patient denies melena bleeding.      #CHEST PAIN-ACS  Hx CAD s/p PCI- S/p stents to LCx and OM1 2/2022 and PCI-SHARON LCx 3/1/2024  On DAPT  Presents with 1 week of dyspnea with intermittent mid-sternal CP with occasional radiation to the left shoulder.  Awaiting labs  ECG no acute findings  Possible cardiac cath today Dr Ilan Bonilla    # Type 2 diabetes mellitus.   ·  Plan: - On presentation, POCT FS 140s  - Home regimen includes metformin 500 daily  - Initiate ISS; monitor FS for goal 140-180  - A1c -6.0% 3/29/23.    # HLD (hyperlipidemia).   ·  Plan: - Home regimen includes atorvastatin 40  - C/w atorvastatin 40    # Hypertension.   ·  Plan: - On presentation, -156  - Home regimen is losartan 50

## 2024-05-02 NOTE — ED PROVIDER NOTE - ATTENDING CONTRIBUTION TO CARE
Attending MAGY Lord performed a history and physical exam of the patient and discussed their management with the resident. I reviewed the resident's note and agree with the documented findings and plan of care, except as noted. My medical decision making and observations are as follows:    63 M with PMH HTN, HLD, CAD s/p PCI, T2DM, LENIN on CPAP, Vertigo, cardiac cath with stent placed 03/01/24 on Aspirin 81 mg, Plavix 75 mg daily referred to ED by outpatient cardiologist for evaluation of 1 week dyspnea and intermittent midsternal chest pain radiating to left shoulder.  Symptoms occur at rest; not exertional.  No fever, cough, abdominal pain, GI symptoms, dysuria.  On exam, patient no acute distress, normal work of breathing, speaking full sentences.  Heart and lungs clear to auscultation, abdomen soft nontender, no pedal edema.    MDM–concern for ACS/possible stent occlusion, will evaluate with labs, x-ray, EKG.  On my independent interpretation, EKG shows NSR rate 81, normal axis, normal intervals, no acute ischemia.

## 2024-05-02 NOTE — PATIENT PROFILE ADULT - FALL HARM RISK - UNIVERSAL INTERVENTIONS
Bed in lowest position, wheels locked, appropriate side rails in place/Call bell, personal items and telephone in reach/Instruct patient to call for assistance before getting out of bed or chair/Non-slip footwear when patient is out of bed/[place] to call system/Physically safe environment - no spills, clutter or unnecessary equipment/Purposeful Proactive Rounding/Room/bathroom lighting operational, light cord in reach

## 2024-05-02 NOTE — ED PROVIDER NOTE - PHYSICAL EXAMINATION
GEN: Patient awake and alert. No acute distress, non-toxic.  CARDIAC: RRR. murmur. No peripheral edema noted.  PULM: CTA B/L no wheeze, rales or rhonchi. No signs of respiratory distress, no accessory muscle usage or nasal flaring.  ABD: Soft, obese, nontender, nondistended. No rebound, no involuntary guarding.  : No suprapubic tenderness.  MSK: Moving all extremities spontaneously.  NEURO: A&Ox3  SKIN: Warm, dry

## 2024-05-02 NOTE — PROGRESS NOTE ADULT - SUBJECTIVE AND OBJECTIVE BOX
SUBJECTIVE / OVERNIGHT EVENTS:pt seen and examined  05-02-24    MEDICATIONS  (STANDING):  aspirin  chewable 81 milliGRAM(s) Oral daily  atorvastatin 40 milliGRAM(s) Oral at bedtime  clopidogrel Tablet 75 milliGRAM(s) Oral daily  dextrose 10% Bolus 125 milliLiter(s) IV Bolus once  dextrose 5%. 1000 milliLiter(s) (100 mL/Hr) IV Continuous <Continuous>  dextrose 5%. 1000 milliLiter(s) (50 mL/Hr) IV Continuous <Continuous>  dextrose 50% Injectable 25 Gram(s) IV Push once  dextrose 50% Injectable 12.5 Gram(s) IV Push once  glucagon  Injectable 1 milliGRAM(s) IntraMuscular once  insulin lispro (ADMELOG) corrective regimen sliding scale   SubCutaneous at bedtime  insulin lispro (ADMELOG) corrective regimen sliding scale   SubCutaneous three times a day before meals  losartan 100 milliGRAM(s) Oral daily  sodium chloride 0.9%. 1000 milliLiter(s) (75 mL/Hr) IV Continuous <Continuous>    MEDICATIONS  (PRN):  dextrose Oral Gel 15 Gram(s) Oral once PRN Blood Glucose LESS THAN 70 milliGRAM(s)/deciliter    T(C): 36.5 (05-02-24 @ 17:55), Max: 36.9 (05-02-24 @ 10:28)  HR: 74 (05-02-24 @ 23:15) (66 - 88)  BP: 136/68 (05-02-24 @ 23:15) (128/73 - 159/81)  RR: 17 (05-02-24 @ 23:15) (15 - 22)  SpO2: 95% (05-02-24 @ 23:15) (94% - 99%)    CAPILLARY BLOOD GLUCOSE      POCT Blood Glucose.: 123 mg/dL (02 May 2024 21:08)  POCT Blood Glucose.: 170 mg/dL (02 May 2024 18:00)  POCT Blood Glucose.: 122 mg/dL (02 May 2024 11:01)    I&O's Summary      Constitutional: No fever, fatigue  Skin: No rash.  Eyes: No recent vision problems or eye pain.  ENT: No congestion, ear pain, or sore throat.  Cardiovascular: No chest pain or palpation.  Respiratory: No cough, shortness of breath, congestion, or wheezing.  Gastrointestinal: No abdominal pain, nausea, vomiting, or diarrhea.  Genitourinary: No dysuria.  Musculoskeletal: No joint swelling.  Neurologic: No headache.    PHYSICAL EXAM:  GENERAL: NAD  EYES: EOMI, PERRLA  NECK: Supple, No JVD  CHEST/LUNG: cta gaetano  HEART:  S1 , S2 +  ABDOMEN: soft , bs+  EXTREMITIES:  no edema  NEUROLOGY:alert awake      LABS:                        15.8   6.66  )-----------( 210      ( 02 May 2024 10:00 )             47.5     05-02    140  |  101  |  14  ----------------------------<  131<H>  4.4   |  26  |  0.76    Ca    9.7      02 May 2024 10:00    TPro  7.9  /  Alb  4.2  /  TBili  0.8  /  DBili  x   /  AST  21  /  ALT  23  /  AlkPhos  83  05-02    PT/INR - ( 02 May 2024 10:00 )   PT: 11.5 sec;   INR: 1.10 ratio         PTT - ( 02 May 2024 10:00 )  PTT:33.9 sec      Urinalysis Basic - ( 02 May 2024 10:00 )    Color: x / Appearance: x / SG: x / pH: x  Gluc: 131 mg/dL / Ketone: x  / Bili: x / Urobili: x   Blood: x / Protein: x / Nitrite: x   Leuk Esterase: x / RBC: x / WBC x   Sq Epi: x / Non Sq Epi: x / Bacteria: x        RADIOLOGY & ADDITIONAL TESTS:    Imaging Personally Reviewed:    Consultant(s) Notes Reviewed:      Care Discussed with Consultants/Other Providers:

## 2024-05-02 NOTE — H&P ADULT - NSHPSOCIALHISTORY_GEN_ALL_CORE
Social History:  · Substance use  Yes  · Alcohol use  Social consumption (may drink 10 drinks in a sitting per outing)  · Substance use  Marijuana use - 1 joint per month  · Tobacco use  Denies  · Social History (marital status, living situation, occupation, and sexual history)  Lives with roommate    Tobacco Screening:  · Core Measure Site  No    Risk Assessment:   Present on Admission:  Deep Venous Thrombosis  no  Pulmonary Embolus  no    HIV Screening:  · In accordance with [place] State law, we offer every patient who comes to our ED an HIV test. Would you like to be tested today?  Opt out

## 2024-05-02 NOTE — PROGRESS NOTE ADULT - ASSESSMENT
63M with PMH of HTN, HLD, CAD s/p PCI, DM2 (last A1c 6.0), LENIN on CPAP, Vertigo presents with chest pain on exertion and dyspnea Had-cath-3/1/24--LCX: Patent OM1 stent, patent circumflex stent with 75% stenosis at distal edge of old stent s/p PCI-1 SHARON to LCx via RRA and RBV with WNL filling pressure discharged on DAPT Of note lab report on 3/4 reported Hgb 8.1 from 16 on 3/1-/Lab error,patient denies melena bleeding.      #CHEST PAIN-ACS  Hx CAD s/p PCI- S/p stents to LCx and OM1 2/2022 and PCI-SHARON LCx 3/1/2024  On DAPT  Presents with 1 week of dyspnea with intermittent mid-sternal CP with occasional radiation to the left shoulder.  Awaiting labs  ECG no acute findings  cardiac cath    # Type 2 diabetes mellitus.   ·  Plan: - On presentation, POCT FS 140s  - Home regimen includes metformin 500 daily  - Initiate ISS; monitor FS for goal 140-180  - A1c -6.0% 3/29/23.    # HLD (hyperlipidemia).   ·  Plan: - Home regimen includes atorvastatin 40  - C/w atorvastatin 40    # Hypertension.   ·  Plan: - On presentation, -156  - Home regimen is losartan 50

## 2024-05-02 NOTE — H&P ADULT - NSHPREVIEWOFSYSTEMS_GEN_ALL_CORE
Review of Systems:  · General  negative  · Ophthalmologic  negative  · Respiratory and Thorax Symptoms  +Intermittent SOB  · Cardiovascular  Chest pain Dyspnea  · Gastrointestinal  negative  · Genitourinary  negative  · Musculoskeletal  negative  · Neurological Symptoms  headache; +dizziness  · Psychiatric  negative

## 2024-05-02 NOTE — ED ADULT NURSE NOTE - CHIEF COMPLAINT QUOTE
cardiac stent placed at [facility] 1 month ago, endorses midsternal non radiating chest pain x1 week, endorses 2 years of dizziness and SOB worse over the last week  Denies n/v

## 2024-05-02 NOTE — H&P ADULT - NSHPPOAURINARYCATHETER_GEN_ALL_CORE
PROCEDURES:  Robot-assisted laparoscopic total hysterectomy using da Kirk Xi with cystoscopy 07-Jun-2022 15:12:43  Rima [place]a  Salpingectomy, bilateral, robot-assisted 07-Jun-2022 15:14:29  Rima Mesa  Lysis of pelvic adhesions 07-Jun-2022 15:14:56  Rima Mesa   no

## 2024-05-02 NOTE — ED ADULT NURSE NOTE - NSFALLUNIVINTERV_ED_ALL_ED
Bed/Stretcher in lowest position, wheels locked, appropriate side rails in place/Call bell, personal items and telephone in reach/Instruct patient to call for assistance before getting out of bed/chair/stretcher/Non-slip footwear applied when patient is off stretcher/[place] to call system/Physically safe environment - no spills, clutter or unnecessary equipment/Purposeful proactive rounding/Room/bathroom lighting operational, light cord in reach

## 2024-05-02 NOTE — H&P ADULT - NSHPLABSRESULTS_GEN_ALL_CORE
ECG   NORMAL SINUS RHYTHM  NORMAL ECG    < from:  Conclusions: -LCX: Patent OM1 stent, patent circumflex stent with 75% stenosis at distal edge of old stent s/p PCI with new SHARON  LM: Luminal irregularities   LAD: Proximal stenosis 30%, luminal disease throughout   RCA: Luminal irregularities    RHC: see report. Normal pressures   Recommendations: -ASA and plavix for 6-12 months

## 2024-05-02 NOTE — ED ADULT NURSE NOTE - OBJECTIVE STATEMENT
Received pt c/o chest pain for one week. Pt has a hx: stents, DM, LENIN on CPAP. No distress. Breathing easy and non labored. Pt is ambulatory. Pt is not diaphoretic. Pt states he was sent here for an angiogram.

## 2024-05-03 ENCOUNTER — RESULT REVIEW (OUTPATIENT)
Age: 64
End: 2024-05-03

## 2024-05-03 DIAGNOSIS — I25.10 ATHEROSCLEROTIC HEART DISEASE OF NATIVE CORONARY ARTERY WITHOUT ANGINA PECTORIS: ICD-10-CM

## 2024-05-03 DIAGNOSIS — E11.9 TYPE 2 DIABETES MELLITUS WITHOUT COMPLICATIONS: ICD-10-CM

## 2024-05-03 LAB
ANION GAP SERPL CALC-SCNC: 12 MMOL/L — SIGNIFICANT CHANGE UP (ref 5–17)
APPEARANCE UR: CLEAR — SIGNIFICANT CHANGE UP
BILIRUB UR-MCNC: NEGATIVE — SIGNIFICANT CHANGE UP
BUN SERPL-MCNC: 12 MG/DL — SIGNIFICANT CHANGE UP (ref 7–23)
CALCIUM SERPL-MCNC: 9.5 MG/DL — SIGNIFICANT CHANGE UP (ref 8.4–10.5)
CHLORIDE SERPL-SCNC: 102 MMOL/L — SIGNIFICANT CHANGE UP (ref 96–108)
CO2 SERPL-SCNC: 24 MMOL/L — SIGNIFICANT CHANGE UP (ref 22–31)
COLOR SPEC: YELLOW — SIGNIFICANT CHANGE UP
CREAT SERPL-MCNC: 0.71 MG/DL — SIGNIFICANT CHANGE UP (ref 0.5–1.3)
DIFF PNL FLD: NEGATIVE — SIGNIFICANT CHANGE UP
EGFR: 104 ML/MIN/1.73M2 — SIGNIFICANT CHANGE UP
GLUCOSE BLDC GLUCOMTR-MCNC: 101 MG/DL — HIGH (ref 70–99)
GLUCOSE BLDC GLUCOMTR-MCNC: 110 MG/DL — HIGH (ref 70–99)
GLUCOSE BLDC GLUCOMTR-MCNC: 116 MG/DL — HIGH (ref 70–99)
GLUCOSE BLDC GLUCOMTR-MCNC: 124 MG/DL — HIGH (ref 70–99)
GLUCOSE SERPL-MCNC: 172 MG/DL — HIGH (ref 70–99)
GLUCOSE UR QL: 100 MG/DL
HCV AB S/CO SERPL IA: 0.61 S/CO — SIGNIFICANT CHANGE UP (ref 0–0.99)
HCV AB SERPL-IMP: SIGNIFICANT CHANGE UP
KETONES UR-MCNC: NEGATIVE MG/DL — SIGNIFICANT CHANGE UP
LEUKOCYTE ESTERASE UR-ACNC: NEGATIVE — SIGNIFICANT CHANGE UP
MAGNESIUM SERPL-MCNC: 2 MG/DL — SIGNIFICANT CHANGE UP (ref 1.6–2.6)
NITRITE UR-MCNC: NEGATIVE — SIGNIFICANT CHANGE UP
PA ADP PRP-ACNC: 90 PRU — LOW (ref 194–417)
PH UR: 6 — SIGNIFICANT CHANGE UP (ref 5–8)
POTASSIUM SERPL-MCNC: 4.1 MMOL/L — SIGNIFICANT CHANGE UP (ref 3.5–5.3)
POTASSIUM SERPL-SCNC: 4.1 MMOL/L — SIGNIFICANT CHANGE UP (ref 3.5–5.3)
PROT UR-MCNC: NEGATIVE MG/DL — SIGNIFICANT CHANGE UP
SODIUM SERPL-SCNC: 138 MMOL/L — SIGNIFICANT CHANGE UP (ref 135–145)
SP GR SPEC: 1.02 — SIGNIFICANT CHANGE UP (ref 1–1.03)
UROBILINOGEN FLD QL: 1 MG/DL — SIGNIFICANT CHANGE UP (ref 0.2–1)

## 2024-05-03 PROCEDURE — 99221 1ST HOSP IP/OBS SF/LOW 40: CPT

## 2024-05-03 PROCEDURE — 93306 TTE W/DOPPLER COMPLETE: CPT | Mod: 26

## 2024-05-03 RX ORDER — METOPROLOL TARTRATE 50 MG
25 TABLET ORAL DAILY
Refills: 0 | Status: DISCONTINUED | OUTPATIENT
Start: 2024-05-03 | End: 2024-05-06

## 2024-05-03 RX ADMIN — Medication 25 MILLIGRAM(S): at 08:46

## 2024-05-03 RX ADMIN — Medication 81 MILLIGRAM(S): at 19:35

## 2024-05-03 RX ADMIN — ATORVASTATIN CALCIUM 40 MILLIGRAM(S): 80 TABLET, FILM COATED ORAL at 19:35

## 2024-05-03 NOTE — CONSULT NOTE ADULT - SUBJECTIVE AND OBJECTIVE BOX
History of Present Illness:  63y Male    Past Medical History  Hypertension    Obesity  s/p gastric bypass    LENIN (Obstructive Sleep Apnea)  Sleep study done 2015  uses CPAP at home    Hiatal Hernia    Cholelithiasis  current diagnosis      Incisional Hernia  current diagnosis    Type 2 diabetes mellitus    Morbid obesity with BMI of 50.0-59.9, adult    Cellulitis, leg    Venous insufficiency of both lower extremities    Anemia        Past Surgical History  History of Appendectomy  1982    Gastric Bypass  1998.  Patient lost 225 lbs and has regained 125 bs in the last 9years.    Umbilical Hernia Repair  2002    History of Abdominoplasty  2001    Incisional Hernia Repair  2005    S/P Cholecystectomy    H/O ventral hernia repair  08/24/12 incarcerated ventral hernia repair with mesh.        MEDICATIONS  (STANDING):  aspirin  chewable 81 milliGRAM(s) Oral daily  atorvastatin 40 milliGRAM(s) Oral at bedtime  clopidogrel Tablet 75 milliGRAM(s) Oral daily  dextrose 10% Bolus 125 milliLiter(s) IV Bolus once  dextrose 5%. 1000 milliLiter(s) (100 mL/Hr) IV Continuous <Continuous>  dextrose 5%. 1000 milliLiter(s) (50 mL/Hr) IV Continuous <Continuous>  dextrose 50% Injectable 25 Gram(s) IV Push once  dextrose 50% Injectable 12.5 Gram(s) IV Push once  glucagon  Injectable 1 milliGRAM(s) IntraMuscular once  insulin lispro (ADMELOG) corrective regimen sliding scale   SubCutaneous three times a day before meals  insulin lispro (ADMELOG) corrective regimen sliding scale   SubCutaneous at bedtime  losartan 100 milliGRAM(s) Oral daily  metoprolol succinate ER 25 milliGRAM(s) Oral daily  sodium chloride 0.9%. 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   Assist device use: None    Relevant Family History  FAMILY HISTORY:  Family history of diabetes mellitus in mother (Mother)  heart attack, ovarian cancer    Family history of diabetes mellitus in father (Father)        Review of Systems  GENERAL: No Fevers chills sweats fatigue weight loss weight gain                                        NEURO: No parathesias, seizures,  syncope,  confusion                                                                               EYES: glasses[x] No blurry vision, discharge, pain, glaucoma                                                                             ENMT: No difficulty hearing,  vertigo,  dysphagia, epistaxis, recent dental work                                     CV:   No chest pain, palpitations, HUDSON,  diaphoresis, edema                                                                                          RESPIRATORY:  No wheezing,  SOB, cough, sputum, hemoptysis                                                                    GI:  No nausea,  vomiting, diarrhea,  constipation,  melena                                                                        : No hematuria,   dysuria,  urgency,  incontinence                                                                                              MUSKULOSKELETAL:  No arthritis,  joint swelling,  muscle weakness                                                                SKIN/BREAST:   No rash,  itching,   hair loss,  masses                                                                                                PSYCH:  No dementia, depresion,  anxiety                                                                                                                  HEME/LYMPH:  No bruises easily, enlarged lymph nodes, tender lymph nodes,                                                  ENDOCRINE: No cold intolerance, heat intolerance, polydipsia                                                                          PHYSICAL EXAM  Vital Signs Last 24 Hrs  T(C): 36.7 (03 May 2024 08:33), Max: 37 (03 May 2024 04:10)  T(F): 98 (03 May 2024 08:33), Max: 98.6 (03 May 2024 04:10)  HR: 80 (03 May 2024 08:33) (66 - 88)  BP: 158/85 (03 May 2024 08:33) (134/72 - 159/81)  BP(mean): 116 (03 May 2024 08:33) (92 - 116)  RR: 17 (03 May 2024 08:33) (15 - 18)  SpO2: 95% (03 May 2024 08:33) (94% - 99%)    Parameters below as of 03 May 2024 08:33  Patient On (Oxygen Delivery Method): room air        General: Well nourished, well developed, no acute distress.                                                         Neuro: Normal exam oriented to person/place & time with no focal motor or sensory  deficits.                    Eyes: Normal exam of conjunctiva & lids, pupils equally reactive.   ENT: Normal exam of nasal/oral mucosa with absence of cyanosis.   Neck: Normal exam of jugular veins, trachea & thyroid.   Chest: Normal lung exam with good air movement absence of wheezes, rales, or rhonchi:                                                                          CV:  Auscultation: normal [ ] S3[ ] S4[ ] Irregular [ ] Rub[ ] Clicks[ ]  Murmurs none:[ ]systolic [ ]  diastolic [ ] holosystolic [ ]  Carotids: No Bruits[ ] Other____________ Abdominal Aorta: normal [ ] nonpalpable[ ]                                                                         GI: Normal exam of abdomen, liver & spleen with no noted masses or tenderness.                                                                                              Extremities: Normal no evidence of cyanosis or deformity Edema: none[ ]trace[ ]1+[ ]2+[ ]3+[ ]4+[ ]  Lower Extremity Pulses: Right[ ] Left[ ]Varicosities[ ]  SKIN : Normal exam to inspection & palation.                                                           LABS:                        15.8   6.66  )-----------( 210      ( 02 May 2024 10:00 )             47.5     05-03    138  |  102  |  12  ----------------------------<  172<H>  4.1   |  24  |  0.71    Ca    9.5      03 May 2024 08:43  Mg     2.0     05-03  < from: TTE W or WO Ultrasound Enhancing Agent (05.03.24 @ 07:43) >    TPro  7.9  /  Alb  4.2  /  TBili  0.8  /  DBili  x   /  AST  21  /  ALT  23  /  AlkPhos  83  05-02    PT/INR - ( 02 May 2024 10:00 )   PT: 11.5 sec;   INR: 1.10 ratio         PTT - ( 02 May 2024 10:00 )  PTT:33.9 sec  Urinalysis Basic - ( 03 May 2024 08:43 )    Color: x / Appearance: x / SG: x / pH: x  Gluc: 172 mg/dL / Ketone: x  / Bili: x / Urobili: x   Blood: x / Protein: x / Nitrite: x   Leuk Esterase: x / RBC: x / WBC x   Sq Epi: x / Non Sq Epi: x / Bacteria: x    Cardiac Cath: < from: Cardiac Catheterization (05.02.24 @ 17:01) >  Diagnostic Conclusions:     LM: 60% mid/distal stenosis.    LAD: 70% ostial stenosis   LCX: Patent stent in mid.    RCA: Luminal irregularities        TTE / AARTI:< from: TTE W or WO Ultrasound Enhancing Agent (05.03.24 @ 07:43) >  CONCLUSIONS:      1. Left ventricular systolic function is hyperdynamic. There are no regional wall motion abnormalities seen.   2. Moderate aortic stenosis.   3. No prior echocardiogram is available for comparison. History of Present Illness:  63M with PMH of HTN, HLD, CAD s/p PCI, DM2 (last A1c 6.0), LENIN on CPAP, Vertigo. Had-cath-3/1/24--LCX: Patent OM1 stent, patent circumflex stent with 75% stenosis at distal edge of old stent s/p PCI-1 SHARON to LCx via RRA and RBV with WNL filling pressure discharged on DAPT. Presents to [facility] with chest pain on exertion and dyspnea  Diagnostic  Cath revealing LM: 60% mid/distal stenosis and LAD: 70% ostial stenosis with LCX: Patent stent. CT surgery consulted for CABG evaluation.       Past Medical History  Hypertension    Obesity  s/p gastric bypass    LENIN (Obstructive Sleep Apnea)  Sleep study done 2015  uses CPAP at home    Hiatal Hernia    Cholelithiasis  current diagnosis      Incisional Hernia  current diagnosis    Type 2 diabetes mellitus    Morbid obesity with BMI of 50.0-59.9, adult    Cellulitis, leg    Venous insufficiency of both lower extremities    Anemia        Past Surgical History  History of Appendectomy  1982    Gastric Bypass  1998.  Patient lost 225 lbs and has regained 125 bs in the last 9years.    Umbilical Hernia Repair  2002    History of Abdominoplasty  2001    Incisional Hernia Repair  2005    S/P Cholecystectomy    H/O ventral hernia repair  08/24/12 incarcerated ventral hernia repair with mesh.        MEDICATIONS  (STANDING):  aspirin  chewable 81 milliGRAM(s) Oral daily  atorvastatin 40 milliGRAM(s) Oral at bedtime  clopidogrel Tablet 75 milliGRAM(s) Oral daily  dextrose 10% Bolus 125 milliLiter(s) IV Bolus once  dextrose 5%. 1000 milliLiter(s) (100 mL/Hr) IV Continuous <Continuous>  dextrose 5%. 1000 milliLiter(s) (50 mL/Hr) IV Continuous <Continuous>  dextrose 50% Injectable 25 Gram(s) IV Push once  dextrose 50% Injectable 12.5 Gram(s) IV Push once  glucagon  Injectable 1 milliGRAM(s) IntraMuscular once  insulin lispro (ADMELOG) corrective regimen sliding scale   SubCutaneous three times a day before meals  insulin lispro (ADMELOG) corrective regimen sliding scale   SubCutaneous at bedtime  losartan 100 milliGRAM(s) Oral daily  metoprolol succinate ER 25 milliGRAM(s) Oral daily  sodium chloride 0.9%. 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   Assist device use: None    Relevant Family History  FAMILY HISTORY:  Family history of diabetes mellitus in mother (Mother)  heart attack, ovarian cancer    Family history of diabetes mellitus in father (Father)        Review of Systems  GENERAL: No Fevers chills sweats fatigue weight loss weight gain                                        NEURO: No parathesias, seizures,  syncope,  confusion                                                                               EYES: glasses[x] No blurry vision, discharge, pain, glaucoma                                                                             ENMT: No difficulty hearing,  vertigo,  dysphagia, epistaxis, recent dental work                                     CV:   No chest pain, palpitations, HUDSON,  diaphoresis, edema                                                                                          RESPIRATORY:  No wheezing,  SOB, cough, sputum, hemoptysis                                                                    GI:  No nausea,  vomiting, diarrhea,  constipation,  melena                                                                        : No hematuria,   dysuria,  urgency,  incontinence                                                                                              MUSKULOSKELETAL:  No arthritis,  joint swelling,  muscle weakness                                                                SKIN/BREAST:   No rash,  itching,   hair loss,  masses                                                                                                PSYCH:  No dementia, depresion,  anxiety                                                                                                                  HEME/LYMPH:  No bruises easily, enlarged lymph nodes, tender lymph nodes,                                                  ENDOCRINE: No cold intolerance, heat intolerance, polydipsia                                                                          PHYSICAL EXAM  Vital Signs Last 24 Hrs  T(C): 36.7 (03 May 2024 08:33), Max: 37 (03 May 2024 04:10)  T(F): 98 (03 May 2024 08:33), Max: 98.6 (03 May 2024 04:10)  HR: 80 (03 May 2024 08:33) (66 - 88)  BP: 158/85 (03 May 2024 08:33) (134/72 - 159/81)  BP(mean): 116 (03 May 2024 08:33) (92 - 116)  RR: 17 (03 May 2024 08:33) (15 - 18)  SpO2: 95% (03 May 2024 08:33) (94% - 99%)    Parameters below as of 03 May 2024 08:33  Patient On (Oxygen Delivery Method): room air        General: Well nourished, well developed, no acute distress.                                                         Neuro: Normal exam oriented to person/place & time with no focal motor or sensory  deficits.                    Eyes: Normal exam of conjunctiva & lids, pupils equally reactive.   ENT: Normal exam of nasal/oral mucosa with absence of cyanosis.   Neck: Normal exam of jugular veins, trachea & thyroid.   Chest: Normal lung exam with good air movement absence of wheezes, rales, or rhonchi:                                                                          CV:  Auscultation: normal [ ] S3[ ] S4[ ] Irregular [ ] Rub[ ] Clicks[ ]  Murmurs none:[ ]systolic [ ]  diastolic [ ] holosystolic [ ]  Carotids: No Bruits[ ] Other____________ Abdominal Aorta: normal [ ] nonpalpable[ ]                                                                         GI: Normal exam of abdomen, liver & spleen with no noted masses or tenderness.                                                                                              Extremities: Normal no evidence of cyanosis or deformity Edema: none[ ]trace[ ]1+[ ]2+[ ]3+[ ]4+[ ]  Lower Extremity Pulses: Right[ ] Left[ ]Varicosities[ ]  SKIN : Normal exam to inspection & palation.                                                           LABS:                        15.8   6.66  )-----------( 210      ( 02 May 2024 10:00 )             47.5     05-03    138  |  102  |  12  ----------------------------<  172<H>  4.1   |  24  |  0.71    Ca    9.5      03 May 2024 08:43  Mg     2.0     05-03  < from: TTE W or WO Ultrasound Enhancing Agent (05.03.24 @ 07:43) >    TPro  7.9  /  Alb  4.2  /  TBili  0.8  /  DBili  x   /  AST  21  /  ALT  23  /  AlkPhos  83  05-02    PT/INR - ( 02 May 2024 10:00 )   PT: 11.5 sec;   INR: 1.10 ratio         PTT - ( 02 May 2024 10:00 )  PTT:33.9 sec  Urinalysis Basic - ( 03 May 2024 08:43 )    Color: x / Appearance: x / SG: x / pH: x  Gluc: 172 mg/dL / Ketone: x  / Bili: x / Urobili: x   Blood: x / Protein: x / Nitrite: x   Leuk Esterase: x / RBC: x / WBC x   Sq Epi: x / Non Sq Epi: x / Bacteria: x    Cardiac Cath: < from: Cardiac Catheterization (05.02.24 @ 17:01) >  Diagnostic Conclusions:     LM: 60% mid/distal stenosis.    LAD: 70% ostial stenosis   LCX: Patent stent in mid.    RCA: Luminal irregularities        TTE / AARTI:< from: TTE W or WO Ultrasound Enhancing Agent (05.03.24 @ 07:43) >  CONCLUSIONS:      1. Left ventricular systolic function is hyperdynamic. There are no regional wall motion abnormalities seen.   2. Moderate aortic stenosis.   3. No prior echocardiogram is available for comparison. History of Present Illness:  63M with PMH of HTN, HLD, CAD s/p PCI, DM2 (last A1c 6.0), LENIN on CPAP, Vertigo. Recent stent to Lcx in March of 2024 discharged on DAPT. Presents to [facility] with chest pain 7/10 on exertion for the past week with associated dyspnea. The patient reports the chest pain does not radiate anywhere and gets better with rest. Diagnostic  Cath revealing LM: 60% mid/distal stenosis and LAD: 70% ostial stenosis with LCX: Patent stent. CT surgery consulted for CABG evaluation.       Past Medical History  Hypertension    Obesity  s/p gastric bypass    LENIN (Obstructive Sleep Apnea)  Sleep study done 2015  uses CPAP at home    Hiatal Hernia    Cholelithiasis  current diagnosis      Incisional Hernia  current diagnosis    Type 2 diabetes mellitus    Morbid obesity with BMI of 50.0-59.9, adult    Cellulitis, leg    Venous insufficiency of both lower extremities    Anemia        Past Surgical History  History of Appendectomy  1982    Gastric Bypass  1998.  Patient lost 225 lbs and has regained 125 bs in the last 9years.    Umbilical Hernia Repair  2002    History of Abdominoplasty  2001    Incisional Hernia Repair  2005    S/P Cholecystectomy    H/O ventral hernia repair  08/24/12 incarcerated ventral hernia repair with mesh.        MEDICATIONS  (STANDING):  aspirin  chewable 81 milliGRAM(s) Oral daily  atorvastatin 40 milliGRAM(s) Oral at bedtime  clopidogrel Tablet 75 milliGRAM(s) Oral daily  dextrose 10% Bolus 125 milliLiter(s) IV Bolus once  dextrose 5%. 1000 milliLiter(s) (100 mL/Hr) IV Continuous <Continuous>  dextrose 5%. 1000 milliLiter(s) (50 mL/Hr) IV Continuous <Continuous>  dextrose 50% Injectable 25 Gram(s) IV Push once  dextrose 50% Injectable 12.5 Gram(s) IV Push once  glucagon  Injectable 1 milliGRAM(s) IntraMuscular once  insulin lispro (ADMELOG) corrective regimen sliding scale   SubCutaneous three times a day before meals  insulin lispro (ADMELOG) corrective regimen sliding scale   SubCutaneous at bedtime  losartan 100 milliGRAM(s) Oral daily  metoprolol succinate ER 25 milliGRAM(s) Oral daily  sodium chloride 0.9%. 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   Assist device use: None    Relevant Family History  FAMILY HISTORY:  Family history of diabetes mellitus in mother (Mother)  heart attack, ovarian cancer    Family history of diabetes mellitus in father (Father)        Review of Systems  GENERAL: No Fevers chills sweats fatigue weight loss weight gain                                        NEURO: No parathesias, seizures,  syncope,  confusion                                                                               EYES: glasses[x] No blurry vision, discharge, pain, glaucoma                                                                             ENMT: No difficulty hearing,  vertigo,  dysphagia, epistaxis, recent dental work                                     CV:   No chest pain, palpitations, HUDSON,  diaphoresis, edema                                                                                          RESPIRATORY:  No wheezing,  SOB, cough, sputum, hemoptysis                                                                    GI:  No nausea,  vomiting, diarrhea,  constipation,  melena                                                                        : No hematuria,   dysuria,  urgency,  incontinence                                                                                              MUSKULOSKELETAL:  No arthritis,  joint swelling,  muscle weakness                                                                SKIN/BREAST:   No rash,  itching,   hair loss,  masses                                                                                                PSYCH:  No dementia, depresion,  anxiety                                                                                                                  HEME/LYMPH:  No bruises easily, enlarged lymph nodes, tender lymph nodes,                                                  ENDOCRINE: No cold intolerance, heat intolerance, polydipsia                                                                          PHYSICAL EXAM  Vital Signs Last 24 Hrs  T(C): 36.7 (03 May 2024 08:33), Max: 37 (03 May 2024 04:10)  T(F): 98 (03 May 2024 08:33), Max: 98.6 (03 May 2024 04:10)  HR: 80 (03 May 2024 08:33) (66 - 88)  BP: 158/85 (03 May 2024 08:33) (134/72 - 159/81)  BP(mean): 116 (03 May 2024 08:33) (92 - 116)  RR: 17 (03 May 2024 08:33) (15 - 18)  SpO2: 95% (03 May 2024 08:33) (94% - 99%)    Parameters below as of 03 May 2024 08:33  Patient On (Oxygen Delivery Method): room air        Neuro: Normal exam oriented to person/place & time        Chest: Normal lung exam with good air movement absence of wheezes, rales, or rhonchi:                                                                          CV:  Auscultation: normal [ x ] S3[ ] S4[ ] Irregular [ ] Rub[ ] Clicks[ ]  Murmurs none:[ ]systolic [ ]  diastolic [ ] holosystolic [ ]  Carotids: No Bruits[ x ] Other____________ Abdominal Aorta: normal [ ] nonpalpable[ ]                                                                         GI: +BS + BM  Abd soft on palpation, no tenderness                                                                                           Extremities: Normal no evidence of cyanosis or deformity Edema: none[ ]trace[ ]1+[ ]2+[ ]3+[ ]4+[ ]  Lower Extremity Pulses: Right[ x] Left[x ] +2 Dorsalis Pedal pulses   SKIN :  chronic skin pigment changes to b/l LE                                                          LABS:                        15.8   6.66  )-----------( 210      ( 02 May 2024 10:00 )             47.5     05-03    138  |  102  |  12  ----------------------------<  172<H>  4.1   |  24  |  0.71    Ca    9.5      03 May 2024 08:43  Mg     2.0     05-03  < from: TTE W or WO Ultrasound Enhancing Agent (05.03.24 @ 07:43) >    TPro  7.9  /  Alb  4.2  /  TBili  0.8  /  DBili  x   /  AST  21  /  ALT  23  /  AlkPhos  83  05-02    PT/INR - ( 02 May 2024 10:00 )   PT: 11.5 sec;   INR: 1.10 ratio         PTT - ( 02 May 2024 10:00 )  PTT:33.9 sec  Urinalysis Basic - ( 03 May 2024 08:43 )    Color: x / Appearance: x / SG: x / pH: x  Gluc: 172 mg/dL / Ketone: x  / Bili: x / Urobili: x   Blood: x / Protein: x / Nitrite: x   Leuk Esterase: x / RBC: x / WBC x   Sq Epi: x / Non Sq Epi: x / Bacteria: x    Cardiac Cath: < from: Cardiac Catheterization (05.02.24 @ 17:01) >  Diagnostic Conclusions:     LM: 60% mid/distal stenosis.    LAD: 70% ostial stenosis   LCX: Patent stent in mid.    RCA: Luminal irregularities        TTE / AARTI:< from: TTE W or WO Ultrasound Enhancing Agent (05.03.24 @ 07:43) >  CONCLUSIONS:      1. Left ventricular systolic function is hyperdynamic. There are no regional wall motion abnormalities seen.   2. Moderate aortic stenosis.   3. No prior echocardiogram is available for comparison.    Assessment:   63M with PMH of HTN, HLD, CAD s/p PCI, DM2 (last A1c 6.0), LENIN on CPAP, Vertigo. Recent stent to Lcx in March of 2024 discharged on DAPT. Presents to [facility] with chest pain 7/10 on exertion for the past week with associated dyspnea. The patient reports the chest pain does not radiate anywhere and gets better with rest. Diagnostic  Cath revealing LM: 60% mid/distal stenosis and LAD: 70% ostial stenosis with LCX: Patent stent. CT surgery consulted for CABG evaluation.     Plan:   Cardiac Surgery Workup in Progress   TTE   TSH, Hgb A1C, Pro BNP, Geo Esteves  MRSA/MSSA PCR  UA  Bedside Spirometry   Plavix d/c'd - Will recheck P2y12 Tomorrow 5/4   Continue on Aspirin 81mg Daily , Toprol XL 25 mg Daily, and Ator 40mg HS  OR Monday with Dr. Hannah

## 2024-05-03 NOTE — CHART NOTE - NSCHARTNOTEFT_GEN_A_CORE
Pt with 6 beats WCT   Seen and evaluated - asymptomatic    RRR S1 S2  Lungs B/L CTA  Right radial site benign  No peripheral edema    -Continue tele monitoring  -checked electrolytes - OK  -currently on beta blocker    Plan for CTS next week with Dr Hannah. Check P2Y12 - plan for plavix is pending results per CTS.    Nya Dong, BRIE-C

## 2024-05-03 NOTE — PROGRESS NOTE ADULT - ASSESSMENT
63M with PMH of HTN, HLD, CAD s/p PCI, DM2 (last A1c 6.0), LENIN on CPAP, Vertigo presents with chest pain on exertion and dyspnea       # CAD-Progressive Angina  Hx CAD s/p PCI-2023-  -Had cath-3/1/24--LCX: Patent OM1 stent, patent circumflex stent with 75% stenosis at distal edge of old stent s/p PCI-1 SHARON to LCx  -Presenting with HUDSON and angina  -Cath 5/2/2024-LM: 60% mid/distal stenosis.  LAD: 70% ostial stenosis LCX: Patent stent in mid.    -CTS  evaluation Dr Hannah  -Continue aspirin will need to hold Plavix preop if planned  -Continue statin      # HTN  On losartan 100 mg    # T2DM  -A1c-6.0 -2023  Repeat A1c  Endocrine evaluation  POCT-123 <--170<--122 mg/dL

## 2024-05-03 NOTE — PROGRESS NOTE ADULT - SUBJECTIVE AND OBJECTIVE BOX
SUBJECTIVE / OVERNIGHT EVENTS:pt seen and examined  24    MEDICATIONS  (STANDING):  aspirin  chewable 81 milliGRAM(s) Oral daily  atorvastatin 40 milliGRAM(s) Oral at bedtime  dextrose 10% Bolus 125 milliLiter(s) IV Bolus once  dextrose 5%. 1000 milliLiter(s) (100 mL/Hr) IV Continuous <Continuous>  dextrose 5%. 1000 milliLiter(s) (50 mL/Hr) IV Continuous <Continuous>  dextrose 50% Injectable 25 Gram(s) IV Push once  dextrose 50% Injectable 12.5 Gram(s) IV Push once  glucagon  Injectable 1 milliGRAM(s) IntraMuscular once  insulin lispro (ADMELOG) corrective regimen sliding scale   SubCutaneous at bedtime  insulin lispro (ADMELOG) corrective regimen sliding scale   SubCutaneous three times a day before meals  metoprolol succinate ER 25 milliGRAM(s) Oral daily  sodium chloride 0.9%. 1000 milliLiter(s) (75 mL/Hr) IV Continuous <Continuous>    MEDICATIONS  (PRN):  dextrose Oral Gel 15 Gram(s) Oral once PRN Blood Glucose LESS THAN 70 milliGRAM(s)/deciliter    Vital Signs Last 24 Hrs  T(C): 36.7 (24 @ 20:20), Max: 37 (24 @ 04:10)  T(F): 98 (24 @ 20:20), Max: 98.6 (24 @ 04:10)  HR: 67 (24 @ 20:20) (67 - 80)  BP: 151/77 (24 @ 20:20) (138/74 - 158/85)  BP(mean): 109 (24 @ 20:20) (95 - 116)  RR: 17 (24 @ 20:20) (17 - 17)  SpO2: 97% (24 @ 20:20) (95% - 97%)      Constitutional: No fever, fatigue  Skin: No rash.  Eyes: No recent vision problems or eye pain.  ENT: No congestion, ear pain, or sore throat.  Cardiovascular: No chest pain or palpation.  Respiratory: No cough, shortness of breath, congestion, or wheezing.  Gastrointestinal: No abdominal pain, nausea, vomiting, or diarrhea.  Genitourinary: No dysuria.  Musculoskeletal: No joint swelling.  Neurologic: No headache.    PHYSICAL EXAM:  GENERAL: NAD  EYES: EOMI, PERRLA  NECK: Supple, No JVD  CHEST/LUNG: cta gaetano  HEART:  S1 , S2 +  ABDOMEN: soft , bs+  EXTREMITIES:  no edema  NEUROLOGY:alert awake      LABS:                        15.8   6.66  )-----------( 210      ( 02 May 2024 10:00 )             47.5     LABS:      138  |  102  |  12  ----------------------------<  172<H>  4.1   |  24  |  0.71    Ca    9.5      03 May 2024 08:43  Mg     2.0         TPro  7.9  /  Alb  4.2  /  TBili  0.8  /  DBili      /  AST  21  /  ALT  23  /  AlkPhos  83  05    Creatinine Trend: 0.71 <--, 0.76 <--                        15.8   6.66  )-----------( 210      ( 02 May 2024 10:00 )             47.5     Urine Studies:  Urinalysis Basic - ( 03 May 2024 22:35 )    Color: Yellow / Appearance: Clear / S.022 / pH:   Gluc:  / Ketone: Negative mg/dL  / Bili: Negative / Urobili: 1.0 mg/dL   Blood:  / Protein: Negative mg/dL / Nitrite: Negative   Leuk Esterase: Negative / RBC:  / WBC    Sq Epi:  / Non Sq Epi:  / Bacteria:               LIVER FUNCTIONS - ( 02 May 2024 10:00 )  Alb: 4.2 g/dL / Pro: 7.9 g/dL / ALK PHOS: 83 U/L / ALT: 23 U/L / AST: 21 U/L / GGT: x           PT/INR - ( 02 May 2024 10:00 )   PT: 11.5 sec;   INR: 1.10 ratio         PTT - ( 02 May 2024 10:00 )  PTT:33.9 sec      RADIOLOGY & ADDITIONAL TESTS:    Imaging Personally Reviewed:    Consultant(s) Notes Reviewed:      Care Discussed with Consultants/Other Providers:

## 2024-05-03 NOTE — CONSULT NOTE ADULT - ASSESSMENT
63M with PMH of HTN, HLD, CAD s/p PCI, DM2 (last A1c 6.0), LENIN on CPAP, Vertigo. Had-cath-3/1/24--LCX: Patent OM1 stent, patent circumflex stent with 75% stenosis at distal edge of old stent s/p PCI-1 SHARON to LCx via RRA and RBV with WNL filling pressure discharged on DAPT. Presents to [facility] with chest pain on exertion and dyspnea. Cardiac Cath revealing LM: 60% mid/distal stenosis and LAD: 70% ostial stenosis with LCX: Patent stent in mid. CT surgery consulted for CABG evaluation.     General: NAD  HEENT:  NC/AT  Neuro: A&Ox4, gait steady, speech clear, no focal deficits noted  Respiratory: B/L BS CTA, no wheeze, no rhonchi, no crackles noted  Cardiovascular: RRR, normal S1S2, no murmur noted  GI: Abd soft, NT/ND, +BSx4Q +BM  Peripheral Vascular:  B/L LE neg edema, 2+ peripheral pulses, Chronic Pigment discoloration to b/l LE  Skin: Normal exam to inspection and palpation.              63M with PMH of HTN, HLD, CAD s/p PCI, DM2 (last A1c 6.0), LENIN on CPAP, Vertigo. Had-cath-3/1/24--LCX: Patent OM1 stent, patent circumflex stent with 75% stenosis at distal edge of old stent s/p PCI-1 SHARON to LCx via RRA and RBV with WNL filling pressure discharged on DAPT. Presents to [facility] with chest pain on exertion and dyspnea. Cardiac Cath revealing LM: 60% mid/distal stenosis and LAD: 70% ostial stenosis with LCX: Patent stent. CT surgery consulted for CABG evaluation.     General: NAD  HEENT:  NC/AT  Neuro: A&Ox4, gait steady, speech clear, no focal deficits noted  Respiratory: B/L BS CTA, no wheeze, no rhonchi, no crackles noted  Cardiovascular: RRR, normal S1S2, no murmur noted  GI: Abd soft, NT/ND, +BSx4Q +BM  Peripheral Vascular:  B/L LE neg edema, 2+ peripheral pulses, Chronic Pigment discoloration to b/l LE  Skin: Normal exam to inspection and palpation.              Assessment              Plan:   Cardiac Surgery Workup in Progress   TTE   TSH, Hgb A1C, Pro BNP, Fibrogen Linn  MRSA/MSSA PCR  UA  Bedside Spirometry   Plavix d/c'd - Will recheck P2y12   Continue on Aspirin 81mg Daily , Toprol XL 25 mg Daily, and Ator 40mg HS  OR Monday with Dr. Hannah

## 2024-05-03 NOTE — PROGRESS NOTE ADULT - SUBJECTIVE AND OBJECTIVE BOX
MR#[id number]  PATIENT NAME:YAMINI CUNNINGHAM    DATE OF SERVICE: 05-03-24 @ 06:09  Patient was seen and examined by Wilfred Major MD on    05-03-24 @ 06:09 .  Interim events noted.Consultant notes ,Labs,Telemetry reviewed by me       HOSPITAL COURSE: HPI:   63M with PMH of HTN, HLD, CAD s/p PCI, DM2 (last A1c 6.0), LENIN on CPAP, Vertigo presents with chest pain on exertion and dyspnea Had-cath-3/1/24--LCX: Patent OM1 stent, patent circumflex stent with 75% stenosis at distal edge of old stent s/p PCI-1 SHARON to LCx via RRA and RBV with WNL filling pressure discharged on DAPT Of note lab report on 3/4 reported Hgb 8.1 from 16 on 3/1-/Lab error,patient denies melena bleeding.            CARDIAC STUDIES:  CATH-05/02/2024-LM-60% mid distal stenosis,LAD-70% ostial stenosis,LCx patent stent IVUS of LM and LAD showed significant plaque burden.  TTE 2/27/24 - Mild LV dysfunction with EF 48%, LVH, Hypokinesis of apical cap, dilated LA, mild AS, AR, MR, TR  CATH 3/01/2024-LCX: Patent OM1 stent, patent circumflex stent with 75% stenosis at distal edge of old stent s/p PCI with new SHARON  CATH 3/29/2023-Mild-moderate nonobstructive CAD of the left cirumflex and left anterior descending arteries. Prior stent to the OM1 is widely patent.        INTERIM EVENTS:Patient seen at bedside ,interim events noted.Awake alert hadcardiac cath-dLM and oLAD disease awaiting CABG evaluation Feli no chest pain at rest      PMH -reviewed admission note, no change since admission  HEART FAILURE: Acute[ ]Chronic[x ] Systolic[ ] Diastolic[x ] Combined Systolic and Diastolic[ ]  CAD[x ] CABG[ ] PCIx[ ]  DEVICES[ ] PPM[ ] ICD[ ] ILR[ ]  ATRIAL FIBRILLATION[ ] Paroxysmal[ ] Permanent[ ] CHADS2-[  ]  BISI[ ] CKD1[ ] CKD2[ ] CKD3[ ] CKD4[ ] ESRD[ ]  COPD[ ] HTN[ ]   DM[ x] Type1[ ] Type 2[x ]   CVA[ ] Paresis[ ]    AMBULATION: Assisted[ ] Cane/walker[ ] Independent[x ]    MEDICATIONS  (STANDING):  aspirin  chewable 81 milliGRAM(s) Oral daily  atorvastatin 40 milliGRAM(s) Oral at bedtime  clopidogrel Tablet 75 milliGRAM(s) Oral daily  glucagon  Injectable 1 milliGRAM(s) IntraMuscular once  insulin lispro (ADMELOG) corrective regimen sliding scale   SubCutaneous three times a day before meals  insulin lispro (ADMELOG) corrective regimen sliding scale   SubCutaneous at bedtime  losartan 100 milliGRAM(s) Oral daily  sodium chloride 0.9%. 1000 milliLiter(s) (75 mL/Hr) IV Continuous <Continuous>    MEDICATIONS  (PRN):  dextrose Oral Gel 15 Gram(s) Oral once PRN Blood Glucose LESS THAN 70 milliGRAM(s)/deciliter            REVIEW OF SYSTEMS:  Constitutional: [ ] fever, [ ]weight loss,  [ x]fatigue [ ]weight gain  Eyes: [ ] visual changes  Respiratory: [ ]shortness of breath;  [ ] cough, [ ]wheezing, [ ]chills, [ ]hemoptysis  Cardiovascular: [ ] chest pain, [ ]palpitations, [ ]dizziness,  [ ]leg swelling[ ]orthopnea[ ]PND  Gastrointestinal: [ ] abdominal pain, [ ]nausea, [ ]vomiting,  [ ]diarrhea [ ]Constipation [ ]Melena  Genitourinary: [ ] dysuria, [ ] hematuria [ ]Suh  Neurologic: [ ] headaches [ ] tremors[ ]weakness [ ]Paralysis Right[ ] Left[ ]  Skin: [ ] itching, [ ]burning, [ ] rashes  Endocrine: [ ] heat or cold intolerance  Musculoskeletal: [ ] joint pain or swelling; [ ] muscle, back, or extremity pain  Psychiatric: [ ] depression, [ ]anxiety, [ ]mood swings, or [ ]difficulty sleeping  Hematologic: [ ] easy bruising, [ ] bleeding gums    [ ] All remaining systems negative except as per above.   [ ]Unable to obtain.  [x] No change in ROS since admission      Vital Signs Last 24 Hrs  T(C): 37 (03 May 2024 04:10), Max: 37 (03 May 2024 04:10)  T(F): 98.6 (03 May 2024 04:10), Max: 98.6 (03 May 2024 04:10)  HR: 69 (03 May 2024 04:10) (66 - 88)  BP: 138/74 (03 May 2024 04:10) (128/73 - 159/81)  BP(mean): 99 (03 May 2024 04:10) (92 - 113)  RR: 17 (03 May 2024 04:10) (15 - 22)  SpO2: 96% (03 May 2024 04:10) (94% - 99%)    Parameters below as of 03 May 2024 04:10  Patient On (Oxygen Delivery Method): room air          PHYSICAL EXAM:  General: No acute distress BMI-34  HEENT: EOMI, PERRL  Neck: Supple, [ ] JVD  Lungs: Equal air entry bilaterally; [ ] rales [ ] wheezing [ ] rhonchi  Heart: Regular rate and rhythm; [x ] murmur   2/6 [ x] systolic [ ] diastolic [ ] radiation[ ] rubs [ ]  gallops  Abdomen: Nontender, bowel sounds present  Extremities: No clubbing, cyanosis, [ ] edema [ ]Pulses  equal and intact  Nervous system:  Alert & Oriented X3, no focal deficits  Psychiatric: Normal affect  Skin: No rashes or lesions    LABS:  05-02    140  |  101  |  14  ----------------------------<  131<H>  4.4   |  26  |  0.76    Ca    9.7      02 May 2024 10:00    TPro  7.9  /  Alb  4.2  /  TBili  0.8  /  DBili  x   /  AST  21  /  ALT  23  /  AlkPhos  83  05-02    Creatinine Trend: 0.76<--                        15.8   6.66  )-----------( 210      ( 02 May 2024 10:00 )             47.5     PT/INR - ( 02 May 2024 10:00 )   PT: 11.5 sec;   INR: 1.10 ratio         PTT - ( 02 May 2024 10:00 )  PTT:33.9 sec     Cardiac Catheterization (05.02.24 @ 17:01) >  Diagnostic Findings:     Coronary Angiography   The coronary circulation is right dominant.      LM   Left main artery: Angiography shows severe atherosclerosis. There is a 60 % stenosis in the middle third portion of the segment.    LAD   Left anterior descending artery: Angiography shows severe atherosclerosis. There is a 70 % stenosis in the ostium portion of the segment.      CX   Circumflex: Angiography shows mild atherosclerosis. Patent mid Lx stent.    RCA   Right coronary artery: Angiography shows minor irregularities.        Diagnostic Conclusions:     LM: 60% mid/distal stenosis.    LAD: 70% ostial stenosis   LCX: Patent stent in mid.    RCA: Luminal irregularities    Recommendations:     CABG evaluaiton. IVUS of LM and LAD showed significant plaque burden.  Catheter with ventriculization in LM.     12 Lead ECG (05.02.24 @ 08:52) >   NORMAL SINUS RHYTHM  NORMAL ECG        Xray Chest 2 Views PA/Lat (05.02.24 @ 09:44) >  FINDINGS:  The lungs are clear.  There is no pleural effusion or pneumothorax.  The heart is normal in size  The visualized osseous structures demonstrate no acute pathology.    IMPRESSION:  No focal consolidations.        TTE 2/27/24 - Mild LV dysfunction with EF 48%, LVH, Hypokinesis of apical cap, dilated LA, mild AS, AR, MR, TR

## 2024-05-03 NOTE — CONSULT NOTE ADULT - SUBJECTIVE AND OBJECTIVE BOX
History of Present Illness:  63y Male presents for diagnostic acth due to    Past Medical History  Hypertension    Obesity  s/p gastric bypass    LENIN (Obstructive Sleep Apnea)  Sleep study done 2015  uses CPAP at home    Hiatal Hernia    Cholelithiasis  current diagnosis      Incisional Hernia  current diagnosis    Type 2 diabetes mellitus    Morbid obesity with BMI of 50.0-59.9, adult    Cellulitis, leg    Venous insufficiency of both lower extremities    Anemia        Past Surgical History  History of Appendectomy  1982    Gastric Bypass  1998.  Patient lost 225 lbs and has regained 125 bs in the last 9years.    Umbilical Hernia Repair  2002    History of Abdominoplasty  2001    Incisional Hernia Repair  2005    S/P Cholecystectomy    H/O ventral hernia repair  08/24/12 incarcerated ventral hernia repair with mesh.        MEDICATIONS  (STANDING):  aspirin  chewable 81 milliGRAM(s) Oral daily  atorvastatin 40 milliGRAM(s) Oral at bedtime  dextrose 10% Bolus 125 milliLiter(s) IV Bolus once  dextrose 5%. 1000 milliLiter(s) (100 mL/Hr) IV Continuous <Continuous>  dextrose 5%. 1000 milliLiter(s) (50 mL/Hr) IV Continuous <Continuous>  dextrose 50% Injectable 25 Gram(s) IV Push once  dextrose 50% Injectable 12.5 Gram(s) IV Push once  glucagon  Injectable 1 milliGRAM(s) IntraMuscular once  insulin lispro (ADMELOG) corrective regimen sliding scale   SubCutaneous at bedtime  insulin lispro (ADMELOG) corrective regimen sliding scale   SubCutaneous three times a day before meals  losartan 100 milliGRAM(s) Oral daily  metoprolol succinate ER 25 milliGRAM(s) Oral daily  sodium chloride 0.9%. 1000 milliLiter(s) (75 mL/Hr) IV Continuous <Continuous>    MEDICATIONS  (PRN):  dextrose Oral Gel 15 Gram(s) Oral once PRN Blood Glucose LESS THAN 70 milliGRAM(s)/deciliter    Antiplatelet therapy:                           Last dose/amt:    Allergies: penicillin (Hives; Urticaria; Rash)      SOCIAL HISTORY:  Smoker: [ ] Yes  [ ] No        PACK YEARS:                         WHEN QUIT?  ETOH use: [ ] Yes  [ ] No              FREQUENCY / QUANTITY:  Ilicit Drug use:  [ ] Yes  [ ] No  Occupation:  Live with:  Assist device use:    Relevant Family History  FAMILY HISTORY:  Family history of diabetes mellitus in mother (Mother)  heart attack, ovarian cancer    Family history of diabetes mellitus in father (Father)        Review of Systems  GENERAL:  Fevers[] chills[] sweats[] fatigue[] weight loss[] weight gain []                                        NEURO:  parathesias[] seizures []  syncope []  confusion []                                                                                  EYES: glasses[]  blurry vision[]  discharge[] pain[] glaucoma []                                                                            ENMT:  difficulty hearing []  vertigo[]  dysphagia[] epistaxis[] recent dental work []                                      CV:  chest pain[] palpitations[] HUDSON [] diaphoresis [] edema[]                                                                                             RESPIRATORY:  wheezing[] SOB[] cough [] sputum[] hemoptysis[]                                                                    GI:  nausea[]  vomiting []  diarrhea[] constipation [] melena []                                                                        : hematuria[ ]  dysuria[ ] urgency[] incontinence[]                                                                                              MUSCULOSKELETAL:  arthritis[ ]  joint swelling [ ] muscle weakness [ ]                                                                  SKIN/BREAST:  rash[ ] itching [ ]  hair loss[ ] masses[ ]                                                                                                PSYCH:  dementia [ ] depression [ ] anxiety[ ]                                                                                                                  HEME/LYMPH:  bruises easily[ ] enlarged lymph nodes[ ] tender lymph nodes[ ]                                                 ENDOCRINE:  cold intolerance[ ] heat intolerance[ ] polydipsia[ ]                                                                              PHYSICAL EXAM  Vital Signs Last 24 Hrs  T(C): 36.7 (03 May 2024 08:33), Max: 37 (03 May 2024 04:10)  T(F): 98 (03 May 2024 08:33), Max: 98.6 (03 May 2024 04:10)  HR: 80 (03 May 2024 08:33) (66 - 88)  BP: 158/85 (03 May 2024 08:33) (134/72 - 159/81)  BP(mean): 116 (03 May 2024 08:33) (92 - 116)  RR: 17 (03 May 2024 08:33) (15 - 18)  SpO2: 95% (03 May 2024 08:33) (94% - 99%)    Parameters below as of 03 May 2024 08:33  Patient On (Oxygen Delivery Method): room air        General: Well nourished, well developed, no acute distress.                                                         Neuro: Normal exam oriented to person/place & time with no focal motor or sensory  deficits.                    Eyes: Normal exam of conjunctiva & lids, pupils equally reactive.   ENT: Normal exam of nasal/oral mucosa with absence of cyanosis.   Neck: Normal exam of jugular veins, trachea & thyroid.   Chest: Normal lung exam with good air movement absence of wheezes, rales, or rhonchi:                                                                          CV:  Auscultation: normal [ ] S3[ ] S4[ ] Irregular [ ] Rub[ ] Clicks[ ]  Murmurs none:[ ]systolic [ ]  diastolic [ ] holosystolic [ ]  Carotids: No Bruits[ ] Other____________ Abdominal Aorta: normal [ ] nonpalpable[ ]                                                                         GI: Normal exam of abdomen, liver & spleen with no noted masses or tenderness.                                                                                              Extremities: Normal no evidence of cyanosis or deformity Edema: none[ ]trace[ ]1+[ ]2+[ ]3+[ ]4+[ ]  Lower Extremity Pulses: Right[ ] Left[ ]Varicosities[ ]  SKIN : Normal exam to inspection & palpation.                                                           LABS:                        15.8   6.66  )-----------( 210      ( 02 May 2024 10:00 )             47.5     05-03    138  |  102  |  12  ----------------------------<  172<H>  4.1   |  24  |  0.71    Ca    9.5      03 May 2024 08:43  Mg     2.0     05-03    TPro  7.9  /  Alb  4.2  /  TBili  0.8  /  DBili  x   /  AST  21  /  ALT  23  /  AlkPhos  83  05-02    PT/INR - ( 02 May 2024 10:00 )   PT: 11.5 sec;   INR: 1.10 ratio         PTT - ( 02 May 2024 10:00 )  PTT:33.9 sec  Urinalysis Basic - ( 03 May 2024 08:43 )    Color: x / Appearance: x / SG: x / pH: x  Gluc: 172 mg/dL / Ketone: x  / Bili: x / Urobili: x   Blood: x / Protein: x / Nitrite: x   Leuk Esterase: x / RBC: x / WBC x   Sq Epi: x / Non Sq Epi: x / Bacteria: x              Cardiac Cath:    TTE / AARTI:    Assessment:  63y Male presents with Chest pain        Plan:

## 2024-05-04 DIAGNOSIS — I25.10 ATHEROSCLEROTIC HEART DISEASE OF NATIVE CORONARY ARTERY WITHOUT ANGINA PECTORIS: ICD-10-CM

## 2024-05-04 DIAGNOSIS — E11.9 TYPE 2 DIABETES MELLITUS WITHOUT COMPLICATIONS: ICD-10-CM

## 2024-05-04 LAB
A1C WITH ESTIMATED AVERAGE GLUCOSE RESULT: 5.9 % — HIGH (ref 4–5.6)
ANION GAP SERPL CALC-SCNC: 12 MMOL/L — SIGNIFICANT CHANGE UP (ref 5–17)
BLD GP AB SCN SERPL QL: NEGATIVE — SIGNIFICANT CHANGE UP
BUN SERPL-MCNC: 13 MG/DL — SIGNIFICANT CHANGE UP (ref 7–23)
CALCIUM SERPL-MCNC: 9.3 MG/DL — SIGNIFICANT CHANGE UP (ref 8.4–10.5)
CHLORIDE SERPL-SCNC: 103 MMOL/L — SIGNIFICANT CHANGE UP (ref 96–108)
CO2 SERPL-SCNC: 24 MMOL/L — SIGNIFICANT CHANGE UP (ref 22–31)
CREAT SERPL-MCNC: 0.69 MG/DL — SIGNIFICANT CHANGE UP (ref 0.5–1.3)
EGFR: 105 ML/MIN/1.73M2 — SIGNIFICANT CHANGE UP
ESTIMATED AVERAGE GLUCOSE: 123 MG/DL — HIGH (ref 68–114)
FIBRINOGEN PPP-MCNC: 413 MG/DL — SIGNIFICANT CHANGE UP (ref 200–445)
GLUCOSE BLDC GLUCOMTR-MCNC: 108 MG/DL — HIGH (ref 70–99)
GLUCOSE BLDC GLUCOMTR-MCNC: 114 MG/DL — HIGH (ref 70–99)
GLUCOSE BLDC GLUCOMTR-MCNC: 114 MG/DL — HIGH (ref 70–99)
GLUCOSE BLDC GLUCOMTR-MCNC: 117 MG/DL — HIGH (ref 70–99)
GLUCOSE SERPL-MCNC: 114 MG/DL — HIGH (ref 70–99)
HCT VFR BLD CALC: 44.3 % — SIGNIFICANT CHANGE UP (ref 39–50)
HGB BLD-MCNC: 15.1 G/DL — SIGNIFICANT CHANGE UP (ref 13–17)
MAGNESIUM SERPL-MCNC: 2 MG/DL — SIGNIFICANT CHANGE UP (ref 1.6–2.6)
MCHC RBC-ENTMCNC: 28.3 PG — SIGNIFICANT CHANGE UP (ref 27–34)
MCHC RBC-ENTMCNC: 34.1 GM/DL — SIGNIFICANT CHANGE UP (ref 32–36)
MCV RBC AUTO: 83 FL — SIGNIFICANT CHANGE UP (ref 80–100)
MRSA PCR RESULT.: SIGNIFICANT CHANGE UP
NRBC # BLD: 0 /100 WBCS — SIGNIFICANT CHANGE UP (ref 0–0)
NT-PROBNP SERPL-SCNC: 97 PG/ML — SIGNIFICANT CHANGE UP (ref 0–300)
PA ADP PRP-ACNC: 99 PRU — LOW (ref 194–417)
PHOSPHATE SERPL-MCNC: 3.6 MG/DL — SIGNIFICANT CHANGE UP (ref 2.5–4.5)
PLATELET # BLD AUTO: 189 K/UL — SIGNIFICANT CHANGE UP (ref 150–400)
POTASSIUM SERPL-MCNC: 4 MMOL/L — SIGNIFICANT CHANGE UP (ref 3.5–5.3)
POTASSIUM SERPL-SCNC: 4 MMOL/L — SIGNIFICANT CHANGE UP (ref 3.5–5.3)
RBC # BLD: 5.34 M/UL — SIGNIFICANT CHANGE UP (ref 4.2–5.8)
RBC # FLD: 13.2 % — SIGNIFICANT CHANGE UP (ref 10.3–14.5)
RH IG SCN BLD-IMP: POSITIVE — SIGNIFICANT CHANGE UP
S AUREUS DNA NOSE QL NAA+PROBE: DETECTED
SODIUM SERPL-SCNC: 139 MMOL/L — SIGNIFICANT CHANGE UP (ref 135–145)
T3 SERPL-MCNC: 104 NG/DL — SIGNIFICANT CHANGE UP (ref 80–200)
T4 AB SER-ACNC: 6.9 UG/DL — SIGNIFICANT CHANGE UP (ref 4.6–12)
TSH SERPL-MCNC: 1.16 UIU/ML — SIGNIFICANT CHANGE UP (ref 0.27–4.2)
WBC # BLD: 8 K/UL — SIGNIFICANT CHANGE UP (ref 3.8–10.5)
WBC # FLD AUTO: 8 K/UL — SIGNIFICANT CHANGE UP (ref 3.8–10.5)

## 2024-05-04 RX ORDER — ACETAMINOPHEN 500 MG
650 TABLET ORAL EVERY 6 HOURS
Refills: 0 | Status: DISCONTINUED | OUTPATIENT
Start: 2024-05-04 | End: 2024-05-06

## 2024-05-04 RX ADMIN — Medication 650 MILLIGRAM(S): at 03:58

## 2024-05-04 RX ADMIN — ATORVASTATIN CALCIUM 40 MILLIGRAM(S): 80 TABLET, FILM COATED ORAL at 21:56

## 2024-05-04 RX ADMIN — Medication 81 MILLIGRAM(S): at 21:56

## 2024-05-04 RX ADMIN — Medication 650 MILLIGRAM(S): at 03:28

## 2024-05-04 RX ADMIN — Medication 25 MILLIGRAM(S): at 04:32

## 2024-05-04 NOTE — CONSULT NOTE ADULT - CONSULT REASON
3 VCAD
CABG evaluation due to stenosis of LM (60% mid/distal stenosis) and LAD (70% ostial stenosis)
High Blood Sugars

## 2024-05-04 NOTE — PROGRESS NOTE ADULT - ASSESSMENT
63M with PMH of HTN, HLD, CAD s/p PCI, DM2 (last A1c 6.0), LENIN on CPAP, Vertigo presents with chest pain on exertion and dyspnea       # CAD-Progressive Angina  Hx CAD s/p PCI-2023-  -Had cath-3/1/24--LCX: Patent OM1 stent, patent circumflex stent with 75% stenosis at distal edge of old stent s/p PCI-1 SHARON to LCx  -Presenting with HUDSON and angina  -Cath 5/2/2024-LM: 60% mid/distal stenosis.  LAD: 70% ostial stenosis LCX: Patent stent in mid.    -CTS  evaluation Dr Hannah scheduled for CABG on Monday  -Continue aspirin  hold Plavix  -P2Y12 Plt Reactivity: 99<--90  -Continue statin      # HTN  On Toprol 25mg    # T2DM  -A1c-6.0 -2023  Repeat A1c  Endocrine evaluation  POCT-114<--101_<--124<---122 mg/dL

## 2024-05-04 NOTE — CONSULT NOTE ADULT - PROBLEM SELECTOR RECOMMENDATION 9
Cardiac Surgery Workup in Progress   TTE   TSH, Hgb A1C, Pro BNP, Fibrogen Linn  MRSA/MSSA PCR  UA  Bedside Spirometry   Plavix d/c'd - Will recheck P2y12   Continue on Aspirin 81mg Daily , Toprol XL 25 mg Daily, and Ator 40mg HS  OR Monday with Dr. Hannah
Will continue current insulin regimen for now. Will continue monitoring  blood sugars, will Follow up.  Suggest to hold off on GLP1a for now.

## 2024-05-04 NOTE — CONSULT NOTE ADULT - ASSESSMENT
Assessment  DMT2: 63y Male with DM T2 with hyperglycemia admitted with chest pain, was on Mounjaro injections at home, now intubated on insulin coverage, NPO, blood sugars are within acceptable range.  CAD: Evaluation for CABG, on medications, monitored.  HTN: On antihypertensive medications, monitored, asymptomatic.      Gena Kumar MD  Cell:  [phone]  Office: [phone]

## 2024-05-04 NOTE — PROGRESS NOTE ADULT - ASSESSMENT
63M with PMH of HTN, HLD, CAD s/p PCI, DM2 (last A1c 6.0), LENIN on CPAP, Vertigo presents with chest pain on exertion and dyspnea Had-cath-3/1/24--LCX: Patent OM1 stent, patent circumflex stent with 75% stenosis at distal edge of old stent s/p PCI-1 SHARON to LCx via RRA and RBV with WNL filling pressure discharged on DAPT Of note lab report on 3/4 reported Hgb 8.1 from 16 on 3/1-/Lab error,patient denies melena bleeding.      #CHEST PAIN-ACS  Hx CAD s/p PCI- S/p stents to LCx and OM1 2/2022 and PCI-SHARON LCx 3/1/2024  On DAPT  Presents with 1 week of dyspnea with intermittent mid-sternal CP with occasional radiation to the left shoulder.  < from: Cardiac Catheterization (05.02.24 @ 17:01) >  LM: 60% mid/distal stenosis.    LAD: 70% ostial stenosis   LCX: Patent stent in mid.    RCA: Luminal irregularities    Recommendations:     CABG evaluaiton. IVUS of LM and LAD showed significant plaque burden.  Catheter with ventriculization in LM.    < end of copied text >    # Type 2 diabetes mellitus.   ·  Plan: - On presentation, POCT FS 140s  - Home regimen includes metformin 500 daily  - Initiate ISS; monitor FS for goal 140-180  - A1c -6.0% 3/29/23.    # HLD (hyperlipidemia).   ·  Plan: - Home regimen includes atorvastatin 40  - C/w atorvastatin 40    # Hypertension.   ·  Plan: - On presentation, -156  - Home regimen is losartan 50

## 2024-05-04 NOTE — PROGRESS NOTE ADULT - SUBJECTIVE AND OBJECTIVE BOX
MR#[id number]  PATIENT NAME:YAMINI CUNNINGHAM    DATE OF SERVICE: 05-04-24 @ 08:04  Patient was seen and examined by Wilfred Major MD on    05-04-24 @ 08:04 .  Interim events noted.Consultant notes ,Labs,Telemetry reviewed by me       HOSPITAL COURSE: HPI:   63M with PMH of HTN, HLD, CAD s/p PCI, DM2 (last A1c 6.0), LENIN on CPAP, Vertigo presents with chest pain on exertion and dyspnea Had-cath-3/1/24--LCX: Patent OM1 stent, patent circumflex stent with 75% stenosis at distal edge of old stent s/p PCI-1 SHARON to LCx via RRA and RBV with WNL filling pressure discharged on DAPT Of note lab report on 3/4 reported Hgb 8.1 from 16 on 3/1-/Lab error,patient denies melena bleeding.    CARDIAC STUDIES:  CATH-05/02/2024-LM-60% mid distal stenosis,LAD-70% ostial stenosis,LCx patent stent IVUS of LM and LAD showed significant plaque burden.  TTE 2/27/24 - Mild LV dysfunction with EF 48%, LVH, Hypokinesis of apical cap, dilated LA, mild AS, AR, MR, TR  CATH 3/01/2024-LCX: Patent OM1 stent, patent circumflex stent with 75% stenosis at distal edge of old stent s/p PCI with new SHARON  CATH 3/29/2023-Mild-moderate nonobstructive CAD of the left cirumflex and left anterior descending arteries. Prior stent to the OM1 is widely patent.              INTERIM EVENTS:Patient seen at bedside ,interim events noted.  Awake alert scheduled for CABG Monday,no chest pain    PMH -reviewed admission note, no change since admission  HEART FAILURE: Acute[ ]Chronic[ x] Systolic[ ] Diastolic[x ] Combined Systolic and Diastolic[ ]  CAD[x ] CABG[ ] PCI[ x]  DEVICES[ ] PPM[ ] ICD[ ] ILR[ ]  ATRIAL FIBRILLATION[ ] Paroxysmal[ ] Permanent[ ] CHADS2-[  ]  BISI[ ] CKD1[ ] CKD2[ ] CKD3[ ] CKD4[ ] ESRD[ ]  COPD[ ] HTN[ ]   DM[x ] Type1[ ] Type 2[ x]   CVA[ ] Paresis[ ]    AMBULATION: Assisted[ ] Cane/walker[ ] Independent[x ]    MEDICATIONS  (STANDING):  aspirin  chewable 81 milliGRAM(s) Oral daily  atorvastatin 40 milliGRAM(s) Oral at bedtime  glucagon  Injectable 1 milliGRAM(s) IntraMuscular once  insulin lispro (ADMELOG) corrective regimen sliding scale   SubCutaneous at bedtime  insulin lispro (ADMELOG) corrective regimen sliding scale   SubCutaneous three times a day before meals  metoprolol succinate ER 25 milliGRAM(s) Oral daily  sodium chloride 0.9%. 1000 milliLiter(s) (75 mL/Hr) IV Continuous <Continuous>    MEDICATIONS  (PRN):  acetaminophen     Tablet .. 650 milliGRAM(s) Oral every 6 hours PRN Mild Pain (1 - 3), Moderate Pain (4 - 6)  dextrose Oral Gel 15 Gram(s) Oral once PRN Blood Glucose LESS THAN 70 milliGRAM(s)/deciliter            REVIEW OF SYSTEMS:  Constitutional: [ ] fever, [ ]weight loss,  [ x]fatigue [ ]weight gain  Eyes: [ ] visual changes  Respiratory: [ ]shortness of breath;  [ ] cough, [ ]wheezing, [ ]chills, [ ]hemoptysis  Cardiovascular: [ ] chest pain, [ ]palpitations, [ ]dizziness,  [ ]leg swelling[ ]orthopnea[ ]PND  Gastrointestinal: [ ] abdominal pain, [ ]nausea, [ ]vomiting,  [ ]diarrhea [ ]Constipation [ ]Melena  Genitourinary: [ ] dysuria, [ ] hematuria [ ]Suh  Neurologic: [ ] headaches [ ] tremors[ ]weakness [ ]Paralysis Right[ ] Left[ ]  Skin: [ ] itching, [ ]burning, [ ] rashes  Endocrine: [ ] heat or cold intolerance  Musculoskeletal: [ ] joint pain or swelling; [ ] muscle, back, or extremity pain  Psychiatric: [ ] depression, [ ]anxiety, [ ]mood swings, or [ ]difficulty sleeping  Hematologic: [ ] easy bruising, [ ] bleeding gums    [ ] All remaining systems negative except as per above.   [ ]Unable to obtain.  [x] No change in ROS since admission      Vital Signs Last 24 Hrs  T(C): 36.6 (04 May 2024 04:47), Max: 36.8 (03 May 2024 13:00)  T(F): 97.8 (04 May 2024 04:47), Max: 98.2 (03 May 2024 13:00)  HR: 71 (04 May 2024 04:47) (67 - 80)  BP: 135/71 (04 May 2024 04:20) (131/71 - 158/85)  BP(mean): 96 (04 May 2024 04:20) (95 - 116)  RR: 17 (04 May 2024 04:47) (17 - 17)  SpO2: 98% (04 May 2024 04:47) (95% - 98%)    Parameters below as of 04 May 2024 04:47  Patient On (Oxygen Delivery Method): room air      I&O's Summary    03 May 2024 07:01  -  04 May 2024 07:00  --------------------------------------------------------  IN: 240 mL / OUT: 0 mL / NET: 240 mL    04 May 2024 07:01  -  04 May 2024 08:04  --------------------------------------------------------  IN: 240 mL / OUT: 0 mL / NET: 240 mL        PHYSICAL EXAM:  General: No acute distress BMI-34  HEENT: EOMI, PERRL  Neck: Supple, [ ] JVD  Lungs: Equal air entry bilaterally; [ ] rales [ ] wheezing [ ] rhonchi  Heart: Regular rate and rhythm; [x ] murmur   2/6 [ x] systolic [ ] diastolic [ ] radiation[ ] rubs [ ]  gallops  Abdomen: Nontender, bowel sounds present  Extremities: No clubbing, cyanosis, [ ] edema [ ]Pulses  equal and intact  Nervous system:  Alert & Oriented X3, no focal deficits  Psychiatric: Normal affect  Skin: No rashes or lesions    LABS:  05-04    139  |  103  |  13  ----------------------------<  114<H>  4.0   |  24  |  0.69    Ca    9.3      04 May 2024 04:44  Phos  3.6     05-04  Mg     2.0     05-04    TPro  7.9  /  Alb  4.2  /  TBili  0.8  /  DBili  x   /  AST  21  /  ALT  23  /  AlkPhos  83  05-02    Creatinine Trend: 0.69<--, 0.71<--, 0.76<--                        15.1   8.00  )-----------( 189      ( 04 May 2024 04:43 )             44.3     PT/INR - ( 02 May 2024 10:00 )   PT: 11.5 sec;   INR: 1.10 ratio         PTT - ( 02 May 2024 10:00 )  PTT:33.9 sec     Cardiac Catheterization (05.02.24 @ 17:01) >  Diagnostic Findings:     Coronary Angiography   The coronary circulation is right dominant.      LM   Left main artery: Angiography shows severe atherosclerosis. There is a 60 % stenosis in the middle third portion of the segment.    LAD   Left anterior descending artery: Angiography shows severe atherosclerosis. There is a 70 % stenosis in the ostium portion of the segment.      CX   Circumflex: Angiography shows mild atherosclerosis. Patent mid Lx stent.    RCA   Right coronary artery: Angiography shows minor irregularities.        Diagnostic Conclusions:     LM: 60% mid/distal stenosis.    LAD: 70% ostial stenosis   LCX: Patent stent in mid.    RCA: Luminal irregularities    Recommendations:     CABG evaluaiton. IVUS of LM and LAD showed significant plaque burden.  Catheter with ventriculization in LM.     12 Lead ECG (05.02.24 @ 08:52) >   NORMAL SINUS RHYTHM  NORMAL ECG        Xray Chest 2 Views PA/Lat (05.02.24 @ 09:44) >  FINDINGS:  The lungs are clear.  There is no pleural effusion or pneumothorax.  The heart is normal in size  The visualized osseous structures demonstrate no acute pathology.    IMPRESSION:  No focal consolidations.         TTE W or WO Ultrasound Enhancing Agent (05.03.24 @ 07:43) >  CONCLUSIONS:      1. Left ventricular systolic function is hyperdynamic. There are no regional wall motion abnormalities seen.   2. Moderate aortic stenosis.-The aortic valve appears trileaflet with reduced systolic excursion. There is calcification of the aortic valve leaflets. There is moderate thickening of the aortic valve leaflets. There is moderate aortic stenosis. The peak transaortic velocity is 3.30 m/s and mean transaortic gradient is 27.6 mmHg. There is no evidence of aortic regurgitation.   3. No prior echocardiogram is available for comparison.

## 2024-05-04 NOTE — CONSULT NOTE ADULT - PROBLEM SELECTOR RECOMMENDATION 2
Hgb A1C  Accuchecks B4 meals and at bedtime   Corrective Sliding Scale
On medications,  no chest pain, stable, monitored and followed up by primary cardiothoracic team/cardiology team

## 2024-05-04 NOTE — CONSULT NOTE ADULT - SUBJECTIVE AND OBJECTIVE BOX
HPI:   63M with PMH of HTN, HLD, CAD s/p PCI, DM2 (last A1c 6.0), LENIN on CPAP, Vertigo presents with chest pain on exertion and dyspnea Had-cath-3/1/24--LCX: Patent OM1 stent, patent circumflex stent with 75% stenosis at distal edge of old stent s/p PCI-1 SHARON to LCx via RRA and RBV with WNL filling pressure discharged on DAPT Of note lab report on 3/4 reported Hgb 8.1 from 16 on 3/1-/Lab error,patient denies melena bleeding.        CARDIAC STUDIES:  TTE 2/27/24 - Mild LV dysfunction with EF 48%, LVH, Hypokinesis of apical cap, dilated LA, mild AS, AR, MR, TR  CATH 3/01/2024-LCX: Patent OM1 stent, patent circumflex stent with 75% stenosis at distal edge of old stent s/p PCI with new SHARON  CATH 3/29/2023-Mild-moderate nonobstructive CAD of the left cirumflex and leftanterior descending arteries. Prior stent to the OM1 is widelypatent.        (02 May 2024 10:05)  Patient apparently has history of diabetes, was on GLP1 a at home, now intubated, could not get ROS.    PAST MEDICAL & SURGICAL HISTORY:  Hypertension      LENIN (Obstructive Sleep Apnea)  Sleep study done 2015  uses CPAP at home      Type 2 diabetes mellitus      Morbid obesity with BMI of 50.0-59.9, adult      Venous insufficiency of both lower extremities      Anemia      History of Appendectomy  1982      Gastric Bypass  1998.  Patient lost 225 lbs and has regained 125 bs in the last 9years.      Umbilical Hernia Repair  2002      History of Abdominoplasty  2001      Incisional Hernia Repair  2005      S/P Cholecystectomy      H/O ventral hernia repair  08/24/12 incarcerated ventral hernia repair with mesh.          FAMILY HISTORY:  Family history of diabetes mellitus in mother (Mother)  heart attack, ovarian cancer    Family history of diabetes mellitus in father (Father)        Social History:    Outpatient Medications:    MEDICATIONS  (STANDING):  aspirin  chewable 81 milliGRAM(s) Oral daily  atorvastatin 40 milliGRAM(s) Oral at bedtime  dextrose 10% Bolus 125 milliLiter(s) IV Bolus once  dextrose 5%. 1000 milliLiter(s) (100 mL/Hr) IV Continuous <Continuous>  dextrose 5%. 1000 milliLiter(s) (50 mL/Hr) IV Continuous <Continuous>  dextrose 50% Injectable 25 Gram(s) IV Push once  dextrose 50% Injectable 12.5 Gram(s) IV Push once  glucagon  Injectable 1 milliGRAM(s) IntraMuscular once  insulin lispro (ADMELOG) corrective regimen sliding scale   SubCutaneous at bedtime  insulin lispro (ADMELOG) corrective regimen sliding scale   SubCutaneous three times a day before meals  metoprolol succinate ER 25 milliGRAM(s) Oral daily  sodium chloride 0.9%. 1000 milliLiter(s) (75 mL/Hr) IV Continuous <Continuous>    MEDICATIONS  (PRN):  acetaminophen     Tablet .. 650 milliGRAM(s) Oral every 6 hours PRN Mild Pain (1 - 3), Moderate Pain (4 - 6)  dextrose Oral Gel 15 Gram(s) Oral once PRN Blood Glucose LESS THAN 70 milliGRAM(s)/deciliter      Allergies    penicillin (Hives; Urticaria; Rash)    Intolerances      Review of Systems:  UNABLE TO OBTAIN    PHYSICAL EXAM:  VITALS: T(C): 36.3 (05-04-24 @ 08:34)  T(F): 97.4 (05-04-24 @ 08:34), Max: 98.2 (05-03-24 @ 13:00)  HR: 66 (05-04-24 @ 08:34) (66 - 73)  BP: 127/66 (05-04-24 @ 08:34) (127/66 - 151/77)  RR:  (17 - 17)  SpO2:  (96% - 98%)  Wt(kg): --  THYROID: Normal size, no palpable nodules  RESPIRATORY: Clear to auscultation bilaterally.  CARDIOVASCULAR: Si S2, No murmurs;  GI: Soft, non distended.      POCT Blood Glucose.: 114 mg/dL (05-04-24 @ 11:23)  POCT Blood Glucose.: 114 mg/dL (05-04-24 @ 07:20)  POCT Blood Glucose.: 101 mg/dL (05-03-24 @ 21:06)  POCT Blood Glucose.: 124 mg/dL (05-03-24 @ 16:43)  POCT Blood Glucose.: 110 mg/dL (05-03-24 @ 11:32)  POCT Blood Glucose.: 116 mg/dL (05-03-24 @ 07:19)  POCT Blood Glucose.: 123 mg/dL (05-02-24 @ 21:08)  POCT Blood Glucose.: 170 mg/dL (05-02-24 @ 18:00)  POCT Blood Glucose.: 122 mg/dL (05-02-24 @ 11:01)                            15.1   8.00  )-----------( 189      ( 04 May 2024 04:43 )             44.3       05-04    139  |  103  |  13  ----------------------------<  114<H>  4.0   |  24  |  0.69    eGFR: 105    Ca    9.3      05-04  Mg     2.0     05-04  Phos  3.6     05-04    TPro  7.9  /  Alb  4.2  /  TBili  0.8  /  DBili  x   /  AST  21  /  ALT  23  /  AlkPhos  83  05-02      Thyroid Function Tests:          Radiology:

## 2024-05-04 NOTE — PROGRESS NOTE ADULT - SUBJECTIVE AND OBJECTIVE BOX
SUBJECTIVE / OVERNIGHT EVENTS:pt seen and examined  05-04-24    MEDICATIONS  (STANDING):  aspirin  chewable 81 milliGRAM(s) Oral daily  atorvastatin 40 milliGRAM(s) Oral at bedtime  dextrose 10% Bolus 125 milliLiter(s) IV Bolus once  dextrose 5%. 1000 milliLiter(s) (100 mL/Hr) IV Continuous <Continuous>  dextrose 5%. 1000 milliLiter(s) (50 mL/Hr) IV Continuous <Continuous>  dextrose 50% Injectable 25 Gram(s) IV Push once  dextrose 50% Injectable 12.5 Gram(s) IV Push once  glucagon  Injectable 1 milliGRAM(s) IntraMuscular once  insulin lispro (ADMELOG) corrective regimen sliding scale   SubCutaneous at bedtime  insulin lispro (ADMELOG) corrective regimen sliding scale   SubCutaneous three times a day before meals  metoprolol succinate ER 25 milliGRAM(s) Oral daily  sodium chloride 0.9%. 1000 milliLiter(s) (75 mL/Hr) IV Continuous <Continuous>    MEDICATIONS  (PRN):  acetaminophen     Tablet .. 650 milliGRAM(s) Oral every 6 hours PRN Mild Pain (1 - 3), Moderate Pain (4 - 6)  dextrose Oral Gel 15 Gram(s) Oral once PRN Blood Glucose LESS THAN 70 milliGRAM(s)/deciliter    Vital Signs Last 24 Hrs  T(C): 36.3 (05-04-24 @ 21:21), Max: 36.6 (05-04-24 @ 00:00)  T(F): 97.4 (05-04-24 @ 21:21), Max: 97.9 (05-04-24 @ 00:00)  HR: 70 (05-04-24 @ 21:21) (66 - 71)  BP: 130/73 (05-04-24 @ 21:21) (119/71 - 159/87)  BP(mean): 90 (05-04-24 @ 13:20) (90 - 96)  RR: 18 (05-04-24 @ 21:21) (16 - 18)  SpO2: 95% (05-04-24 @ 21:21) (95% - 98%)      Constitutional: No fever, fatigue  Skin: No rash.  Eyes: No recent vision problems or eye pain.  ENT: No congestion, ear pain, or sore throat.  Cardiovascular: No chest pain or palpation.  Respiratory: No cough, shortness of breath, congestion, or wheezing.  Gastrointestinal: No abdominal pain, nausea, vomiting, or diarrhea.  Genitourinary: No dysuria.  Musculoskeletal: No joint swelling.  Neurologic: No headache.    PHYSICAL EXAM:  GENERAL: NAD  EYES: EOMI, PERRLA  NECK: Supple, No JVD  CHEST/LUNG: cta gaetano  HEART:  S1 , S2 +  ABDOMEN: soft , bs+  EXTREMITIES:  no edema  NEUROLOGY:alert awake      LABS:  05-04    139  |  103  |  13  ----------------------------<  114<H>  4.0   |  24  |  0.69    Ca    9.3      04 May 2024 04:44  Phos  3.6     05-04  Mg     2.0     05-04      Creatinine Trend: 0.69 <--, 0.71 <--, 0.76 <--                        15.1   8.00  )-----------( 189      ( 04 May 2024 04:43 )             44.3     Urine Studies:  Urinalysis Basic - ( 04 May 2024 04:44 )    Color:  / Appearance:  / SG:  / pH:   Gluc: 114 mg/dL / Ketone:   / Bili:  / Urobili:    Blood:  / Protein:  / Nitrite:    Leuk Esterase:  / RBC:  / WBC    Sq Epi:  / Non Sq Epi:  / Bacteria:                         RADIOLOGY & ADDITIONAL TESTS:    Imaging Personally Reviewed:    Consultant(s) Notes Reviewed:      Care Discussed with Consultants/Other Providers:

## 2024-05-05 LAB
A1C WITH ESTIMATED AVERAGE GLUCOSE RESULT: 5.8 % — HIGH (ref 4–5.6)
ANION GAP SERPL CALC-SCNC: 13 MMOL/L — SIGNIFICANT CHANGE UP (ref 5–17)
BLD GP AB SCN SERPL QL: NEGATIVE — SIGNIFICANT CHANGE UP
BUN SERPL-MCNC: 19 MG/DL — SIGNIFICANT CHANGE UP (ref 7–23)
CALCIUM SERPL-MCNC: 9.3 MG/DL — SIGNIFICANT CHANGE UP (ref 8.4–10.5)
CHLORIDE SERPL-SCNC: 101 MMOL/L — SIGNIFICANT CHANGE UP (ref 96–108)
CO2 SERPL-SCNC: 23 MMOL/L — SIGNIFICANT CHANGE UP (ref 22–31)
CREAT SERPL-MCNC: 0.68 MG/DL — SIGNIFICANT CHANGE UP (ref 0.5–1.3)
EGFR: 106 ML/MIN/1.73M2 — SIGNIFICANT CHANGE UP
ESTIMATED AVERAGE GLUCOSE: 120 MG/DL — HIGH (ref 68–114)
FIBRINOGEN PPP-MCNC: 389 MG/DL — SIGNIFICANT CHANGE UP (ref 200–445)
GLUCOSE BLDC GLUCOMTR-MCNC: 105 MG/DL — HIGH (ref 70–99)
GLUCOSE BLDC GLUCOMTR-MCNC: 114 MG/DL — HIGH (ref 70–99)
GLUCOSE BLDC GLUCOMTR-MCNC: 116 MG/DL — HIGH (ref 70–99)
GLUCOSE BLDC GLUCOMTR-MCNC: 93 MG/DL — SIGNIFICANT CHANGE UP (ref 70–99)
GLUCOSE SERPL-MCNC: 183 MG/DL — HIGH (ref 70–99)
HCT VFR BLD CALC: 46.9 % — SIGNIFICANT CHANGE UP (ref 39–50)
HGB BLD-MCNC: 15.8 G/DL — SIGNIFICANT CHANGE UP (ref 13–17)
MAGNESIUM SERPL-MCNC: 2 MG/DL — SIGNIFICANT CHANGE UP (ref 1.6–2.6)
MCHC RBC-ENTMCNC: 27.9 PG — SIGNIFICANT CHANGE UP (ref 27–34)
MCHC RBC-ENTMCNC: 33.7 GM/DL — SIGNIFICANT CHANGE UP (ref 32–36)
MCV RBC AUTO: 82.7 FL — SIGNIFICANT CHANGE UP (ref 80–100)
MRSA PCR RESULT.: SIGNIFICANT CHANGE UP
NRBC # BLD: 0 /100 WBCS — SIGNIFICANT CHANGE UP (ref 0–0)
PA ADP PRP-ACNC: 96 PRU — LOW (ref 194–417)
PHOSPHATE SERPL-MCNC: 2.5 MG/DL — SIGNIFICANT CHANGE UP (ref 2.5–4.5)
PLATELET # BLD AUTO: 195 K/UL — SIGNIFICANT CHANGE UP (ref 150–400)
POTASSIUM SERPL-MCNC: 3.9 MMOL/L — SIGNIFICANT CHANGE UP (ref 3.5–5.3)
POTASSIUM SERPL-SCNC: 3.9 MMOL/L — SIGNIFICANT CHANGE UP (ref 3.5–5.3)
RBC # BLD: 5.67 M/UL — SIGNIFICANT CHANGE UP (ref 4.2–5.8)
RBC # FLD: 13 % — SIGNIFICANT CHANGE UP (ref 10.3–14.5)
RH IG SCN BLD-IMP: POSITIVE — SIGNIFICANT CHANGE UP
S AUREUS DNA NOSE QL NAA+PROBE: DETECTED
SODIUM SERPL-SCNC: 137 MMOL/L — SIGNIFICANT CHANGE UP (ref 135–145)
WBC # BLD: 7.98 K/UL — SIGNIFICANT CHANGE UP (ref 3.8–10.5)
WBC # FLD AUTO: 7.98 K/UL — SIGNIFICANT CHANGE UP (ref 3.8–10.5)

## 2024-05-05 RX ORDER — GABAPENTIN 400 MG/1
300 CAPSULE ORAL ONCE
Refills: 0 | Status: COMPLETED | OUTPATIENT
Start: 2024-05-05 | End: 2024-05-06

## 2024-05-05 RX ORDER — MUPIROCIN 20 MG/G
1 OINTMENT TOPICAL
Refills: 0 | Status: DISCONTINUED | OUTPATIENT
Start: 2024-05-05 | End: 2024-05-06

## 2024-05-05 RX ORDER — ACETAMINOPHEN 500 MG
1000 TABLET ORAL ONCE
Refills: 0 | Status: COMPLETED | OUTPATIENT
Start: 2024-05-05 | End: 2024-05-06

## 2024-05-05 RX ORDER — CHLORHEXIDINE GLUCONATE 213 G/1000ML
30 SOLUTION TOPICAL ONCE
Refills: 0 | Status: COMPLETED | OUTPATIENT
Start: 2024-05-05 | End: 2024-05-06

## 2024-05-05 RX ORDER — CHLORHEXIDINE GLUCONATE 213 G/1000ML
1 SOLUTION TOPICAL ONCE
Refills: 0 | Status: COMPLETED | OUTPATIENT
Start: 2024-05-05 | End: 2024-05-05

## 2024-05-05 RX ORDER — VANCOMYCIN HCL 1 G
1750 VIAL (EA) INTRAVENOUS ONCE
Refills: 0 | Status: DISCONTINUED | OUTPATIENT
Start: 2024-05-05 | End: 2024-05-06

## 2024-05-05 RX ORDER — ASCORBIC ACID 60 MG
2000 TABLET,CHEWABLE ORAL ONCE
Refills: 0 | Status: COMPLETED | OUTPATIENT
Start: 2024-05-05 | End: 2024-05-05

## 2024-05-05 RX ADMIN — Medication 81 MILLIGRAM(S): at 21:35

## 2024-05-05 RX ADMIN — ATORVASTATIN CALCIUM 40 MILLIGRAM(S): 80 TABLET, FILM COATED ORAL at 21:36

## 2024-05-05 RX ADMIN — Medication 2000 MILLIGRAM(S): at 21:36

## 2024-05-05 RX ADMIN — MUPIROCIN 1 APPLICATION(S): 20 OINTMENT TOPICAL at 06:33

## 2024-05-05 RX ADMIN — CHLORHEXIDINE GLUCONATE 1 APPLICATION(S): 213 SOLUTION TOPICAL at 21:38

## 2024-05-05 RX ADMIN — Medication 25 MILLIGRAM(S): at 05:48

## 2024-05-05 RX ADMIN — MUPIROCIN 1 APPLICATION(S): 20 OINTMENT TOPICAL at 19:03

## 2024-05-05 NOTE — PRE PROCEDURE NOTE - PRE PROCEDURE EVALUATION
Cardiac Surgery Pre-op Note:  CC: Patient is a 63y old  Male who presents with a chief complaint of Chest Pain (04 May 2024 11:50)      Referring Physician: Dr. Moriah Hardin                                                                                         Surgeon: Dr. Hannah  Procedure: CABG 5/6/24    Allergies    penicillin (Hives; Urticaria; Rash)    Intolerances      HPI:   63M with PMH of HTN, HLD, CAD s/p PCI, DM2 (last A1c 6.0), LENIN on CPAP, Vertigo presents with chest pain on exertion and dyspnea Had-cath-3/1/24--LCX: Patent OM1 stent, patent circumflex stent with 75% stenosis at distal edge of old stent s/p PCI-1 SHARON to LCx via RRA and RBV with WNL filling pressure discharged on DAPT Of note lab report on 3/4 reported Hgb 8.1 from 16 on 3/1-/Lab error,patient denies melena bleeding.    CARDIAC STUDIES:  TTE 2/27/24 - Mild LV dysfunction with EF 48%, LVH, Hypokinesis of apical cap, dilated LA, mild AS, AR, MR, TR  CATH 3/01/2024-LCX: Patent OM1 stent, patent circumflex stent with 75% stenosis at distal edge of old stent s/p PCI with new SHARON  CATH 3/29/2023-Mild-moderate nonobstructive CAD of the left cirumflex and leftanterior descending arteries. Prior stent to the OM1 is widelypatent.        (02 May 2024 10:05)      PAST MEDICAL & SURGICAL HISTORY:  Hypertension      LENIN (Obstructive Sleep Apnea)  Sleep study done 2015  uses CPAP at home      Type 2 diabetes mellitus      Morbid obesity with BMI of 50.0-59.9, adult      Venous insufficiency of both lower extremities      Anemia      History of Appendectomy  1982      Gastric Bypass  1998.  Patient lost 225 lbs and has regained 125 bs in the last 9years.      Umbilical Hernia Repair  2002      History of Abdominoplasty  2001      Incisional Hernia Repair  2005      S/P Cholecystectomy      H/O ventral hernia repair  08/24/12 incarcerated ventral hernia repair with mesh.      MEDICATIONS  (STANDING):  aspirin  chewable 81 milliGRAM(s) Oral daily  atorvastatin 40 milliGRAM(s) Oral at bedtime  dextrose 10% Bolus 125 milliLiter(s) IV Bolus once  dextrose 5%. 1000 milliLiter(s) (100 mL/Hr) IV Continuous <Continuous>  dextrose 5%. 1000 milliLiter(s) (50 mL/Hr) IV Continuous <Continuous>  dextrose 50% Injectable 25 Gram(s) IV Push once  dextrose 50% Injectable 12.5 Gram(s) IV Push once  glucagon  Injectable 1 milliGRAM(s) IntraMuscular once  insulin lispro (ADMELOG) corrective regimen sliding scale   SubCutaneous three times a day before meals  insulin lispro (ADMELOG) corrective regimen sliding scale   SubCutaneous at bedtime  metoprolol succinate ER 25 milliGRAM(s) Oral daily  mupirocin 2% Nasal 1 Application(s) Both Nostrils two times a day  sodium chloride 0.9%. 1000 milliLiter(s) (75 mL/Hr) IV Continuous <Continuous>    MEDICATIONS  (PRN):  acetaminophen     Tablet .. 650 milliGRAM(s) Oral every 6 hours PRN Mild Pain (1 - 3), Moderate Pain (4 - 6)  dextrose Oral Gel 15 Gram(s) Oral once PRN Blood Glucose LESS THAN 70 milliGRAM(s)/deciliter      Labs:                        15.1   8.00  )-----------( 189      ( 04 May 2024 04:43 )             44.3     05-04    139  |  103  |  13  ----------------------------<  114<H>  4.0   |  24  |  0.69    Ca    9.3      04 May 2024 04:44  Phos  3.6     05-04  Mg     2.0     05-04          Blood Type: ABO Interpretation: A (05-04 @ 04:44)    HGB A1C: 5.9  Prealbumin:   Pro-BNP: 97  Thyroid Panel: 05-04 @ 04:51/1.16  --/6.9/104    MRSA: MRSA PCR Result.: NotDetec (05-04 @ 13:34)  MRSA PCR Result.: NotDetec (05-03 @ 20:10)   / MSSA:   Urinalysis Basic - ( 04 May 2024 04:44 )    Color: x / Appearance: x / SG: x / pH: x  Gluc: 114 mg/dL / Ketone: x  / Bili: x / Urobili: x   Blood: x / Protein: x / Nitrite: x   Leuk Esterase: x / RBC: x / WBC x   Sq Epi: x / Non Sq Epi: x / Bacteria: x    CXR: < from: Xray Chest 2 Views PA/Lat (05.02.24 @ 09:44) >  FINDINGS:  The lungs are clear.  There is no pleural effusion or pneumothorax.  The heart is normal in size  The visualized osseous structures demonstrate no acute pathology.    IMPRESSION:  No focal consolidations.    EKG: < from: 12 Lead ECG (05.02.24 @ 08:52) >  Diagnosis Line NORMAL SINUS RHYTHM  NORMAL ECG  WHEN COMPARED WITH ECG OF 01-MAR-2024 18:12,  NO SIGNIFICANT CHANGE WAS FOUND      PFT's:    Echocardiogram: < from: TTE W or WO Ultrasound Enhancing Agent (05.03.24 @ 07:43) >   1. Left ventricular systolic function is hyperdynamic. There are no regional wall motion abnormalities seen.   2. Moderate aortic stenosis.   3. No prior echocardiogram is available for comparison.      Cardiac catheterization: < from: Cardiac Catheterization (05.02.24 @ 17:01) >    Diagnostic Conclusions:     LM: 60% mid/distal stenosis.    LAD: 70% ostial stenosis   LCX: Patent stent in mid.    RCA: Luminal irregularities    Recommendations:       Gen: WN/WD NAD  Neuro: AAOx3, nonfocal  Pulm: CTA B/L  CV: RRR, S1S2   Abd: Soft, NT, ND +BS  Ext: No edema, + peripheral pulses      Pt has AICD/PPM [ ] Yes  [x ] No             Brand Name:  Pre-op Beta Blocker ordered within 24 hrs of surgery (CABG ONLY)?  [x ] Yes  [ ] No  If not, Why?  Type & Cross  [ x ] Yes  [ ] No  NPO after Midnight [x ] Yes  [ ] No  Pre-op ABX ordered, to be taped on chart:  [ x] Yes  [ ] No     Hibiclens/Peridex ordered [x ] Yes  [ ] No  Intraop on Hold: PRBCs, CXR, AARTI [x ]   Consent obtained  [ ] Yes  [ ] No   Cardiac Surgery Pre-op Note:  CC: Patient is a 63y old  Male who presents with a chief complaint of Chest Pain (04 May 2024 11:50)      Referring Physician: Dr. Moriah Hardin                                                                                         Surgeon: Dr. Hannah  Procedure: CABG 5/6/24    Allergies    penicillin (Hives; Urticaria; Rash)    Intolerances      HPI:   63M with PMH of HTN, HLD, CAD s/p PCI, DM2 (last A1c 6.0), LENIN on CPAP, Vertigo presents with chest pain on exertion and dyspnea Had-cath-3/1/24--LCX: Patent OM1 stent, patent circumflex stent with 75% stenosis at distal edge of old stent s/p PCI-1 SHARON to LCx via RRA and RBV with WNL filling pressure discharged on DAPT Of note lab report on 3/4 reported Hgb 8.1 from 16 on 3/1-/Lab error,patient denies melena bleeding.    CARDIAC STUDIES:  TTE 2/27/24 - Mild LV dysfunction with EF 48%, LVH, Hypokinesis of apical cap, dilated LA, mild AS, AR, MR, TR  CATH 3/01/2024-LCX: Patent OM1 stent, patent circumflex stent with 75% stenosis at distal edge of old stent s/p PCI with new SHARON  CATH 3/29/2023-Mild-moderate nonobstructive CAD of the left cirumflex and leftanterior descending arteries. Prior stent to the OM1 is widelypatent.        (02 May 2024 10:05)      PAST MEDICAL & SURGICAL HISTORY:  Hypertension      LENIN (Obstructive Sleep Apnea)  Sleep study done 2015  uses CPAP at home      Type 2 diabetes mellitus      Morbid obesity with BMI of 50.0-59.9, adult      Venous insufficiency of both lower extremities      Anemia      History of Appendectomy  1982      Gastric Bypass  1998.  Patient lost 225 lbs and has regained 125 bs in the last 9years.      Umbilical Hernia Repair  2002      History of Abdominoplasty  2001      Incisional Hernia Repair  2005      S/P Cholecystectomy      H/O ventral hernia repair  08/24/12 incarcerated ventral hernia repair with mesh.    PCI with SHARON x 1 to LCX      MEDICATIONS  (STANDING):  aspirin  chewable 81 milliGRAM(s) Oral daily  atorvastatin 40 milliGRAM(s) Oral at bedtime  dextrose 10% Bolus 125 milliLiter(s) IV Bolus once  dextrose 5%. 1000 milliLiter(s) (100 mL/Hr) IV Continuous <Continuous>  dextrose 5%. 1000 milliLiter(s) (50 mL/Hr) IV Continuous <Continuous>  dextrose 50% Injectable 25 Gram(s) IV Push once  dextrose 50% Injectable 12.5 Gram(s) IV Push once  glucagon  Injectable 1 milliGRAM(s) IntraMuscular once  insulin lispro (ADMELOG) corrective regimen sliding scale   SubCutaneous three times a day before meals  insulin lispro (ADMELOG) corrective regimen sliding scale   SubCutaneous at bedtime  metoprolol succinate ER 25 milliGRAM(s) Oral daily  mupirocin 2% Nasal 1 Application(s) Both Nostrils two times a day  sodium chloride 0.9%. 1000 milliLiter(s) (75 mL/Hr) IV Continuous <Continuous>    MEDICATIONS  (PRN):  acetaminophen     Tablet .. 650 milliGRAM(s) Oral every 6 hours PRN Mild Pain (1 - 3), Moderate Pain (4 - 6)  dextrose Oral Gel 15 Gram(s) Oral once PRN Blood Glucose LESS THAN 70 milliGRAM(s)/deciliter      Labs:                        15.1   8.00  )-----------( 189      ( 04 May 2024 04:43 )             44.3     05-04    139  |  103  |  13  ----------------------------<  114<H>  4.0   |  24  |  0.69    Ca    9.3      04 May 2024 04:44  Phos  3.6     05-04  Mg     2.0     05-04          Blood Type: ABO Interpretation: A (05-04 @ 04:44)    HGB A1C: 5.9  Prealbumin:   Pro-BNP: 97  Thyroid Panel: 05-04 @ 04:51/1.16  --/6.9/104    MRSA: MRSA PCR Result.: NotDetec (05-04 @ 13:34)  MRSA PCR Result.: NotDetec (05-03 @ 20:10)   / MSSA:   Urinalysis Basic - ( 04 May 2024 04:44 )    Color: x / Appearance: x / SG: x / pH: x  Gluc: 114 mg/dL / Ketone: x  / Bili: x / Urobili: x   Blood: x / Protein: x / Nitrite: x   Leuk Esterase: x / RBC: x / WBC x   Sq Epi: x / Non Sq Epi: x / Bacteria: x    CXR: < from: Xray Chest 2 Views PA/Lat (05.02.24 @ 09:44) >  FINDINGS:  The lungs are clear.  There is no pleural effusion or pneumothorax.  The heart is normal in size  The visualized osseous structures demonstrate no acute pathology.    IMPRESSION:  No focal consolidations.    EKG: < from: 12 Lead ECG (05.02.24 @ 08:52) >  Diagnosis Line NORMAL SINUS RHYTHM  NORMAL ECG  WHEN COMPARED WITH ECG OF 01-MAR-2024 18:12,  NO SIGNIFICANT CHANGE WAS FOUND      PFT's:    Echocardiogram: < from: TTE W or WO Ultrasound Enhancing Agent (05.03.24 @ 07:43) >   1. Left ventricular systolic function is hyperdynamic. There are no regional wall motion abnormalities seen.   2. Moderate aortic stenosis.   3. No prior echocardiogram is available for comparison.      Cardiac catheterization: < from: Cardiac Catheterization (05.02.24 @ 17:01) >    Diagnostic Conclusions:     LM: 60% mid/distal stenosis.    LAD: 70% ostial stenosis   LCX: Patent stent in mid.    RCA: Luminal irregularities    Recommendations:       Gen: WN/WD NAD  Neuro: AAOx3, nonfocal  Pulm: CTA B/L  CV: RRR, S1S2   Abd: Soft, NT, ND +BS  Ext: No edema, + peripheral pulses      Pt has AICD/PPM [ ] Yes  [x ] No             Brand Name:  Pre-op Beta Blocker ordered within 24 hrs of surgery (CABG ONLY)?  [x ] Yes  [ ] No  If not, Why?  Type & Cross  [ x ] Yes  [ ] No  NPO after Midnight [x ] Yes  [ ] No  Pre-op ABX ordered, to be taped on chart:  [ x] Yes  [ ] No     Hibiclens/Peridex ordered [x ] Yes  [ ] No  Intraop on Hold: PRBCs, CXR, AARTI [x ]   Consent obtained  [ ] Yes  [ ] No   Cardiac Surgery Pre-op Note:  CC: Patient is a 63y old  Male who presents with a chief complaint of Chest Pain (04 May 2024 11:50)      Referring Physician: Dr. Moriah Hardin                                                                                         Surgeon: Dr. Hannah  Procedure: CABG 5/6/24    Allergies    penicillin (Hives; Urticaria; Rash)    Intolerances      HPI:   63M with PMH of HTN, HLD, CAD s/p PCI, DM2 (last A1c 6.0), LENIN on CPAP, Vertigo presents with chest pain on exertion and dyspnea Had-cath-3/1/24--LCX: Patent OM1 stent, patent circumflex stent with 75% stenosis at distal edge of old stent s/p PCI-1 SHARON to LCx via RRA and RBV with WNL filling pressure discharged on DAPT Of note lab report on 3/4 reported Hgb 8.1 from 16 on 3/1-/Lab error,patient denies melena bleeding.    CARDIAC STUDIES:  TTE 2/27/24 - Mild LV dysfunction with EF 48%, LVH, Hypokinesis of apical cap, dilated LA, mild AS, AR, MR, TR  CATH 3/01/2024-LCX: Patent OM1 stent, patent circumflex stent with 75% stenosis at distal edge of old stent s/p PCI with new SHARON  CATH 3/29/2023-Mild-moderate nonobstructive CAD of the left cirumflex and leftanterior descending arteries. Prior stent to the OM1 is widelypatent.        (02 May 2024 10:05)      PAST MEDICAL & SURGICAL HISTORY:  Hypertension      LENIN (Obstructive Sleep Apnea)  Sleep study done 2015  uses CPAP at home      Type 2 diabetes mellitus      Morbid obesity with BMI of 50.0-59.9, adult      Venous insufficiency of both lower extremities      Anemia      History of Appendectomy  1982      Gastric Bypass  1998.  Patient lost 225 lbs and has regained 125 bs in the last 9years.      Umbilical Hernia Repair  2002      History of Abdominoplasty  2001      Incisional Hernia Repair  2005      S/P Cholecystectomy      H/O ventral hernia repair  08/24/12 incarcerated ventral hernia repair with mesh.    PCI with SHARON x 1 to LCX      MEDICATIONS  (STANDING):  aspirin  chewable 81 milliGRAM(s) Oral daily  atorvastatin 40 milliGRAM(s) Oral at bedtime  dextrose 10% Bolus 125 milliLiter(s) IV Bolus once  dextrose 5%. 1000 milliLiter(s) (100 mL/Hr) IV Continuous <Continuous>  dextrose 5%. 1000 milliLiter(s) (50 mL/Hr) IV Continuous <Continuous>  dextrose 50% Injectable 25 Gram(s) IV Push once  dextrose 50% Injectable 12.5 Gram(s) IV Push once  glucagon  Injectable 1 milliGRAM(s) IntraMuscular once  insulin lispro (ADMELOG) corrective regimen sliding scale   SubCutaneous three times a day before meals  insulin lispro (ADMELOG) corrective regimen sliding scale   SubCutaneous at bedtime  metoprolol succinate ER 25 milliGRAM(s) Oral daily  mupirocin 2% Nasal 1 Application(s) Both Nostrils two times a day  sodium chloride 0.9%. 1000 milliLiter(s) (75 mL/Hr) IV Continuous <Continuous>    MEDICATIONS  (PRN):  acetaminophen     Tablet .. 650 milliGRAM(s) Oral every 6 hours PRN Mild Pain (1 - 3), Moderate Pain (4 - 6)  dextrose Oral Gel 15 Gram(s) Oral once PRN Blood Glucose LESS THAN 70 milliGRAM(s)/deciliter      Labs:                        15.1   8.00  )-----------( 189      ( 04 May 2024 04:43 )             44.3     05-04    139  |  103  |  13  ----------------------------<  114<H>  4.0   |  24  |  0.69    Ca    9.3      04 May 2024 04:44  Phos  3.6     05-04  Mg     2.0     05-04          Blood Type: ABO Interpretation: A (05-04 @ 04:44)    HGB A1C: 5.9  Prealbumin:   Pro-BNP: 97  Thyroid Panel: 05-04 @ 04:51/1.16  --/6.9/104    MRSA: MRSA PCR Result.: NotDetec (05-04 @ 13:34)  MRSA PCR Result.: NotDetec (05-03 @ 20:10)   / MSSA:   Urinalysis Basic - ( 04 May 2024 04:44 )    Color: x / Appearance: x / SG: x / pH: x  Gluc: 114 mg/dL / Ketone: x  / Bili: x / Urobili: x   Blood: x / Protein: x / Nitrite: x   Leuk Esterase: x / RBC: x / WBC x   Sq Epi: x / Non Sq Epi: x / Bacteria: x    CXR: < from: Xray Chest 2 Views PA/Lat (05.02.24 @ 09:44) >  FINDINGS:  The lungs are clear.  There is no pleural effusion or pneumothorax.  The heart is normal in size  The visualized osseous structures demonstrate no acute pathology.    IMPRESSION:  No focal consolidations.    EKG: < from: 12 Lead ECG (05.02.24 @ 08:52) >  Diagnosis Line NORMAL SINUS RHYTHM  NORMAL ECG  WHEN COMPARED WITH ECG OF 01-MAR-2024 18:12,  NO SIGNIFICANT CHANGE WAS FOUND      PFT's:    Echocardiogram: < from: TTE W or WO Ultrasound Enhancing Agent (05.03.24 @ 07:43) >   1. Left ventricular systolic function is hyperdynamic. There are no regional wall motion abnormalities seen.   2. Moderate aortic stenosis.   3. No prior echocardiogram is available for comparison.      Cardiac catheterization: < from: Cardiac Catheterization (05.02.24 @ 17:01) >    Diagnostic Conclusions:     LM: 60% mid/distal stenosis.    LAD: 70% ostial stenosis   LCX: Patent stent in mid.    RCA: Luminal irregularities    Recommendations:       Gen: WN/WD NAD  Neuro: AAOx3, nonfocal  Pulm: CTA B/L  CV: RRR, S1S2   Abd: Soft, NT, ND +BS  Ext: No edema, + peripheral pulses  discoloration to b/l LE       Pt has AICD/PPM [ ] Yes  [x ] No             Brand Name:  Pre-op Beta Blocker ordered within 24 hrs of surgery (CABG ONLY)?  [x ] Yes  [ ] No  If not, Why?  Type & Cross  [ x ] Yes  [ ] No  NPO after Midnight [x ] Yes  [ ] No  Pre-op ABX ordered, to be taped on chart:  [ x] Yes  [ ] No     Hibiclens/Peridex ordered [x ] Yes  [ ] No  Intraop on Hold: PRBCs, CXR, AARTI [x ]   Consent obtained  [ ] Yes  [ ] No

## 2024-05-05 NOTE — PROGRESS NOTE ADULT - ASSESSMENT
Assessment  DMT2: 63y Male with DM T2 with hyperglycemia admitted with chest pain, was on Mounjaro injections at home, now intubated on insulin coverage, NPO, blood sugars are improving.  CAD: Evaluation for CABG, on medications, monitored.  HTN: On antihypertensive medications, monitored, asymptomatic.      Gena Kumar MD  Cell:  [phone]  Office: [phone]

## 2024-05-05 NOTE — PROGRESS NOTE ADULT - SUBJECTIVE AND OBJECTIVE BOX
Chief complaint    Patient is a 63y old  Male who presents with a chief complaint of Chest Pain (05 May 2024 06:57)   Review of systems  Patient appears comfortable.    Labs and Fingersticks  CAPILLARY BLOOD GLUCOSE      POCT Blood Glucose.: 114 mg/dL (05 May 2024 07:54)  POCT Blood Glucose.: 117 mg/dL (04 May 2024 22:10)  POCT Blood Glucose.: 108 mg/dL (04 May 2024 17:23)  POCT Blood Glucose.: 114 mg/dL (04 May 2024 11:23)      Anion Gap: 12 (05-04 @ 04:44)      Calcium: 9.3 (05-04 @ 04:44)          05-04    139  |  103  |  13  ----------------------------<  114<H>  4.0   |  24  |  0.69    Ca    9.3      04 May 2024 04:44  Phos  3.6     05-04  Mg     2.0     05-04                          15.1   8.00  )-----------( 189      ( 04 May 2024 04:43 )             44.3     Medications  MEDICATIONS  (STANDING):  acetaminophen     Tablet .. 1000 milliGRAM(s) Oral once  ascorbic acid 2000 milliGRAM(s) Oral once  aspirin  chewable 81 milliGRAM(s) Oral daily  atorvastatin 40 milliGRAM(s) Oral at bedtime  chlorhexidine 0.12% Liquid 30 milliLiter(s) Swish and Spit once  chlorhexidine 4% Liquid 1 Application(s) Topical once  dextrose 10% Bolus 125 milliLiter(s) IV Bolus once  dextrose 5%. 1000 milliLiter(s) (100 mL/Hr) IV Continuous <Continuous>  dextrose 5%. 1000 milliLiter(s) (50 mL/Hr) IV Continuous <Continuous>  dextrose 50% Injectable 25 Gram(s) IV Push once  dextrose 50% Injectable 12.5 Gram(s) IV Push once  gabapentin 300 milliGRAM(s) Oral once  glucagon  Injectable 1 milliGRAM(s) IntraMuscular once  insulin lispro (ADMELOG) corrective regimen sliding scale   SubCutaneous at bedtime  insulin lispro (ADMELOG) corrective regimen sliding scale   SubCutaneous three times a day before meals  metoprolol succinate ER 25 milliGRAM(s) Oral daily  mupirocin 2% Nasal 1 Application(s) Both Nostrils two times a day  sodium chloride 0.9%. 1000 milliLiter(s) (75 mL/Hr) IV Continuous <Continuous>  vancomycin  IVPB 1750 milliGRAM(s) IV Intermittent once      Physical Exam  General: Patient appears comfortable.  Vital Signs Last 12 Hrs  T(F): 97.4 (05-05-24 @ 10:05), Max: 97.6 (05-05-24 @ 04:34)  HR: 69 (05-05-24 @ 10:05) (66 - 69)  BP: 143/75 (05-05-24 @ 10:05) (136/74 - 143/75)  BP(mean): 98 (05-05-24 @ 10:05) (98 - 98)  RR: 18 (05-05-24 @ 10:05) (18 - 18)  SpO2: 96% (05-05-24 @ 10:05) (96% - 96%)  Neck: No palpable thyroid nodules.  CVS: S1S2, No murmurs  Respiratory: No wheezing, no crepitations  GI: Abdomen soft, non tender.    Diagnostics    A1C with Estimated Average Glucose: AM Sched. Collection: 05-May-2024 06:00 (05-04 @ 07:14)      Radiology:

## 2024-05-05 NOTE — PROGRESS NOTE ADULT - ASSESSMENT
63M with PMH of HTN, HLD, CAD s/p PCI, DM2 (last A1c 6.0), LENIN on CPAP, Vertigo presents with chest pain on exertion and dyspnea       # CAD-Progressive Angina  Hx CAD s/p PCI-2023-  -Had cath-3/1/24--LCX: Patent OM1 stent, patent circumflex stent with 75% stenosis at distal edge of old stent s/p PCI-1 SHARON to LCx  -Presenting with HUDSON and angina  -Cath 5/2/2024-LM: 60% mid/distal stenosis.  LAD: 70% ostial stenosis LCX: Patent stent in mid.    -CTS  evaluation Dr Hannah scheduled for CABG on Monday  -Continue aspirin  hold Plavix  -P2Y12 Plt Reactivity: 99<--90  -Continue statin      # HTN  On Toprol 25mg    # T2DM  -A1c-6.0 -2023 now 5.9%  Endocrine evaluation noted  POCT-117<--108<--114<--101_<--124<---122 mg/dL  Patient was on Mounjaro at home

## 2024-05-05 NOTE — PROGRESS NOTE ADULT - PROBLEM SELECTOR PLAN 1
Will continue current insulin regimen for now. Will continue monitoring  blood sugars, will Follow up.    .

## 2024-05-05 NOTE — PROGRESS NOTE ADULT - SUBJECTIVE AND OBJECTIVE BOX
SUBJECTIVE / OVERNIGHT EVENTS:pt seen and examined  05-05-24    MEDICATIONS  (STANDING):  acetaminophen     Tablet .. 1000 milliGRAM(s) Oral once  aspirin  chewable 81 milliGRAM(s) Oral daily  atorvastatin 40 milliGRAM(s) Oral at bedtime  chlorhexidine 0.12% Liquid 30 milliLiter(s) Swish and Spit once  dextrose 10% Bolus 125 milliLiter(s) IV Bolus once  dextrose 5%. 1000 milliLiter(s) (100 mL/Hr) IV Continuous <Continuous>  dextrose 5%. 1000 milliLiter(s) (50 mL/Hr) IV Continuous <Continuous>  dextrose 50% Injectable 25 Gram(s) IV Push once  dextrose 50% Injectable 12.5 Gram(s) IV Push once  gabapentin 300 milliGRAM(s) Oral once  glucagon  Injectable 1 milliGRAM(s) IntraMuscular once  insulin lispro (ADMELOG) corrective regimen sliding scale   SubCutaneous at bedtime  insulin lispro (ADMELOG) corrective regimen sliding scale   SubCutaneous three times a day before meals  metoprolol succinate ER 25 milliGRAM(s) Oral daily  mupirocin 2% Nasal 1 Application(s) Both Nostrils two times a day  sodium chloride 0.9%. 1000 milliLiter(s) (75 mL/Hr) IV Continuous <Continuous>  vancomycin  IVPB 1750 milliGRAM(s) IV Intermittent once    MEDICATIONS  (PRN):  acetaminophen     Tablet .. 650 milliGRAM(s) Oral every 6 hours PRN Mild Pain (1 - 3), Moderate Pain (4 - 6)  dextrose Oral Gel 15 Gram(s) Oral once PRN Blood Glucose LESS THAN 70 milliGRAM(s)/deciliter    Vital Signs Last 24 Hrs  T(C): 36.4 (05-05-24 @ 21:58), Max: 36.4 (05-05-24 @ 04:34)  T(F): 97.6 (05-05-24 @ 21:58), Max: 97.6 (05-05-24 @ 04:34)  HR: 84 (05-05-24 @ 21:58) (66 - 84)  BP: 149/87 (05-05-24 @ 21:58) (136/74 - 149/87)  BP(mean): 98 (05-05-24 @ 10:05) (98 - 98)  RR: 18 (05-05-24 @ 21:58) (18 - 18)  SpO2: 96% (05-05-24 @ 21:58) (96% - 96%)      Constitutional: No fever, fatigue  Skin: No rash.  Eyes: No recent vision problems or eye pain.  ENT: No congestion, ear pain, or sore throat.  Cardiovascular: No chest pain or palpation.  Respiratory: No cough, shortness of breath, congestion, or wheezing.  Gastrointestinal: No abdominal pain, nausea, vomiting, or diarrhea.  Genitourinary: No dysuria.  Musculoskeletal: No joint swelling.  Neurologic: No headache.    PHYSICAL EXAM:  GENERAL: NAD  EYES: EOMI, PERRLA  NECK: Supple, No JVD  CHEST/LUNG: cta gaetano  HEART:  S1 , S2 +  ABDOMEN: soft , bs+  EXTREMITIES:  no edema  NEUROLOGY:alert awake      LABS:  05-05    137  |  101  |  19  ----------------------------<  183<H>  3.9   |  23  |  0.68    Ca    9.3      05 May 2024 11:22  Phos  2.5     05-05  Mg     2.0     05-05      Creatinine Trend: 0.68 <--, 0.69 <--, 0.71 <--, 0.76 <--                        15.8   7.98  )-----------( 195      ( 05 May 2024 11:22 )             46.9     Urine Studies:  Urinalysis Basic - ( 05 May 2024 11:22 )    Color:  / Appearance:  / SG:  / pH:   Gluc: 183 mg/dL / Ketone:   / Bili:  / Urobili:    Blood:  / Protein:  / Nitrite:    Leuk Esterase:  / RBC:  / WBC    Sq Epi:  / Non Sq Epi:  / Bacteria:                                 RADIOLOGY & ADDITIONAL TESTS:    Imaging Personally Reviewed:    Consultant(s) Notes Reviewed:      Care Discussed with Consultants/Other Providers:

## 2024-05-05 NOTE — PROGRESS NOTE ADULT - SUBJECTIVE AND OBJECTIVE BOX
MR#[id number]  PATIENT NAME:YAMINI CUNNINGHAM    DATE OF SERVICE: 05-05-24 @ 06:57  Patient was seen and examined by Wilfred Major MD on    05-05-24 @ 06:57 .  Interim events noted.Consultant notes ,Labs,Telemetry reviewed by me       HOSPITAL COURSE: HPI:   63M with PMH of HTN, HLD, CAD s/p PCI, DM2 (last A1c 6.0), LENIN on CPAP, Vertigo presents with chest pain on exertion and dyspnea Had-cath-3/1/24--LCX: Patent OM1 stent, patent circumflex stent with 75% stenosis at distal edge of old stent s/p PCI-1 SHARON to LCx via RRA and RBV with WNL filling pressure discharged on DAPT Of note lab report on 3/4 reported Hgb 8.1 from 16 on 3/1-/Lab error,patient denies melena bleeding.          CARDIAC STUDIES:  CATH-05/02/2024-LM-60% mid distal stenosis,LAD-70% ostial stenosis,LCx patent stent IVUS of LM and LAD showed significant plaque burden.  TTE 2/27/24 - Mild LV dysfunction with EF 48%, LVH, Hypokinesis of apical cap, dilated LA, mild AS, AR, MR, TR  CATH 3/01/2024-LCX: Patent OM1 stent, patent circumflex stent with 75% stenosis at distal edge of old stent s/p PCI with new SHARON  CATH 3/29/2023-Mild-moderate nonobstructive CAD of the left cirumflex and left anterior descending arteries. Prior stent to the OM1 is widely patent.              INTERIM EVENTS:Patient seen at bedside ,interim events noted.  Awake alert scheduled for CABG Monday,no chest pain      PMH -reviewed admission note, no change since admission  HEART FAILURE: Acute[ ]Chronic[ x] Systolic[ ] Diastolic[x ] Combined Systolic and Diastolic[ ]  CAD[x ] CABG[ ] PCI[ x]  DEVICES[ ] PPM[ ] ICD[ ] ILR[ ]  ATRIAL FIBRILLATION[ ] Paroxysmal[ ] Permanent[ ] CHADS2-[  ]  BISI[ ] CKD1[ ] CKD2[ ] CKD3[ ] CKD4[ ] ESRD[ ]  COPD[ ] HTN[ ]   DM[x ] Type1[ ] Type 2[ x]   CVA[ ] Paresis[ ]    AMBULATION: Assisted[ ] Cane/walker[ ] Independent[x ]  MEDICATIONS  (STANDING):  acetaminophen     Tablet .. 1000 milliGRAM(s) Oral once  ascorbic acid 2000 milliGRAM(s) Oral once  aspirin  chewable 81 milliGRAM(s) Oral daily  atorvastatin 40 milliGRAM(s) Oral at bedtime  chlorhexidine 0.12% Liquid 30 milliLiter(s) Swish and Spit once  chlorhexidine 4% Liquid 1 Application(s) Topical once  gabapentin 300 milliGRAM(s) Oral once  glucagon  Injectable 1 milliGRAM(s) IntraMuscular once  insulin lispro (ADMELOG) corrective regimen sliding scale   SubCutaneous at bedtime  insulin lispro (ADMELOG) corrective regimen sliding scale   SubCutaneous three times a day before meals  metoprolol succinate ER 25 milliGRAM(s) Oral daily  mupirocin 2% Nasal 1 Application(s) Both Nostrils two times a day  sodium chloride 0.9%. 1000 milliLiter(s) (75 mL/Hr) IV Continuous <Continuous>  vancomycin  IVPB 1750 milliGRAM(s) IV Intermittent once    MEDICATIONS  (PRN):  acetaminophen     Tablet .. 650 milliGRAM(s) Oral every 6 hours PRN Mild Pain (1 - 3), Moderate Pain (4 - 6)  dextrose Oral Gel 15 Gram(s) Oral once PRN Blood Glucose LESS THAN 70 milliGRAM(s)/deciliter            REVIEW OF SYSTEMS:  Constitutional: [ ] fever, [ ]weight loss,  [ x]fatigue [ ]weight gain  Eyes: [ ] visual changes  Respiratory: [ ]shortness of breath;  [ ] cough, [ ]wheezing, [ ]chills, [ ]hemoptysis  Cardiovascular: [ ] chest pain, [ ]palpitations, [ ]dizziness,  [ ]leg swelling[ ]orthopnea[ ]PND  Gastrointestinal: [ ] abdominal pain, [ ]nausea, [ ]vomiting,  [ ]diarrhea [ ]Constipation [ ]Melena  Genitourinary: [ ] dysuria, [ ] hematuria [ ]Suh  Neurologic: [ ] headaches [ ] tremors[ ]weakness [ ]Paralysis Right[ ] Left[ ]  Skin: [ ] itching, [ ]burning, [ ] rashes  Endocrine: [ ] heat or cold intolerance  Musculoskeletal: [ ] joint pain or swelling; [ ] muscle, back, or extremity pain  Psychiatric: [ ] depression, [ ]anxiety, [ ]mood swings, or [ ]difficulty sleeping  Hematologic: [ ] easy bruising, [ ] bleeding gums    [ ] All remaining systems negative except as per above.   [ ]Unable to obtain.  [x] No change in ROS since admission      Vital Signs Last 24 Hrs  T(C): 36.4 (05 May 2024 04:34), Max: 36.6 (04 May 2024 16:49)  T(F): 97.6 (05 May 2024 04:34), Max: 97.8 (04 May 2024 16:49)  HR: 66 (05 May 2024 04:34) (66 - 70)  BP: 136/74 (05 May 2024 04:34) (119/71 - 159/87)  BP(mean): 90 (04 May 2024 13:20) (90 - 90)  RR: 18 (05 May 2024 04:34) (16 - 18)  SpO2: 96% (05 May 2024 04:34) (95% - 98%)    Parameters below as of 05 May 2024 04:34  Patient On (Oxygen Delivery Method): room air      I&O's Summary    03 May 2024 07:01  -  04 May 2024 07:00  --------------------------------------------------------  IN: 240 mL / OUT: 0 mL / NET: 240 mL    04 May 2024 07:01  -  05 May 2024 06:57  --------------------------------------------------------  IN: 720 mL / OUT: 0 mL / NET: 720 mL        PHYSICAL EXAM:  General: No acute distress BMI-43.6  HEENT: EOMI, PERRL  Neck: Supple, [ ] JVD  Lungs: Equal air entry bilaterally; [ ] rales [ ] wheezing [ ] rhonchi  Heart: Regular rate and rhythm; [x ] murmur   2/6 [ x] systolic [ ] diastolic [ ] radiation[ ] rubs [ ]  gallops  Abdomen: Nontender, bowel sounds present  Extremities: No clubbing, cyanosis, [ ] edema [ ]Pulses  equal and intact  Nervous system:  Alert & Oriented X3, no focal deficits  Psychiatric: Normal affect  Skin: No rashes or lesions    LABS:  05-04    139  |  103  |  13  ----------------------------<  114<H>  4.0   |  24  |  0.69    Ca    9.3      04 May 2024 04:44  Phos  3.6     05-04  Mg     2.0     05-04      Creatinine Trend: 0.69<--, 0.71<--, 0.76<--                        15.1   8.00  )-----------( 189      ( 04 May 2024 04:43 )             44.3         Xray Chest 2 Views PA/Lat (05.02.24 @ 09:44) >  FINDINGS:  The lungs are clear.  There is no pleural effusion or pneumothorax.  The heart is normal in size  The visualized osseous structures demonstrate no acute pathology.    IMPRESSION:  No focal consolidations.       Cardiac Catheterization (05.02.24 @ 17:01) >  Diagnostic Findings:     Coronary Angiography   The coronary circulation is right dominant.      LM   Left main artery: Angiography shows severe atherosclerosis. There is a 60 % stenosis in the middle third portion of the segment.    LAD   Left anterior descending artery: Angiography shows severe atherosclerosis. There is a 70 % stenosis in the ostium portion of the segment.      CX   Circumflex: Angiography shows mild atherosclerosis. Patent mid Lx stent.    RCA   Right coronary artery: Angiography shows minor irregularities.        Diagnostic Conclusions:     LM: 60% mid/distal stenosis.    LAD: 70% ostial stenosis   LCX: Patent stent in mid.    RCA: Luminal irregularities    Recommendations:     CABG evaluaiton. IVUS of LM and LAD showed significant plaque burden.  Catheter with ventriculization in LM.     12 Lead ECG (05.02.24 @ 08:52) >   NORMAL SINUS RHYTHM  NORMAL ECG       TTE W or WO Ultrasound Enhancing Agent (05.03.24 @ 07:43) >  CONCLUSIONS:      1. Left ventricular systolic function is hyperdynamic. There are no regional wall motion abnormalities seen.   2. Moderate aortic stenosis.-The aortic valve appears trileaflet with reduced systolic excursion. There is calcification of the aortic valve leaflets. There is moderate thickening of the aortic valve leaflets. There is moderate aortic stenosis. The peak transaortic velocity is 3.30 m/s and mean transaortic gradient is 27.6 mmHg. There is no evidence of aortic regurgitation.   3. No prior echocardiogram is available for comparison.

## 2024-05-06 ENCOUNTER — APPOINTMENT (OUTPATIENT)
Dept: CARDIOTHORACIC SURGERY | Facility: HOSPITAL | Age: 64
End: 2024-05-06

## 2024-05-06 ENCOUNTER — RESULT REVIEW (OUTPATIENT)
Age: 64
End: 2024-05-06

## 2024-05-06 LAB
ALBUMIN SERPL ELPH-MCNC: 3.6 G/DL — SIGNIFICANT CHANGE UP (ref 3.3–5)
ALP SERPL-CCNC: 61 U/L — SIGNIFICANT CHANGE UP (ref 40–120)
ALT FLD-CCNC: 19 U/L — SIGNIFICANT CHANGE UP (ref 10–45)
ANION GAP SERPL CALC-SCNC: 12 MMOL/L — SIGNIFICANT CHANGE UP (ref 5–17)
ANION GAP SERPL CALC-SCNC: 15 MMOL/L — SIGNIFICANT CHANGE UP (ref 5–17)
APTT BLD: 29.6 SEC — SIGNIFICANT CHANGE UP (ref 24.5–35.6)
AST SERPL-CCNC: 29 U/L — SIGNIFICANT CHANGE UP (ref 10–40)
BASE EXCESS BLDV CALC-SCNC: -2.4 MMOL/L — LOW (ref -2–3)
BASE EXCESS BLDV CALC-SCNC: -3.2 MMOL/L — LOW (ref -2–3)
BASE EXCESS BLDV CALC-SCNC: 0.5 MMOL/L — SIGNIFICANT CHANGE UP (ref -2–3)
BASOPHILS # BLD AUTO: 0 K/UL — SIGNIFICANT CHANGE UP (ref 0–0.2)
BASOPHILS NFR BLD AUTO: 0 % — SIGNIFICANT CHANGE UP (ref 0–2)
BILIRUB SERPL-MCNC: 1.3 MG/DL — HIGH (ref 0.2–1.2)
BUN SERPL-MCNC: 16 MG/DL — SIGNIFICANT CHANGE UP (ref 7–23)
BUN SERPL-MCNC: 17 MG/DL — SIGNIFICANT CHANGE UP (ref 7–23)
CA-I SERPL-SCNC: 0.96 MMOL/L — LOW (ref 1.15–1.33)
CA-I SERPL-SCNC: 1 MMOL/L — LOW (ref 1.15–1.33)
CA-I SERPL-SCNC: 1.17 MMOL/L — SIGNIFICANT CHANGE UP (ref 1.15–1.33)
CALCIUM SERPL-MCNC: 8.6 MG/DL — SIGNIFICANT CHANGE UP (ref 8.4–10.5)
CALCIUM SERPL-MCNC: 9 MG/DL — SIGNIFICANT CHANGE UP (ref 8.4–10.5)
CHLORIDE BLDV-SCNC: 101 MMOL/L — SIGNIFICANT CHANGE UP (ref 96–108)
CHLORIDE BLDV-SCNC: 102 MMOL/L — SIGNIFICANT CHANGE UP (ref 96–108)
CHLORIDE BLDV-SCNC: 103 MMOL/L — SIGNIFICANT CHANGE UP (ref 96–108)
CHLORIDE SERPL-SCNC: 104 MMOL/L — SIGNIFICANT CHANGE UP (ref 96–108)
CHLORIDE SERPL-SCNC: 104 MMOL/L — SIGNIFICANT CHANGE UP (ref 96–108)
CK MB BLD-MCNC: 4.7 % — HIGH (ref 0–3.5)
CK MB CFR SERPL CALC: 11.4 NG/ML — HIGH (ref 0–6.7)
CK SERPL-CCNC: 243 U/L — HIGH (ref 30–200)
CO2 BLDV-SCNC: 24 MMOL/L — SIGNIFICANT CHANGE UP (ref 22–26)
CO2 BLDV-SCNC: 26 MMOL/L — SIGNIFICANT CHANGE UP (ref 22–26)
CO2 BLDV-SCNC: 27 MMOL/L — HIGH (ref 22–26)
CO2 SERPL-SCNC: 23 MMOL/L — SIGNIFICANT CHANGE UP (ref 22–31)
CO2 SERPL-SCNC: 24 MMOL/L — SIGNIFICANT CHANGE UP (ref 22–31)
CREAT SERPL-MCNC: 0.73 MG/DL — SIGNIFICANT CHANGE UP (ref 0.5–1.3)
CREAT SERPL-MCNC: 0.73 MG/DL — SIGNIFICANT CHANGE UP (ref 0.5–1.3)
EGFR: 104 ML/MIN/1.73M2 — SIGNIFICANT CHANGE UP
EGFR: 104 ML/MIN/1.73M2 — SIGNIFICANT CHANGE UP
EOSINOPHIL # BLD AUTO: 0.42 K/UL — SIGNIFICANT CHANGE UP (ref 0–0.5)
EOSINOPHIL NFR BLD AUTO: 1.7 % — SIGNIFICANT CHANGE UP (ref 0–6)
FIBRINOGEN PPP-MCNC: 313 MG/DL — SIGNIFICANT CHANGE UP (ref 200–445)
GAS PNL BLDA: SIGNIFICANT CHANGE UP
GAS PNL BLDV: 133 MMOL/L — LOW (ref 136–145)
GAS PNL BLDV: 136 MMOL/L — SIGNIFICANT CHANGE UP (ref 136–145)
GAS PNL BLDV: 136 MMOL/L — SIGNIFICANT CHANGE UP (ref 136–145)
GAS PNL BLDV: SIGNIFICANT CHANGE UP
GLUCOSE BLDC GLUCOMTR-MCNC: 108 MG/DL — HIGH (ref 70–99)
GLUCOSE BLDC GLUCOMTR-MCNC: 120 MG/DL — HIGH (ref 70–99)
GLUCOSE BLDC GLUCOMTR-MCNC: 214 MG/DL — HIGH (ref 70–99)
GLUCOSE BLDC GLUCOMTR-MCNC: 215 MG/DL — HIGH (ref 70–99)
GLUCOSE BLDC GLUCOMTR-MCNC: 221 MG/DL — HIGH (ref 70–99)
GLUCOSE BLDV-MCNC: 137 MG/DL — HIGH (ref 70–99)
GLUCOSE BLDV-MCNC: 144 MG/DL — HIGH (ref 70–99)
GLUCOSE BLDV-MCNC: 184 MG/DL — HIGH (ref 70–99)
GLUCOSE SERPL-MCNC: 117 MG/DL — HIGH (ref 70–99)
GLUCOSE SERPL-MCNC: 192 MG/DL — HIGH (ref 70–99)
HCO3 BLDV-SCNC: 23 MMOL/L — SIGNIFICANT CHANGE UP (ref 22–29)
HCO3 BLDV-SCNC: 24 MMOL/L — SIGNIFICANT CHANGE UP (ref 22–29)
HCO3 BLDV-SCNC: 25 MMOL/L — SIGNIFICANT CHANGE UP (ref 22–29)
HCT VFR BLD CALC: 37.7 % — LOW (ref 39–50)
HCT VFR BLD CALC: 42.6 % — SIGNIFICANT CHANGE UP (ref 39–50)
HCT VFR BLDA CALC: 32 % — LOW (ref 39–51)
HCT VFR BLDA CALC: 36 % — LOW (ref 39–51)
HCT VFR BLDA CALC: 39 % — SIGNIFICANT CHANGE UP (ref 39–51)
HEPARINASE TEG R TIME: 7.5 MIN — SIGNIFICANT CHANGE UP (ref 4.3–8.3)
HGB BLD CALC-MCNC: 10.5 G/DL — LOW (ref 12.6–17.4)
HGB BLD CALC-MCNC: 12.1 G/DL — LOW (ref 12.6–17.4)
HGB BLD CALC-MCNC: 12.9 G/DL — SIGNIFICANT CHANGE UP (ref 12.6–17.4)
HGB BLD-MCNC: 13 G/DL — SIGNIFICANT CHANGE UP (ref 13–17)
HGB BLD-MCNC: 14.9 G/DL — SIGNIFICANT CHANGE UP (ref 13–17)
HOROWITZ INDEX BLDV+IHG-RTO: 100 — SIGNIFICANT CHANGE UP
INR BLD: 1.33 RATIO — HIGH (ref 0.85–1.18)
LACTATE BLDV-MCNC: 0.7 MMOL/L — SIGNIFICANT CHANGE UP (ref 0.5–2)
LACTATE BLDV-MCNC: 1.1 MMOL/L — SIGNIFICANT CHANGE UP (ref 0.5–2)
LACTATE BLDV-MCNC: 2.3 MMOL/L — HIGH (ref 0.5–2)
LYMPHOCYTES # BLD AUTO: 10.1 % — LOW (ref 13–44)
LYMPHOCYTES # BLD AUTO: 2.51 K/UL — SIGNIFICANT CHANGE UP (ref 1–3.3)
MANUAL SMEAR VERIFICATION: SIGNIFICANT CHANGE UP
MCHC RBC-ENTMCNC: 28.6 PG — SIGNIFICANT CHANGE UP (ref 27–34)
MCHC RBC-ENTMCNC: 28.6 PG — SIGNIFICANT CHANGE UP (ref 27–34)
MCHC RBC-ENTMCNC: 34.5 GM/DL — SIGNIFICANT CHANGE UP (ref 32–36)
MCHC RBC-ENTMCNC: 35 GM/DL — SIGNIFICANT CHANGE UP (ref 32–36)
MCV RBC AUTO: 81.8 FL — SIGNIFICANT CHANGE UP (ref 80–100)
MCV RBC AUTO: 82.9 FL — SIGNIFICANT CHANGE UP (ref 80–100)
MONOCYTES # BLD AUTO: 0.62 K/UL — SIGNIFICANT CHANGE UP (ref 0–0.9)
MONOCYTES NFR BLD AUTO: 2.5 % — SIGNIFICANT CHANGE UP (ref 2–14)
NEUTROPHILS # BLD AUTO: 21.26 K/UL — HIGH (ref 1.8–7.4)
NEUTROPHILS NFR BLD AUTO: 84 % — HIGH (ref 43–77)
NEUTS BAND # BLD: 1.7 % — SIGNIFICANT CHANGE UP (ref 0–8)
NRBC # BLD: 0 /100 WBCS — SIGNIFICANT CHANGE UP (ref 0–0)
PA ADP PRP-ACNC: 124 PRU — LOW (ref 194–417)
PCO2 BLDV: 41 MMHG — LOW (ref 42–55)
PCO2 BLDV: 43 MMHG — SIGNIFICANT CHANGE UP (ref 42–55)
PCO2 BLDV: 48 MMHG — SIGNIFICANT CHANGE UP (ref 42–55)
PH BLDV: 7.31 — LOW (ref 7.32–7.43)
PH BLDV: 7.33 — SIGNIFICANT CHANGE UP (ref 7.32–7.43)
PH BLDV: 7.4 — SIGNIFICANT CHANGE UP (ref 7.32–7.43)
PLAT MORPH BLD: NORMAL — SIGNIFICANT CHANGE UP
PLATELET # BLD AUTO: 185 K/UL — SIGNIFICANT CHANGE UP (ref 150–400)
PLATELET # BLD AUTO: 236 K/UL — SIGNIFICANT CHANGE UP (ref 150–400)
PLATELET MAPPING ACTF MAX AMPLITUDE: 16.4 MM — SIGNIFICANT CHANGE UP (ref 2–19)
PLATELET MAPPING ADP MAXIMUM AMPLITUDE: 61.8 MM — SIGNIFICANT CHANGE UP (ref 45–69)
PLATELET MAPPING ADP PERCENT INHIBITION: 5.4 % — SIGNIFICANT CHANGE UP (ref 0–17)
PLATELET MAPPING ARACHIDONIC ACID INHIBITION: 62.5 % — HIGH (ref 0–11)
PLATELET MAPPING HKH MAXIMUM AMPLITUDE: 64.4 MM — SIGNIFICANT CHANGE UP (ref 53–68)
PO2 BLDV: 54 MMHG — HIGH (ref 25–45)
PO2 BLDV: 66 MMHG — HIGH (ref 25–45)
PO2 BLDV: 67 MMHG — HIGH (ref 25–45)
POTASSIUM BLDV-SCNC: 4.4 MMOL/L — SIGNIFICANT CHANGE UP (ref 3.5–5.1)
POTASSIUM BLDV-SCNC: 4.5 MMOL/L — SIGNIFICANT CHANGE UP (ref 3.5–5.1)
POTASSIUM BLDV-SCNC: 5.6 MMOL/L — HIGH (ref 3.5–5.1)
POTASSIUM SERPL-MCNC: 4 MMOL/L — SIGNIFICANT CHANGE UP (ref 3.5–5.3)
POTASSIUM SERPL-MCNC: 4.6 MMOL/L — SIGNIFICANT CHANGE UP (ref 3.5–5.3)
POTASSIUM SERPL-SCNC: 4 MMOL/L — SIGNIFICANT CHANGE UP (ref 3.5–5.3)
POTASSIUM SERPL-SCNC: 4.6 MMOL/L — SIGNIFICANT CHANGE UP (ref 3.5–5.3)
PROT SERPL-MCNC: 6.2 G/DL — SIGNIFICANT CHANGE UP (ref 6–8.3)
PROTHROM AB SERPL-ACNC: 13.9 SEC — HIGH (ref 9.5–13)
RAPIDTEG MAXIMUM AMPLITUDE: 65 MM — SIGNIFICANT CHANGE UP (ref 52–70)
RBC # BLD: 4.55 M/UL — SIGNIFICANT CHANGE UP (ref 4.2–5.8)
RBC # BLD: 5.21 M/UL — SIGNIFICANT CHANGE UP (ref 4.2–5.8)
RBC # FLD: 12.9 % — SIGNIFICANT CHANGE UP (ref 10.3–14.5)
RBC # FLD: 13.1 % — SIGNIFICANT CHANGE UP (ref 10.3–14.5)
RBC BLD AUTO: SIGNIFICANT CHANGE UP
SAO2 % BLDV: 88 % — SIGNIFICANT CHANGE UP (ref 67–88)
SAO2 % BLDV: 92.2 % — HIGH (ref 67–88)
SAO2 % BLDV: 95.2 % — HIGH (ref 67–88)
SODIUM SERPL-SCNC: 140 MMOL/L — SIGNIFICANT CHANGE UP (ref 135–145)
SODIUM SERPL-SCNC: 142 MMOL/L — SIGNIFICANT CHANGE UP (ref 135–145)
TEG FUNCTIONAL FIBRINOGEN: 24.2 MM — SIGNIFICANT CHANGE UP (ref 15–32)
TEG MAXIMUM AMPLITUDE: 62.1 MM — SIGNIFICANT CHANGE UP (ref 52–69)
TEG REACTION TIME: 7.5 MIN — SIGNIFICANT CHANGE UP (ref 4.6–9.1)
TROPONIN T, HIGH SENSITIVITY RESULT: 89 NG/L — HIGH (ref 0–51)
WBC # BLD: 24.81 K/UL — HIGH (ref 3.8–10.5)
WBC # BLD: 8.56 K/UL — SIGNIFICANT CHANGE UP (ref 3.8–10.5)
WBC # FLD AUTO: 24.81 K/UL — HIGH (ref 3.8–10.5)
WBC # FLD AUTO: 8.56 K/UL — SIGNIFICANT CHANGE UP (ref 3.8–10.5)

## 2024-05-06 PROCEDURE — 33533 CABG ARTERIAL SINGLE: CPT

## 2024-05-06 PROCEDURE — 71045 X-RAY EXAM CHEST 1 VIEW: CPT | Mod: 26

## 2024-05-06 PROCEDURE — 94010 BREATHING CAPACITY TEST: CPT | Mod: 26

## 2024-05-06 PROCEDURE — 33508 ENDOSCOPIC VEIN HARVEST: CPT | Mod: 59

## 2024-05-06 PROCEDURE — 33517 CABG ARTERY-VEIN SINGLE: CPT

## 2024-05-06 PROCEDURE — 99291 CRITICAL CARE FIRST HOUR: CPT

## 2024-05-06 DEVICE — CANNULA ARTERIAL 8IN 22FR: Type: IMPLANTABLE DEVICE | Status: FUNCTIONAL

## 2024-05-06 DEVICE — LIGATING CLIPS WECK HORIZON MEDIUM (BLUE) 24: Type: IMPLANTABLE DEVICE | Status: FUNCTIONAL

## 2024-05-06 DEVICE — CHEST DRAIN THORACIC ARGYLE PVC 32FR RIGHT ANGLE: Type: IMPLANTABLE DEVICE | Status: FUNCTIONAL

## 2024-05-06 DEVICE — CHEST DRAIN THORACIC ARGYLE PVC 36FR STRAIGHT: Type: IMPLANTABLE DEVICE | Status: FUNCTIONAL

## 2024-05-06 DEVICE — PACING WIRE BLUE DARK 2-0 24" SH SKS-3: Type: IMPLANTABLE DEVICE | Status: FUNCTIONAL

## 2024-05-06 DEVICE — IMPLANTABLE DEVICE: Type: IMPLANTABLE DEVICE | Status: FUNCTIONAL

## 2024-05-06 DEVICE — CANNULA ANTEGRADE CARDIOPLEGIA 14 GA STRL: Type: IMPLANTABLE DEVICE | Status: FUNCTIONAL

## 2024-05-06 DEVICE — KIT A-LINE 1LUM 20G X 12CM SAFE KIT: Type: IMPLANTABLE DEVICE | Status: FUNCTIONAL

## 2024-05-06 DEVICE — LIGATING CLIPS WECK HORIZON SMALL-WIDE (RED) 24: Type: IMPLANTABLE DEVICE | Status: FUNCTIONAL

## 2024-05-06 DEVICE — KIT CVC 2LUM MAC 9FR CHG: Type: IMPLANTABLE DEVICE | Status: FUNCTIONAL

## 2024-05-06 DEVICE — CANNULA IMA 1MM BLUNT TIP: Type: IMPLANTABLE DEVICE | Status: FUNCTIONAL

## 2024-05-06 DEVICE — CANNULA PERF CARDIOPLEGIA SURE TOUCH HND 15FR: Type: IMPLANTABLE DEVICE | Status: FUNCTIONAL

## 2024-05-06 DEVICE — CANNULA ARTERIAL STRAIGHT 20FR X 3/8" VENTED: Type: IMPLANTABLE DEVICE | Status: FUNCTIONAL

## 2024-05-06 DEVICE — CANNULA VENOUS 2 STAGE THIN FLEX 36/46FR X 1/2" WITH RETURN DIAL: Type: IMPLANTABLE DEVICE | Status: FUNCTIONAL

## 2024-05-06 RX ORDER — INSULIN HUMAN 100 [IU]/ML
3 INJECTION, SOLUTION SUBCUTANEOUS
Qty: 100 | Refills: 0 | Status: DISCONTINUED | OUTPATIENT
Start: 2024-05-06 | End: 2024-05-08

## 2024-05-06 RX ORDER — DEXTROSE 50 % IN WATER 50 %
50 SYRINGE (ML) INTRAVENOUS
Refills: 0 | Status: DISCONTINUED | OUTPATIENT
Start: 2024-05-06 | End: 2024-05-13

## 2024-05-06 RX ORDER — ALBUMIN HUMAN 25 %
250 VIAL (ML) INTRAVENOUS ONCE
Refills: 0 | Status: COMPLETED | OUTPATIENT
Start: 2024-05-06 | End: 2024-05-06

## 2024-05-06 RX ORDER — OXYCODONE HYDROCHLORIDE 5 MG/1
5 TABLET ORAL EVERY 4 HOURS
Refills: 0 | Status: DISCONTINUED | OUTPATIENT
Start: 2024-05-06 | End: 2024-05-08

## 2024-05-06 RX ORDER — GABAPENTIN 400 MG/1
100 CAPSULE ORAL EVERY 8 HOURS
Refills: 0 | Status: COMPLETED | OUTPATIENT
Start: 2024-05-06 | End: 2024-05-11

## 2024-05-06 RX ORDER — DEXTROSE 50 % IN WATER 50 %
25 SYRINGE (ML) INTRAVENOUS
Refills: 0 | Status: DISCONTINUED | OUTPATIENT
Start: 2024-05-06 | End: 2024-05-13

## 2024-05-06 RX ORDER — ASCORBIC ACID 60 MG
500 TABLET,CHEWABLE ORAL
Refills: 0 | Status: COMPLETED | OUTPATIENT
Start: 2024-05-06 | End: 2024-05-11

## 2024-05-06 RX ORDER — CHLORHEXIDINE GLUCONATE 213 G/1000ML
15 SOLUTION TOPICAL EVERY 12 HOURS
Refills: 0 | Status: DISCONTINUED | OUTPATIENT
Start: 2024-05-06 | End: 2024-05-08

## 2024-05-06 RX ORDER — METOCLOPRAMIDE HCL 10 MG
10 TABLET ORAL EVERY 8 HOURS
Refills: 0 | Status: COMPLETED | OUTPATIENT
Start: 2024-05-06 | End: 2024-05-08

## 2024-05-06 RX ORDER — ASPIRIN/CALCIUM CARB/MAGNESIUM 324 MG
300 TABLET ORAL ONCE
Refills: 0 | Status: DISCONTINUED | OUTPATIENT
Start: 2024-05-06 | End: 2024-05-07

## 2024-05-06 RX ORDER — CEFUROXIME AXETIL 250 MG
1500 TABLET ORAL EVERY 8 HOURS
Refills: 0 | Status: COMPLETED | OUTPATIENT
Start: 2024-05-07 | End: 2024-05-08

## 2024-05-06 RX ORDER — SENNA PLUS 8.6 MG/1
2 TABLET ORAL AT BEDTIME
Refills: 0 | Status: DISCONTINUED | OUTPATIENT
Start: 2024-05-07 | End: 2024-05-13

## 2024-05-06 RX ORDER — CHLORHEXIDINE GLUCONATE 213 G/1000ML
1 SOLUTION TOPICAL DAILY
Refills: 0 | Status: DISCONTINUED | OUTPATIENT
Start: 2024-05-06 | End: 2024-05-13

## 2024-05-06 RX ORDER — ACETAMINOPHEN 500 MG
650 TABLET ORAL EVERY 6 HOURS
Refills: 0 | Status: COMPLETED | OUTPATIENT
Start: 2024-05-06 | End: 2024-05-09

## 2024-05-06 RX ORDER — NOREPINEPHRINE BITARTRATE/D5W 8 MG/250ML
0.05 PLASTIC BAG, INJECTION (ML) INTRAVENOUS
Qty: 8 | Refills: 0 | Status: DISCONTINUED | OUTPATIENT
Start: 2024-05-06 | End: 2024-05-07

## 2024-05-06 RX ORDER — PANTOPRAZOLE SODIUM 20 MG/1
40 TABLET, DELAYED RELEASE ORAL DAILY
Refills: 0 | Status: DISCONTINUED | OUTPATIENT
Start: 2024-05-06 | End: 2024-05-07

## 2024-05-06 RX ORDER — POTASSIUM CHLORIDE 20 MEQ
10 PACKET (EA) ORAL
Refills: 0 | Status: DISCONTINUED | OUTPATIENT
Start: 2024-05-06 | End: 2024-05-08

## 2024-05-06 RX ORDER — CALCIUM GLUCONATE 100 MG/ML
2 VIAL (ML) INTRAVENOUS ONCE
Refills: 0 | Status: COMPLETED | OUTPATIENT
Start: 2024-05-06 | End: 2024-05-06

## 2024-05-06 RX ORDER — POLYETHYLENE GLYCOL 3350 17 G/17G
17 POWDER, FOR SOLUTION ORAL DAILY
Refills: 0 | Status: DISCONTINUED | OUTPATIENT
Start: 2024-05-07 | End: 2024-05-13

## 2024-05-06 RX ORDER — AMIODARONE HYDROCHLORIDE 400 MG/1
400 TABLET ORAL
Refills: 0 | Status: COMPLETED | OUTPATIENT
Start: 2024-05-06 | End: 2024-05-09

## 2024-05-06 RX ORDER — HYDROMORPHONE HYDROCHLORIDE 2 MG/ML
0.5 INJECTION INTRAMUSCULAR; INTRAVENOUS; SUBCUTANEOUS EVERY 6 HOURS
Refills: 0 | Status: DISCONTINUED | OUTPATIENT
Start: 2024-05-06 | End: 2024-05-07

## 2024-05-06 RX ORDER — ASPIRIN/CALCIUM CARB/MAGNESIUM 324 MG
81 TABLET ORAL DAILY
Refills: 0 | Status: DISCONTINUED | OUTPATIENT
Start: 2024-05-06 | End: 2024-05-13

## 2024-05-06 RX ORDER — SODIUM CHLORIDE 9 MG/ML
1000 INJECTION INTRAMUSCULAR; INTRAVENOUS; SUBCUTANEOUS
Refills: 0 | Status: DISCONTINUED | OUTPATIENT
Start: 2024-05-06 | End: 2024-05-13

## 2024-05-06 RX ORDER — OXYCODONE HYDROCHLORIDE 5 MG/1
10 TABLET ORAL EVERY 4 HOURS
Refills: 0 | Status: DISCONTINUED | OUTPATIENT
Start: 2024-05-06 | End: 2024-05-08

## 2024-05-06 RX ADMIN — MUPIROCIN 1 APPLICATION(S): 20 OINTMENT TOPICAL at 05:07

## 2024-05-06 RX ADMIN — Medication 25 MILLIGRAM(S): at 05:07

## 2024-05-06 RX ADMIN — Medication 125 MILLILITER(S): at 22:30

## 2024-05-06 RX ADMIN — Medication 1000 MILLIGRAM(S): at 09:41

## 2024-05-06 RX ADMIN — Medication 1000 MILLIGRAM(S): at 10:26

## 2024-05-06 RX ADMIN — Medication 125 MILLILITER(S): at 20:15

## 2024-05-06 RX ADMIN — CHLORHEXIDINE GLUCONATE 30 MILLILITER(S): 213 SOLUTION TOPICAL at 09:41

## 2024-05-06 RX ADMIN — Medication 100 MILLIEQUIVALENT(S): at 21:30

## 2024-05-06 RX ADMIN — HYDROMORPHONE HYDROCHLORIDE 0.5 MILLIGRAM(S): 2 INJECTION INTRAMUSCULAR; INTRAVENOUS; SUBCUTANEOUS at 22:45

## 2024-05-06 RX ADMIN — Medication 125 MILLILITER(S): at 23:20

## 2024-05-06 RX ADMIN — HYDROMORPHONE HYDROCHLORIDE 0.5 MILLIGRAM(S): 2 INJECTION INTRAMUSCULAR; INTRAVENOUS; SUBCUTANEOUS at 22:30

## 2024-05-06 RX ADMIN — Medication 125 MILLILITER(S): at 20:27

## 2024-05-06 RX ADMIN — Medication 10 MILLIGRAM(S): at 20:36

## 2024-05-06 RX ADMIN — Medication 100 MILLIEQUIVALENT(S): at 22:45

## 2024-05-06 RX ADMIN — GABAPENTIN 300 MILLIGRAM(S): 400 CAPSULE ORAL at 09:41

## 2024-05-06 RX ADMIN — Medication 11.3 MICROGRAM(S)/KG/MIN: at 20:10

## 2024-05-06 RX ADMIN — Medication 200 GRAM(S): at 20:21

## 2024-05-06 RX ADMIN — Medication 100 MILLIEQUIVALENT(S): at 22:15

## 2024-05-06 RX ADMIN — Medication 200 GRAM(S): at 22:45

## 2024-05-06 NOTE — BRIEF OPERATIVE NOTE - COMMENTS
*** ESTIMATED BLOOD LOSS NOT APPLICABLE DUE TO USE OF CARDIOTOMY AND CELL SAVER SUCTION ***   No unexpected foreign bodies were apparent on preliminary review of post-op x-ray. Reviewed by Dr. Hannah

## 2024-05-06 NOTE — PROGRESS NOTE ADULT - ASSESSMENT
63M with PMH of HTN, HLD, CAD s/p PCI, DM2 (last A1c 6.0), LENIN on CPAP, Vertigo presents with chest pain on exertion and dyspnea       # CAD-Progressive Angina  Hx CAD s/p PCI-2023-  -Had cath-3/1/24--LCX: Patent OM1 stent, patent circumflex stent with 75% stenosis at distal edge of old stent s/p PCI-1 SHARON to LCx  -Presenting with HUDSON and angina  -Cath 5/2/2024-LM: 60% mid/distal stenosis.  LAD: 70% ostial stenosis LCX: Patent stent in mid.    -CTS  evaluation Dr Hannah scheduled for CABG today    -Continue aspirin  hold Plavix  -P2Y12 Plt Reactivity: 124<--99<--90  -Continue statin      # HTN  On Toprol 25mg    # T2DM  -A1c-6.0 -2023 now 5.9%  Endocrine evaluation noted  POCT-93 <--116<--105<--117<--108<--114 mg/dL  Patient was on Mounjaro at home

## 2024-05-06 NOTE — PROGRESS NOTE ADULT - SUBJECTIVE AND OBJECTIVE BOX
SUBJECTIVE / OVERNIGHT EVENTS:pt seen and examined  05-06-24    MEDICATIONS  (STANDING):  acetaminophen     Tablet .. 650 milliGRAM(s) Oral every 6 hours  aMIOdarone    Tablet 400 milliGRAM(s) Oral two times a day  ascorbic acid 500 milliGRAM(s) Oral two times a day  aspirin enteric coated 81 milliGRAM(s) Oral daily  aspirin Suppository 300 milliGRAM(s) Rectal once  chlorhexidine 0.12% Liquid 15 milliLiter(s) Oral Mucosa every 12 hours  chlorhexidine 2% Cloths 1 Application(s) Topical daily  dextrose 50% Injectable 25 milliLiter(s) IV Push every 15 minutes  dextrose 50% Injectable 50 milliLiter(s) IV Push every 15 minutes  gabapentin 100 milliGRAM(s) Oral every 8 hours  insulin regular Infusion 3 Unit(s)/Hr (3 mL/Hr) IV Continuous <Continuous>  metoclopramide Injectable 10 milliGRAM(s) IV Push every 8 hours  norepinephrine Infusion 0.05 MICROgram(s)/kG/Min (11.3 mL/Hr) IV Continuous <Continuous>  pantoprazole  Injectable 40 milliGRAM(s) IV Push daily  potassium chloride  10 mEq/50 mL IVPB 10 milliEquivalent(s) IV Intermittent every 1 hour  potassium chloride  10 mEq/50 mL IVPB 10 milliEquivalent(s) IV Intermittent every 1 hour  potassium chloride  10 mEq/50 mL IVPB 10 milliEquivalent(s) IV Intermittent every 1 hour  sodium chloride 0.9%. 1000 milliLiter(s) (10 mL/Hr) IV Continuous <Continuous>    MEDICATIONS  (PRN):  HYDROmorphone  Injectable 0.5 milliGRAM(s) IV Push every 6 hours PRN Breakthrough Pain  oxyCODONE    IR 10 milliGRAM(s) Oral every 4 hours PRN Severe Pain (7 - 10)  oxyCODONE    IR 5 milliGRAM(s) Oral every 4 hours PRN Moderate Pain (4 - 6)    Vital Signs Last 24 Hrs  T(C): 36.6 (05-06-24 @ 20:00), Max: 36.7 (05-06-24 @ 10:22)  T(F): 97.9 (05-06-24 @ 20:00), Max: 98 (05-06-24 @ 10:22)  HR: 100 (05-06-24 @ 23:00) (64 - 100)  BP: 124/80 (05-06-24 @ 13:41) (124/80 - 129/78)  BP(mean): 98 (05-06-24 @ 13:41) (98 - 98)  RR: 16 (05-06-24 @ 23:00) (7 - 23)  SpO2: 100% (05-06-24 @ 23:00) (96% - 100%)      Constitutional: No fever, fatigue  Skin: No rash.  Eyes: No recent vision problems or eye pain.  ENT: No congestion, ear pain, or sore throat.  Cardiovascular: No chest pain or palpation.  Respiratory: No cough, shortness of breath, congestion, or wheezing.  Gastrointestinal: No abdominal pain, nausea, vomiting, or diarrhea.  Genitourinary: No dysuria.  Musculoskeletal: No joint swelling.  Neurologic: No headache.    PHYSICAL EXAM:  GENERAL: NAD  EYES: EOMI, PERRLA  NECK: Supple, No JVD  CHEST/LUNG: cta gaetano  HEART:  S1 , S2 +  ABDOMEN: soft , bs+  EXTREMITIES:  no edema  NEUROLOGY:alert awake    LABS:  05-06    142  |  104  |  16  ----------------------------<  192<H>  4.6   |  23  |  0.73    Ca    8.6      06 May 2024 20:15  Phos  2.5     05-05  Mg     2.0     05-05    TPro  6.2  /  Alb  3.6  /  TBili  1.3<H>  /  DBili      /  AST  29  /  ALT  19  /  AlkPhos  61  05-06    Creatinine Trend: 0.73 <--, 0.73 <--, 0.68 <--, 0.69 <--, 0.71 <--, 0.76 <--                        13.0   24.81 )-----------( 236      ( 06 May 2024 20:15 )             37.7     Urine Studies:  Urinalysis Basic - ( 06 May 2024 20:15 )    Color:  / Appearance:  / SG:  / pH:   Gluc: 192 mg/dL / Ketone:   / Bili:  / Urobili:    Blood:  / Protein:  / Nitrite:    Leuk Esterase:  / RBC:  / WBC    Sq Epi:  / Non Sq Epi:  / Bacteria:         CARDIAC MARKERS ( 06 May 2024 20:15 )  x     / x     / 243 U/L / x     / 11.4 ng/mL      ABG - ( 06 May 2024 23:00 )  pH, Arterial: 7.34  pH, Blood: x     /  pCO2: 32    /  pO2: 186   / HCO3: 17    / Base Excess: -7.5  /  SaO2: 98.7              LIVER FUNCTIONS - ( 06 May 2024 20:15 )  Alb: 3.6 g/dL / Pro: 6.2 g/dL / ALK PHOS: 61 U/L / ALT: 19 U/L / AST: 29 U/L / GGT: x           PT/INR - ( 06 May 2024 20:15 )   PT: 13.9 sec;   INR: 1.33 ratio         PTT - ( 06 May 2024 20:15 )  PTT:29.6 sec                          RADIOLOGY & ADDITIONAL TESTS:    Imaging Personally Reviewed:    Consultant(s) Notes Reviewed:      Care Discussed with Consultants/Other Providers:

## 2024-05-06 NOTE — PROGRESS NOTE ADULT - ASSESSMENT
Assessment  DMT2: 63y Male with DM T2 with hyperglycemia admitted with chest pain, was on Mounjaro injections at home, now intubated on insulin coverage, blood sugars are within acceptable.  CAD: Evaluation for CABG, on medications, monitored.  HTN: On antihypertensive medications, monitored, asymptomatic.      Gena uKmar MD  Cell:  [phone]  Office: [phone]

## 2024-05-06 NOTE — PRE-OP CHECKLIST - MUPIRONCIN COMMENTS
shower x2 done on unit, pre-op meds administered, clorhex mouthwash done
chlorhexidine and prep completed on floor

## 2024-05-06 NOTE — PROGRESS NOTE ADULT - SUBJECTIVE AND OBJECTIVE BOX
MR#[id number]  PATIENT NAME:YAMINI CUNNINGHAM    DATE OF SERVICE: 05-06-24 @ 06:46  Patient was seen and examined by Wilfred Major MD on    05-06-24 @ 06:46 .  Interim events noted.Consultant notes ,Labs,Telemetry reviewed by me     Covering for [facility] -Virginia Bradley, Thien, Travis Song, Francisco Javier Garcia  Office Number: [phone]       HOSPITAL COURSE: HPI:   63M with PMH of HTN, HLD, CAD s/p PCI, DM2 (last A1c 6.0), LENIN on CPAP, Vertigo presents with chest pain on exertion and dyspnea Had-cath-3/1/24--LCX: Patent OM1 stent, patent circumflex stent with 75% stenosis at distal edge of old stent s/p PCI-1 SHARON to LCx via RRA and RBV with WNL filling pressure discharged on DAPT Of note lab report on 3/4 reported Hgb 8.1 from 16 on 3/1-/Lab error,patient denies melena bleeding.        CARDIAC STUDIES:  CATH-05/02/2024-LM-60% mid distal stenosis,LAD-70% ostial stenosis,LCx patent stent IVUS of LM and LAD showed significant plaque burden.  TTE 2/27/24 - Mild LV dysfunction with EF 48%, LVH, Hypokinesis of apical cap, dilated LA, mild AS, AR, MR, TR  CATH 3/01/2024-LCX: Patent OM1 stent, patent circumflex stent with 75% stenosis at distal edge of old stent s/p PCI with new SHARON  CATH 3/29/2023-Mild-moderate nonobstructive CAD of the left cirumflex and left anterior descending arteries. Prior stent to the OM1 is widely patent.      INTERIM EVENTS:Patient seen at bedside ,interim events noted.  Awake alert scheduled for CABG today ,no chest pain dyspnea remains in sinusrhythm      PMH -reviewed admission note, no change since admission  HEART FAILURE: Acute[ ]Chronic[ x] Systolic[ ] Diastolic[x ] Combined Systolic and Diastolic[ ]  CAD[x ] CABG[ ] PCI[ x]  DEVICES[ ] PPM[ ] ICD[ ] ILR[ ]  ATRIAL FIBRILLATION[ ] Paroxysmal[ ] Permanent[ ] CHADS2-[  ]  BISI[ ] CKD1[ ] CKD2[ ] CKD3[ ] CKD4[ ] ESRD[ ]  COPD[ ] HTN[ ]   DM[x ] Type1[ ] Type 2[ x]   CVA[ ] Paresis[ ]    AMBULATION: Assisted[ ] Cane/walker[ ] Independent[x ]            MEDICATIONS  (STANDING):  acetaminophen     Tablet .. 1000 milliGRAM(s) Oral once  aspirin  chewable 81 milliGRAM(s) Oral daily  atorvastatin 40 milliGRAM(s) Oral at bedtime  chlorhexidine 0.12% Liquid 30 milliLiter(s) Swish and Spit once  gabapentin 300 milliGRAM(s) Oral once  glucagon  Injectable 1 milliGRAM(s) IntraMuscular once  insulin lispro (ADMELOG) corrective regimen sliding scale   SubCutaneous at bedtime  insulin lispro (ADMELOG) corrective regimen sliding scale   SubCutaneous three times a day before meals  metoprolol succinate ER 25 milliGRAM(s) Oral daily  mupirocin 2% Nasal 1 Application(s) Both Nostrils two times a day  sodium chloride 0.9%. 1000 milliLiter(s) (75 mL/Hr) IV Continuous <Continuous>  vancomycin  IVPB 1750 milliGRAM(s) IV Intermittent once    MEDICATIONS  (PRN):  acetaminophen     Tablet .. 650 milliGRAM(s) Oral every 6 hours PRN Mild Pain (1 - 3), Moderate Pain (4 - 6)  dextrose Oral Gel 15 Gram(s) Oral once PRN Blood Glucose LESS THAN 70 milliGRAM(s)/deciliter            REVIEW OF SYSTEMS:  Constitutional: [ ] fever, [ ]weight loss,  [x ]fatigue [ ]weight gain  Eyes: [ ] visual changes  Respiratory: [ ]shortness of breath;  [ ] cough, [ ]wheezing, [ ]chills, [ ]hemoptysis  Cardiovascular: [ ] chest pain, [ ]palpitations, [ ]dizziness,  [ ]leg swelling[ ]orthopnea[ ]PND  Gastrointestinal: [ ] abdominal pain, [ ]nausea, [ ]vomiting,  [ ]diarrhea [ ]Constipation [ ]Melena  Genitourinary: [ ] dysuria, [ ] hematuria [ ]Suh  Neurologic: [ ] headaches [ ] tremors[ ]weakness [ ]Paralysis Right[ ] Left[ ]  Skin: [ ] itching, [ ]burning, [ ] rashes  Endocrine: [ ] heat or cold intolerance  Musculoskeletal: [ ] joint pain or swelling; [ ] muscle, back, or extremity pain  Psychiatric: [ ] depression, [ ]anxiety, [ ]mood swings, or [ ]difficulty sleeping  Hematologic: [ ] easy bruising, [ ] bleeding gums    [ ] All remaining systems negative except as per above.   [ ]Unable to obtain.  [x] No change in ROS since admission      Vital Signs Last 24 Hrs  T(C): 36.5 (06 May 2024 04:56), Max: 36.5 (06 May 2024 04:56)  T(F): 97.7 (06 May 2024 04:56), Max: 97.7 (06 May 2024 04:56)  HR: 64 (06 May 2024 04:56) (64 - 84)  BP: 128/78 (06 May 2024 04:56) (128/78 - 149/87)  BP(mean): 98 (05 May 2024 10:05) (98 - 98)  RR: 18 (06 May 2024 04:56) (18 - 18)  SpO2: 96% (06 May 2024 04:56) (96% - 96%)    Parameters below as of 06 May 2024 04:56  Patient On (Oxygen Delivery Method): room air      I&O's Summary    04 May 2024 07:01  -  05 May 2024 07:00  --------------------------------------------------------  IN: 720 mL / OUT: 0 mL / NET: 720 mL    05 May 2024 07:01  -  06 May 2024 06:46  --------------------------------------------------------  IN: 360 mL / OUT: 0 mL / NET: 360 mL        PHYSICAL EXAM:  General: No acute distress BMI-43.6  HEENT: EOMI, PERRL  Neck: Supple, [ ] JVD  Lungs: Equal air entry bilaterally; [ ] rales [ ] wheezing [ ] rhonchi  Heart: Regular rate and rhythm; [x ] murmur   2/6 [ x] systolic [ ] diastolic [ ] radiation[ ] rubs [ ]  gallops  Abdomen: Nontender, bowel sounds present  Extremities: No clubbing, cyanosis, [ ] edema [ ]Pulses  equal and intact  Nervous system:  Alert & Oriented X3, no focal deficits  Psychiatric: Normal affect  Skin: No rashes or lesions    LABS:  05-06    140  |  104  |  17  ----------------------------<  117<H>  4.0   |  24  |  0.73    Ca    9.0      06 May 2024 04:50  Phos  2.5     05-05  Mg     2.0     05-05      Creatinine Trend: 0.73<--, 0.68<--, 0.69<--, 0.71<--, 0.76<--                        14.9   8.56  )-----------( 185      ( 06 May 2024 04:50 )             42.6       P2Y12 Plt Reactivity: 124 <-- 96 <-- 99       Cardiac Catheterization (05.02.24 @ 17:01) >  Diagnostic Findings:     Coronary Angiography   The coronary circulation is right dominant.      LM   Left main artery: Angiography shows severe atherosclerosis. There is a 60 % stenosis in the middle third portion of the segment.    LAD   Left anterior descending artery: Angiography shows severe atherosclerosis. There is a 70 % stenosis in the ostium portion of the segment.      CX   Circumflex: Angiography shows mild atherosclerosis. Patent mid Lx stent.    RCA   Right coronary artery: Angiography shows minor irregularities.        Diagnostic Conclusions:     LM: 60% mid/distal stenosis.    LAD: 70% ostial stenosis   LCX: Patent stent in mid.    RCA: Luminal irregularities    Recommendations:     CABG evaluaiton. IVUS of LM and LAD showed significant plaque burden.  Catheter with ventriculization in LM.     12 Lead ECG (05.02.24 @ 08:52) >   NORMAL SINUS RHYTHM  NORMAL ECG       TTE W or WO Ultrasound Enhancing Agent (05.03.24 @ 07:43) >  CONCLUSIONS:      1. Left ventricular systolic function is hyperdynamic. There are no regional wall motion abnormalities seen.   2. Moderate aortic stenosis.-The aortic valve appears trileaflet with reduced systolic excursion. There is calcification of the aortic valve leaflets. There is moderate thickening of the aortic valve leaflets. There is moderate aortic stenosis. The peak transaortic velocity is 3.30 m/s and mean transaortic gradient is 27.6 mmHg. There is no evidence of aortic regurgitation.   3. No prior echocardiogram is available for comparison.

## 2024-05-06 NOTE — PROGRESS NOTE ADULT - ASSESSMENT
63M with PMH of HTN, HLD, CAD s/p PCI, DM2 (last A1c 6.0), LENIN on CPAP, Vertigo presents with chest pain on exertion and dyspnea Had-cath-3/1/24--LCX: Patent OM1 stent, patent circumflex stent with 75% stenosis at distal edge of old stent s/p PCI-1 SHARON to LCx via RRA and RBV with WNL filling pressure discharged on DAPT Of note lab report on 3/4 reported Hgb 8.1 from 16 on 3/1-/Lab error,patient denies melena bleeding.      #CHEST PAIN-ACS  Hx CAD s/p PCI- S/p stents to LCx and OM1 2/2022 and PCI-SHARON LCx 3/1/2024  On DAPT  Presents with 1 week of dyspnea with intermittent mid-sternal CP with occasional radiation to the left shoulder.  < from: Cardiac Catheterization (05.02.24 @ 17:01) >  LM: 60% mid/distal stenosis.    LAD: 70% ostial stenosis   LCX: Patent stent in mid.    RCA: Luminal irregularities    Recommendations:     CABG today  . IVUS of LM and LAD showed significant plaque burden.  Catheter with ventriculization in LM.    < end of copied text >    # Type 2 diabetes mellitus.   ·  Plan: - On presentation, POCT FS 140s  - Home regimen includes metformin 500 daily  - Initiate ISS; monitor FS for goal 140-180  - A1c -6.0% 3/29/23.    # HLD (hyperlipidemia).   ·  Plan: - Home regimen includes atorvastatin 40  - C/w atorvastatin 40    # Hypertension.   ·  Plan: - On presentation, -156  - Home regimen is losartan 50

## 2024-05-06 NOTE — AIRWAY PLACEMENT NOTE ADULT - AIRWAY COMMENTS:
"Chief complaint:   Chief Complaint   Patient presents with   â¢ Hand Injury     patient hit a wall, injuring right hand/wrist/ on Friday, no fever or other injuries       Vitals:  Visit Vitals  /76 (BP Location: [facility] [place], Patient Position: Sitting, Cuff Size: Regular)   Pulse 75   Temp 98.2 Â°F (36.8 Â°C) (Temporal Artery)   Resp 18   Ht 5' 5.5"" (1.664 m)   Wt 68.7 kg   SpO2 99%   BMI 24.81 kg/mÂ²       HISTORY OF PRESENT ILLNESS     HPI     Patient presents with right hand injury that happened on Friday night. He got upset with his brother and he punched a wall. Presents with pain and slight swelling. No bruising. Denies any previous injuries. Other significant problems: There are no active problems to display for this patient. PAST MEDICAL, FAMILY AND SOCIAL HISTORY     Medications:  Current Outpatient Prescriptions   Medication   â¢ lisdexamfetamine (VYVANSE) 40 MG capsule     No current facility-administered medications for this visit. Allergies:  ALLERGIES:   Allergen Reactions   â¢ Amoxicillin RASH   â¢ Penicillins RASH       Past Medical  History/Surgeries:  Past Medical History:   Diagnosis Date   â¢ Asthma    â¢ Attention deficit disorder    â¢ Healthcare maintenance        Past Surgical History:   Procedure Laterality Date   â¢ CIRCUMCISION, PRIMARY  2004   â¢ TYMPANOSTOMY         Family History:  No family history on file. Social History:  Social History   Substance Use Topics   â¢ Smoking status: Never Smoker   â¢ Smokeless tobacco: Never Used   â¢ Alcohol use No       REVIEW OF SYSTEMS     Review of Systems    PHYSICAL EXAM     Physical Exam  No acute distress  Right-hand-there is mild tenderness along the right fourth and fifth metacarpal area, no skin break, no ecchymosis or discoloration. Minimal swelling present. Radial pulse 2+, sensations are intact  ASSESSMENT/PLAN     Tegan Aguilar was seen today for hand injury.     Diagnoses and all orders for this visit:    Injury of right hand, initial " encounter  -     XR HAND 3+ VIEW RIGHT; Future  -     XR HAND 3+ VIEW RIGHT      X-ray of right hand-no acute findings  See patient instructions for complete plan details. intubated in OR. Arrived to CTU and placed on mechanical ventilator.

## 2024-05-06 NOTE — PRE-OP CHECKLIST - BP NONINVASIVE DIASTOLIC (MM HG)
78
80
keflex 500mg by mouth 3 times a day for 7 days  follow up with doctor in 1 - 2 weeks  tylenol 650mg by mouth every 6 hours  pepcid 20mg by mouth 2 times a day for 7 days

## 2024-05-06 NOTE — PRE-OP CHECKLIST - WAS PATIENT ON BETA BLOCKER?
Assessment:     1  Chronic left shoulder pain    2  Knee strain, left, initial encounter    3  Acute pain of left knee        Plan:     Problem List Items Addressed This Visit     None      Visit Diagnoses     Chronic left shoulder pain    -  Primary    Relevant Orders    XR shoulder 2+ vw left    Ambulatory referral to Physical Therapy    Knee strain, left, initial encounter        Acute pain of left knee        Relevant Orders    XR knee 4+ vw left injury          Findings consistent with chronic left shoulder pain and a left knee strain  I discussed with the patient that he dislocated his shoulder back in 2003 , but did not have proper rehabilitation of the shoulder  He has no recent dislocations or instability of his left shoulder  Treatment options were discussed in the form of physical therapy  A script for physical therapy was provided to the patient at today's visit  He may use Aspercreme or Voltaren gel for his left knee strain  I will see him back in 6 weeks for a clinical re-evaluation  All patient's questions were answered to his satisfaction  This note is created using dictation transcription  It may contain typographical errors, grammatical errors, improperly dictated words, background noise and other errors  Subjective:     Patient ID: Tonie Tena is a 57 y o  male  Chief Complaint:  Patient is a 53-year-old who presents the office today for an initial evaluation of his left shoulder and left knee pain  He states that his left shoulder pain started in 2003 when he tripped over a bag of garbage and fell into a car  He states that his left shoulder hit the car  He was seen at [facility] the next day where he states that they relocated his shoulder  He states that he has constant pain  He is able to do his activities of daily living without any issues  He states that he did no treatment for his shoulder after the initial injury    He states he will use Advil to help alleviate his pain   He denies any weakness into his right shoulder arm  He denies any numbness or tingling  He states that about 1 month ago, he was sitting with his legs crossed on got up quick and felt a pop over his lateral left knee  He denies any pain he denies any giving out  He denies any previous injury  Patient is walking without use of any assistive device  He did notice some swelling over the outer aspect of knee which seemed to have decreased  Information on patient's intake form was reviewed  Allergy:  No Known Allergies  Medications:  all current active meds have been reviewed  Past Medical History:  History reviewed  No pertinent past medical history  Past Surgical History:  Past Surgical History:   Procedure Laterality Date    APPENDECTOMY  2014    NASAL SEPTUM SURGERY  2005     Family History:  Family History   Problem Relation Age of Onset    Diabetes Mother     Cancer Father     Diabetes Father      Social History:  Social History     Substance and Sexual Activity   Alcohol Use None    Comment: social     Social History     Substance and Sexual Activity   Drug Use Not on file     Social History     Tobacco Use   Smoking Status Former Smoker   Smokeless Tobacco Never Used     Review of Systems   Constitutional: Negative  HENT: Positive for hearing loss and tinnitus  Eyes: Negative  Respiratory: Negative  Cardiovascular: Negative  Gastrointestinal: Negative  Endocrine: Negative  Genitourinary: Negative  Musculoskeletal: Positive for arthralgias (Left shoulder), back pain, myalgias and neck pain  Skin: Negative  Allergic/Immunologic: Negative  Neurological: Negative  Hematological: Negative  Psychiatric/Behavioral: Negative            Objective:  BP Readings from Last 1 Encounters:   04/21/21 120/70      Wt Readings from Last 1 Encounters:   04/21/21 86 2 kg (190 lb)      BMI:   Estimated body mass index is 25 07 kg/m² as calculated from the following:    Height as of this encounter: 6' 1" (1 854 m)  Weight as of this encounter: 86 2 kg (190 lb)  BSA:   Estimated body surface area is 2 11 meters squared as calculated from the following:    Height as of this encounter: 6' 1" (1 854 m)  Weight as of this encounter: 86 2 kg (190 lb)  Physical Exam  Vitals signs and nursing note reviewed  Constitutional:       Appearance: Normal appearance  He is well-developed  HENT:      Head: Normocephalic and atraumatic  Right Ear: External ear normal       Left Ear: External ear normal    Eyes:      Extraocular Movements: Extraocular movements intact  Conjunctiva/sclera: Conjunctivae normal    Neck:      Musculoskeletal: Neck supple  Pulmonary:      Effort: Pulmonary effort is normal    Musculoskeletal:      Left knee: He exhibits no effusion  Skin:     General: Skin is warm  Neurological:      Mental Status: He is alert and oriented to person, place, and time  Psychiatric:         Mood and Affect: Mood normal          Behavior: Behavior normal        Left Knee Exam     Range of Motion   Extension: 0   Flexion: 130     Tests   Frandy:  Medial - negative Lateral - negative  Varus: negative Valgus: negative    Other   Erythema: absent  Scars: absent  Sensation: normal  Pulse: present  Swelling: none  Effusion: no effusion present    Comments:  Distal biceps tendon over fibular head      Left Shoulder Exam     Tenderness   The patient is experiencing tenderness in the biceps tendon      Range of Motion   Active abduction: 170   Passive abduction: 170   External rotation: 90   Forward flexion: 180   Internal rotation 0 degrees: Lumbar     Muscle Strength   Abduction: 5/5   Internal rotation: 5/5   External rotation: 5/5   Supraspinatus: 5/5   Subscapularis: 5/5   Biceps: 5/5     Tests   Apprehension: positive  Ross test: negative  Cross arm: negative  Impingement: negative  Drop arm: negative  Sulcus: absent    Other   Erythema: absent  Scars: absent  Sensation: normal  Pulse: present             I have personally reviewed pertinent films in PACS and my interpretation is as follows: X-rays taken today of his left knee demonstrates no acute fractures or dislocations appreciated  X-rays taken today of his left shoulder demonstrates no acute fractures or dislocations appreciated  There is calcification over the coracoclavicular ligaments  There is no widening over the acromioclavicular joint      Scribe Attestation    I,:  Angie Kimball am acting as a scribe while in the presence of the attending physician :       I,:  Joslyn Bates MD personally performed the services described in this documentation    as scribed in my presence : Yes

## 2024-05-06 NOTE — PROGRESS NOTE ADULT - SUBJECTIVE AND OBJECTIVE BOX
Chief complaint    Patient is a 63y old  Male who presents with a chief complaint of Chest Pain (06 May 2024 06:46)   Review of systems  Patient appears comfortable.    Labs and Fingersticks  CAPILLARY BLOOD GLUCOSE      POCT Blood Glucose.: 120 mg/dL (06 May 2024 12:25)  POCT Blood Glucose.: 108 mg/dL (06 May 2024 06:50)  POCT Blood Glucose.: 93 mg/dL (05 May 2024 21:33)  POCT Blood Glucose.: 116 mg/dL (05 May 2024 16:54)      Anion Gap: 12 (05-06 @ 04:50)  Anion Gap: 13 (05-05 @ 11:22)      Calcium: 9.0 (05-06 @ 04:50)  Calcium: 9.3 (05-05 @ 11:22)          05-06    140  |  104  |  17  ----------------------------<  117<H>  4.0   |  24  |  0.73    Ca    9.0      06 May 2024 04:50  Phos  2.5     05-05  Mg     2.0     05-05                          14.9   8.56  )-----------( 185      ( 06 May 2024 04:50 )             42.6     Medications  MEDICATIONS  (STANDING):  sodium chloride 0.9%. 1000 milliLiter(s) (75 mL/Hr) IV Continuous <Continuous>  vancomycin  IVPB 1750 milliGRAM(s) IV Intermittent once      Physical Exam  General: Patient appears comfortable.  Vital Signs Last 12 Hrs  T(F): 97.5 (05-06-24 @ 12:57), Max: 98 (05-06-24 @ 10:22)  HR: 69 (05-06-24 @ 13:41) (64 - 70)  BP: 124/80 (05-06-24 @ 13:41) (124/80 - 129/78)  BP(mean): 98 (05-06-24 @ 13:41) (98 - 98)  RR: 18 (05-06-24 @ 13:41) (16 - 18)  SpO2: 96% (05-06-24 @ 13:41) (96% - 96%)  Neck: No palpable thyroid nodules.  CVS: S1S2, No murmurs  Respiratory: No wheezing, no crepitations  GI: Abdomen soft, non tender.    Diagnostics    A1C with Estimated Average Glucose: AM Sched. Collection: 05-May-2024 06:00 (05-04 @ 07:14)      Radiology:

## 2024-05-06 NOTE — BRIEF OPERATIVE NOTE - OPERATION/FINDINGS
Aortic XClamp:  55   |    Total CPB: 32  Procedure: C-2L  LIMA-LAD | SVG-OM    Right greater saphenous vein harvested endoscopically by VALERIO Martinez

## 2024-05-06 NOTE — PROGRESS NOTE ADULT - SUBJECTIVE AND OBJECTIVE BOX
ESPINOZA CUNNINGHAM  MRN-[id number]  Patient is a 63y old  Male who presents with a chief complaint of Chest Pain (06 May 2024 15:58)    HPI:   63M with PMH of HTN, HLD, CAD s/p PCI, DM2 (last A1c 6.0), LENIN on CPAP, Vertigo presents with chest pain on exertion and dyspnea Had-cath-3/1/24--LCX: Patent OM1 stent, patent circumflex stent with 75% stenosis at distal edge of old stent s/p PCI-1 SHARON to LCx via RRA and RBV with WNL filling pressure discharged on DAPT Of note lab report on 3/4 reported Hgb 8.1 from 16 on 3/1-/Lab error,patient denies melena bleeding.    CARDIAC STUDIES:  TTE 2/27/24 - Mild LV dysfunction with EF 48%, LVH, Hypokinesis of apical cap, dilated LA, mild AS, AR, MR, TR  CATH 3/01/2024-LCX: Patent OM1 stent, patent circumflex stent with 75% stenosis at distal edge of old stent s/p PCI with new SHARON  CATH 3/29/2023-Mild-moderate nonobstructive CAD of the left cirumflex and leftanterior descending arteries. Prior stent to the OM1 is widelypatent.    (02 May 2024 10:05)      Surgery/Hospital course:  5/6/2024 CABG ( C2L)     brought to icu from OR , intubated. full vent support.  sedated on precedex  drips insuline and norepinephrine   BP labile, fluid resuscitated , titrate pressors     Physical Exam:  Gen:  mild lethargy post anaesthesia  CNS: non focal  Neck: no JVD  RES : clear , no wheezing    Chest:   + chest tubes                     CVS: Regular  rhythm. Normal S1/S2  Abd: Soft, non-distended. Bowel sounds present.  Skin: No rash.  Ext:  no edema    Vital Signs Last 24 Hrs  T(C): 36.6 (06 May 2024 20:00), Max: 36.7 (06 May 2024 10:22)  T(F): 97.9 (06 May 2024 20:00), Max: 98 (06 May 2024 10:22)  HR: 100 (06 May 2024 23:00) (64 - 100)  BP: 124/80 (06 May 2024 13:41) (124/80 - 129/78)  BP(mean): 98 (06 May 2024 13:41) (98 - 98)  RR: 16 (06 May 2024 23:00) (7 - 23)  SpO2: 100% (06 May 2024 23:00) (96% - 100%)    Parameters below as of 06 May 2024 20:00  Patient On (Oxygen Delivery Method): ventilator    O2 Concentration (%): 100    LABS:                        13.0   24.81 )-----------( 236      ( 06 May 2024 20:15 )             37.7     05-06    142  |  104  |  16  ----------------------------<  192<H>  4.6   |  23  |  0.73    Ca    8.6      06 May 2024 20:15  Phos  2.5     05-05  Mg     2.0     05-05    TPro  6.2  /  Alb  3.6  /  TBili  1.3<H>  /  DBili  x   /  AST  29  /  ALT  19  /  AlkPhos  61  05-06    PT/INR - ( 06 May 2024 20:15 )   PT: 13.9 sec;   INR: 1.33 ratio   PTT - ( 06 May 2024 20:15 )  PTT:29.6 sec  ABG - ( 06 May 2024 23:00 ) pH, Arterial: 7.34  pH, Blood: x     /  pCO2: 32    /  pO2: 186   / HCO3: 17    / Base Excess: -7.5  /  SaO2: 98.7      ============================I/O===========================   I&O's Detail    05 May 2024 07:01  -  06 May 2024 07:00  --------------------------------------------------------  IN:    Oral Fluid: 360 mL  Total IN: 360 mL    OUT:    Voided (mL): 0 mL  Total OUT: 0 mL    Total NET: 360 mL      06 May 2024 07:01  -  06 May 2024 23:21  --------------------------------------------------------  IN:    Albumin 5%  - 250 mL: 1000 mL    Insulin: 15 mL    IV PiggyBack: 300 mL    Norepinephrine: 20.4 mL    sodium chloride 0.9%: 40 mL  Total IN: 1375.4 mL    OUT:    Chest Tube (mL): 25 mL    Chest Tube (mL): 60 mL    Indwelling Catheter - Urethral (mL): 450 mL  Total OUT: 535 mL    Total NET: 840.4 mL          =====================Rad/Cardio/cultures =================  CXR: Reviewed  Echo:Reviewed  ======================================================  Home Medications:  Ecotrin Adult Low Strength 81 mg oral delayed release tablet: 1 tab(s) orally once a day (01 Mar 2024 15:22)  losartan 100 mg oral tablet: 1 tab(s) orally once a day (04 Mar 2024 10:14)  Mounjaro 10 mg/0.5 mL subcutaneous solution: 10 milligram(s) subcutaneously once a week Every Mondays (04 Mar 2024 10:14)  Vitamin D3 5000 intl units oral capsule: 1 cap(s) orally once a day (01 Mar 2024 15:22)    PAST MEDICAL & SURGICAL HISTORY:  Hypertension  LENIN (Obstructive Sleep Apnea)Sleep study done 2015  uses CPAP at home  Type 2 diabetes mellitus  Morbid obesity with BMI of 50.0-59.9, adult  Venous insufficiency of both lower extremities  Anemia   History of Appendectomy 1982  Gastric Bypass 1998.  Patient lost 225 lbs and has regained 125 bs in the last 9years.  Umbilical Hernia Repair 2002  History of [id number]  Incisional Hernia [id number]  S/P Cholecystectomy  H/O ventral hernia repair 08/24/12 incarcerated ventral hernia repair with mesh.        ====================ASSESMENT/MDM ==============  5/6/2024 CABG ( C2L)   hemodynamic instability  At risk for respiratory insufficiency  post op pain  chest tube in place  CAD/ASHD  Acute Respiratory insuficiency post op  hypovolemia   Anemia blood loss  Hyperglycemia  Diabetes mellitus type 2  Hypothyroidism       Plan:  ====================== NEUROLOGY============  pain control:   sedation with Precedex infusion    acetaminophen     Tablet .. 650 milliGRAM(s) Oral every 6 hours  aspirin Suppository 300 milliGRAM(s) Rectal once  gabapentin 100 milliGRAM(s) Oral every 8 hours  HYDROmorphone  Injectable 0.5 milliGRAM(s) IV Push every 6 hours PRN Breakthrough Pain  metoclopramide Injectable 10 milliGRAM(s) IV Push every 8 hours  oxyCODONE    IR 10 milliGRAM(s) Oral every 4 hours PRN Severe Pain (7 - 10)  oxyCODONE    IR 5 milliGRAM(s) Oral every 4 hours PRN Moderate Pain (4 - 6)    ====================== RESPIRATORY============  VENT support, peep 10--> 5, Fio2 100-->40  ABG - ( 06 May 2024 23:00 ) pH, Arterial: 7.34  pH, Blood: x     /  pCO2: 32    /  pO2: 186   / HCO3: 17    / Base Excess: -7.5  /  SaO2: 98.7    Mechanical Ventilation:  Mode: CPAP with PS  FiO2: 40  PEEP: 5  PS: 15  MAP: 10  PIP: 21  incentive spirometry  titrate FiO2 , keep sat above 92%  Monitor chest tube: outputs , Air leak,   continue chest tube to  suction , f/u cxr    ======================CARDIOVASCULAR =========  Monitor Hemodynamic, Telemetry  pressors: Levophed    aMIOdarone    Tablet 400 milliGRAM(s) Oral two times a day  norepinephrine Infusion 0.05 MICROgram(s)/kG/Min (11.3 mL/Hr) IV Continuous <Continuous>    titrate pressors for mean BP above 60    =======================Hematology===============                        13.0   24.81 )-----------( 236      ( 06 May 2024 20:15 )             37.7        PT/INR - ( 06 May 2024 20:15 )   PT: 13.9 sec;   INR: 1.33 ratio         PTT - ( 06 May 2024 20:15 )  PTT:29.6 sec   aspirin enteric coated 81 milliGRAM(s) Oral daily  monitor  Hb/Hct ,plts ,coags     ========================RENAL ===================  05-06    142  |  104  |  16  ----------------------------<  192<H>  4.6   |  23  |  0.73    Ca    8.6      06 May 2024 20:15  Phos  2.5     05-05  Mg     2.0     05-05    TPro  6.2  /  Alb  3.6  /  TBili  1.3<H>  /  DBili  x   /  AST  29  /  ALT  19  /  AlkPhos  61  05-06  ======================== GASTROINTESTINAL========  Diet:NPO     GI prophylaxis :PPI  Pepcid  Bowel regimen:    LIVER FUNCTIONS - ( 06 May 2024 20:15 )  Alb: 3.6 g/dL / Pro: 6.2 g/dL / ALK PHOS: 61 U/L / ALT: 19 U/L / AST: 29 U/L / GGT: x             ascorbic acid 500 milliGRAM(s) Oral two times a day  pantoprazole  Injectable 40 milliGRAM(s) IV Push daily  potassium chloride  10 mEq/50 mL IVPB 10 milliEquivalent(s) IV Intermittent every 1 hour  potassium chloride  10 mEq/50 mL IVPB 10 milliEquivalent(s) IV Intermittent every 1 hour  potassium chloride  10 mEq/50 mL IVPB 10 milliEquivalent(s) IV Intermittent every 1 hour  sodium chloride 0.9%. 1000 milliLiter(s) (10 mL/Hr) IV Continuous <Continuous>    ======================= ENDOCRINE  =============  HbA1c:  Glycemic control : insulin drip  , Lantus/NPH, Lispro sliding scale  statin   Hypothyroid : synthroid TSH    dextrose 50% Injectable 25 milliLiter(s) IV Push every 15 minutes  dextrose 50% Injectable 50 milliLiter(s) IV Push every 15 minutes  insulin regular Infusion 3 Unit(s)/Hr (3 mL/Hr) IV Continuous <Continuous>    ========================INFECTIOUS DISEASE========  prophylactic antibiotics:    -----------------------------------------------------------------------------------------------------------  ALL MEDICATIONS   MEDICATIONS  (STANDING):  acetaminophen     Tablet .. 650 milliGRAM(s) Oral every 6 hours  aMIOdarone    Tablet 400 milliGRAM(s) Oral two times a day  ascorbic acid 500 milliGRAM(s) Oral two times a day  aspirin enteric coated 81 milliGRAM(s) Oral daily  aspirin Suppository 300 milliGRAM(s) Rectal once  chlorhexidine 0.12% Liquid 15 milliLiter(s) Oral Mucosa every 12 hours  chlorhexidine 2% Cloths 1 Application(s) Topical daily  dextrose 50% Injectable 25 milliLiter(s) IV Push every 15 minutes  dextrose 50% Injectable 50 milliLiter(s) IV Push every 15 minutes  gabapentin 100 milliGRAM(s) Oral every 8 hours  insulin regular Infusion 3 Unit(s)/Hr (3 mL/Hr) IV Continuous <Continuous>  metoclopramide Injectable 10 milliGRAM(s) IV Push every 8 hours  norepinephrine Infusion 0.05 MICROgram(s)/kG/Min (11.3 mL/Hr) IV Continuous <Continuous>  pantoprazole  Injectable 40 milliGRAM(s) IV Push daily  potassium chloride  10 mEq/50 mL IVPB 10 milliEquivalent(s) IV Intermittent every 1 hour  potassium chloride  10 mEq/50 mL IVPB 10 milliEquivalent(s) IV Intermittent every 1 hour  potassium chloride  10 mEq/50 mL IVPB 10 milliEquivalent(s) IV Intermittent every 1 hour  sodium chloride 0.9%. 1000 milliLiter(s) (10 mL/Hr) IV Continuous <Continuous>    MEDICATIONS  (PRN):  HYDROmorphone  Injectable 0.5 milliGRAM(s) IV Push every 6 hours PRN Breakthrough Pain  oxyCODONE    IR 5 milliGRAM(s) Oral every 4 hours PRN Moderate Pain (4 - 6)  oxyCODONE    IR 10 milliGRAM(s) Oral every 4 hours PRN Severe Pain (7 - 10)    ------------------------------------------------------------------------------------------------------  Patient requires continuous monitoring with:  bedside rhythm monitoring,continuous  pulse oximetry monitoring, O2 supplement titrate for O2 sat above 90%  ;   ventilator management and monitoring; intermittent blood gas analysis.  Care plan discussed with ICU care team.  patient remain critical, at risk for life threatening decompensation; required more than usual post op care;   I have spent 35 minutes providing non routine post op care, revaluated multiple times.      Opal Cramer MD  intensivist   [facility]

## 2024-05-07 DIAGNOSIS — Z95.1 PRESENCE OF AORTOCORONARY BYPASS GRAFT: ICD-10-CM

## 2024-05-07 DIAGNOSIS — G47.33 OBSTRUCTIVE SLEEP APNEA (ADULT) (PEDIATRIC): ICD-10-CM

## 2024-05-07 DIAGNOSIS — Z29.9 ENCOUNTER FOR PROPHYLACTIC MEASURES, UNSPECIFIED: ICD-10-CM

## 2024-05-07 LAB
ALBUMIN SERPL ELPH-MCNC: 4 G/DL — SIGNIFICANT CHANGE UP (ref 3.3–5)
ALP SERPL-CCNC: 44 U/L — SIGNIFICANT CHANGE UP (ref 40–120)
ALT FLD-CCNC: 16 U/L — SIGNIFICANT CHANGE UP (ref 10–45)
ANION GAP SERPL CALC-SCNC: 15 MMOL/L — SIGNIFICANT CHANGE UP (ref 5–17)
APTT BLD: 40.8 SEC — HIGH (ref 24.5–35.6)
AST SERPL-CCNC: 22 U/L — SIGNIFICANT CHANGE UP (ref 10–40)
BASE EXCESS BLDV CALC-SCNC: -1 MMOL/L — SIGNIFICANT CHANGE UP (ref -2–3)
BASOPHILS # BLD AUTO: 0.02 K/UL — SIGNIFICANT CHANGE UP (ref 0–0.2)
BASOPHILS NFR BLD AUTO: 0.1 % — SIGNIFICANT CHANGE UP (ref 0–2)
BILIRUB SERPL-MCNC: 1.5 MG/DL — HIGH (ref 0.2–1.2)
BUN SERPL-MCNC: 16 MG/DL — SIGNIFICANT CHANGE UP (ref 7–23)
CA-I SERPL-SCNC: 1.27 MMOL/L — SIGNIFICANT CHANGE UP (ref 1.15–1.33)
CALCIUM SERPL-MCNC: 9.4 MG/DL — SIGNIFICANT CHANGE UP (ref 8.4–10.5)
CHLORIDE BLDV-SCNC: 105 MMOL/L — SIGNIFICANT CHANGE UP (ref 96–108)
CHLORIDE SERPL-SCNC: 107 MMOL/L — SIGNIFICANT CHANGE UP (ref 96–108)
CO2 BLDV-SCNC: 26 MMOL/L — SIGNIFICANT CHANGE UP (ref 22–26)
CO2 SERPL-SCNC: 18 MMOL/L — LOW (ref 22–31)
CREAT SERPL-MCNC: 0.7 MG/DL — SIGNIFICANT CHANGE UP (ref 0.5–1.3)
EGFR: 105 ML/MIN/1.73M2 — SIGNIFICANT CHANGE UP
EOSINOPHIL # BLD AUTO: 0 K/UL — SIGNIFICANT CHANGE UP (ref 0–0.5)
EOSINOPHIL NFR BLD AUTO: 0 % — SIGNIFICANT CHANGE UP (ref 0–6)
FIBRINOGEN PPP-MCNC: 240 MG/DL — SIGNIFICANT CHANGE UP (ref 200–445)
GAS PNL BLDA: SIGNIFICANT CHANGE UP
GAS PNL BLDV: 139 MMOL/L — SIGNIFICANT CHANGE UP (ref 136–145)
GAS PNL BLDV: SIGNIFICANT CHANGE UP
GAS PNL BLDV: SIGNIFICANT CHANGE UP
GLUCOSE BLDC GLUCOMTR-MCNC: 104 MG/DL — HIGH (ref 70–99)
GLUCOSE BLDC GLUCOMTR-MCNC: 105 MG/DL — HIGH (ref 70–99)
GLUCOSE BLDC GLUCOMTR-MCNC: 106 MG/DL — HIGH (ref 70–99)
GLUCOSE BLDC GLUCOMTR-MCNC: 111 MG/DL — HIGH (ref 70–99)
GLUCOSE BLDC GLUCOMTR-MCNC: 120 MG/DL — HIGH (ref 70–99)
GLUCOSE BLDC GLUCOMTR-MCNC: 122 MG/DL — HIGH (ref 70–99)
GLUCOSE BLDC GLUCOMTR-MCNC: 128 MG/DL — HIGH (ref 70–99)
GLUCOSE BLDC GLUCOMTR-MCNC: 133 MG/DL — HIGH (ref 70–99)
GLUCOSE BLDC GLUCOMTR-MCNC: 133 MG/DL — HIGH (ref 70–99)
GLUCOSE BLDC GLUCOMTR-MCNC: 142 MG/DL — HIGH (ref 70–99)
GLUCOSE BLDC GLUCOMTR-MCNC: 144 MG/DL — HIGH (ref 70–99)
GLUCOSE BLDC GLUCOMTR-MCNC: 145 MG/DL — HIGH (ref 70–99)
GLUCOSE BLDC GLUCOMTR-MCNC: 149 MG/DL — HIGH (ref 70–99)
GLUCOSE BLDC GLUCOMTR-MCNC: 170 MG/DL — HIGH (ref 70–99)
GLUCOSE BLDC GLUCOMTR-MCNC: 171 MG/DL — HIGH (ref 70–99)
GLUCOSE BLDC GLUCOMTR-MCNC: 176 MG/DL — HIGH (ref 70–99)
GLUCOSE BLDC GLUCOMTR-MCNC: 181 MG/DL — HIGH (ref 70–99)
GLUCOSE BLDC GLUCOMTR-MCNC: 21 MG/DL — CRITICAL LOW (ref 70–99)
GLUCOSE BLDC GLUCOMTR-MCNC: 212 MG/DL — HIGH (ref 70–99)
GLUCOSE BLDV-MCNC: 173 MG/DL — HIGH (ref 70–99)
GLUCOSE SERPL-MCNC: 223 MG/DL — HIGH (ref 70–99)
HCO3 BLDV-SCNC: 25 MMOL/L — SIGNIFICANT CHANGE UP (ref 22–29)
HCT VFR BLD CALC: 30.5 % — LOW (ref 39–50)
HCT VFR BLDA CALC: 32 % — LOW (ref 39–51)
HGB BLD CALC-MCNC: 10.5 G/DL — LOW (ref 12.6–17.4)
HGB BLD-MCNC: 10.5 G/DL — LOW (ref 13–17)
HOROWITZ INDEX BLDV+IHG-RTO: 40 — SIGNIFICANT CHANGE UP
IMM GRANULOCYTES NFR BLD AUTO: 0.7 % — SIGNIFICANT CHANGE UP (ref 0–0.9)
INR BLD: 1.46 RATIO — HIGH (ref 0.85–1.18)
LACTATE BLDV-MCNC: 4.3 MMOL/L — CRITICAL HIGH (ref 0.5–2)
LYMPHOCYTES # BLD AUTO: 0.58 K/UL — LOW (ref 1–3.3)
LYMPHOCYTES # BLD AUTO: 4.2 % — LOW (ref 13–44)
MAGNESIUM SERPL-MCNC: 1.8 MG/DL — SIGNIFICANT CHANGE UP (ref 1.6–2.6)
MCHC RBC-ENTMCNC: 28.5 PG — SIGNIFICANT CHANGE UP (ref 27–34)
MCHC RBC-ENTMCNC: 34.4 GM/DL — SIGNIFICANT CHANGE UP (ref 32–36)
MCV RBC AUTO: 82.9 FL — SIGNIFICANT CHANGE UP (ref 80–100)
MONOCYTES # BLD AUTO: 0.33 K/UL — SIGNIFICANT CHANGE UP (ref 0–0.9)
MONOCYTES NFR BLD AUTO: 2.4 % — SIGNIFICANT CHANGE UP (ref 2–14)
NEUTROPHILS # BLD AUTO: 12.93 K/UL — HIGH (ref 1.8–7.4)
NEUTROPHILS NFR BLD AUTO: 92.6 % — HIGH (ref 43–77)
NRBC # BLD: 0 /100 WBCS — SIGNIFICANT CHANGE UP (ref 0–0)
PCO2 BLDV: 45 MMHG — SIGNIFICANT CHANGE UP (ref 42–55)
PH BLDV: 7.35 — SIGNIFICANT CHANGE UP (ref 7.32–7.43)
PHOSPHATE SERPL-MCNC: 2.7 MG/DL — SIGNIFICANT CHANGE UP (ref 2.5–4.5)
PLATELET # BLD AUTO: 158 K/UL — SIGNIFICANT CHANGE UP (ref 150–400)
PO2 BLDV: 56 MMHG — HIGH (ref 25–45)
POTASSIUM BLDV-SCNC: 3.9 MMOL/L — SIGNIFICANT CHANGE UP (ref 3.5–5.1)
POTASSIUM SERPL-MCNC: 4 MMOL/L — SIGNIFICANT CHANGE UP (ref 3.5–5.3)
POTASSIUM SERPL-SCNC: 4 MMOL/L — SIGNIFICANT CHANGE UP (ref 3.5–5.3)
PROT SERPL-MCNC: 6 G/DL — SIGNIFICANT CHANGE UP (ref 6–8.3)
PROTHROM AB SERPL-ACNC: 15.2 SEC — HIGH (ref 9.5–13)
RBC # BLD: 3.68 M/UL — LOW (ref 4.2–5.8)
RBC # FLD: 12.9 % — SIGNIFICANT CHANGE UP (ref 10.3–14.5)
SAO2 % BLDV: 87.8 % — SIGNIFICANT CHANGE UP (ref 67–88)
SODIUM SERPL-SCNC: 140 MMOL/L — SIGNIFICANT CHANGE UP (ref 135–145)
WBC # BLD: 13.96 K/UL — HIGH (ref 3.8–10.5)
WBC # FLD AUTO: 13.96 K/UL — HIGH (ref 3.8–10.5)

## 2024-05-07 PROCEDURE — 71045 X-RAY EXAM CHEST 1 VIEW: CPT | Mod: 26

## 2024-05-07 PROCEDURE — 99291 CRITICAL CARE FIRST HOUR: CPT

## 2024-05-07 RX ORDER — GLUCAGON INJECTION, SOLUTION 0.5 MG/.1ML
1 INJECTION, SOLUTION SUBCUTANEOUS ONCE
Refills: 0 | Status: DISCONTINUED | OUTPATIENT
Start: 2024-05-07 | End: 2024-05-13

## 2024-05-07 RX ORDER — HYDRALAZINE HCL 50 MG
10 TABLET ORAL ONCE
Refills: 0 | Status: COMPLETED | OUTPATIENT
Start: 2024-05-07 | End: 2024-05-07

## 2024-05-07 RX ORDER — POTASSIUM CHLORIDE 20 MEQ
10 PACKET (EA) ORAL
Refills: 0 | Status: COMPLETED | OUTPATIENT
Start: 2024-05-07 | End: 2024-05-07

## 2024-05-07 RX ORDER — HYDROMORPHONE HYDROCHLORIDE 2 MG/ML
0.5 INJECTION INTRAMUSCULAR; INTRAVENOUS; SUBCUTANEOUS ONCE
Refills: 0 | Status: DISCONTINUED | OUTPATIENT
Start: 2024-05-07 | End: 2024-05-07

## 2024-05-07 RX ORDER — ALBUMIN HUMAN 25 %
250 VIAL (ML) INTRAVENOUS ONCE
Refills: 0 | Status: COMPLETED | OUTPATIENT
Start: 2024-05-07 | End: 2024-05-06

## 2024-05-07 RX ORDER — CLOPIDOGREL BISULFATE 75 MG/1
75 TABLET, FILM COATED ORAL DAILY
Refills: 0 | Status: DISCONTINUED | OUTPATIENT
Start: 2024-05-08 | End: 2024-05-13

## 2024-05-07 RX ORDER — AMLODIPINE BESYLATE 2.5 MG/1
5 TABLET ORAL DAILY
Refills: 0 | Status: DISCONTINUED | OUTPATIENT
Start: 2024-05-07 | End: 2024-05-07

## 2024-05-07 RX ORDER — ACETAMINOPHEN 500 MG
1000 TABLET ORAL ONCE
Refills: 0 | Status: COMPLETED | OUTPATIENT
Start: 2024-05-07 | End: 2024-05-07

## 2024-05-07 RX ORDER — PANTOPRAZOLE SODIUM 20 MG/1
40 TABLET, DELAYED RELEASE ORAL
Refills: 0 | Status: DISCONTINUED | OUTPATIENT
Start: 2024-05-07 | End: 2024-05-13

## 2024-05-07 RX ORDER — SODIUM CHLORIDE 9 MG/ML
1000 INJECTION, SOLUTION INTRAVENOUS
Refills: 0 | Status: DISCONTINUED | OUTPATIENT
Start: 2024-05-07 | End: 2024-05-08

## 2024-05-07 RX ORDER — ALBUMIN HUMAN 25 %
250 VIAL (ML) INTRAVENOUS ONCE
Refills: 0 | Status: COMPLETED | OUTPATIENT
Start: 2024-05-07 | End: 2024-05-07

## 2024-05-07 RX ORDER — DEXTROSE 50 % IN WATER 50 %
15 SYRINGE (ML) INTRAVENOUS ONCE
Refills: 0 | Status: DISCONTINUED | OUTPATIENT
Start: 2024-05-07 | End: 2024-05-13

## 2024-05-07 RX ORDER — INSULIN GLARGINE 100 [IU]/ML
10 INJECTION, SOLUTION SUBCUTANEOUS AT BEDTIME
Refills: 0 | Status: DISCONTINUED | OUTPATIENT
Start: 2024-05-07 | End: 2024-05-08

## 2024-05-07 RX ORDER — SODIUM BICARBONATE 1 MEQ/ML
50 SYRINGE (ML) INTRAVENOUS ONCE
Refills: 0 | Status: COMPLETED | OUTPATIENT
Start: 2024-05-07 | End: 2024-05-07

## 2024-05-07 RX ORDER — DEXTROSE 10 % IN WATER 10 %
125 INTRAVENOUS SOLUTION INTRAVENOUS ONCE
Refills: 0 | Status: DISCONTINUED | OUTPATIENT
Start: 2024-05-07 | End: 2024-05-08

## 2024-05-07 RX ORDER — METOPROLOL TARTRATE 50 MG
25 TABLET ORAL
Refills: 0 | Status: DISCONTINUED | OUTPATIENT
Start: 2024-05-07 | End: 2024-05-07

## 2024-05-07 RX ORDER — ENOXAPARIN SODIUM 100 MG/ML
40 INJECTION SUBCUTANEOUS EVERY 24 HOURS
Refills: 0 | Status: DISCONTINUED | OUTPATIENT
Start: 2024-05-07 | End: 2024-05-13

## 2024-05-07 RX ORDER — ASPIRIN/CALCIUM CARB/MAGNESIUM 324 MG
81 TABLET ORAL ONCE
Refills: 0 | Status: COMPLETED | OUTPATIENT
Start: 2024-05-07 | End: 2024-05-07

## 2024-05-07 RX ORDER — METOPROLOL TARTRATE 50 MG
25 TABLET ORAL EVERY 8 HOURS
Refills: 0 | Status: DISCONTINUED | OUTPATIENT
Start: 2024-05-07 | End: 2024-05-07

## 2024-05-07 RX ORDER — METOPROLOL TARTRATE 50 MG
50 TABLET ORAL EVERY 8 HOURS
Refills: 0 | Status: DISCONTINUED | OUTPATIENT
Start: 2024-05-07 | End: 2024-05-13

## 2024-05-07 RX ORDER — CALCIUM GLUCONATE 100 MG/ML
2 VIAL (ML) INTRAVENOUS ONCE
Refills: 0 | Status: COMPLETED | OUTPATIENT
Start: 2024-05-07 | End: 2024-05-06

## 2024-05-07 RX ORDER — SODIUM CHLORIDE 9 MG/ML
1000 INJECTION, SOLUTION INTRAVENOUS
Refills: 0 | Status: DISCONTINUED | OUTPATIENT
Start: 2024-05-07 | End: 2024-05-13

## 2024-05-07 RX ORDER — METOPROLOL TARTRATE 50 MG
5 TABLET ORAL ONCE
Refills: 0 | Status: COMPLETED | OUTPATIENT
Start: 2024-05-07 | End: 2024-05-07

## 2024-05-07 RX ORDER — NICARDIPINE HYDROCHLORIDE 30 MG/1
5 CAPSULE, EXTENDED RELEASE ORAL
Qty: 40 | Refills: 0 | Status: DISCONTINUED | OUTPATIENT
Start: 2024-05-07 | End: 2024-05-07

## 2024-05-07 RX ORDER — ATORVASTATIN CALCIUM 80 MG/1
80 TABLET, FILM COATED ORAL AT BEDTIME
Refills: 0 | Status: DISCONTINUED | OUTPATIENT
Start: 2024-05-07 | End: 2024-05-13

## 2024-05-07 RX ORDER — AMLODIPINE BESYLATE 2.5 MG/1
10 TABLET ORAL DAILY
Refills: 0 | Status: DISCONTINUED | OUTPATIENT
Start: 2024-05-08 | End: 2024-05-13

## 2024-05-07 RX ADMIN — OXYCODONE HYDROCHLORIDE 10 MILLIGRAM(S): 5 TABLET ORAL at 08:56

## 2024-05-07 RX ADMIN — CHLORHEXIDINE GLUCONATE 15 MILLILITER(S): 213 SOLUTION TOPICAL at 17:06

## 2024-05-07 RX ADMIN — Medication 25 MILLIGRAM(S): at 14:50

## 2024-05-07 RX ADMIN — Medication 125 MILLILITER(S): at 02:19

## 2024-05-07 RX ADMIN — GABAPENTIN 100 MILLIGRAM(S): 400 CAPSULE ORAL at 14:06

## 2024-05-07 RX ADMIN — HYDROMORPHONE HYDROCHLORIDE 0.5 MILLIGRAM(S): 2 INJECTION INTRAMUSCULAR; INTRAVENOUS; SUBCUTANEOUS at 01:15

## 2024-05-07 RX ADMIN — HYDROMORPHONE HYDROCHLORIDE 0.5 MILLIGRAM(S): 2 INJECTION INTRAMUSCULAR; INTRAVENOUS; SUBCUTANEOUS at 05:00

## 2024-05-07 RX ADMIN — Medication 100 MILLIEQUIVALENT(S): at 02:09

## 2024-05-07 RX ADMIN — Medication 1000 MILLIGRAM(S): at 05:35

## 2024-05-07 RX ADMIN — Medication 50 MILLIGRAM(S): at 21:24

## 2024-05-07 RX ADMIN — OXYCODONE HYDROCHLORIDE 10 MILLIGRAM(S): 5 TABLET ORAL at 22:14

## 2024-05-07 RX ADMIN — Medication 500 MILLIGRAM(S): at 05:11

## 2024-05-07 RX ADMIN — OXYCODONE HYDROCHLORIDE 5 MILLIGRAM(S): 5 TABLET ORAL at 12:21

## 2024-05-07 RX ADMIN — Medication 10 MILLIGRAM(S): at 14:06

## 2024-05-07 RX ADMIN — Medication 10 MILLIGRAM(S): at 21:24

## 2024-05-07 RX ADMIN — Medication 100 MILLIGRAM(S): at 02:18

## 2024-05-07 RX ADMIN — Medication 650 MILLIGRAM(S): at 18:30

## 2024-05-07 RX ADMIN — CHLORHEXIDINE GLUCONATE 1 APPLICATION(S): 213 SOLUTION TOPICAL at 11:08

## 2024-05-07 RX ADMIN — Medication 25 MILLIGRAM(S): at 07:56

## 2024-05-07 RX ADMIN — GABAPENTIN 100 MILLIGRAM(S): 400 CAPSULE ORAL at 21:23

## 2024-05-07 RX ADMIN — OXYCODONE HYDROCHLORIDE 5 MILLIGRAM(S): 5 TABLET ORAL at 11:21

## 2024-05-07 RX ADMIN — Medication 100 MILLIGRAM(S): at 11:22

## 2024-05-07 RX ADMIN — CHLORHEXIDINE GLUCONATE 15 MILLILITER(S): 213 SOLUTION TOPICAL at 05:12

## 2024-05-07 RX ADMIN — ENOXAPARIN SODIUM 40 MILLIGRAM(S): 100 INJECTION SUBCUTANEOUS at 18:08

## 2024-05-07 RX ADMIN — HYDROMORPHONE HYDROCHLORIDE 0.5 MILLIGRAM(S): 2 INJECTION INTRAMUSCULAR; INTRAVENOUS; SUBCUTANEOUS at 14:45

## 2024-05-07 RX ADMIN — Medication 650 MILLIGRAM(S): at 12:21

## 2024-05-07 RX ADMIN — INSULIN GLARGINE 10 UNIT(S): 100 INJECTION, SOLUTION SUBCUTANEOUS at 21:23

## 2024-05-07 RX ADMIN — Medication 650 MILLIGRAM(S): at 11:21

## 2024-05-07 RX ADMIN — Medication 650 MILLIGRAM(S): at 17:11

## 2024-05-07 RX ADMIN — HYDROMORPHONE HYDROCHLORIDE 0.5 MILLIGRAM(S): 2 INJECTION INTRAMUSCULAR; INTRAVENOUS; SUBCUTANEOUS at 03:55

## 2024-05-07 RX ADMIN — Medication 500 MILLIGRAM(S): at 17:11

## 2024-05-07 RX ADMIN — Medication 100 MILLIEQUIVALENT(S): at 03:41

## 2024-05-07 RX ADMIN — Medication 400 MILLIGRAM(S): at 05:20

## 2024-05-07 RX ADMIN — AMLODIPINE BESYLATE 5 MILLIGRAM(S): 2.5 TABLET ORAL at 16:05

## 2024-05-07 RX ADMIN — AMIODARONE HYDROCHLORIDE 400 MILLIGRAM(S): 400 TABLET ORAL at 05:11

## 2024-05-07 RX ADMIN — HYDROMORPHONE HYDROCHLORIDE 0.5 MILLIGRAM(S): 2 INJECTION INTRAMUSCULAR; INTRAVENOUS; SUBCUTANEOUS at 03:40

## 2024-05-07 RX ADMIN — HYDROMORPHONE HYDROCHLORIDE 0.5 MILLIGRAM(S): 2 INJECTION INTRAMUSCULAR; INTRAVENOUS; SUBCUTANEOUS at 00:00

## 2024-05-07 RX ADMIN — OXYCODONE HYDROCHLORIDE 10 MILLIGRAM(S): 5 TABLET ORAL at 22:54

## 2024-05-07 RX ADMIN — GABAPENTIN 100 MILLIGRAM(S): 400 CAPSULE ORAL at 05:11

## 2024-05-07 RX ADMIN — SENNA PLUS 2 TABLET(S): 8.6 TABLET ORAL at 21:23

## 2024-05-07 RX ADMIN — OXYCODONE HYDROCHLORIDE 10 MILLIGRAM(S): 5 TABLET ORAL at 07:56

## 2024-05-07 RX ADMIN — PANTOPRAZOLE SODIUM 40 MILLIGRAM(S): 20 TABLET, DELAYED RELEASE ORAL at 07:56

## 2024-05-07 RX ADMIN — Medication 50 MILLIEQUIVALENT(S): at 00:12

## 2024-05-07 RX ADMIN — ATORVASTATIN CALCIUM 80 MILLIGRAM(S): 80 TABLET, FILM COATED ORAL at 21:23

## 2024-05-07 RX ADMIN — Medication 100 MILLIGRAM(S): at 17:11

## 2024-05-07 RX ADMIN — POLYETHYLENE GLYCOL 3350 17 GRAM(S): 17 POWDER, FOR SOLUTION ORAL at 11:22

## 2024-05-07 RX ADMIN — Medication 81 MILLIGRAM(S): at 11:21

## 2024-05-07 RX ADMIN — Medication 81 MILLIGRAM(S): at 01:55

## 2024-05-07 RX ADMIN — Medication 125 MILLILITER(S): at 02:21

## 2024-05-07 RX ADMIN — Medication 10 MILLIGRAM(S): at 05:12

## 2024-05-07 RX ADMIN — HYDROMORPHONE HYDROCHLORIDE 0.5 MILLIGRAM(S): 2 INJECTION INTRAMUSCULAR; INTRAVENOUS; SUBCUTANEOUS at 05:15

## 2024-05-07 RX ADMIN — HYDROMORPHONE HYDROCHLORIDE 0.5 MILLIGRAM(S): 2 INJECTION INTRAMUSCULAR; INTRAVENOUS; SUBCUTANEOUS at 15:00

## 2024-05-07 RX ADMIN — AMIODARONE HYDROCHLORIDE 400 MILLIGRAM(S): 400 TABLET ORAL at 17:11

## 2024-05-07 RX ADMIN — Medication 10 MILLIGRAM(S): at 17:11

## 2024-05-07 RX ADMIN — Medication 50 MILLIEQUIVALENT(S): at 00:40

## 2024-05-07 RX ADMIN — Medication 5 MILLIGRAM(S): at 16:06

## 2024-05-07 NOTE — PROGRESS NOTE ADULT - SUBJECTIVE AND OBJECTIVE BOX
Chief complaint    Patient is a 63y old  Male who presents with a chief complaint of Chest Pain (07 May 2024 07:09)   Review of systems  Patient appears comfortable.    Labs and Fingersticks  CAPILLARY BLOOD GLUCOSE  122 (07 May 2024 07:00)  133 (07 May 2024 06:00)  144 (07 May 2024 05:00)  120 (07 May 2024 04:00)  142 (07 May 2024 03:00)  171 (07 May 2024 02:00)  182 (07 May 2024 01:00)  221 (07 May 2024 00:00)  215 (06 May 2024 23:00)  221 (06 May 2024 22:00)      POCT Blood Glucose.: 111 mg/dL (07 May 2024 11:36)  POCT Blood Glucose.: 105 mg/dL (07 May 2024 10:22)  POCT Blood Glucose.: 21 mg/dL (07 May 2024 10:19)  POCT Blood Glucose.: 122 mg/dL (07 May 2024 08:55)  POCT Blood Glucose.: 104 mg/dL (07 May 2024 07:58)  POCT Blood Glucose.: 106 mg/dL (07 May 2024 07:49)  POCT Blood Glucose.: 128 mg/dL (07 May 2024 06:48)  POCT Blood Glucose.: 133 mg/dL (07 May 2024 05:57)  POCT Blood Glucose.: 144 mg/dL (07 May 2024 04:59)  POCT Blood Glucose.: 120 mg/dL (07 May 2024 03:56)  POCT Blood Glucose.: 142 mg/dL (07 May 2024 03:04)  POCT Blood Glucose.: 171 mg/dL (07 May 2024 01:46)  POCT Blood Glucose.: 214 mg/dL (06 May 2024 23:57)  POCT Blood Glucose.: 215 mg/dL (06 May 2024 22:45)  POCT Blood Glucose.: 221 mg/dL (06 May 2024 22:19)  POCT Blood Glucose.: 120 mg/dL (06 May 2024 12:25)      Anion Gap: 15 (05-07 @ 00:16)  Anion Gap: 15 (05-06 @ 20:15)  Anion Gap: 12 (05-06 @ 04:50)      Calcium: 9.4 (05-07 @ 00:16)  Calcium: 8.6 (05-06 @ 20:15)  Calcium: 9.0 (05-06 @ 04:50)  Albumin: 4.0 (05-07 @ 00:16)  Albumin: 3.6 (05-06 @ 20:15)    Alanine Aminotransferase (ALT/SGPT): 16 (05-07 @ 00:16)  Alanine Aminotransferase (ALT/SGPT): 19 (05-06 @ 20:15)  Alkaline Phosphatase: 44 (05-07 @ 00:16)  Alkaline Phosphatase: 61 (05-06 @ 20:15)  Aspartate Aminotransferase (AST/SGOT): 22 (05-07 @ 00:16)  Aspartate Aminotransferase (AST/SGOT): 29 (05-06 @ 20:15)        05-07    140  |  107  |  16  ----------------------------<  223<H>  4.0   |  18<L>  |  0.70    Ca    9.4      07 May 2024 00:16  Phos  2.7     05-07  Mg     1.8     05-07    TPro  6.0  /  Alb  4.0  /  TBili  1.5<H>  /  DBili  x   /  AST  22  /  ALT  16  /  AlkPhos  44  05-07                        10.5   13.96 )-----------( 158      ( 07 May 2024 00:09 )             30.5     Medications  MEDICATIONS  (STANDING):  acetaminophen     Tablet .. 650 milliGRAM(s) Oral every 6 hours  aMIOdarone    Tablet 400 milliGRAM(s) Oral two times a day  ascorbic acid 500 milliGRAM(s) Oral two times a day  aspirin enteric coated 81 milliGRAM(s) Oral daily  atorvastatin 80 milliGRAM(s) Oral at bedtime  cefuroxime  IVPB 1500 milliGRAM(s) IV Intermittent every 8 hours  chlorhexidine 0.12% Liquid 15 milliLiter(s) Oral Mucosa every 12 hours  chlorhexidine 2% Cloths 1 Application(s) Topical daily  dextrose 50% Injectable 50 milliLiter(s) IV Push every 15 minutes  dextrose 50% Injectable 25 milliLiter(s) IV Push every 15 minutes  enoxaparin Injectable 40 milliGRAM(s) SubCutaneous every 24 hours  gabapentin 100 milliGRAM(s) Oral every 8 hours  insulin regular Infusion 3 Unit(s)/Hr (3 mL/Hr) IV Continuous <Continuous>  metoclopramide Injectable 10 milliGRAM(s) IV Push every 8 hours  metoprolol tartrate 25 milliGRAM(s) Oral two times a day  niCARdipine Infusion 5 mG/Hr (25 mL/Hr) IV Continuous <Continuous>  norepinephrine Infusion 0.05 MICROgram(s)/kG/Min (11.3 mL/Hr) IV Continuous <Continuous>  pantoprazole    Tablet 40 milliGRAM(s) Oral before breakfast  polyethylene glycol 3350 17 Gram(s) Oral daily  potassium chloride  10 mEq/50 mL IVPB 10 milliEquivalent(s) IV Intermittent every 1 hour  potassium chloride  10 mEq/50 mL IVPB 10 milliEquivalent(s) IV Intermittent every 1 hour  potassium chloride  10 mEq/50 mL IVPB 10 milliEquivalent(s) IV Intermittent every 1 hour  senna 2 Tablet(s) Oral at bedtime  sodium chloride 0.9%. 1000 milliLiter(s) (10 mL/Hr) IV Continuous <Continuous>      Physical Exam  General: Patient appears comfortable.  Vital Signs Last 12 Hrs  T(F): 98.8 (05-07-24 @ 08:00), Max: 99.1 (05-07-24 @ 04:00)  HR: 85 (05-07-24 @ 12:15) (80 - 101)  BP: 125/61 (05-07-24 @ 12:00) (125/61 - 125/61)  BP(mean): 82 (05-07-24 @ 12:00) (82 - 82)  RR: 16 (05-07-24 @ 10:15) (6 - 32)  SpO2: 97% (05-07-24 @ 12:15) (96% - 100%)  Neck: No palpable thyroid nodules.  CVS: S1S2, No murmurs  Respiratory: No wheezing, no crepitations  GI: Abdomen soft, non tender.    Diagnostics    A1C with Estimated Average Glucose: AM Sched. Collection: 05-May-2024 06:00 (05-04 @ 07:14)      Radiology:

## 2024-05-07 NOTE — PROGRESS NOTE ADULT - PROBLEM SELECTOR PLAN 1
Will continue current insulin regimen for now. Will continue monitoring  blood sugars, will Follow up.  Will switch to basal bolus insulin once sugars more stable.  Patient counseled for compliance with consistent low carb diet.  .

## 2024-05-07 NOTE — PROGRESS NOTE ADULT - SUBJECTIVE AND OBJECTIVE BOX
CRITICAL CARE ATTENDING - CTICU    MEDICATIONS  (STANDING):  acetaminophen     Tablet .. 650 milliGRAM(s) Oral every 6 hours  aMIOdarone    Tablet 400 milliGRAM(s) Oral two times a day  ascorbic acid 500 milliGRAM(s) Oral two times a day  aspirin enteric coated 81 milliGRAM(s) Oral daily  atorvastatin 80 milliGRAM(s) Oral at bedtime  cefuroxime  IVPB 1500 milliGRAM(s) IV Intermittent every 8 hours  chlorhexidine 0.12% Liquid 15 milliLiter(s) Oral Mucosa every 12 hours  chlorhexidine 2% Cloths 1 Application(s) Topical daily  dextrose 50% Injectable 50 milliLiter(s) IV Push every 15 minutes  dextrose 50% Injectable 25 milliLiter(s) IV Push every 15 minutes  gabapentin 100 milliGRAM(s) Oral every 8 hours  insulin regular Infusion 3 Unit(s)/Hr (3 mL/Hr) IV Continuous <Continuous>  metoclopramide Injectable 10 milliGRAM(s) IV Push every 8 hours  niCARdipine Infusion 5 mG/Hr (25 mL/Hr) IV Continuous <Continuous>  norepinephrine Infusion 0.05 MICROgram(s)/kG/Min (11.3 mL/Hr) IV Continuous <Continuous>  pantoprazole    Tablet 40 milliGRAM(s) Oral before breakfast  polyethylene glycol 3350 17 Gram(s) Oral daily  potassium chloride  10 mEq/50 mL IVPB 10 milliEquivalent(s) IV Intermittent every 1 hour  potassium chloride  10 mEq/50 mL IVPB 10 milliEquivalent(s) IV Intermittent every 1 hour  potassium chloride  10 mEq/50 mL IVPB 10 milliEquivalent(s) IV Intermittent every 1 hour  senna 2 Tablet(s) Oral at bedtime  sodium chloride 0.9%. 1000 milliLiter(s) (10 mL/Hr) IV Continuous <Continuous>                                    10.5   13.96 )-----------( 158      ( 07 May 2024 00:09 )             30.5       05-07    140  |  107  |  16  ----------------------------<  223<H>  4.0   |  18<L>  |  0.70    Ca    9.4      07 May 2024 00:16  Phos  2.7     05-07  Mg     1.8     05-07    TPro  6.0  /  Alb  4.0  /  TBili  1.5<H>  /  DBili  x   /  AST  22  /  ALT  16  /  AlkPhos  44        PT/INR - ( 07 May 2024 00:16 )   PT: 15.2 sec;   INR: 1.46 ratio         PTT - ( 07 May 2024 00:16 )  PTT:40.8 sec    Mode: vent off      Daily Height in cm: 167.64 (06 May 2024 23:20)    Daily Weight in k (07 May 2024 00:00)       @ 07:01  -   @ 07:00  --------------------------------------------------------  IN: 2415.4 mL / OUT: 1450 mL / NET: 965.4 mL        Critically Ill patient  : [ ] preoperative ,   [x ] post operative    Requires :  [ x] Arterial Line   [ x] Central Line  [ ] PA catheter  [ ] IABP  [ ] ECMO  [ ] LVAD  [ ] Ventilator  [ ] pacemaker [ ] Impella.                      [x ] ABG's     [x ] Pulse Oxymetry Monitoring  Bedside evaluation , monitoring , treatment of hemodynamics , fluids , IVP/ IVCD meds.        Diagnosis:     POD 1- CABG X2    Hypovolemia    Hemodynamic lability,  instability. Requires IVCD [ ] vasopressors [ ] inotropes  [x ] vasodilator  [x ]IVSS fluid  to maintain MAP, perfusion, C.I.     Chest Tube Drainage/ Management    CHF- acute [x ]   chronic [ ]    systolic [ ]   diatolic [ ]          - Echo- EF - 55-60%, Mod AS/ Mild MR/TR            [ ] RV dysfunction          - Cxr-cardiomegally, edema          - Clinical-  [ ]inotropes   [ ]pressors [x] Vasodilator   [ ]diuresis   [ ]IABP   [ ]ECMO   [ ]LVAD   [ ]Respiratory Failure.     Requires chest PT, pulmonary toilet, suctioning to maintain SaO2,  patent airway and treat atelectasis.     DM- IVCD insulin     Requires bedside physical therapy, mobilization and total [facility] care.     Obesity OHS / LENIN         I, Eben Niño, personally performed the services described in this documentation. All medical record entries made by the scribe were at my direction and in my presence. I have reviewed the chart and agree that the record reflects my personal performance and is accurate and complete.   Eben Niño MD.       By signing my name below, I, Oscar Noyola, attest that this documentation has been prepared under the direction and in the presence of Eben Niño MD.   Electronically Signed: Linh Emerson 24 @ 07:10        Discussed with CT surgeon, Physician Assistant - Nurse Practitioner- Critical care medicine team.   Dicussed at  AM / PM rounds.   Chart, labs , films reviewed.    Cumulative Critical Care Time Given Today:  CRITICAL CARE ATTENDING - CTICU    MEDICATIONS  (STANDING):  acetaminophen     Tablet .. 650 milliGRAM(s) Oral every 6 hours  aMIOdarone    Tablet 400 milliGRAM(s) Oral two times a day  ascorbic acid 500 milliGRAM(s) Oral two times a day  aspirin enteric coated 81 milliGRAM(s) Oral daily  atorvastatin 80 milliGRAM(s) Oral at bedtime  cefuroxime  IVPB 1500 milliGRAM(s) IV Intermittent every 8 hours  chlorhexidine 0.12% Liquid 15 milliLiter(s) Oral Mucosa every 12 hours  chlorhexidine 2% Cloths 1 Application(s) Topical daily  dextrose 50% Injectable 50 milliLiter(s) IV Push every 15 minutes  dextrose 50% Injectable 25 milliLiter(s) IV Push every 15 minutes  gabapentin 100 milliGRAM(s) Oral every 8 hours  insulin regular Infusion 3 Unit(s)/Hr (3 mL/Hr) IV Continuous <Continuous>  metoclopramide Injectable 10 milliGRAM(s) IV Push every 8 hours  niCARdipine Infusion 5 mG/Hr (25 mL/Hr) IV Continuous <Continuous>  norepinephrine Infusion 0.05 MICROgram(s)/kG/Min (11.3 mL/Hr) IV Continuous <Continuous>  pantoprazole    Tablet 40 milliGRAM(s) Oral before breakfast  polyethylene glycol 3350 17 Gram(s) Oral daily  potassium chloride  10 mEq/50 mL IVPB 10 milliEquivalent(s) IV Intermittent every 1 hour  potassium chloride  10 mEq/50 mL IVPB 10 milliEquivalent(s) IV Intermittent every 1 hour  potassium chloride  10 mEq/50 mL IVPB 10 milliEquivalent(s) IV Intermittent every 1 hour  senna 2 Tablet(s) Oral at bedtime  sodium chloride 0.9%. 1000 milliLiter(s) (10 mL/Hr) IV Continuous <Continuous>                                    10.5   13.96 )-----------( 158      ( 07 May 2024 00:09 )             30.5       05-07    140  |  107  |  16  ----------------------------<  223<H>  4.0   |  18<L>  |  0.70    Ca    9.4      07 May 2024 00:16  Phos  2.7     05-07  Mg     1.8     05-07    TPro  6.0  /  Alb  4.0  /  TBili  1.5<H>  /  DBili  x   /  AST  22  /  ALT  16  /  AlkPhos  44        PT/INR - ( 07 May 2024 00:16 )   PT: 15.2 sec;   INR: 1.46 ratio         PTT - ( 07 May 2024 00:16 )  PTT:40.8 sec    Mode: vent off      Daily Height in cm: 167.64 (06 May 2024 23:20)    Daily Weight in k (07 May 2024 00:00)      - @ 07:01  -   @ 07:00  --------------------------------------------------------  IN: 2415.4 mL / OUT: 1450 mL / NET: 965.4 mL        Critically Ill patient  : [ ] preoperative ,   [x ] post operative    Requires :  [ x] Arterial Line   [ x] Central Line  [ ] PA catheter  [ ] IABP  [ ] ECMO  [ ] LVAD  [ ] Ventilator  [x ] pacemaker - TPM  [ ] Impella.                      [x ] ABG's     [x ] Pulse Oxymetry Monitoring  Bedside evaluation , monitoring , treatment of hemodynamics , fluids , IVP/ IVCD meds.        Diagnosis:     POD 1- CABG X2    Hypotension a/w Hypertension, requiring intravenous medication.     Hypovolemia    Metabolic Acidosis    Hemodynamic lability,  instability. Requires IVCD [ x] vasopressors [ ] inotropes  [x ] vasodilator  [x ]IVSS fluid  to maintain MAP, perfusion, C.I.     Chest Tube Drainage/ Management    CHF- acute [x ]   chronic [ ]    systolic [ ]   diatolic [ ]  [x] Valvular           - Echo- EF - 55-60%, Mod AS/ Mild MR/TR            [ ] RV dysfunction          - Cxr-cardiomegally, edema          - Clinical-  [ ]inotropes   [ ]pressors [x] Vasodilator   [ ]diuresis   [ ]IABP   [ ]ECMO   [ ]LVAD   [ x] Respiratory insuffiencey     AS / MR / TR     Requires chest PT, pulmonary toilet, suctioning to maintain SaO2,  patent airway and treat atelectasis.     DM- IVCD insulin     Requires bedside physical therapy, mobilization and total [facility] care.     Obesity OHS / LENIN         IEben, personally performed the services described in this documentation. All medical record entries made by the scribe were at my direction and in my presence. I have reviewed the chart and agree that the record reflects my personal performance and is accurate and complete.   Eben Niño MD.       By signing my name below, I, Oscar Noyola, attest that this documentation has been prepared under the direction and in the presence of Eben Niño MD.   Electronically Signed: Linh Emerson 24 @ 07:10        Discussed with CT surgeon, Physician Assistant - Nurse Practitioner- Critical care medicine team.   Dicussed at  AM / PM rounds.   Chart, labs , films reviewed.    Cumulative Critical Care Time Given Today:  30 min

## 2024-05-07 NOTE — PROGRESS NOTE ADULT - SUBJECTIVE AND OBJECTIVE BOX
S/p C2     VITAL SIGNS  Telemetry:  SR  Vital Signs Last 24 Hrs  T(C): 37.4 (24 @ 16:00), Max: 37.4 (24 @ 16:00)  T(F): 99.3 (24 @ 16:00), Max: 99.3 (24 @ 16:00)  HR: 89 (24 @ 18:00) (76 - 102)  BP: 141/66 (24 @ 18:00) (125/61 - 147/70)  RR: 20 (24 @ 18:00) (6 - 32)  SpO2: 99% (24 @ 18:00) (96% - 100%)             07:01  -   07:00  --------------------------------------------------------  IN: 2415.4 mL / OUT: 1450 mL / NET: 965.4 mL     07:01  -   @ 18:48  --------------------------------------------------------  IN: 398.5 mL / OUT: 1565 mL / NET: -1166.5 mL    Daily Height in cm: 167.64 (06 May 2024 23:20)    Daily Weight in k (07 May 2024 00:00)    LABS:                        10.5   13.96 )-----------( 158      ( 07 May 2024 00:09 )             30.5     Hemoglobin: 10.5 g/dL ( 00:09)  Hemoglobin: 13.0 g/dL ( 20:15)  Hemoglobin: 14.9 g/dL ( 04:50)  Hemoglobin: 15.8 g/dL ( 11:22)  Hemoglobin: 15.1 g/dL (05-04 @ 04:43)          140  |  107  |  16  ----------------------------<  223<H>  4.0   |  18<L>  |  0.70    Creatinine Trend: 0.70<--, 0.73<--, 0.73<--, 0.68<--, 0.69<--, 0.71<--    Ca    9.4      07 May 2024 00:16  Phos  2.7       Mg     1.8         TPro  6.0  /  Alb  4.0  /  TBili  1.5<H>  /  DBili  x   /  AST  22  /  ALT  16  /  AlkPhos  44      PT/INR - ( 07 May 2024 00:16 )   PT: 15.2 sec;   INR: 1.46 ratio    PTT - ( 07 May 2024 00:16 )  PTT:40.8 sec      CAPILLARY BLOOD GLUCOSE  POCT Blood Glucose.: 176 mg/dL (07 May 2024 17:19)  POCT Blood Glucose.: 133 mg/dL (07 May 2024 12:53)  POCT Blood Glucose.: 111 mg/dL (07 May 2024 11:36)  POCT Blood Glucose.: 105 mg/dL (07 May 2024 10:22)  POCT Blood Glucose.: 21 mg/dL (07 May 2024 10:19)  POCT Blood Glucose.: 122 mg/dL (07 May 2024 08:55)  POCT Blood Glucose.: 104 mg/dL (07 May 2024 07:58)  POCT Blood Glucose.: 106 mg/dL (07 May 2024 07:49)    MEDICATIONS   acetaminophen     Tablet .. 650 milliGRAM(s) Oral every 6 hours  aMIOdarone    Tablet 400 milliGRAM(s) Oral two times a day  ascorbic acid 500 milliGRAM(s) Oral two times a day  aspirin enteric coated 81 milliGRAM(s) Oral daily  atorvastatin 80 milliGRAM(s) Oral at bedtime  cefuroxime  IVPB 1500 milliGRAM(s) IV Intermittent every 8 hours  enoxaparin Injectable 40 milliGRAM(s) SubCutaneous every 24 hours  gabapentin 100 milliGRAM(s) Oral every 8 hours  HYDROmorphone  Injectable 0.5 milliGRAM(s) IV Push every 6 hours PRN  insulin regular Infusion 3 Unit(s)/Hr IV Continuous <Continuous>  metoclopramide Injectable 10 milliGRAM(s) IV Push every 8 hours  metoprolol tartrate 50 milliGRAM(s) Oral every 8 hours  oxyCODONE    IR 10 milliGRAM(s) Oral every 4 hours PRN  oxyCODONE    IR 5 milliGRAM(s) Oral every 4 hours PRN  pantoprazole    Tablet 40 milliGRAM(s) Oral before breakfast  polyethylene glycol 3350 17 Gram(s) Oral daily    senna 2 Tablet(s) Oral at bedtime  sodium chloride 0.9%. 1000 milliLiter(s) IV Continuous <Continuous>    Review of Systems:  Constitutional: No fever, No weight loss, good appetite/po intake  Head: No headache   Eyes: No blurry vision, No diplopia  Neuro: No tremors, No muscle weakness   Cardiovascular: No chest pain, No palpitations  Respiratory: No SOB, No cough  GI: No nausea, No vomiting, No diarrhea  : No dysuria, No hematuria  Skin: No rash  MSK: No joint pain   Psych: No depression      PHYSICAL EXAM  Gen: NAD  Neurology: alert and oriented x 3, nonfocal, no gross deficits  Psy: appropriate, pleasant, conversive  CV : s1 s2 RRR   Sternal Wound :  CDI , Stable  +PW:   Drains: MS [x  ]  L Pleural  [x  ]   R Pleural  [  ]    Lungs: decreased bilateral bases  Abdomen: soft, nontender, nondistended, positive bowel sounds, last bowel movement               :  ji  Extremities:      edema   /  -   calve tenderness ,    L leg  /  R leg  incisions cdi  Vascular access: PIV   arterial line  RIJ

## 2024-05-07 NOTE — PHYSICAL THERAPY INITIAL EVALUATION ADULT - ADDITIONAL COMMENTS
Pt lives with friend in apartment with 2 stairs to entrance. Prior to admission pt independent with all functional mobility including ambulation without AD.

## 2024-05-07 NOTE — PROGRESS NOTE ADULT - SUBJECTIVE AND OBJECTIVE BOX
pt seen and examined  VITAL SIGNS  Telemetry:  SR  Vital Signs Last 24 Hrs  T(C): 37.4 (24 @ 16:00), Max: 37.4 (24 @ 16:00)  T(F): 99.3 (24 @ 16:00), Max: 99.3 (24 @ 16:00)  HR: 89 (24 @ 18:00) (76 - 102)  BP: 141/66 (24 @ 18:00) (125/61 - 147/70)  RR: 20 (24 @ 18:00) (6 - 32)  SpO2: 99% (24 @ 18:00) (96% - 100%)             07:01  -   07:00  --------------------------------------------------------  IN: 2415.4 mL / OUT: 1450 mL / NET: 965.4 mL     07:01  -   @ 18:48  --------------------------------------------------------  IN: 398.5 mL / OUT: 1565 mL / NET: -1166.5 mL    Daily Height in cm: 167.64 (06 May 2024 23:20)    Daily Weight in k (07 May 2024 00:00)    LABS:                        10.5   13.96 )-----------( 158      ( 07 May 2024 00:09 )             30.5     Hemoglobin: 10.5 g/dL ( 00:09)  Hemoglobin: 13.0 g/dL ( 20:15)  Hemoglobin: 14.9 g/dL ( 04:50)  Hemoglobin: 15.8 g/dL ( 11:22)  Hemoglobin: 15.1 g/dL (05-04 @ 04:43)          140  |  107  |  16  ----------------------------<  223<H>  4.0   |  18<L>  |  0.70    Creatinine Trend: 0.70<--, 0.73<--, 0.73<--, 0.68<--, 0.69<--, 0.71<--    Ca    9.4      07 May 2024 00:16  Phos  2.7       Mg     1.8         TPro  6.0  /  Alb  4.0  /  TBili  1.5<H>  /  DBili  x   /  AST  22  /  ALT  16  /  AlkPhos  44      PT/INR - ( 07 May 2024 00:16 )   PT: 15.2 sec;   INR: 1.46 ratio    PTT - ( 07 May 2024 00:16 )  PTT:40.8 sec      CAPILLARY BLOOD GLUCOSE  POCT Blood Glucose.: 176 mg/dL (07 May 2024 17:19)  POCT Blood Glucose.: 133 mg/dL (07 May 2024 12:53)  POCT Blood Glucose.: 111 mg/dL (07 May 2024 11:36)  POCT Blood Glucose.: 105 mg/dL (07 May 2024 10:22)  POCT Blood Glucose.: 21 mg/dL (07 May 2024 10:19)  POCT Blood Glucose.: 122 mg/dL (07 May 2024 08:55)  POCT Blood Glucose.: 104 mg/dL (07 May 2024 07:58)  POCT Blood Glucose.: 106 mg/dL (07 May 2024 07:49)    MEDICATIONS   acetaminophen     Tablet .. 650 milliGRAM(s) Oral every 6 hours  aMIOdarone    Tablet 400 milliGRAM(s) Oral two times a day  ascorbic acid 500 milliGRAM(s) Oral two times a day  aspirin enteric coated 81 milliGRAM(s) Oral daily  atorvastatin 80 milliGRAM(s) Oral at bedtime  cefuroxime  IVPB 1500 milliGRAM(s) IV Intermittent every 8 hours  enoxaparin Injectable 40 milliGRAM(s) SubCutaneous every 24 hours  gabapentin 100 milliGRAM(s) Oral every 8 hours  HYDROmorphone  Injectable 0.5 milliGRAM(s) IV Push every 6 hours PRN  insulin regular Infusion 3 Unit(s)/Hr IV Continuous <Continuous>  metoclopramide Injectable 10 milliGRAM(s) IV Push every 8 hours  metoprolol tartrate 50 milliGRAM(s) Oral every 8 hours  oxyCODONE    IR 10 milliGRAM(s) Oral every 4 hours PRN  oxyCODONE    IR 5 milliGRAM(s) Oral every 4 hours PRN  pantoprazole    Tablet 40 milliGRAM(s) Oral before breakfast  polyethylene glycol 3350 17 Gram(s) Oral daily    senna 2 Tablet(s) Oral at bedtime  sodium chloride 0.9%. 1000 milliLiter(s) IV Continuous <Continuous>    Review of Systems:  Constitutional: No fever, No weight loss, good appetite/po intake  Head: No headache   Eyes: No blurry vision, No diplopia  Neuro: No tremors, No muscle weakness   Cardiovascular: No chest pain, No palpitations  Respiratory: No SOB, No cough  GI: No nausea, No vomiting, No diarrhea  : No dysuria, No hematuria  Skin: No rash  MSK: No joint pain   Psych: No depression      PHYSICAL EXAM  Gen: NAD  CV : s1 s2 +  Sternal Wound :  CDI , Stable  +PW:   Drains: MS [x  ]  L Pleural  [x  ]   R Pleural  [  ]    Lungs: decreased bilateral bases  Abdomen: soft, nontender, nondistended, positive bowel sounds, last bowel movement               :  ji  Extremities:      edema   /  -   calve tenderness ,    L leg  /  R leg  incisions cdi  Vascular access: PIV   arterial line  RIJ

## 2024-05-07 NOTE — PROGRESS NOTE ADULT - PROBLEM SELECTOR PLAN 1
c/w telemetry, monitor for dysrhythmias  Hypertension:  BP control with norvasc, beta blocker, up-titrate as appropriate  Amiodarone ERP for Afib prophylaxis  Monitor CT output  c/w ASA/Statin, add plavix on Wednesday  Add home medications as appropriate   Trend CTS daily labs:  replete electrolytes with goal K>4.0 Mag >2.0  ERP: Vit C Gabapentin   GI: bowel regimen

## 2024-05-07 NOTE — PHYSICAL THERAPY INITIAL EVALUATION ADULT - PERTINENT HX OF CURRENT PROBLEM, REHAB EVAL
63M with PMH of HTN, HLD, CAD s/p PCI, DM2 (last A1c 6.0), LENIN on CPAP, Vertigo presents with chest pain on exertion and dyspnea. Pt had-cath-3/1/24--LCX: Patent OM1 stent, patent circumflex stent with 75% stenosis at distal edge of old stent s/p PCI-1 SHARON to LCx via RRA and RBV with WNL filling pressure discharged on DAPT. Pt now s/p 5/6/2024 CABG ( C2L)

## 2024-05-07 NOTE — PHYSICAL THERAPY INITIAL EVALUATION ADULT - PLANNED THERAPY INTERVENTIONS, PT EVAL
Goal: Patient will negotiate up and down 2 steps independently, within 2 weeks./balance training/bed mobility training/gait training/transfer training

## 2024-05-07 NOTE — PHYSICAL THERAPY INITIAL EVALUATION ADULT - ACTIVE RANGE OF MOTION EXAMINATION, REHAB EVAL
left total knee arthroplasty  b/l shoulder assessment limited to 90 degrees due to sternal precautions/bilateral upper extremity Active ROM was WFL (within functional limits)/bilateral  lower extremity Active ROM was WFL (within functional limits)

## 2024-05-07 NOTE — PROGRESS NOTE ADULT - SUBJECTIVE AND OBJECTIVE BOX
PATIENT SEEN AND EXAMINED BY CEDRIC MONTANEZ M.D. ON :- 5/7/24  DATE OF SERVICE:   5/7/24          Interim events noted,Labs ,Radiological studies and Cardiology tests reviewed .    Patient is a 63y old  Male who presents with a chief complaint of Chest Pain (07 May 2024 18:47)      HPI:   63M with PMH of HTN, HLD, CAD s/p PCI, DM2 (last A1c 6.0), LENIN on CPAP, Vertigo presents with chest pain on exertion and dyspnea Had-cath-3/1/24--LCX: Patent OM1 stent, patent circumflex stent with 75% stenosis at distal edge of old stent s/p PCI-1 SHARON to LCx via RRA and RBV with WNL filling pressure discharged on DAPT Of note lab report on 3/4 reported Hgb 8.1 from 16 on 3/1-/Lab error,patient denies melena bleeding.        CARDIAC STUDIES:  TTE 2/27/24 - Mild LV dysfunction with EF 48%, LVH, Hypokinesis of apical cap, dilated LA, mild AS, AR, MR, TR  CATH 3/01/2024-LCX: Patent OM1 stent, patent circumflex stent with 75% stenosis at distal edge of old stent s/p PCI with new SHARON  CATH 3/29/2023-Mild-moderate nonobstructive CAD of the left cirumflex and leftanterior descending arteries. Prior stent to the OM1 is widelypatent.        (02 May 2024 10:05)      PAST MEDICAL & SURGICAL HISTORY:  Hypertension      LENIN (Obstructive Sleep Apnea)  Sleep study done 2015  uses CPAP at home      Type 2 diabetes mellitus      Morbid obesity with BMI of 50.0-59.9, adult      Venous insufficiency of both lower extremities      Anemia      History of Appendectomy  1982      Gastric Bypass  1998.  Patient lost 225 lbs and has regained 125 bs in the last 9years.      Umbilical Hernia Repair  2002      History of Abdominoplasty  2001      Incisional Hernia Repair  2005      S/P Cholecystectomy      H/O ventral hernia repair  08/24/12 incarcerated ventral hernia repair with mesh.          PREVIOUS DIAGNOSTIC TESTING:      ECHO  FINDINGS:    STRESS  FINDINGS:    CATHETERIZATION  FINDINGS:    MEDICATIONS  (STANDING):  acetaminophen     Tablet .. 650 milliGRAM(s) Oral every 6 hours  aMIOdarone    Tablet 400 milliGRAM(s) Oral two times a day  ascorbic acid 500 milliGRAM(s) Oral two times a day  aspirin enteric coated 81 milliGRAM(s) Oral daily  atorvastatin 80 milliGRAM(s) Oral at bedtime  cefuroxime  IVPB 1500 milliGRAM(s) IV Intermittent every 8 hours  chlorhexidine 0.12% Liquid 15 milliLiter(s) Oral Mucosa every 12 hours  chlorhexidine 2% Cloths 1 Application(s) Topical daily  dextrose 10% Bolus 125 milliLiter(s) IV Bolus once  dextrose 5%. 1000 milliLiter(s) (50 mL/Hr) IV Continuous <Continuous>  dextrose 5%. 1000 milliLiter(s) (100 mL/Hr) IV Continuous <Continuous>  dextrose 50% Injectable 50 milliLiter(s) IV Push every 15 minutes  dextrose 50% Injectable 25 milliLiter(s) IV Push every 15 minutes  enoxaparin Injectable 40 milliGRAM(s) SubCutaneous every 24 hours  gabapentin 100 milliGRAM(s) Oral every 8 hours  glucagon  Injectable 1 milliGRAM(s) IntraMuscular once  insulin glargine Injectable (LANTUS) 10 Unit(s) SubCutaneous at bedtime  insulin regular Infusion 3 Unit(s)/Hr (3 mL/Hr) IV Continuous <Continuous>  metoclopramide Injectable 10 milliGRAM(s) IV Push every 8 hours  metoprolol tartrate 50 milliGRAM(s) Oral every 8 hours  pantoprazole    Tablet 40 milliGRAM(s) Oral before breakfast  polyethylene glycol 3350 17 Gram(s) Oral daily  potassium chloride  10 mEq/50 mL IVPB 10 milliEquivalent(s) IV Intermittent every 1 hour  potassium chloride  10 mEq/50 mL IVPB 10 milliEquivalent(s) IV Intermittent every 1 hour  potassium chloride  10 mEq/50 mL IVPB 10 milliEquivalent(s) IV Intermittent every 1 hour  senna 2 Tablet(s) Oral at bedtime  sodium chloride 0.9%. 1000 milliLiter(s) (10 mL/Hr) IV Continuous <Continuous>    MEDICATIONS  (PRN):  dextrose Oral Gel 15 Gram(s) Oral once PRN Blood Glucose LESS THAN 70 milliGRAM(s)/deciliter  HYDROmorphone  Injectable 0.5 milliGRAM(s) IV Push every 6 hours PRN Breakthrough Pain  oxyCODONE    IR 10 milliGRAM(s) Oral every 4 hours PRN Severe Pain (7 - 10)  oxyCODONE    IR 5 milliGRAM(s) Oral every 4 hours PRN Moderate Pain (4 - 6)      FAMILY HISTORY:  Family history of diabetes mellitus in mother (Mother)  heart attack, ovarian cancer    Family history of diabetes mellitus in father (Father)        SOCIAL HISTORY:    CIGARETTES:    ALCOHOL:    REVIEW OF SYSTEMS:  CONSTITUTIONAL: No fever, weight loss, or fatigue  EYES: No eye pain, visual disturbances, or discharge  ENMT:  No difficulty hearing, tinnitus, vertigo; No sinus or throat pain  NECK: No pain or stiffness  RESPIRATORY: No cough, wheezing, chills or hemoptysis; No shortness of breath  CARDIOVASCULAR: No chest pain, palpitations, dizziness, or leg swelling  GASTROINTESTINAL: No abdominal or epigastric pain. No nausea, vomiting, or hematemesis; No diarrhea or constipation. No melena or hematochezia.  GENITOURINARY: No dysuria, frequency, hematuria, or incontinence  NEUROLOGICAL: No headaches, memory loss, loss of strength, numbness, or tremors  SKIN: No itching, burning, rashes, or lesions   LYMPH NODES: No enlarged glands  ENDOCRINE: No heat or cold intolerance; No hair loss  MUSCULOSKELETAL: No joint pain or swelling; No muscle, back, or extremity pain  PSYCHIATRIC: No depression, anxiety, mood swings, or difficulty sleeping  HEME/LYMPH: No easy bruising, or bleeding gums  ALLERY AND IMMUNOLOGIC: No hives or eczema    Vital Signs Last 24 Hrs  T(C): 37.4 (07 May 2024 16:00), Max: 37.4 (07 May 2024 16:00)  T(F): 99.3 (07 May 2024 16:00), Max: 99.3 (07 May 2024 16:00)  HR: 87 (07 May 2024 19:00) (76 - 102)  BP: 134/64 (07 May 2024 19:00) (125/61 - 147/70)  BP(mean): 92 (07 May 2024 19:00) (82 - 104)  RR: 20 (07 May 2024 18:00) (6 - 32)  SpO2: 97% (07 May 2024 19:00) (96% - 100%)    Parameters below as of 07 May 2024 12:00  Patient On (Oxygen Delivery Method): nasal cannula  O2 Flow (L/min): 3        PHYSICAL EXAM:  GENERAL: NAD, well-groomed, well-developed  HEAD:  Atraumatic, Normocephalic  EYES: EOMI, PERRLA, conjunctiva and sclera clear  ENMT: No tonsillar erythema, exudates, or enlargement; Moist mucous membranes, Good dentition, No lesions  NECK: Supple, No JVD, Normal thyroid  NERVOUS SYSTEM:  Alert & Oriented X3, Good concentration; Motor Strength 5/5 B/L upper and lower extremities; DTRs 2+ intact and symmetric  CHEST/LUNG: Clear to percussion bilaterally; No rales, rhonchi, wheezing, or rubs  HEART: Regular rate and rhythm; No murmurs, rubs, or gallops  ABDOMEN: Soft, Nontender, Nondistended; Bowel sounds present  EXTREMITIES:  2+ Peripheral Pulses, No clubbing, cyanosis, or edema  LYMPH: No lymphadenopathy noted  SKIN: No rashes or lesions      INTERPRETATION OF TELEMETRY:    ECG:    [facility]:     LABS:                        10.5   13.96 )-----------( 158      ( 07 May 2024 00:09 )             30.5     05-07    140  |  107  |  16  ----------------------------<  223<H>  4.0   |  18<L>  |  0.70    Ca    9.4      07 May 2024 00:16  Phos  2.7     05-07  Mg     1.8     05-07    TPro  6.0  /  Alb  4.0  /  TBili  1.5<H>  /  DBili  x   /  AST  22  /  ALT  16  /  AlkPhos  44  05-07    CARDIAC MARKERS ( 06 May 2024 20:15 )  x     / x     / 243 U/L / x     / 11.4 ng/mL      PT/INR - ( 07 May 2024 00:16 )   PT: 15.2 sec;   INR: 1.46 ratio         PTT - ( 07 May 2024 00:16 )  PTT:40.8 sec  Urinalysis Basic - ( 07 May 2024 00:16 )    Color: x / Appearance: x / SG: x / pH: x  Gluc: 223 mg/dL / Ketone: x  / Bili: x / Urobili: x   Blood: x / Protein: x / Nitrite: x   Leuk Esterase: x / RBC: x / WBC x   Sq Epi: x / Non Sq Epi: x / Bacteria: x      Lipid Panel:   I&O's Summary    06 May 2024 07:01  -  07 May 2024 07:00  --------------------------------------------------------  IN: 2415.4 mL / OUT: 1450 mL / NET: 965.4 mL    07 May 2024 07:01  -  07 May 2024 21:23  --------------------------------------------------------  IN: 408.5 mL / OUT: 1625 mL / NET: -1216.5 mL        RADIOLOGY & ADDITIONAL STUDIES:

## 2024-05-07 NOTE — AIRWAY REMOVAL NOTE  ADULT & PEDS - ARTIFICAL AIRWAY REMOVAL COMMENTS
Written order for extubation verified. The patient was identified by full name and birth date compared to the identification band. Present during the procedure was SUSANNA Newman

## 2024-05-07 NOTE — PROGRESS NOTE ADULT - ASSESSMENT
Assessment  DMT2: 63y Male with DM T2 with hyperglycemia admitted with chest pain, was on Mounjaro injections at home s/p now intubated on IV insulin,, blood sugars are fluctuating, started eating meals.  CAD: Evaluation for CABG, on medications, monitored.  HTN: On antihypertensive medications, monitored, asymptomatic.      Gena Kumar MD  Cell:  [phone]  Office: [phone]

## 2024-05-08 LAB
ALBUMIN SERPL ELPH-MCNC: 4.1 G/DL — SIGNIFICANT CHANGE UP (ref 3.3–5)
ALP SERPL-CCNC: 50 U/L — SIGNIFICANT CHANGE UP (ref 40–120)
ALT FLD-CCNC: 18 U/L — SIGNIFICANT CHANGE UP (ref 10–45)
ANION GAP SERPL CALC-SCNC: 14 MMOL/L — SIGNIFICANT CHANGE UP (ref 5–17)
ANISOCYTOSIS BLD QL: SLIGHT — SIGNIFICANT CHANGE UP
AST SERPL-CCNC: 19 U/L — SIGNIFICANT CHANGE UP (ref 10–40)
BASOPHILS # BLD AUTO: 0 K/UL — SIGNIFICANT CHANGE UP (ref 0–0.2)
BASOPHILS NFR BLD AUTO: 0 % — SIGNIFICANT CHANGE UP (ref 0–2)
BILIRUB SERPL-MCNC: 0.7 MG/DL — SIGNIFICANT CHANGE UP (ref 0.2–1.2)
BUN SERPL-MCNC: 16 MG/DL — SIGNIFICANT CHANGE UP (ref 7–23)
BURR CELLS BLD QL SMEAR: PRESENT — SIGNIFICANT CHANGE UP
CALCIUM SERPL-MCNC: 9.3 MG/DL — SIGNIFICANT CHANGE UP (ref 8.4–10.5)
CHLORIDE SERPL-SCNC: 103 MMOL/L — SIGNIFICANT CHANGE UP (ref 96–108)
CO2 SERPL-SCNC: 21 MMOL/L — LOW (ref 22–31)
CREAT SERPL-MCNC: 0.68 MG/DL — SIGNIFICANT CHANGE UP (ref 0.5–1.3)
EGFR: 106 ML/MIN/1.73M2 — SIGNIFICANT CHANGE UP
EOSINOPHIL # BLD AUTO: 0 K/UL — SIGNIFICANT CHANGE UP (ref 0–0.5)
EOSINOPHIL NFR BLD AUTO: 0 % — SIGNIFICANT CHANGE UP (ref 0–6)
GLUCOSE BLDC GLUCOMTR-MCNC: 113 MG/DL — HIGH (ref 70–99)
GLUCOSE BLDC GLUCOMTR-MCNC: 115 MG/DL — HIGH (ref 70–99)
GLUCOSE BLDC GLUCOMTR-MCNC: 121 MG/DL — HIGH (ref 70–99)
GLUCOSE BLDC GLUCOMTR-MCNC: 122 MG/DL — HIGH (ref 70–99)
GLUCOSE BLDC GLUCOMTR-MCNC: 143 MG/DL — HIGH (ref 70–99)
GLUCOSE BLDC GLUCOMTR-MCNC: 174 MG/DL — HIGH (ref 70–99)
GLUCOSE BLDC GLUCOMTR-MCNC: 176 MG/DL — HIGH (ref 70–99)
GLUCOSE BLDC GLUCOMTR-MCNC: 223 MG/DL — HIGH (ref 70–99)
GLUCOSE BLDC GLUCOMTR-MCNC: 94 MG/DL — SIGNIFICANT CHANGE UP (ref 70–99)
GLUCOSE SERPL-MCNC: 155 MG/DL — HIGH (ref 70–99)
HCT VFR BLD CALC: 35.8 % — LOW (ref 39–50)
HGB BLD-MCNC: 12.3 G/DL — LOW (ref 13–17)
LYMPHOCYTES # BLD AUTO: 0.78 K/UL — LOW (ref 1–3.3)
LYMPHOCYTES # BLD AUTO: 3.5 % — LOW (ref 13–44)
MAGNESIUM SERPL-MCNC: 2 MG/DL — SIGNIFICANT CHANGE UP (ref 1.6–2.6)
MANUAL SMEAR VERIFICATION: SIGNIFICANT CHANGE UP
MCHC RBC-ENTMCNC: 28.8 PG — SIGNIFICANT CHANGE UP (ref 27–34)
MCHC RBC-ENTMCNC: 34.4 GM/DL — SIGNIFICANT CHANGE UP (ref 32–36)
MCV RBC AUTO: 83.8 FL — SIGNIFICANT CHANGE UP (ref 80–100)
MICROCYTES BLD QL: SLIGHT — SIGNIFICANT CHANGE UP
MONOCYTES # BLD AUTO: 2.28 K/UL — HIGH (ref 0–0.9)
MONOCYTES NFR BLD AUTO: 10.3 % — SIGNIFICANT CHANGE UP (ref 2–14)
NEUTROPHILS # BLD AUTO: 19.1 K/UL — HIGH (ref 1.8–7.4)
NEUTROPHILS NFR BLD AUTO: 86.2 % — HIGH (ref 43–77)
PHOSPHATE SERPL-MCNC: 2.3 MG/DL — LOW (ref 2.5–4.5)
PLAT MORPH BLD: NORMAL — SIGNIFICANT CHANGE UP
PLATELET # BLD AUTO: 248 K/UL — SIGNIFICANT CHANGE UP (ref 150–400)
POIKILOCYTOSIS BLD QL AUTO: SLIGHT — SIGNIFICANT CHANGE UP
POLYCHROMASIA BLD QL SMEAR: SLIGHT — SIGNIFICANT CHANGE UP
POTASSIUM SERPL-MCNC: 4.3 MMOL/L — SIGNIFICANT CHANGE UP (ref 3.5–5.3)
POTASSIUM SERPL-SCNC: 4.3 MMOL/L — SIGNIFICANT CHANGE UP (ref 3.5–5.3)
PROT SERPL-MCNC: 6.6 G/DL — SIGNIFICANT CHANGE UP (ref 6–8.3)
RBC # BLD: 4.27 M/UL — SIGNIFICANT CHANGE UP (ref 4.2–5.8)
RBC # FLD: 14 % — SIGNIFICANT CHANGE UP (ref 10.3–14.5)
RBC BLD AUTO: ABNORMAL
SODIUM SERPL-SCNC: 138 MMOL/L — SIGNIFICANT CHANGE UP (ref 135–145)
WBC # BLD: 22.16 K/UL — HIGH (ref 3.8–10.5)
WBC # FLD AUTO: 22.16 K/UL — HIGH (ref 3.8–10.5)

## 2024-05-08 PROCEDURE — 71045 X-RAY EXAM CHEST 1 VIEW: CPT | Mod: 26,76

## 2024-05-08 RX ORDER — INSULIN GLARGINE 100 [IU]/ML
14 INJECTION, SOLUTION SUBCUTANEOUS AT BEDTIME
Refills: 0 | Status: DISCONTINUED | OUTPATIENT
Start: 2024-05-08 | End: 2024-05-13

## 2024-05-08 RX ORDER — HYDROMORPHONE HYDROCHLORIDE 2 MG/ML
4 INJECTION INTRAMUSCULAR; INTRAVENOUS; SUBCUTANEOUS EVERY 4 HOURS
Refills: 0 | Status: DISCONTINUED | OUTPATIENT
Start: 2024-05-08 | End: 2024-05-13

## 2024-05-08 RX ORDER — INSULIN LISPRO 100/ML
VIAL (ML) SUBCUTANEOUS AT BEDTIME
Refills: 0 | Status: DISCONTINUED | OUTPATIENT
Start: 2024-05-08 | End: 2024-05-13

## 2024-05-08 RX ORDER — INSULIN LISPRO 100/ML
VIAL (ML) SUBCUTANEOUS
Refills: 0 | Status: DISCONTINUED | OUTPATIENT
Start: 2024-05-08 | End: 2024-05-08

## 2024-05-08 RX ORDER — LIDOCAINE 4 G/100G
1 CREAM TOPICAL EVERY 24 HOURS
Refills: 0 | Status: DISCONTINUED | OUTPATIENT
Start: 2024-05-08 | End: 2024-05-13

## 2024-05-08 RX ORDER — INSULIN LISPRO 100/ML
VIAL (ML) SUBCUTANEOUS
Refills: 0 | Status: DISCONTINUED | OUTPATIENT
Start: 2024-05-08 | End: 2024-05-13

## 2024-05-08 RX ORDER — HYDROMORPHONE HYDROCHLORIDE 2 MG/ML
2 INJECTION INTRAMUSCULAR; INTRAVENOUS; SUBCUTANEOUS
Refills: 0 | Status: DISCONTINUED | OUTPATIENT
Start: 2024-05-08 | End: 2024-05-13

## 2024-05-08 RX ADMIN — ATORVASTATIN CALCIUM 80 MILLIGRAM(S): 80 TABLET, FILM COATED ORAL at 21:17

## 2024-05-08 RX ADMIN — POLYETHYLENE GLYCOL 3350 17 GRAM(S): 17 POWDER, FOR SOLUTION ORAL at 11:31

## 2024-05-08 RX ADMIN — ENOXAPARIN SODIUM 40 MILLIGRAM(S): 100 INJECTION SUBCUTANEOUS at 17:09

## 2024-05-08 RX ADMIN — HYDROMORPHONE HYDROCHLORIDE 4 MILLIGRAM(S): 2 INJECTION INTRAMUSCULAR; INTRAVENOUS; SUBCUTANEOUS at 21:17

## 2024-05-08 RX ADMIN — AMIODARONE HYDROCHLORIDE 400 MILLIGRAM(S): 400 TABLET ORAL at 05:28

## 2024-05-08 RX ADMIN — Medication 10 MILLIGRAM(S): at 05:29

## 2024-05-08 RX ADMIN — CLOPIDOGREL BISULFATE 75 MILLIGRAM(S): 75 TABLET, FILM COATED ORAL at 11:30

## 2024-05-08 RX ADMIN — LIDOCAINE 1 PATCH: 4 CREAM TOPICAL at 20:45

## 2024-05-08 RX ADMIN — Medication 650 MILLIGRAM(S): at 06:48

## 2024-05-08 RX ADMIN — LIDOCAINE 1 PATCH: 4 CREAM TOPICAL at 09:29

## 2024-05-08 RX ADMIN — Medication 650 MILLIGRAM(S): at 05:28

## 2024-05-08 RX ADMIN — CHLORHEXIDINE GLUCONATE 15 MILLILITER(S): 213 SOLUTION TOPICAL at 05:28

## 2024-05-08 RX ADMIN — GABAPENTIN 100 MILLIGRAM(S): 400 CAPSULE ORAL at 21:17

## 2024-05-08 RX ADMIN — GABAPENTIN 100 MILLIGRAM(S): 400 CAPSULE ORAL at 05:39

## 2024-05-08 RX ADMIN — Medication 650 MILLIGRAM(S): at 01:11

## 2024-05-08 RX ADMIN — Medication 81 MILLIGRAM(S): at 11:30

## 2024-05-08 RX ADMIN — Medication 650 MILLIGRAM(S): at 23:06

## 2024-05-08 RX ADMIN — Medication 500 MILLIGRAM(S): at 17:06

## 2024-05-08 RX ADMIN — CHLORHEXIDINE GLUCONATE 1 APPLICATION(S): 213 SOLUTION TOPICAL at 11:15

## 2024-05-08 RX ADMIN — OXYCODONE HYDROCHLORIDE 10 MILLIGRAM(S): 5 TABLET ORAL at 08:50

## 2024-05-08 RX ADMIN — GABAPENTIN 100 MILLIGRAM(S): 400 CAPSULE ORAL at 13:45

## 2024-05-08 RX ADMIN — Medication 10 MILLIGRAM(S): at 13:45

## 2024-05-08 RX ADMIN — Medication 1: at 11:46

## 2024-05-08 RX ADMIN — Medication 50 MILLIGRAM(S): at 21:18

## 2024-05-08 RX ADMIN — AMLODIPINE BESYLATE 10 MILLIGRAM(S): 2.5 TABLET ORAL at 05:29

## 2024-05-08 RX ADMIN — Medication 650 MILLIGRAM(S): at 17:05

## 2024-05-08 RX ADMIN — INSULIN GLARGINE 14 UNIT(S): 100 INJECTION, SOLUTION SUBCUTANEOUS at 21:18

## 2024-05-08 RX ADMIN — Medication 100 MILLIGRAM(S): at 11:33

## 2024-05-08 RX ADMIN — HYDROMORPHONE HYDROCHLORIDE 4 MILLIGRAM(S): 2 INJECTION INTRAMUSCULAR; INTRAVENOUS; SUBCUTANEOUS at 21:58

## 2024-05-08 RX ADMIN — OXYCODONE HYDROCHLORIDE 10 MILLIGRAM(S): 5 TABLET ORAL at 09:20

## 2024-05-08 RX ADMIN — Medication 650 MILLIGRAM(S): at 11:31

## 2024-05-08 RX ADMIN — Medication 50 MILLIGRAM(S): at 05:28

## 2024-05-08 RX ADMIN — HYDROMORPHONE HYDROCHLORIDE 4 MILLIGRAM(S): 2 INJECTION INTRAMUSCULAR; INTRAVENOUS; SUBCUTANEOUS at 17:05

## 2024-05-08 RX ADMIN — SENNA PLUS 2 TABLET(S): 8.6 TABLET ORAL at 21:17

## 2024-05-08 RX ADMIN — AMIODARONE HYDROCHLORIDE 400 MILLIGRAM(S): 400 TABLET ORAL at 17:05

## 2024-05-08 RX ADMIN — Medication 100 MILLIGRAM(S): at 02:02

## 2024-05-08 RX ADMIN — Medication 500 MILLIGRAM(S): at 05:29

## 2024-05-08 RX ADMIN — LIDOCAINE 1 PATCH: 4 CREAM TOPICAL at 19:16

## 2024-05-08 RX ADMIN — PANTOPRAZOLE SODIUM 40 MILLIGRAM(S): 20 TABLET, DELAYED RELEASE ORAL at 05:28

## 2024-05-08 RX ADMIN — Medication 650 MILLIGRAM(S): at 00:57

## 2024-05-08 RX ADMIN — Medication 1: at 08:13

## 2024-05-08 RX ADMIN — Medication 50 MILLIGRAM(S): at 13:45

## 2024-05-08 RX ADMIN — HYDROMORPHONE HYDROCHLORIDE 4 MILLIGRAM(S): 2 INJECTION INTRAMUSCULAR; INTRAVENOUS; SUBCUTANEOUS at 17:30

## 2024-05-08 NOTE — OCCUPATIONAL THERAPY INITIAL EVALUATION ADULT - VISUAL ACUITY
no visual impairment noted. Pt. able to identifiy objects with in environment and accurately read clock/printed material

## 2024-05-08 NOTE — OCCUPATIONAL THERAPY INITIAL EVALUATION ADULT - STRENGTHENING, PT EVAL
GOAL: pt. will increase strength by 1/2 grade in order to perform transfers and mobility independently

## 2024-05-08 NOTE — PROGRESS NOTE ADULT - SUBJECTIVE AND OBJECTIVE BOX
pt seen and examined    MEDICATIONS  (STANDING):  acetaminophen     Tablet .. 650 milliGRAM(s) Oral every 6 hours  aMIOdarone    Tablet 400 milliGRAM(s) Oral two times a day  amLODIPine   Tablet 10 milliGRAM(s) Oral daily  ascorbic acid 500 milliGRAM(s) Oral two times a day  aspirin enteric coated 81 milliGRAM(s) Oral daily  atorvastatin 80 milliGRAM(s) Oral at bedtime  bisacodyl Suppository 10 milliGRAM(s) Rectal once  chlorhexidine 2% Cloths 1 Application(s) Topical daily  clopidogrel Tablet 75 milliGRAM(s) Oral daily  dextrose 5%. 1000 milliLiter(s) (50 mL/Hr) IV Continuous <Continuous>  dextrose 50% Injectable 25 milliLiter(s) IV Push every 15 minutes  dextrose 50% Injectable 50 milliLiter(s) IV Push every 15 minutes  enoxaparin Injectable 40 milliGRAM(s) SubCutaneous every 24 hours  gabapentin 100 milliGRAM(s) Oral every 8 hours  glucagon  Injectable 1 milliGRAM(s) IntraMuscular once  insulin glargine Injectable (LANTUS) 14 Unit(s) SubCutaneous at bedtime  insulin lispro (ADMELOG) corrective regimen sliding scale   SubCutaneous at bedtime  insulin lispro (ADMELOG) corrective regimen sliding scale   SubCutaneous three times a day before meals  lidocaine   4% Patch 1 Patch Transdermal every 24 hours  metoprolol tartrate 50 milliGRAM(s) Oral every 8 hours  pantoprazole    Tablet 40 milliGRAM(s) Oral before breakfast  polyethylene glycol 3350 17 Gram(s) Oral daily  senna 2 Tablet(s) Oral at bedtime  sodium chloride 0.9%. 1000 milliLiter(s) (10 mL/Hr) IV Continuous <Continuous>    MEDICATIONS  (PRN):  dextrose Oral Gel 15 Gram(s) Oral once PRN Blood Glucose LESS THAN 70 milliGRAM(s)/deciliter  HYDROmorphone   Tablet 2 milliGRAM(s) Oral every 3 hours PRN Moderate Pain (4 - 6)  HYDROmorphone   Tablet 4 milliGRAM(s) Oral every 4 hours PRN Severe Pain (7 - 10)    Vital Signs Last 24 Hrs  T(C): 36.9 (05-08-24 @ 23:15), Max: 37.1 (05-08-24 @ 15:51)  T(F): 98.4 (05-08-24 @ 23:15), Max: 98.7 (05-08-24 @ 15:51)  HR: 84 (05-08-24 @ 23:15) (76 - 88)  BP: 123/67 (05-08-24 @ 23:15) (104/54 - 126/60)  BP(mean): 90 (05-08-24 @ 23:15) (75 - 90)  RR: 18 (05-08-24 @ 23:15) (18 - 18)  SpO2: 93% (05-08-24 @ 23:15) (91% - 98%)      Review of Systems:  Constitutional: No fever, No weight loss, good appetite/po intake  Head: No headache   Eyes: No blurry vision, No diplopia  Neuro: No tremors, No muscle weakness   Cardiovascular: No chest pain, No palpitations  Respiratory: No SOB, No cough  GI: No nausea, No vomiting, No diarrhea  : No dysuria, No hematuria  Skin: No rash  MSK: No joint pain   Psych: No depression      PHYSICAL EXAM  Gen: NAD  CV : s1 s2 +  Sternal Wound :  CDI , Stable  +PW:   Drains: MS [x  ]  L Pleural  [x  ]   R Pleural  [  ]    Lungs: decreased bilateral bases  Abdomen: soft, nontender, nondistended, positive bowel sounds, last bowel movement               :  ji  Extremities:      edema   /  -   calve tenderness ,    L leg  /  R leg  incisions cdi      LABS:  05-08    138  |  103  |  16  ----------------------------<  155<H>  4.3   |  21<L>  |  0.68    Ca    9.3      08 May 2024 06:21  Phos  2.3     05-08  Mg     2.0     05-08    TPro  6.6  /  Alb  4.1  /  TBili  0.7  /  DBili      /  AST  19  /  ALT  18  /  AlkPhos  50  05-08    Creatinine Trend: 0.68 <--, 0.70 <--, 0.73 <--, 0.73 <--, 0.68 <--, 0.69 <--, 0.71 <--, 0.76 <--                        12.3   22.16 )-----------( 248      ( 08 May 2024 06:16 )             35.8     Urine Studies:  Urinalysis Basic - ( 08 May 2024 06:21 )    Color:  / Appearance:  / SG:  / pH:   Gluc: 155 mg/dL / Ketone:   / Bili:  / Urobili:    Blood:  / Protein:  / Nitrite:    Leuk Esterase:  / RBC:  / WBC    Sq Epi:  / Non Sq Epi:  / Bacteria:             ABG - ( 07 May 2024 03:58 )  pH, Arterial: 7.42  pH, Blood: x     /  pCO2: 41    /  pO2: 172   / HCO3: 27    / Base Excess: 1.9   /  SaO2: 98.7              LIVER FUNCTIONS - ( 08 May 2024 06:21 )  Alb: 4.1 g/dL / Pro: 6.6 g/dL / ALK PHOS: 50 U/L / ALT: 18 U/L / AST: 19 U/L / GGT: x           PT/INR - ( 07 May 2024 00:16 )   PT: 15.2 sec;   INR: 1.46 ratio         PTT - ( 07 May 2024 00:16 )  PTT:40.8 sec

## 2024-05-08 NOTE — PROGRESS NOTE ADULT - ASSESSMENT
63M with PMH of HTN, HLD, CAD s/p PCI, DM2 (last A1c 6.0), LENIN on CPAP, Vertigo. Recent stent to Lcx in March of 2024 discharged on DAPT. Presents to [facility] with chest pain 7/10 on exertion for the past week with associated dyspnea. The patient reports the chest pain does not radiate anywhere and gets better with rest. Diagnostic  Cath revealing LM: 60% mid/distal stenosis and LAD: 70% ostial stenosis with LCX: Patent stent. CT surgery consulted for CABG evaluation.   5/6  C-2L  LIMA-LAD | SVG-OM  ef nml  Insulin infusion  5/7  sdu  5/8  remove CT's, a line  intro  ji  May transfer to floor

## 2024-05-08 NOTE — OCCUPATIONAL THERAPY INITIAL EVALUATION ADULT - ADL RETRAINING, OT EVAL
GOAL: pt. will perform upper and lower body dressing independently while demonstrating proper modified dressing techniques 100% of the time

## 2024-05-08 NOTE — OCCUPATIONAL THERAPY INITIAL EVALUATION ADULT - BED MOBILITY TRAINING, PT EVAL
GOAL: pt. will perform bed mobility independently while demonstrating adherence to sternal precautions 100% of the time

## 2024-05-08 NOTE — PROGRESS NOTE ADULT - ASSESSMENT
63M with PMH of HTN, HLD, CAD s/p PCI, DM2 (last A1c 6.0), LENIN on CPAP, Vertigo presents with chest pain on exertion and dyspnea       # CAD-Progressive Angina  Hx CAD s/p PCI-2023-  -Had cath-3/1/24--LCX: Patent OM1 stent, patent circumflex stent with 75% stenosis at distal edge of old stent s/p PCI-1 SHARON to LCx  -Presenting with HUDSON and angina  -Cath 5/2/2024-LM: 60% mid/distal stenosis.  LAD: 70% ostial stenosis LCX: Patent stent in mid.    -5/6-CABG-LIMA-LAD | SVG-OM  5/7-Extubated off pressors TX to Stepdown  5/8-POD# 2 Awake no dyspnea-Amio for AF prophylaxis,resumes plavix      # HTN  On Metoprolol 50mg q8    # T2DM  -A1c-6.0 -2023 now 5.9%  Endocrine evaluation noted  Patient was on Mounjaro at home

## 2024-05-08 NOTE — PROGRESS NOTE ADULT - ASSESSMENT
Assessment  DMT2: 63y Male with DM T2 with hyperglycemia admitted with chest pain, a1c is stable 5.8%,  was on home Mounjaro injections at home s/p surgery, now off IV insulin, eating meals.   CAD: now s/p CABG, on medications, monitored.  HTN: On antihypertensive medications, monitored, asymptomatic.        Discussed plan and management with Dr Stacy Jimenez NP - TEAMS  Gena Kumar MD  Cell: [phone]  Office: [phone]      Assessment  DMT2: 63y Male with DM T2 with hyperglycemia admitted with chest pain, a1c is stable 5.8%, was on home  Mounjaro injections at home s/p surgery, now off IV insulin, eating meals.   CAD: now s/p CABG, on medications, monitored.  HTN: On antihypertensive medications, monitored, asymptomatic.        Discussed plan and management with Dr Stacy Jimenez NP - TEAMS  Gena Kumar MD  Cell: [phone]  Office: [phone]

## 2024-05-08 NOTE — PROGRESS NOTE ADULT - SUBJECTIVE AND OBJECTIVE BOX
VITAL SIGNS    Telemetry:  nsr 70    Vital Signs Last 24 Hrs  T(C): 36.6 (24 @ 07:05), Max: 37.4 (24 @ 16:00)  T(F): 97.8 (24 @ 07:05), Max: 99.3 (24 @ 16:00)  HR: 83 (24 @ 07:05) (71 - 91)  BP: 112/58 (24 @ 07:05) (102/57 - 147/70)  RR: 18 (24 @ 07:05) (12 - 25)  SpO2: 91% (24 @ 07:05) (91% - 99%)                    07:01  -   @ 07:00  --------------------------------------------------------  IN: 460.5 mL / OUT: 2105 mL / NET: -1644.5 mL     07:01  -   @ 09:52  --------------------------------------------------------  IN: 240 mL / OUT: 20 mL / NET: 220 mL          Daily     Daily Weight in k.9 (08 May 2024 07:10)            CAPILLARY BLOOD GLUCOSE  216 (07 May 2024 19:00)      POCT Blood Glucose.: 174 mg/dL (08 May 2024 07:47)  POCT Blood Glucose.: 122 mg/dL (08 May 2024 03:55)  POCT Blood Glucose.: 115 mg/dL (08 May 2024 02:49)  POCT Blood Glucose.: 94 mg/dL (08 May 2024 02:13)  POCT Blood Glucose.: 113 mg/dL (08 May 2024 00:52)  POCT Blood Glucose.: 121 mg/dL (08 May 2024 00:18)  POCT Blood Glucose.: 145 mg/dL (07 May 2024 23:14)  POCT Blood Glucose.: 181 mg/dL (07 May 2024 22:07)  POCT Blood Glucose.: 149 mg/dL (07 May 2024 20:53)  POCT Blood Glucose.: 170 mg/dL (07 May 2024 20:11)  POCT Blood Glucose.: 212 mg/dL (07 May 2024 19:01)  POCT Blood Glucose.: 176 mg/dL (07 May 2024 17:19)  POCT Blood Glucose.: 133 mg/dL (07 May 2024 12:53)  POCT Blood Glucose.: 111 mg/dL (07 May 2024 11:36)  POCT Blood Glucose.: 105 mg/dL (07 May 2024 10:22)  POCT Blood Glucose.: 21 mg/dL (07 May 2024 10:19)            Drains:     MS         [  x] Drainage:                 L Pleural  [x  ]  Drainage:                R Pleural  [  ]  Drainage:    Pacing Wires        [x ]   Settings:     vvi                             Isolated  [  ]    Coumadin    [ ] YES          [ x ]      NO                                   PHYSICAL EXAM        Neurology: alert and oriented x 3, nonfocal, no gross deficits  CV : s1 s2 RRR  r ij intro  r rad a line  Sternal Wound :  CDI , Stable  Lungs: cta  Abdomen: soft, nontender, nondistended, positive bowel sounds, last bowel movement                       chest tubesx 2  :    ji - sbd         Extremities:    trace  edema   /  -   calve tenderness ,    L leg    incisions cdi          acetaminophen     Tablet .. 650 milliGRAM(s) Oral every 6 hours  aMIOdarone    Tablet 400 milliGRAM(s) Oral two times a day  amLODIPine   Tablet 10 milliGRAM(s) Oral daily  ascorbic acid 500 milliGRAM(s) Oral two times a day  aspirin enteric coated 81 milliGRAM(s) Oral daily  atorvastatin 80 milliGRAM(s) Oral at bedtime  bisacodyl Suppository 10 milliGRAM(s) Rectal once  cefuroxime  IVPB 1500 milliGRAM(s) IV Intermittent every 8 hours  chlorhexidine 2% Cloths 1 Application(s) Topical daily  clopidogrel Tablet 75 milliGRAM(s) Oral daily  dextrose 5%. 1000 milliLiter(s) IV Continuous <Continuous>  dextrose 50% Injectable 25 milliLiter(s) IV Push every 15 minutes  dextrose 50% Injectable 50 milliLiter(s) IV Push every 15 minutes  dextrose Oral Gel 15 Gram(s) Oral once PRN  enoxaparin Injectable 40 milliGRAM(s) SubCutaneous every 24 hours  gabapentin 100 milliGRAM(s) Oral every 8 hours  glucagon  Injectable 1 milliGRAM(s) IntraMuscular once  HYDROmorphone   Tablet 4 milliGRAM(s) Oral every 4 hours PRN  HYDROmorphone   Tablet 2 milliGRAM(s) Oral every 3 hours PRN  HYDROmorphone  Injectable 0.5 milliGRAM(s) IV Push every 6 hours PRN  insulin glargine Injectable (LANTUS) 10 Unit(s) SubCutaneous at bedtime  insulin lispro (ADMELOG) corrective regimen sliding scale   SubCutaneous three times a day before meals  insulin lispro (ADMELOG) corrective regimen sliding scale   SubCutaneous at bedtime  lidocaine   4% Patch 1 Patch Transdermal every 24 hours  metoclopramide Injectable 10 milliGRAM(s) IV Push every 8 hours  metoprolol tartrate 50 milliGRAM(s) Oral every 8 hours  pantoprazole    Tablet 40 milliGRAM(s) Oral before breakfast  polyethylene glycol 3350 17 Gram(s) Oral daily  senna 2 Tablet(s) Oral at bedtime  sodium chloride 0.9%. 1000 milliLiter(s) IV Continuous <Continuous>                    Physical Therapy Rec:   Home  [  ]   Home w/ PT  [  ]  Rehab  [  ]  Discussed with Cardiothoracic Team at AM rounds.

## 2024-05-08 NOTE — PROGRESS NOTE ADULT - SUBJECTIVE AND OBJECTIVE BOX
Chief complaint  Patient is a 63y old  Male who presents with a chief complaint of Chest Pain (08 May 2024 09:51)         Labs and Fingersticks  CAPILLARY BLOOD GLUCOSE  216 (07 May 2024 19:00)      POCT Blood Glucose.: 176 mg/dL (08 May 2024 11:32)  POCT Blood Glucose.: 174 mg/dL (08 May 2024 07:47)  POCT Blood Glucose.: 122 mg/dL (08 May 2024 03:55)  POCT Blood Glucose.: 115 mg/dL (08 May 2024 02:49)  POCT Blood Glucose.: 94 mg/dL (08 May 2024 02:13)  POCT Blood Glucose.: 113 mg/dL (08 May 2024 00:52)  POCT Blood Glucose.: 121 mg/dL (08 May 2024 00:18)  POCT Blood Glucose.: 145 mg/dL (07 May 2024 23:14)  POCT Blood Glucose.: 181 mg/dL (07 May 2024 22:07)  POCT Blood Glucose.: 149 mg/dL (07 May 2024 20:53)  POCT Blood Glucose.: 170 mg/dL (07 May 2024 20:11)  POCT Blood Glucose.: 212 mg/dL (07 May 2024 19:01)  POCT Blood Glucose.: 176 mg/dL (07 May 2024 17:19)      Anion Gap: 14 (05-08 @ 06:21)  Anion Gap: 15 (05-07 @ 00:16)  Anion Gap: 15 (05-06 @ 20:15)      Calcium: 9.3 (05-08 @ 06:21)  Calcium: 9.4 (05-07 @ 00:16)  Calcium: 8.6 (05-06 @ 20:15)  Albumin: 4.1 (05-08 @ 06:21)  Albumin: 4.0 (05-07 @ 00:16)  Albumin: 3.6 (05-06 @ 20:15)    Alanine Aminotransferase (ALT/SGPT): 18 (05-08 @ 06:21)  Alanine Aminotransferase (ALT/SGPT): 16 (05-07 @ 00:16)  Alanine Aminotransferase (ALT/SGPT): 19 (05-06 @ 20:15)  Alkaline Phosphatase: 50 (05-08 @ 06:21)  Alkaline Phosphatase: 44 (05-07 @ 00:16)  Alkaline Phosphatase: 61 (05-06 @ 20:15)  Aspartate Aminotransferase (AST/SGOT): 19 (05-08 @ 06:21)  Aspartate Aminotransferase (AST/SGOT): 22 (05-07 @ 00:16)  Aspartate Aminotransferase (AST/SGOT): 29 (05-06 @ 20:15)        05-08    138  |  103  |  16  ----------------------------<  155<H>  4.3   |  21<L>  |  0.68    Ca    9.3      08 May 2024 06:21  Phos  2.3     05-08  Mg     2.0     05-08    TPro  6.6  /  Alb  4.1  /  TBili  0.7  /  DBili  x   /  AST  19  /  ALT  18  /  AlkPhos  50  05-08                        12.3   22.16 )-----------( 248      ( 08 May 2024 06:16 )             35.8     Medications  MEDICATIONS  (STANDING):  acetaminophen     Tablet .. 650 milliGRAM(s) Oral every 6 hours  aMIOdarone    Tablet 400 milliGRAM(s) Oral two times a day  amLODIPine   Tablet 10 milliGRAM(s) Oral daily  ascorbic acid 500 milliGRAM(s) Oral two times a day  aspirin enteric coated 81 milliGRAM(s) Oral daily  atorvastatin 80 milliGRAM(s) Oral at bedtime  bisacodyl Suppository 10 milliGRAM(s) Rectal once  chlorhexidine 2% Cloths 1 Application(s) Topical daily  clopidogrel Tablet 75 milliGRAM(s) Oral daily  dextrose 5%. 1000 milliLiter(s) (50 mL/Hr) IV Continuous <Continuous>  dextrose 50% Injectable 25 milliLiter(s) IV Push every 15 minutes  dextrose 50% Injectable 50 milliLiter(s) IV Push every 15 minutes  enoxaparin Injectable 40 milliGRAM(s) SubCutaneous every 24 hours  gabapentin 100 milliGRAM(s) Oral every 8 hours  glucagon  Injectable 1 milliGRAM(s) IntraMuscular once  insulin glargine Injectable (LANTUS) 14 Unit(s) SubCutaneous at bedtime  insulin lispro (ADMELOG) corrective regimen sliding scale   SubCutaneous three times a day before meals  insulin lispro (ADMELOG) corrective regimen sliding scale   SubCutaneous at bedtime  lidocaine   4% Patch 1 Patch Transdermal every 24 hours  metoprolol tartrate 50 milliGRAM(s) Oral every 8 hours  pantoprazole    Tablet 40 milliGRAM(s) Oral before breakfast  polyethylene glycol 3350 17 Gram(s) Oral daily  senna 2 Tablet(s) Oral at bedtime  sodium chloride 0.9%. 1000 milliLiter(s) (10 mL/Hr) IV Continuous <Continuous>      Physical Exam  General: Patient comfortable in bed   Vital Signs Last 12 Hrs  T(F): 98.6 (05-08-24 @ 10:59), Max: 98.6 (05-08-24 @ 10:59)  HR: 82 (05-08-24 @ 10:59) (76 - 83)  BP: 104/54 (05-08-24 @ 10:59) (104/54 - 126/60)  BP(mean): 75 (05-08-24 @ 10:59) (75 - 84)  RR: 18 (05-08-24 @ 10:59) (18 - 18)  SpO2: 92% (05-08-24 @ 10:59) (91% - 98%)    CVS: S1S2   Respiratory: No wheezing, no crepitations  GI: Abdomen soft, bowel sounds positive  Musculoskeletal:  moves all extremities         Chief complaint  Patient is a 63y old  Male who presents with a chief complaint of Chest Pain  (08 May 2024 09:51)       Labs and Fingersticks  CAPILLARY BLOOD GLUCOSE  216 (07 May 2024 19:00)      POCT Blood Glucose.: 176 mg/dL (08 May 2024 11:32)  POCT Blood Glucose.: 174 mg/dL (08 May 2024 07:47)  POCT Blood Glucose.: 122 mg/dL (08 May 2024 03:55)  POCT Blood Glucose.: 115 mg/dL (08 May 2024 02:49)  POCT Blood Glucose.: 94 mg/dL (08 May 2024 02:13)  POCT Blood Glucose.: 113 mg/dL (08 May 2024 00:52)  POCT Blood Glucose.: 121 mg/dL (08 May 2024 00:18)  POCT Blood Glucose.: 145 mg/dL (07 May 2024 23:14)  POCT Blood Glucose.: 181 mg/dL (07 May 2024 22:07)  POCT Blood Glucose.: 149 mg/dL (07 May 2024 20:53)  POCT Blood Glucose.: 170 mg/dL (07 May 2024 20:11)  POCT Blood Glucose.: 212 mg/dL (07 May 2024 19:01)  POCT Blood Glucose.: 176 mg/dL (07 May 2024 17:19)      Anion Gap: 14 (05-08 @ 06:21)  Anion Gap: 15 (05-07 @ 00:16)  Anion Gap: 15 (05-06 @ 20:15)      Calcium: 9.3 (05-08 @ 06:21)  Calcium: 9.4 (05-07 @ 00:16)  Calcium: 8.6 (05-06 @ 20:15)  Albumin: 4.1 (05-08 @ 06:21)  Albumin: 4.0 (05-07 @ 00:16)  Albumin: 3.6 (05-06 @ 20:15)    Alanine Aminotransferase (ALT/SGPT): 18 (05-08 @ 06:21)  Alanine Aminotransferase (ALT/SGPT): 16 (05-07 @ 00:16)  Alanine Aminotransferase (ALT/SGPT): 19 (05-06 @ 20:15)  Alkaline Phosphatase: 50 (05-08 @ 06:21)  Alkaline Phosphatase: 44 (05-07 @ 00:16)  Alkaline Phosphatase: 61 (05-06 @ 20:15)  Aspartate Aminotransferase (AST/SGOT): 19 (05-08 @ 06:21)  Aspartate Aminotransferase (AST/SGOT): 22 (05-07 @ 00:16)  Aspartate Aminotransferase (AST/SGOT): 29 (05-06 @ 20:15)        05-08    138  |  103  |  16  ----------------------------<  155<H>  4.3   |  21<L>  |  0.68    Ca    9.3      08 May 2024 06:21  Phos  2.3     05-08  Mg     2.0     05-08    TPro  6.6  /  Alb  4.1  /  TBili  0.7  /  DBili  x   /  AST  19  /  ALT  18  /  AlkPhos  50  05-08                        12.3   22.16 )-----------( 248      ( 08 May 2024 06:16 )             35.8     Medications  MEDICATIONS  (STANDING):  acetaminophen     Tablet .. 650 milliGRAM(s) Oral every 6 hours  aMIOdarone    Tablet 400 milliGRAM(s) Oral two times a day  amLODIPine   Tablet 10 milliGRAM(s) Oral daily  ascorbic acid 500 milliGRAM(s) Oral two times a day  aspirin enteric coated 81 milliGRAM(s) Oral daily  atorvastatin 80 milliGRAM(s) Oral at bedtime  bisacodyl Suppository 10 milliGRAM(s) Rectal once  chlorhexidine 2% Cloths 1 Application(s) Topical daily  clopidogrel Tablet 75 milliGRAM(s) Oral daily  dextrose 5%. 1000 milliLiter(s) (50 mL/Hr) IV Continuous <Continuous>  dextrose 50% Injectable 25 milliLiter(s) IV Push every 15 minutes  dextrose 50% Injectable 50 milliLiter(s) IV Push every 15 minutes  enoxaparin Injectable 40 milliGRAM(s) SubCutaneous every 24 hours  gabapentin 100 milliGRAM(s) Oral every 8 hours  glucagon  Injectable 1 milliGRAM(s) IntraMuscular once  insulin glargine Injectable (LANTUS) 14 Unit(s) SubCutaneous at bedtime  insulin lispro (ADMELOG) corrective regimen sliding scale   SubCutaneous three times a day before meals  insulin lispro (ADMELOG) corrective regimen sliding scale   SubCutaneous at bedtime  lidocaine   4% Patch 1 Patch Transdermal every 24 hours  metoprolol tartrate 50 milliGRAM(s) Oral every 8 hours  pantoprazole    Tablet 40 milliGRAM(s) Oral before breakfast  polyethylene glycol 3350 17 Gram(s) Oral daily  senna 2 Tablet(s) Oral at bedtime  sodium chloride 0.9%. 1000 milliLiter(s) (10 mL/Hr) IV Continuous <Continuous>      Physical Exam  General: Patient comfortable in bed   Vital Signs Last 12 Hrs  T(F): 98.6 (05-08-24 @ 10:59), Max: 98.6 (05-08-24 @ 10:59)  HR: 82 (05-08-24 @ 10:59) (76 - 83)  BP: 104/54 (05-08-24 @ 10:59) (104/54 - 126/60)  BP(mean): 75 (05-08-24 @ 10:59) (75 - 84)  RR: 18 (05-08-24 @ 10:59) (18 - 18)  SpO2: 92% (05-08-24 @ 10:59) (91% - 98%)    CVS: S1S2   Respiratory: No wheezing, no crepitations  GI: Abdomen soft, bowel sounds positive  Musculoskeletal:  moves all extremities

## 2024-05-08 NOTE — PROGRESS NOTE ADULT - SUBJECTIVE AND OBJECTIVE BOX
MR#[id number]  PATIENT NAME:YAMINI CUNNINGHAM    DATE OF SERVICE: 05-08-24 @ 06:30  Patient was seen and examined by Wilfred Major MD on    05-08-24 @ 06:30 .  Interim events noted.Consultant notes ,Labs,Telemetry reviewed by me       HOSPITAL COURSE: HPI:   63M with PMH of HTN, HLD, CAD s/p PCI, DM2 (last A1c 6.0), LENIN on CPAP, Vertigo presents with chest pain on exertion and dyspnea Had-cath-3/1/24--LCX: Patent OM1 stent, patent circumflex stent with 75% stenosis at distal edge of old stent s/p PCI-1 SHARON to LCx via RRA and RBV with WNL filling pressure discharged on DAPT Of note lab report on 3/4 reported Hgb 8.1 from 16 on 3/1-/Lab error,patient denies melena bleeding.      CARDIAC STUDIES:  CATH-05/02/2024-LM-60% mid distal stenosis,LAD-70% ostial stenosis,LCx patent stent IVUS of LM and LAD showed significant plaque burden.  TTE 2/27/24 - Mild LV dysfunction with EF 48%, LVH, Hypokinesis of apical cap, dilated LA, mild AS, AR, MR, TR  CATH 3/01/2024-LCX: Patent OM1 stent, patent circumflex stent with 75% stenosis at distal edge of old stent s/p PCI with new SHARON  CATH 3/29/2023-Mild-moderate nonobstructive CAD of the left cirumflex and left anterior descending arteries. Prior stent to the OM1 is widely patent.      INTERIM EVENTS:Patient seen at bedside ,interim events noted.  5/6-CABG-LIMA-LAD | SVG-OM  5/7-Extubated transferred to stepdown  5/8-Awake alert remains in Sinus Rhythm           PMH -reviewed admission note, no change since admission  HEART FAILURE: Acute[ ]Chronic[ x] Systolic[ ] Diastolic[x ] Combined Systolic and Diastolic[ ]  CAD[x ] CABG[ ] PCI[ x]  DEVICES[ ] PPM[ ] ICD[ ] ILR[ ]  ATRIAL FIBRILLATION[ ] Paroxysmal[ ] Permanent[ ] CHADS2-[  ]  BISI[ ] CKD1[ ] CKD2[ ] CKD3[ ] CKD4[ ] ESRD[ ]  COPD[ ] HTN[ ]   DM[x ] Type1[ ] Type 2[ x]   CVA[ ] Paresis[ ]    AMBULATION: Assisted[ ] Cane/walker[ ] Independent[x ]      MEDICATIONS  (STANDING):  acetaminophen     Tablet .. 650 milliGRAM(s) Oral every 6 hours  aMIOdarone    Tablet 400 milliGRAM(s) Oral two times a day  amLODIPine   Tablet 10 milliGRAM(s) Oral daily  ascorbic acid 500 milliGRAM(s) Oral two times a day  aspirin enteric coated 81 milliGRAM(s) Oral daily  atorvastatin 80 milliGRAM(s) Oral at bedtime  bisacodyl Suppository 10 milliGRAM(s) Rectal once  cefuroxime  IVPB 1500 milliGRAM(s) IV Intermittent every 8 hours  chlorhexidine 2% Cloths 1 Application(s) Topical daily  clopidogrel Tablet 75 milliGRAM(s) Oral daily  enoxaparin Injectable 40 milliGRAM(s) SubCutaneous every 24 hours  gabapentin 100 milliGRAM(s) Oral every 8 hours  glucagon  Injectable 1 milliGRAM(s) IntraMuscular once  insulin glargine Injectable (LANTUS) 10 Unit(s) SubCutaneous at bedtime  insulin lispro (ADMELOG) corrective regimen sliding scale   SubCutaneous three times a day before meals  insulin lispro (ADMELOG) corrective regimen sliding scale   SubCutaneous at bedtime  metoclopramide Injectable 10 milliGRAM(s) IV Push every 8 hours  metoprolol tartrate 50 milliGRAM(s) Oral every 8 hours  pantoprazole    Tablet 40 milliGRAM(s) Oral before breakfast  polyethylene glycol 3350 17 Gram(s) Oral daily  senna 2 Tablet(s) Oral at bedtime  sodium chloride 0.9%. 1000 milliLiter(s) (10 mL/Hr) IV Continuous <Continuous>    MEDICATIONS  (PRN):  dextrose Oral Gel 15 Gram(s) Oral once PRN Blood Glucose LESS THAN 70 milliGRAM(s)/deciliter  HYDROmorphone  Injectable 0.5 milliGRAM(s) IV Push every 6 hours PRN Breakthrough Pain  oxyCODONE    IR 10 milliGRAM(s) Oral every 4 hours PRN Severe Pain (7 - 10)  oxyCODONE    IR 5 milliGRAM(s) Oral every 4 hours PRN Moderate Pain (4 - 6)            REVIEW OF SYSTEMS:  Constitutional: [ ] fever, [ ]weight loss,  [ x]fatigue [ ]weight gain  Eyes: [ ] visual changes  Respiratory: [ ]shortness of breath;  [ ] cough, [ ]wheezing, [ ]chills, [ ]hemoptysis  Cardiovascular: [ ] chest pain, [ ]palpitations, [ ]dizziness,  [ ]leg swelling[ ]orthopnea[ ]PND  Gastrointestinal: [ ] abdominal pain, [ ]nausea, [ ]vomiting,  [ ]diarrhea [ ]Constipation [ ]Melena  Genitourinary: [ ] dysuria, [ ] hematuria [ ]Suh  Neurologic: [ ] headaches [ ] tremors[ ]weakness [ ]Paralysis Right[ ] Left[ ]  Skin: [ ] itching, [ ]burning, [ ] rashes  Endocrine: [ ] heat or cold intolerance  Musculoskeletal: [ ] joint pain or swelling; [ ] muscle, back, or extremity pain  Psychiatric: [ ] depression, [ ]anxiety, [ ]mood swings, or [ ]difficulty sleeping  Hematologic: [ ] easy bruising, [ ] bleeding gums    [ ] All remaining systems negative except as per above.   [ ]Unable to obtain.  [x] No change in ROS since admission      Vital Signs Last 24 Hrs  T(C): 37.4 (07 May 2024 16:00), Max: 37.4 (07 May 2024 16:00)  T(F): 99.3 (07 May 2024 16:00), Max: 99.3 (07 May 2024 16:00)  HR: 76 (08 May 2024 02:53) (71 - 94)  BP: 126/60 (08 May 2024 02:53) (102/57 - 147/70)  BP(mean): 84 (08 May 2024 02:53) (74 - 104)  RR: 18 (08 May 2024 02:53) (12 - 25)  SpO2: 98% (08 May 2024 02:53) (96% - 99%)    Parameters below as of 08 May 2024 02:53  Patient On (Oxygen Delivery Method): nasal cannula      I&O's Summary    06 May 2024 07:01  -  07 May 2024 07:00  --------------------------------------------------------  IN: 2415.4 mL / OUT: 1450 mL / NET: 965.4 mL    07 May 2024 07:01  -  08 May 2024 06:30  --------------------------------------------------------  IN: 460.5 mL / OUT: 2105 mL / NET: -1644.5 mL        PHYSICAL EXAM:  General: No acute distress BMI-42.8  HEENT: EOMI, PERRL  Neck: Supple, [ ] JVD  Lungs: Equal air entry bilaterally; [ ] rales [ ] wheezing [ ] rhonchi  Heart: Regular rate and rhythm; [x ] murmur   2/6 [ x] systolic [ ] diastolic [ ] radiation[ ] rubs [ ]  gallops  Abdomen: Nontender, bowel sounds present  Extremities: No clubbing, cyanosis, [ ] edema [ ]Pulses  equal and intact  Nervous system:  Alert & Oriented X3, no focal deficits  Psychiatric: Normal affect  Skin: No rashes or lesions    LABS:  05-07    140  |  107  |  16  ----------------------------<  223<H>  4.0   |  18<L>  |  0.70    Ca    9.4      07 May 2024 00:16  Phos  2.7     05-07  Mg     1.8     05-07    TPro  6.0  /  Alb  4.0  /  TBili  1.5<H>  /  DBili  x   /  AST  22  /  ALT  16  /  AlkPhos  44  05-07    Creatinine Trend: 0.70<--, 0.73<--, 0.73<--, 0.68<--, 0.69<--, 0.71<--                        10.5   13.96 )-----------( 158      ( 07 May 2024 00:09 )             30.5     PT/INR - ( 07 May 2024 00:16 )   PT: 15.2 sec;   INR: 1.46 ratio         PTT - ( 07 May 2024 00:16 )  PTT:40.8 sec       Intra-Operative Transesophageal Echo W or WO Ultrasound Enhancing Agent (05.06.24 @ 14:22) >  Post-Bypass:  No changes from baseline. Normal biventricular function. S/P CABG X 2.

## 2024-05-09 LAB
ALBUMIN SERPL ELPH-MCNC: 3.3 G/DL — SIGNIFICANT CHANGE UP (ref 3.3–5)
ALP SERPL-CCNC: 48 U/L — SIGNIFICANT CHANGE UP (ref 40–120)
ALT FLD-CCNC: 14 U/L — SIGNIFICANT CHANGE UP (ref 10–45)
ANION GAP SERPL CALC-SCNC: 9 MMOL/L — SIGNIFICANT CHANGE UP (ref 5–17)
AST SERPL-CCNC: 12 U/L — SIGNIFICANT CHANGE UP (ref 10–40)
BASOPHILS # BLD AUTO: 0.04 K/UL — SIGNIFICANT CHANGE UP (ref 0–0.2)
BASOPHILS NFR BLD AUTO: 0.3 % — SIGNIFICANT CHANGE UP (ref 0–2)
BILIRUB SERPL-MCNC: 0.9 MG/DL — SIGNIFICANT CHANGE UP (ref 0.2–1.2)
BUN SERPL-MCNC: 15 MG/DL — SIGNIFICANT CHANGE UP (ref 7–23)
CALCIUM SERPL-MCNC: 8.5 MG/DL — SIGNIFICANT CHANGE UP (ref 8.4–10.5)
CHLORIDE SERPL-SCNC: 103 MMOL/L — SIGNIFICANT CHANGE UP (ref 96–108)
CO2 SERPL-SCNC: 26 MMOL/L — SIGNIFICANT CHANGE UP (ref 22–31)
CREAT SERPL-MCNC: 0.59 MG/DL — SIGNIFICANT CHANGE UP (ref 0.5–1.3)
EGFR: 110 ML/MIN/1.73M2 — SIGNIFICANT CHANGE UP
EOSINOPHIL # BLD AUTO: 0.09 K/UL — SIGNIFICANT CHANGE UP (ref 0–0.5)
EOSINOPHIL NFR BLD AUTO: 0.7 % — SIGNIFICANT CHANGE UP (ref 0–6)
GLUCOSE BLDC GLUCOMTR-MCNC: 131 MG/DL — HIGH (ref 70–99)
GLUCOSE BLDC GLUCOMTR-MCNC: 145 MG/DL — HIGH (ref 70–99)
GLUCOSE BLDC GLUCOMTR-MCNC: 175 MG/DL — HIGH (ref 70–99)
GLUCOSE BLDC GLUCOMTR-MCNC: 195 MG/DL — HIGH (ref 70–99)
GLUCOSE SERPL-MCNC: 152 MG/DL — HIGH (ref 70–99)
HCT VFR BLD CALC: 32.7 % — LOW (ref 39–50)
HGB BLD-MCNC: 11.1 G/DL — LOW (ref 13–17)
IMM GRANULOCYTES NFR BLD AUTO: 0.7 % — SIGNIFICANT CHANGE UP (ref 0–0.9)
LYMPHOCYTES # BLD AUTO: 1.96 K/UL — SIGNIFICANT CHANGE UP (ref 1–3.3)
LYMPHOCYTES # BLD AUTO: 15 % — SIGNIFICANT CHANGE UP (ref 13–44)
MAGNESIUM SERPL-MCNC: 1.9 MG/DL — SIGNIFICANT CHANGE UP (ref 1.6–2.6)
MCHC RBC-ENTMCNC: 28.6 PG — SIGNIFICANT CHANGE UP (ref 27–34)
MCHC RBC-ENTMCNC: 33.9 GM/DL — SIGNIFICANT CHANGE UP (ref 32–36)
MCV RBC AUTO: 84.3 FL — SIGNIFICANT CHANGE UP (ref 80–100)
MONOCYTES # BLD AUTO: 1.28 K/UL — HIGH (ref 0–0.9)
MONOCYTES NFR BLD AUTO: 9.8 % — SIGNIFICANT CHANGE UP (ref 2–14)
NEUTROPHILS # BLD AUTO: 9.64 K/UL — HIGH (ref 1.8–7.4)
NEUTROPHILS NFR BLD AUTO: 73.5 % — SIGNIFICANT CHANGE UP (ref 43–77)
NRBC # BLD: 0 /100 WBCS — SIGNIFICANT CHANGE UP (ref 0–0)
PHOSPHATE SERPL-MCNC: 1.8 MG/DL — LOW (ref 2.5–4.5)
PLATELET # BLD AUTO: 179 K/UL — SIGNIFICANT CHANGE UP (ref 150–400)
POTASSIUM SERPL-MCNC: 4.1 MMOL/L — SIGNIFICANT CHANGE UP (ref 3.5–5.3)
POTASSIUM SERPL-SCNC: 4.1 MMOL/L — SIGNIFICANT CHANGE UP (ref 3.5–5.3)
PROT SERPL-MCNC: 5.9 G/DL — LOW (ref 6–8.3)
RBC # BLD: 3.88 M/UL — LOW (ref 4.2–5.8)
RBC # FLD: 13.9 % — SIGNIFICANT CHANGE UP (ref 10.3–14.5)
SODIUM SERPL-SCNC: 138 MMOL/L — SIGNIFICANT CHANGE UP (ref 135–145)
WBC # BLD: 13.1 K/UL — HIGH (ref 3.8–10.5)
WBC # FLD AUTO: 13.1 K/UL — HIGH (ref 3.8–10.5)

## 2024-05-09 PROCEDURE — 71045 X-RAY EXAM CHEST 1 VIEW: CPT | Mod: 26

## 2024-05-09 RX ORDER — SODIUM,POTASSIUM PHOSPHATES 278-250MG
1 POWDER IN PACKET (EA) ORAL
Refills: 0 | Status: COMPLETED | OUTPATIENT
Start: 2024-05-09 | End: 2024-05-12

## 2024-05-09 RX ORDER — FUROSEMIDE 40 MG
40 TABLET ORAL DAILY
Refills: 0 | Status: DISCONTINUED | OUTPATIENT
Start: 2024-05-09 | End: 2024-05-13

## 2024-05-09 RX ORDER — MAGNESIUM SULFATE 500 MG/ML
2 VIAL (ML) INJECTION ONCE
Refills: 0 | Status: COMPLETED | OUTPATIENT
Start: 2024-05-09 | End: 2024-05-09

## 2024-05-09 RX ORDER — SORBITOL SOLUTION 70 %
30 SOLUTION, ORAL MISCELLANEOUS ONCE
Refills: 0 | Status: COMPLETED | OUTPATIENT
Start: 2024-05-09 | End: 2024-05-09

## 2024-05-09 RX ORDER — SPIRONOLACTONE 25 MG/1
25 TABLET, FILM COATED ORAL DAILY
Refills: 0 | Status: DISCONTINUED | OUTPATIENT
Start: 2024-05-09 | End: 2024-05-12

## 2024-05-09 RX ORDER — INSULIN LISPRO 100/ML
4 VIAL (ML) SUBCUTANEOUS
Refills: 0 | Status: DISCONTINUED | OUTPATIENT
Start: 2024-05-09 | End: 2024-05-13

## 2024-05-09 RX ADMIN — POLYETHYLENE GLYCOL 3350 17 GRAM(S): 17 POWDER, FOR SOLUTION ORAL at 11:33

## 2024-05-09 RX ADMIN — INSULIN GLARGINE 14 UNIT(S): 100 INJECTION, SOLUTION SUBCUTANEOUS at 21:04

## 2024-05-09 RX ADMIN — HYDROMORPHONE HYDROCHLORIDE 4 MILLIGRAM(S): 2 INJECTION INTRAMUSCULAR; INTRAVENOUS; SUBCUTANEOUS at 01:18

## 2024-05-09 RX ADMIN — Medication 2: at 17:30

## 2024-05-09 RX ADMIN — ATORVASTATIN CALCIUM 80 MILLIGRAM(S): 80 TABLET, FILM COATED ORAL at 21:05

## 2024-05-09 RX ADMIN — HYDROMORPHONE HYDROCHLORIDE 4 MILLIGRAM(S): 2 INJECTION INTRAMUSCULAR; INTRAVENOUS; SUBCUTANEOUS at 11:38

## 2024-05-09 RX ADMIN — Medication 2: at 11:33

## 2024-05-09 RX ADMIN — Medication 81 MILLIGRAM(S): at 11:38

## 2024-05-09 RX ADMIN — HYDROMORPHONE HYDROCHLORIDE 4 MILLIGRAM(S): 2 INJECTION INTRAMUSCULAR; INTRAVENOUS; SUBCUTANEOUS at 12:10

## 2024-05-09 RX ADMIN — Medication 650 MILLIGRAM(S): at 11:33

## 2024-05-09 RX ADMIN — Medication 50 MILLIGRAM(S): at 21:05

## 2024-05-09 RX ADMIN — Medication 1 TABLET(S): at 21:04

## 2024-05-09 RX ADMIN — Medication 500 MILLIGRAM(S): at 05:21

## 2024-05-09 RX ADMIN — Medication 650 MILLIGRAM(S): at 18:05

## 2024-05-09 RX ADMIN — SENNA PLUS 2 TABLET(S): 8.6 TABLET ORAL at 21:04

## 2024-05-09 RX ADMIN — Medication 4 UNIT(S): at 18:05

## 2024-05-09 RX ADMIN — CLOPIDOGREL BISULFATE 75 MILLIGRAM(S): 75 TABLET, FILM COATED ORAL at 11:33

## 2024-05-09 RX ADMIN — AMLODIPINE BESYLATE 10 MILLIGRAM(S): 2.5 TABLET ORAL at 05:21

## 2024-05-09 RX ADMIN — SPIRONOLACTONE 25 MILLIGRAM(S): 25 TABLET, FILM COATED ORAL at 11:33

## 2024-05-09 RX ADMIN — Medication 1 TABLET(S): at 18:10

## 2024-05-09 RX ADMIN — Medication 650 MILLIGRAM(S): at 05:21

## 2024-05-09 RX ADMIN — GABAPENTIN 100 MILLIGRAM(S): 400 CAPSULE ORAL at 21:04

## 2024-05-09 RX ADMIN — Medication 500 MILLIGRAM(S): at 18:05

## 2024-05-09 RX ADMIN — AMIODARONE HYDROCHLORIDE 400 MILLIGRAM(S): 400 TABLET ORAL at 05:20

## 2024-05-09 RX ADMIN — Medication 40 MILLIGRAM(S): at 11:33

## 2024-05-09 RX ADMIN — CHLORHEXIDINE GLUCONATE 1 APPLICATION(S): 213 SOLUTION TOPICAL at 11:00

## 2024-05-09 RX ADMIN — HYDROMORPHONE HYDROCHLORIDE 4 MILLIGRAM(S): 2 INJECTION INTRAMUSCULAR; INTRAVENOUS; SUBCUTANEOUS at 06:00

## 2024-05-09 RX ADMIN — Medication 25 GRAM(S): at 11:37

## 2024-05-09 RX ADMIN — ENOXAPARIN SODIUM 40 MILLIGRAM(S): 100 INJECTION SUBCUTANEOUS at 18:10

## 2024-05-09 RX ADMIN — AMIODARONE HYDROCHLORIDE 400 MILLIGRAM(S): 400 TABLET ORAL at 18:05

## 2024-05-09 RX ADMIN — HYDROMORPHONE HYDROCHLORIDE 4 MILLIGRAM(S): 2 INJECTION INTRAMUSCULAR; INTRAVENOUS; SUBCUTANEOUS at 05:20

## 2024-05-09 RX ADMIN — Medication 50 MILLIGRAM(S): at 05:20

## 2024-05-09 RX ADMIN — HYDROMORPHONE HYDROCHLORIDE 4 MILLIGRAM(S): 2 INJECTION INTRAMUSCULAR; INTRAVENOUS; SUBCUTANEOUS at 02:00

## 2024-05-09 RX ADMIN — GABAPENTIN 100 MILLIGRAM(S): 400 CAPSULE ORAL at 14:31

## 2024-05-09 RX ADMIN — Medication 50 MILLIGRAM(S): at 14:31

## 2024-05-09 RX ADMIN — PANTOPRAZOLE SODIUM 40 MILLIGRAM(S): 20 TABLET, DELAYED RELEASE ORAL at 05:20

## 2024-05-09 RX ADMIN — Medication 1 TABLET(S): at 12:27

## 2024-05-09 RX ADMIN — GABAPENTIN 100 MILLIGRAM(S): 400 CAPSULE ORAL at 05:20

## 2024-05-09 NOTE — PROGRESS NOTE ADULT - SUBJECTIVE AND OBJECTIVE BOX
Chief complaint  Patient is a 63y old  Male who presents with a chief complaint of Chest Pain (09 May 2024 10:12)         Labs and Fingersticks  CAPILLARY BLOOD GLUCOSE      POCT Blood Glucose.: 195 mg/dL (09 May 2024 11:14)  POCT Blood Glucose.: 145 mg/dL (09 May 2024 07:19)  POCT Blood Glucose.: 223 mg/dL (08 May 2024 20:40)  POCT Blood Glucose.: 143 mg/dL (08 May 2024 16:31)      Anion Gap: 9 (05-09 @ 06:00)  Anion Gap: 14 (05-08 @ 06:21)      Calcium: 8.5 (05-09 @ 06:00)  Calcium: 9.3 (05-08 @ 06:21)  Albumin: 3.3 (05-09 @ 06:00)  Albumin: 4.1 (05-08 @ 06:21)    Alanine Aminotransferase (ALT/SGPT): 14 (05-09 @ 06:00)  Alanine Aminotransferase (ALT/SGPT): 18 (05-08 @ 06:21)  Alkaline Phosphatase: 48 (05-09 @ 06:00)  Alkaline Phosphatase: 50 (05-08 @ 06:21)  Aspartate Aminotransferase (AST/SGOT): 12 (05-09 @ 06:00)  Aspartate Aminotransferase (AST/SGOT): 19 (05-08 @ 06:21)        05-09    138  |  103  |  15  ----------------------------<  152<H>  4.1   |  26  |  0.59    Ca    8.5      09 May 2024 06:00  Phos  1.8     05-09  Mg     1.9     05-09    TPro  5.9<L>  /  Alb  3.3  /  TBili  0.9  /  DBili  x   /  AST  12  /  ALT  14  /  AlkPhos  48  05-09                        11.1   13.10 )-----------( 179      ( 09 May 2024 06:00 )             32.7     Medications  MEDICATIONS  (STANDING):  acetaminophen     Tablet .. 650 milliGRAM(s) Oral every 6 hours  aMIOdarone    Tablet 400 milliGRAM(s) Oral two times a day  amLODIPine   Tablet 10 milliGRAM(s) Oral daily  ascorbic acid 500 milliGRAM(s) Oral two times a day  aspirin enteric coated 81 milliGRAM(s) Oral daily  atorvastatin 80 milliGRAM(s) Oral at bedtime  bisacodyl Suppository 10 milliGRAM(s) Rectal once  chlorhexidine 2% Cloths 1 Application(s) Topical daily  clopidogrel Tablet 75 milliGRAM(s) Oral daily  dextrose 5%. 1000 milliLiter(s) (50 mL/Hr) IV Continuous <Continuous>  dextrose 50% Injectable 50 milliLiter(s) IV Push every 15 minutes  dextrose 50% Injectable 25 milliLiter(s) IV Push every 15 minutes  enoxaparin Injectable 40 milliGRAM(s) SubCutaneous every 24 hours  furosemide    Tablet 40 milliGRAM(s) Oral daily  gabapentin 100 milliGRAM(s) Oral every 8 hours  glucagon  Injectable 1 milliGRAM(s) IntraMuscular once  insulin glargine Injectable (LANTUS) 14 Unit(s) SubCutaneous at bedtime  insulin lispro (ADMELOG) corrective regimen sliding scale   SubCutaneous at bedtime  insulin lispro (ADMELOG) corrective regimen sliding scale   SubCutaneous three times a day before meals  lidocaine   4% Patch 1 Patch Transdermal every 24 hours  metoprolol tartrate 50 milliGRAM(s) Oral every 8 hours  pantoprazole    Tablet 40 milliGRAM(s) Oral before breakfast  polyethylene glycol 3350 17 Gram(s) Oral daily  potassium phosphate / sodium phosphate Tablet (K-PHOS No. 2) 1 Tablet(s) Oral four times a day with meals  senna 2 Tablet(s) Oral at bedtime  sodium chloride 0.9%. 1000 milliLiter(s) (10 mL/Hr) IV Continuous <Continuous>  sorbitol 70% Solution 30 milliLiter(s) Oral once  spironolactone 25 milliGRAM(s) Oral daily      Physical Exam  General: Patient comfortable in bed   Vital Signs Last 12 Hrs  T(F): 98.4 (05-09-24 @ 10:55), Max: 98.5 (05-09-24 @ 07:07)  HR: 88 (05-09-24 @ 11:50) (75 - 89)  BP: 123/61 (05-09-24 @ 11:50) (106/60 - 130/68)  BP(mean): 85 (05-09-24 @ 10:55) (76 - 94)  RR: 18 (05-09-24 @ 10:55) (18 - 18)  SpO2: 95% (05-09-24 @ 11:50) (95% - 96%)    CVS: S1S2   Respiratory: No wheezing, no crepitations  GI: Abdomen soft, bowel sounds positive  Musculoskeletal:  moves all extremities  : Voiding          Chief complaint  Patient is a 63y old  Male who presents with a chief complaint of Chest Pain (09 May 2024 10:12)    Labs and Fingersticks  CAPILLARY BLOOD GLUCOSE      POCT Blood Glucose.: 195 mg/dL (09 May 2024 11:14)  POCT Blood Glucose.: 145 mg/dL (09 May 2024 07:19)  POCT Blood Glucose.: 223 mg/dL (08 May 2024 20:40)  POCT Blood Glucose.: 143 mg/dL (08 May 2024 16:31)      Anion Gap: 9 (05-09 @ 06:00)  Anion Gap: 14 (05-08 @ 06:21)      Calcium: 8.5 (05-09 @ 06:00)  Calcium: 9.3 (05-08 @ 06:21)  Albumin: 3.3 (05-09 @ 06:00)  Albumin: 4.1 (05-08 @ 06:21)    Alanine Aminotransferase (ALT/SGPT): 14 (05-09 @ 06:00)  Alanine Aminotransferase (ALT/SGPT): 18 (05-08 @ 06:21)  Alkaline Phosphatase: 48 (05-09 @ 06:00)  Alkaline Phosphatase: 50 (05-08 @ 06:21)  Aspartate Aminotransferase (AST/SGOT): 12 (05-09 @ 06:00)  Aspartate Aminotransferase (AST/SGOT): 19 (05-08 @ 06:21)        05-09    138  |  103  |  15  ----------------------------<  152<H>  4.1   |  26  |  0.59    Ca    8.5      09 May 2024 06:00  Phos  1.8     05-09  Mg     1.9     05-09    TPro  5.9<L>  /  Alb  3.3  /  TBili  0.9  /  DBili  x   /  AST  12  /  ALT  14  /  AlkPhos  48  05-09                        11.1   13.10 )-----------( 179      ( 09 May 2024 06:00 )             32.7     Medications  MEDICATIONS  (STANDING):  acetaminophen     Tablet .. 650 milliGRAM(s) Oral every 6 hours  aMIOdarone    Tablet 400 milliGRAM(s) Oral two times a day  amLODIPine   Tablet 10 milliGRAM(s) Oral daily  ascorbic acid 500 milliGRAM(s) Oral two times a day  aspirin enteric coated 81 milliGRAM(s) Oral daily  atorvastatin 80 milliGRAM(s) Oral at bedtime  bisacodyl Suppository 10 milliGRAM(s) Rectal once  chlorhexidine 2% Cloths 1 Application(s) Topical daily  clopidogrel Tablet 75 milliGRAM(s) Oral daily  dextrose 5%. 1000 milliLiter(s) (50 mL/Hr) IV Continuous <Continuous>  dextrose 50% Injectable 50 milliLiter(s) IV Push every 15 minutes  dextrose 50% Injectable 25 milliLiter(s) IV Push every 15 minutes  enoxaparin Injectable 40 milliGRAM(s) SubCutaneous every 24 hours  furosemide    Tablet 40 milliGRAM(s) Oral daily  gabapentin 100 milliGRAM(s) Oral every 8 hours  glucagon  Injectable 1 milliGRAM(s) IntraMuscular once  insulin glargine Injectable (LANTUS) 14 Unit(s) SubCutaneous at bedtime  insulin lispro (ADMELOG) corrective regimen sliding scale   SubCutaneous at bedtime  insulin lispro (ADMELOG) corrective regimen sliding scale   SubCutaneous three times a day before meals  lidocaine   4% Patch 1 Patch Transdermal every 24 hours  metoprolol tartrate 50 milliGRAM(s) Oral every 8 hours  pantoprazole    Tablet 40 milliGRAM(s) Oral before breakfast  polyethylene glycol 3350 17 Gram(s) Oral daily  potassium phosphate / sodium phosphate Tablet (K-PHOS No. 2) 1 Tablet(s) Oral four times a day with meals  senna 2 Tablet(s) Oral at bedtime  sodium chloride 0.9%. 1000 milliLiter(s) (10 mL/Hr) IV Continuous <Continuous>  sorbitol 70% Solution 30 milliLiter(s) Oral once  spironolactone 25 milliGRAM(s) Oral daily      Physical Exam  General: Patient comfortable in bed   Vital Signs Last 12 Hrs  T(F): 98.4 (05-09-24 @ 10:55), Max: 98.5 (05-09-24 @ 07:07)  HR: 88 (05-09-24 @ 11:50) (75 - 89)  BP: 123/61 (05-09-24 @ 11:50) (106/60 - 130/68)  BP(mean): 85 (05-09-24 @ 10:55) (76 - 94)  RR: 18 (05-09-24 @ 10:55) (18 - 18)  SpO2: 95% (05-09-24 @ 11:50) (95% - 96%)    CVS: S1S2   Respiratory: No wheezing, no crepitations  GI: Abdomen soft, bowel sounds positive  Musculoskeletal:  moves all extremities  : Voiding

## 2024-05-09 NOTE — PROGRESS NOTE ADULT - ASSESSMENT
Assessment  DMT2: 63y Male with DM T2 with hyperglycemia admitted with chest pain, a1c is stable 5.8%, was on home  Mounjaro injections at home s/p surgery, now off IV insulin, eating meals.   CAD: now s/p CABG, on medications, monitored.  HTN: On antihypertensive medications, monitored, asymptomatic.        Discussed plan and management with Dr Stacy Jimenez NP - TEAMS  Gena Kumar MD  Cell: [phone]  Office: [phone]

## 2024-05-09 NOTE — PROGRESS NOTE ADULT - ASSESSMENT
63M with PMH of HTN, HLD, CAD s/p PCI, DM2 (last A1c 6.0), LENIN on CPAP, Vertigo. Recent stent to Lcx in March of 2024 discharged on DAPT. Presents to [facility] with chest pain 7/10 on exertion for the past week with associated dyspnea. The patient reports the chest pain does not radiate anywhere and gets better with rest. Diagnostic  Cath revealing LM: 60% mid/distal stenosis and LAD: 70% ostial stenosis with LCX: Patent stent. CT surgery consulted for CABG evaluation.   5/6  C-2L  LIMA-LAD | SVG-OM  ef nml  Insulin infusion  5/7  sdu  5/8  remove CT's, a line  intro  ji  May transfer to floor  5/9  d/c pw, diuresis, tx to floor

## 2024-05-09 NOTE — PROGRESS NOTE ADULT - SUBJECTIVE AND OBJECTIVE BOX
VITAL SIGNS    Telemetry:  nsr 84    Vital Signs Last 24 Hrs  T(C): 36.9 (24 @ 07:07), Max: 37.1 (24 @ 15:51)  T(F): 98.5 (24 @ 07:07), Max: 98.7 (24 @ 15:51)  HR: 75 (24 @ 07:07) (75 - 88)  BP: 106/60 (24 @ 07:07) (104/54 - 130/68)  RR: 18 (24 @ 07:07) (18 - 18)  SpO2: 96% (24 @ 07:07) (92% - 96%)                    07:01  -   @ 07:00  --------------------------------------------------------  IN: 1140 mL / OUT: 2255 mL / NET: -1115 mL     07:01  -   @ 10:12  --------------------------------------------------------  IN: 240 mL / OUT: 0 mL / NET: 240 mL          Daily     Daily Weight in k.2 (09 May 2024 06:33)            CAPILLARY BLOOD GLUCOSE      POCT Blood Glucose.: 145 mg/dL (09 May 2024 07:19)  POCT Blood Glucose.: 223 mg/dL (08 May 2024 20:40)  POCT Blood Glucose.: 143 mg/dL (08 May 2024 16:31)  POCT Blood Glucose.: 176 mg/dL (08 May 2024 11:32)                Pacing Wires        [  ]   Settings:                                  Isolated  [x  ]    Coumadin    [ ] YES          x[  ]      NO                                   PHYSICAL EXAM        Neurology: alert and oriented x 3, nonfocal, no gross deficits  CV : s1 s2 RRR    Sternal Wound :  CDI , Stable  Lungs: cta  Abdomen: soft, nontender, nondistended, positive bowel sounds, last bowel movement no                        :   voiding      Extremities:    trace  edema   /  -   calve tenderness ,    L leg    incisions cdi              acetaminophen     Tablet .. 650 milliGRAM(s) Oral every 6 hours  aMIOdarone    Tablet 400 milliGRAM(s) Oral two times a day  amLODIPine   Tablet 10 milliGRAM(s) Oral daily  ascorbic acid 500 milliGRAM(s) Oral two times a day  aspirin enteric coated 81 milliGRAM(s) Oral daily  atorvastatin 80 milliGRAM(s) Oral at bedtime  bisacodyl Suppository 10 milliGRAM(s) Rectal once  chlorhexidine 2% Cloths 1 Application(s) Topical daily  clopidogrel Tablet 75 milliGRAM(s) Oral daily  dextrose 5%. 1000 milliLiter(s) IV Continuous <Continuous>  dextrose 50% Injectable 50 milliLiter(s) IV Push every 15 minutes  dextrose 50% Injectable 25 milliLiter(s) IV Push every 15 minutes  dextrose Oral Gel 15 Gram(s) Oral once PRN  enoxaparin Injectable 40 milliGRAM(s) SubCutaneous every 24 hours  gabapentin 100 milliGRAM(s) Oral every 8 hours  glucagon  Injectable 1 milliGRAM(s) IntraMuscular once  HYDROmorphone   Tablet 2 milliGRAM(s) Oral every 3 hours PRN  HYDROmorphone   Tablet 4 milliGRAM(s) Oral every 4 hours PRN  insulin glargine Injectable (LANTUS) 14 Unit(s) SubCutaneous at bedtime  insulin lispro (ADMELOG) corrective regimen sliding scale   SubCutaneous three times a day before meals  insulin lispro (ADMELOG) corrective regimen sliding scale   SubCutaneous at bedtime  lidocaine   4% Patch 1 Patch Transdermal every 24 hours  magnesium sulfate  IVPB 2 Gram(s) IV Intermittent once  metoprolol tartrate 50 milliGRAM(s) Oral every 8 hours  pantoprazole    Tablet 40 milliGRAM(s) Oral before breakfast  polyethylene glycol 3350 17 Gram(s) Oral daily  potassium phosphate / sodium phosphate Tablet (K-PHOS No. 2) 1 Tablet(s) Oral four times a day with meals  senna 2 Tablet(s) Oral at bedtime  sodium chloride 0.9%. 1000 milliLiter(s) IV Continuous <Continuous>                    Physical Therapy Rec:   Home  [  ]   Home w/ PT  [  ]  Rehab  [  ]  Discussed with Cardiothoracic Team at AM rounds.

## 2024-05-09 NOTE — PROGRESS NOTE ADULT - ASSESSMENT
63M with PMH of HTN, HLD, CAD s/p PCI, DM2 (last A1c 6.0), LENIN on CPAP, Vertigo presents with chest pain on exertion and dyspnea       # CAD-Progressive Angina  Hx CAD s/p PCI-2023-  -Had cath-3/1/24--LCX: Patent OM1 stent, patent circumflex stent with 75% stenosis at distal edge of old stent s/p PCI-1 SHARON to LCx  -Presenting with HUDSON and angina  -Cath 5/2/2024-LM: 60% mid/distal stenosis.  LAD: 70% ostial stenosis LCX: Patent stent in mid.    -5/6-CABG-LIMA-LAD | SVG-OM  5/7-Extubated off pressors TX to Stepdown  5/8-POD# 2 Awake no dyspnea-Amio for AF prophylaxis,resumes plavix  5/9-POD# 3 Remains in sinus rhythm-      # HTN  On Metoprolol 50mg q8    # T2DM  -A1c-6.0 -2023 now 5.9%  Endocrine evaluation noted  Patient was on Mounjaro at home

## 2024-05-09 NOTE — PROGRESS NOTE ADULT - SUBJECTIVE AND OBJECTIVE BOX
MR#[id number]  PATIENT NAME:YAMINI CUNNINGHAM    DATE OF SERVICE: 05-09-24 @ 06:44  Patient was seen and examined by Wilfred Major MD on    05-09-24 @ 06:44 .  Interim events noted.Consultant notes ,Labs,Telemetry reviewed by me       HOSPITAL COURSE: HPI:   63M with PMH of HTN, HLD, CAD s/p PCI, DM2 (last A1c 6.0), LENIN on CPAP, Vertigo presents with chest pain on exertion and dyspnea Had-cath-3/1/24--LCX: Patent OM1 stent, patent circumflex stent with 75% stenosis at distal edge of old stent s/p PCI-1 SHARON to LCx via RRA and RBV with WNL filling pressure discharged on DAPT Of note lab report on 3/4 reported Hgb 8.1 from 16 on 3/1-/Lab error,patient denies melena bleeding.        CARDIAC STUDIES:  CATH-05/02/2024-LM-60% mid distal stenosis,LAD-70% ostial stenosis,LCx patent stent IVUS of LM and LAD showed significant plaque burden.  TTE 2/27/24 - Mild LV dysfunction with EF 48%, LVH, Hypokinesis of apical cap, dilated LA, mild AS, AR, MR, TR  CATH 3/01/2024-LCX: Patent OM1 stent, patent circumflex stent with 75% stenosis at distal edge of old stent s/p PCI with new SHARON  CATH 3/29/2023-Mild-moderate nonobstructive CAD of the left cirumflex and left anterior descending arteries. Prior stent to the OM1 is widely patent.      INTERIM EVENTS:Patient seen at bedside ,interim events noted.  5/6-CABG-LIMA-LAD | SVG-OM  5/7-Extubated transferred to stepdown  5/8-Awake alert remains in Sinus Rhythm   5/9-Awake alert OOB back pain improved          PMH -reviewed admission note, no change since admission  HEART FAILURE: Acute[ ]Chronic[ x] Systolic[ ] Diastolic[x ] Combined Systolic and Diastolic[ ]  CAD[x ] CABG[ ] PCI[ x]  DEVICES[ ] PPM[ ] ICD[ ] ILR[ ]  ATRIAL FIBRILLATION[ ] Paroxysmal[ ] Permanent[ ] CHADS2-[  ]  BISI[ ] CKD1[ ] CKD2[ ] CKD3[ ] CKD4[ ] ESRD[ ]  COPD[ ] HTN[ ]   DM[x ] Type1[ ] Type 2[ x]   CVA[ ] Paresis[ ]    AMBULATION: Assisted[ ] Cane/walker[ ] Independent[x ]    MEDICATIONS  (STANDING):  acetaminophen     Tablet .. 650 milliGRAM(s) Oral every 6 hours  aMIOdarone    Tablet 400 milliGRAM(s) Oral two times a day  amLODIPine   Tablet 10 milliGRAM(s) Oral daily  ascorbic acid 500 milliGRAM(s) Oral two times a day  aspirin enteric coated 81 milliGRAM(s) Oral daily  atorvastatin 80 milliGRAM(s) Oral at bedtime  bisacodyl Suppository 10 milliGRAM(s) Rectal once  chlorhexidine 2% Cloths 1 Application(s) Topical daily  clopidogrel Tablet 75 milliGRAM(s) Oral daily  enoxaparin Injectable 40 milliGRAM(s) SubCutaneous every 24 hours  gabapentin 100 milliGRAM(s) Oral every 8 hours  glucagon  Injectable 1 milliGRAM(s) IntraMuscular once  insulin glargine Injectable (LANTUS) 14 Unit(s) SubCutaneous at bedtime  insulin lispro (ADMELOG) corrective regimen sliding scale   SubCutaneous at bedtime  insulin lispro (ADMELOG) corrective regimen sliding scale   SubCutaneous three times a day before meals  lidocaine   4% Patch 1 Patch Transdermal every 24 hours  metoprolol tartrate 50 milliGRAM(s) Oral every 8 hours  pantoprazole    Tablet 40 milliGRAM(s) Oral before breakfast  polyethylene glycol 3350 17 Gram(s) Oral daily  senna 2 Tablet(s) Oral at bedtime  sodium chloride 0.9%. 1000 milliLiter(s) (10 mL/Hr) IV Continuous <Continuous>    MEDICATIONS  (PRN):  dextrose Oral Gel 15 Gram(s) Oral once PRN Blood Glucose LESS THAN 70 milliGRAM(s)/deciliter  HYDROmorphone   Tablet 4 milliGRAM(s) Oral every 4 hours PRN Severe Pain (7 - 10)  HYDROmorphone   Tablet 2 milliGRAM(s) Oral every 3 hours PRN Moderate Pain (4 - 6)            REVIEW OF SYSTEMS:  Constitutional: [ ] fever, [ ]weight loss,  [x ]fatigue [ ]weight gain  Eyes: [ ] visual changes  Respiratory: [ ]shortness of breath;  [ ] cough, [ ]wheezing, [ ]chills, [ ]hemoptysis  Cardiovascular: [ ] chest pain, [ ]palpitations, [ ]dizziness,  [ ]leg swelling[ ]orthopnea[ ]PND  Gastrointestinal: [ ] abdominal pain, [ ]nausea, [ ]vomiting,  [ ]diarrhea [ ]Constipation [ ]Melena  Genitourinary: [ ] dysuria, [ ] hematuria [ ]Suh  Neurologic: [ ] headaches [ ] tremors[ ]weakness [ ]Paralysis Right[ ] Left[ ]  Skin: [ ] itching, [ ]burning, [ ] rashes  Endocrine: [ ] heat or cold intolerance  Musculoskeletal: [ ] joint pain or swelling; [ ] muscle, back, or extremity pain  Psychiatric: [ ] depression, [ ]anxiety, [ ]mood swings, or [ ]difficulty sleeping  Hematologic: [ ] easy bruising, [ ] bleeding gums    [ ] All remaining systems negative except as per above.   [ ]Unable to obtain.  [x] No change in ROS since admission      Vital Signs Last 24 Hrs  T(C): 36.6 (09 May 2024 03:06), Max: 37.1 (08 May 2024 15:51)  T(F): 97.9 (09 May 2024 03:06), Max: 98.7 (08 May 2024 15:51)  HR: 86 (09 May 2024 05:16) (82 - 88)  BP: 129/71 (09 May 2024 05:16) (104/54 - 130/68)  BP(mean): 94 (09 May 2024 05:16) (75 - 94)  RR: 18 (09 May 2024 03:06) (18 - 18)  SpO2: 95% (09 May 2024 03:06) (91% - 95%)    Parameters below as of 09 May 2024 03:06  Patient On (Oxygen Delivery Method): nasal cannula      I&O's Summary    07 May 2024 07:01  -  08 May 2024 07:00  --------------------------------------------------------  IN: 460.5 mL / OUT: 2105 mL / NET: -1644.5 mL    08 May 2024 07:01  -  09 May 2024 06:44  --------------------------------------------------------  IN: 1140 mL / OUT: 2255 mL / NET: -1115 mL        PHYSICAL EXAM:  General: No acute distress BMI-34  HEENT: EOMI, PERRL  Neck: Supple, [ ] JVD  Lungs: Equal air entry bilaterally; [ ] rales [ ] wheezing [ ] rhonchi  Heart: Regular rate and rhythm; [x ] murmur   2/6 [ x] systolic [ ] diastolic [ ] radiation[ ] rubs [ ]  gallops  Abdomen: Nontender, bowel sounds present  Extremities: No clubbing, cyanosis, [ ] edema [ ]Pulses  equal and intact  Nervous system:  Alert & Oriented X3, no focal deficits  Psychiatric: Normal affect  Skin: No rashes or lesions    LABS:  05-09    138  |  103  |  15  ----------------------------<  152<H>  4.1   |  26  |  0.59    Ca    8.5      09 May 2024 06:00  Phos  1.8     05-09  Mg     1.9     05-09    TPro  5.9<L>  /  Alb  3.3  /  TBili  0.9  /  DBili  x   /  AST  12  /  ALT  14  /  AlkPhos  48  05-09    Creatinine Trend: 0.59<--, 0.68<--, 0.70<--, 0.73<--, 0.73<--, 0.68<--                        11.1   13.10 )-----------( 179      ( 09 May 2024 06:00 )             32.7            Intra-Operative Transesophageal Echo W or WO Ultrasound Enhancing Agent (05.06.24 @ 14:22) >  Post-Bypass:  No changes from baseline. Normal biventricular function. S/P CABG X 2.          Xray Chest 1 View- PORTABLE-Routine (05.08.24 @ 07:12) >  FINDINGS:  5/6/2024 7:30 PM:  The patient is status post sternotomy. An endotracheal tube seen with its   tip 4 cm above the cathy. An NG tube is seen in the stomach. A right IJ   catheter seen with its tip in the SVC. A left chest tube and mediastinal   drainare noted.  The heart is not well assessed on an AP film.  There is pulmonary vascular congestion.  There is a trace left pleural effusion  There is no pneumothorax.    5/7/2024 3:04 AM:  The endotracheal tube and NG tube were removed. Mild pulmonaryvascular  congestion remains.    5/8/2024 6:55 AM:  Pulmonary vascular congestion has improved.  There is minimal bibasilar atelectasis and trace effusions.    IMPRESSION:  Status post cardiac surgery. Tubes and lines as above.  Improved pulmonaryvascular congestion.  Small bilateral pleural effusions and atelectasis.

## 2024-05-10 ENCOUNTER — APPOINTMENT (OUTPATIENT)
Dept: VASCULAR SURGERY | Facility: CLINIC | Age: 64
End: 2024-05-10

## 2024-05-10 ENCOUNTER — TRANSCRIPTION ENCOUNTER (OUTPATIENT)
Age: 64
End: 2024-05-10

## 2024-05-10 LAB
ALBUMIN SERPL ELPH-MCNC: 3.5 G/DL — SIGNIFICANT CHANGE UP (ref 3.3–5)
ALP SERPL-CCNC: 46 U/L — SIGNIFICANT CHANGE UP (ref 40–120)
ALT FLD-CCNC: 14 U/L — SIGNIFICANT CHANGE UP (ref 10–45)
ANION GAP SERPL CALC-SCNC: 12 MMOL/L — SIGNIFICANT CHANGE UP (ref 5–17)
AST SERPL-CCNC: 10 U/L — SIGNIFICANT CHANGE UP (ref 10–40)
BASOPHILS # BLD AUTO: 0.05 K/UL — SIGNIFICANT CHANGE UP (ref 0–0.2)
BASOPHILS NFR BLD AUTO: 0.4 % — SIGNIFICANT CHANGE UP (ref 0–2)
BILIRUB SERPL-MCNC: 1 MG/DL — SIGNIFICANT CHANGE UP (ref 0.2–1.2)
BUN SERPL-MCNC: 14 MG/DL — SIGNIFICANT CHANGE UP (ref 7–23)
CALCIUM SERPL-MCNC: 9 MG/DL — SIGNIFICANT CHANGE UP (ref 8.4–10.5)
CHLORIDE SERPL-SCNC: 102 MMOL/L — SIGNIFICANT CHANGE UP (ref 96–108)
CO2 SERPL-SCNC: 26 MMOL/L — SIGNIFICANT CHANGE UP (ref 22–31)
CREAT SERPL-MCNC: 0.65 MG/DL — SIGNIFICANT CHANGE UP (ref 0.5–1.3)
EGFR: 107 ML/MIN/1.73M2 — SIGNIFICANT CHANGE UP
EOSINOPHIL # BLD AUTO: 0.35 K/UL — SIGNIFICANT CHANGE UP (ref 0–0.5)
EOSINOPHIL NFR BLD AUTO: 2.8 % — SIGNIFICANT CHANGE UP (ref 0–6)
GLUCOSE BLDC GLUCOMTR-MCNC: 112 MG/DL — HIGH (ref 70–99)
GLUCOSE BLDC GLUCOMTR-MCNC: 132 MG/DL — HIGH (ref 70–99)
GLUCOSE BLDC GLUCOMTR-MCNC: 141 MG/DL — HIGH (ref 70–99)
GLUCOSE BLDC GLUCOMTR-MCNC: 150 MG/DL — HIGH (ref 70–99)
GLUCOSE BLDC GLUCOMTR-MCNC: 183 MG/DL — HIGH (ref 70–99)
GLUCOSE SERPL-MCNC: 149 MG/DL — HIGH (ref 70–99)
HCT VFR BLD CALC: 33.1 % — LOW (ref 39–50)
HGB BLD-MCNC: 11.2 G/DL — LOW (ref 13–17)
IMM GRANULOCYTES NFR BLD AUTO: 0.6 % — SIGNIFICANT CHANGE UP (ref 0–0.9)
LYMPHOCYTES # BLD AUTO: 1.9 K/UL — SIGNIFICANT CHANGE UP (ref 1–3.3)
LYMPHOCYTES # BLD AUTO: 15 % — SIGNIFICANT CHANGE UP (ref 13–44)
MAGNESIUM SERPL-MCNC: 1.9 MG/DL — SIGNIFICANT CHANGE UP (ref 1.6–2.6)
MCHC RBC-ENTMCNC: 28.8 PG — SIGNIFICANT CHANGE UP (ref 27–34)
MCHC RBC-ENTMCNC: 33.8 GM/DL — SIGNIFICANT CHANGE UP (ref 32–36)
MCV RBC AUTO: 85.1 FL — SIGNIFICANT CHANGE UP (ref 80–100)
MONOCYTES # BLD AUTO: 1.06 K/UL — HIGH (ref 0–0.9)
MONOCYTES NFR BLD AUTO: 8.4 % — SIGNIFICANT CHANGE UP (ref 2–14)
NEUTROPHILS # BLD AUTO: 9.21 K/UL — HIGH (ref 1.8–7.4)
NEUTROPHILS NFR BLD AUTO: 72.8 % — SIGNIFICANT CHANGE UP (ref 43–77)
NRBC # BLD: 0 /100 WBCS — SIGNIFICANT CHANGE UP (ref 0–0)
PHOSPHATE SERPL-MCNC: 2.1 MG/DL — LOW (ref 2.5–4.5)
PLATELET # BLD AUTO: 189 K/UL — SIGNIFICANT CHANGE UP (ref 150–400)
POTASSIUM SERPL-MCNC: 3.9 MMOL/L — SIGNIFICANT CHANGE UP (ref 3.5–5.3)
POTASSIUM SERPL-SCNC: 3.9 MMOL/L — SIGNIFICANT CHANGE UP (ref 3.5–5.3)
PROT SERPL-MCNC: 5.9 G/DL — LOW (ref 6–8.3)
RBC # BLD: 3.89 M/UL — LOW (ref 4.2–5.8)
RBC # FLD: 13.7 % — SIGNIFICANT CHANGE UP (ref 10.3–14.5)
SODIUM SERPL-SCNC: 140 MMOL/L — SIGNIFICANT CHANGE UP (ref 135–145)
WBC # BLD: 12.65 K/UL — HIGH (ref 3.8–10.5)
WBC # FLD AUTO: 12.65 K/UL — HIGH (ref 3.8–10.5)

## 2024-05-10 RX ORDER — MAGNESIUM OXIDE 400 MG ORAL TABLET 241.3 MG
400 TABLET ORAL ONCE
Refills: 0 | Status: COMPLETED | OUTPATIENT
Start: 2024-05-10 | End: 2024-05-10

## 2024-05-10 RX ORDER — POTASSIUM CHLORIDE 20 MEQ
20 PACKET (EA) ORAL ONCE
Refills: 0 | Status: COMPLETED | OUTPATIENT
Start: 2024-05-10 | End: 2024-05-10

## 2024-05-10 RX ADMIN — Medication 2: at 13:09

## 2024-05-10 RX ADMIN — Medication 1 TABLET(S): at 07:41

## 2024-05-10 RX ADMIN — Medication 1 TABLET(S): at 17:30

## 2024-05-10 RX ADMIN — HYDROMORPHONE HYDROCHLORIDE 2 MILLIGRAM(S): 2 INJECTION INTRAMUSCULAR; INTRAVENOUS; SUBCUTANEOUS at 22:35

## 2024-05-10 RX ADMIN — ENOXAPARIN SODIUM 40 MILLIGRAM(S): 100 INJECTION SUBCUTANEOUS at 17:30

## 2024-05-10 RX ADMIN — Medication 4 UNIT(S): at 17:30

## 2024-05-10 RX ADMIN — Medication 50 MILLIGRAM(S): at 22:07

## 2024-05-10 RX ADMIN — LIDOCAINE 1 PATCH: 4 CREAM TOPICAL at 07:42

## 2024-05-10 RX ADMIN — CHLORHEXIDINE GLUCONATE 1 APPLICATION(S): 213 SOLUTION TOPICAL at 12:33

## 2024-05-10 RX ADMIN — Medication 1 TABLET(S): at 22:07

## 2024-05-10 RX ADMIN — CLOPIDOGREL BISULFATE 75 MILLIGRAM(S): 75 TABLET, FILM COATED ORAL at 13:08

## 2024-05-10 RX ADMIN — Medication 4 UNIT(S): at 13:09

## 2024-05-10 RX ADMIN — Medication 20 MILLIEQUIVALENT(S): at 08:25

## 2024-05-10 RX ADMIN — INSULIN GLARGINE 14 UNIT(S): 100 INJECTION, SOLUTION SUBCUTANEOUS at 22:08

## 2024-05-10 RX ADMIN — Medication 4 UNIT(S): at 07:41

## 2024-05-10 RX ADMIN — Medication 500 MILLIGRAM(S): at 05:38

## 2024-05-10 RX ADMIN — HYDROMORPHONE HYDROCHLORIDE 2 MILLIGRAM(S): 2 INJECTION INTRAMUSCULAR; INTRAVENOUS; SUBCUTANEOUS at 22:07

## 2024-05-10 RX ADMIN — AMLODIPINE BESYLATE 10 MILLIGRAM(S): 2.5 TABLET ORAL at 05:38

## 2024-05-10 RX ADMIN — Medication 50 MILLIGRAM(S): at 13:09

## 2024-05-10 RX ADMIN — PANTOPRAZOLE SODIUM 40 MILLIGRAM(S): 20 TABLET, DELAYED RELEASE ORAL at 05:38

## 2024-05-10 RX ADMIN — GABAPENTIN 100 MILLIGRAM(S): 400 CAPSULE ORAL at 05:38

## 2024-05-10 RX ADMIN — MAGNESIUM OXIDE 400 MG ORAL TABLET 400 MILLIGRAM(S): 241.3 TABLET ORAL at 08:25

## 2024-05-10 RX ADMIN — GABAPENTIN 100 MILLIGRAM(S): 400 CAPSULE ORAL at 13:09

## 2024-05-10 RX ADMIN — Medication 1 TABLET(S): at 13:08

## 2024-05-10 RX ADMIN — Medication 40 MILLIGRAM(S): at 05:38

## 2024-05-10 RX ADMIN — Medication 50 MILLIGRAM(S): at 05:39

## 2024-05-10 RX ADMIN — LIDOCAINE 1 PATCH: 4 CREAM TOPICAL at 20:36

## 2024-05-10 RX ADMIN — Medication 81 MILLIGRAM(S): at 13:08

## 2024-05-10 RX ADMIN — LIDOCAINE 1 PATCH: 4 CREAM TOPICAL at 20:37

## 2024-05-10 RX ADMIN — ATORVASTATIN CALCIUM 80 MILLIGRAM(S): 80 TABLET, FILM COATED ORAL at 22:07

## 2024-05-10 RX ADMIN — SPIRONOLACTONE 25 MILLIGRAM(S): 25 TABLET, FILM COATED ORAL at 05:38

## 2024-05-10 RX ADMIN — Medication 500 MILLIGRAM(S): at 17:31

## 2024-05-10 NOTE — DISCHARGE NOTE NURSING/CASE MANAGEMENT/SOCIAL WORK - PATIENT PORTAL LINK FT
You can access the FollowMyHealth Patient Portal offered by [facility] by registering at the following website: [email or web address]. By joining [email or web address] FollowMyHealth portal, you will also be able to view your health information using other applications (apps) compatible with our system.

## 2024-05-10 NOTE — PROGRESS NOTE ADULT - ASSESSMENT
63M with PMH of HTN, HLD, CAD s/p PCI, DM2 (last A1c 6.0), LENIN on CPAP, Vertigo. Recent stent to Lcx in March of 2024 discharged on DAPT. Presents to [facility] with chest pain 7/10 on exertion for the past week with associated dyspnea. The patient reports the chest pain does not radiate anywhere and gets better with rest. Diagnostic  Cath revealing LM: 60% mid/distal stenosis and LAD: 70% ostial stenosis with LCX: Patent stent. CT surgery consulted for CABG evaluation.   5/6  C-2L  LIMA-LAD | SVG-OM  ef nml  Insulin infusion  5/7  sdu  5/8  remove CT's, a line  intro  ji  May transfer to floor  5/9  d/c pw, diuresis, tx to floor  5/10 remains on NC, continue to diurese encourage IS. Tx to MB

## 2024-05-10 NOTE — PROGRESS NOTE ADULT - ASSESSMENT
Assessment  DMT2: 63y Male with DM T2 with hyperglycemia admitted with chest pain, a1c is stable 5.8%, was on home  Mounjaro injections at home s/p surgery, now off IV insulin, eating meals.   CAD: now s/p CABG, on medications, monitored.  HTN: On antihypertensive medications, monitored, asymptomatic.        Discussed plan and management with Dr Stacy Jimenez NP - TEAMS  Gena Kumar MD  Cell: [phone]  Office: [phone]  Assessment  DMT2: 63y Male with DM T2 with hyperglycemia admitted with chest pain, a1c is stable 5.8%, was  on home  Mounjaro injections at home s/p surgery, now off IV insulin, eating meals.   CAD: now s/p CABG, on medications, monitored.  HTN: On antihypertensive medications, monitored, asymptomatic.        Discussed plan and management with Dr Stacy Jimenez NP - TEAMS  Gena Kumar MD  Cell: [phone]  Office: [phone]

## 2024-05-10 NOTE — DISCHARGE NOTE NURSING/CASE MANAGEMENT/SOCIAL WORK - NSPROEXTENSIONSOFSELF_GEN_A_NUR
"Subjective   Sandro Ramirez is a 72 y.o. male.     Chief Complaint   Patient presents with   • Hyperlipidemia   • Hypertension     6 month f/u       HPI  Chief complaint: Hypertension hyperlipidemia atrial fibrillation coronary artery disease type 2 diabetes mellitus    The patient is a 72-year-old white male comes in for follow-up and maintenance of his current problems which include    1.  Hypertension-stable-the patient is currently on Cozaar 50 mg daily and atenolol 50 mg daily Lasix 40 mg daily as needed and potassium with his Lasix.  Blood pressure stable.    2.  Hyperlipidemia-stable after patient on Lipitor 20 mg daily.  He denies myalgias and arthralgias.    3.  Type 2 diabetes mellitus-stable-the patient is on Jardiance 25 mg daily.  Blood sugars are stable.  He is asymptomatic.    4.  Coronary artery disease-stable-the patient is on Jardiance 25 mg daily atenolol 50 mg daily aspirin 81 mg daily.  He denied chest pain shortness of breath orthopnea or PND.    5.  Atrial fibrillation-stable-patient on Coumadin and atenolol.  Denied episodes of rapid or slow heart rhythm.    6.  Prostatism-stable-patient is on Fausto Shannon 75 mg daily and Uroxatrol 10 mg twice a day.  States he is having trouble affording the Fausto Shannon.    7.  Hyperuricemia-stable after patient on allopurinol 300 mg daily.    8.  COPD-stable-the patient is on rescue inhalers and Flonase.        The following portions of the patient's history were reviewed and updated as appropriate: allergies, current medications, past family history, past medical history, past social history, past surgical history and problem list.    Review of Systems    Objective     /71   Pulse 97   Temp 97.9 °F (36.6 °C) (Oral)   Ht 177.8 cm (70\")   Wt 91.2 kg (201 lb)   SpO2 93%   BMI 28.84 kg/m²     Physical Exam  Vitals and nursing note reviewed.   Constitutional:       Appearance: He is well-developed.   HENT:      Head: Normocephalic and atraumatic. "   Eyes:      Pupils: Pupils are equal, round, and reactive to light.   Cardiovascular:      Rate and Rhythm: Normal rate. Rhythm irregularly irregular.      Pulses: Normal pulses.      Heart sounds: Normal heart sounds.   Pulmonary:      Effort: Pulmonary effort is normal.      Breath sounds: Normal breath sounds.   Abdominal:      General: Bowel sounds are normal.      Palpations: Abdomen is soft.   Musculoskeletal:         General: Normal range of motion.      Cervical back: Neck supple.   Skin:     General: Skin is warm and dry.   Neurological:      Mental Status: He is alert and oriented to person, place, and time.   Psychiatric:         Behavior: Behavior normal.         Thought Content: Thought content normal.         Judgment: Judgment normal.           Assessment & Plan   Diagnoses and all orders for this visit:    1. Primary hypertension (Primary)    2. Pure hypercholesterolemia    3. Permanent atrial fibrillation (HCC)    4. Atherosclerosis of native coronary artery of native heart with angina pectoris (HCC)    5. Type 2 diabetes mellitus without complication, without long-term current use of insulin (HCC)    6. Hyperuricemia    7. Chronic obstructive pulmonary disease, unspecified COPD type (HCC)    8. Prostatism      Patient Instructions   Continue your current medications and treatment.    Have the follow up labs done and call for results.    Follow up in the office in 6 months.      James Tirado Jr., MD    11/30/22   eyeglasses

## 2024-05-10 NOTE — PROGRESS NOTE ADULT - SUBJECTIVE AND OBJECTIVE BOX
Chief complaint  Patient is a 63y old  Male who presents with a chief complaint of Chest Pain (10 May 2024 10:34)         Labs and Fingersticks  CAPILLARY BLOOD GLUCOSE      POCT Blood Glucose.: 132 mg/dL (10 May 2024 11:34)  POCT Blood Glucose.: 141 mg/dL (10 May 2024 07:11)  POCT Blood Glucose.: 131 mg/dL (09 May 2024 20:46)  POCT Blood Glucose.: 175 mg/dL (09 May 2024 16:38)      Anion Gap: 12 (05-10 @ 06:10)  Anion Gap: 9 (05-09 @ 06:00)      Calcium: 9.0 (05-10 @ 06:10)  Calcium: 8.5 (05-09 @ 06:00)  Albumin: 3.5 (05-10 @ 06:10)  Albumin: 3.3 (05-09 @ 06:00)    Alanine Aminotransferase (ALT/SGPT): 14 (05-10 @ 06:10)  Alanine Aminotransferase (ALT/SGPT): 14 (05-09 @ 06:00)  Alkaline Phosphatase: 46 (05-10 @ 06:10)  Alkaline Phosphatase: 48 (05-09 @ 06:00)  Aspartate Aminotransferase (AST/SGOT): 10 (05-10 @ 06:10)  Aspartate Aminotransferase (AST/SGOT): 12 (05-09 @ 06:00)        05-10    140  |  102  |  14  ----------------------------<  149<H>  3.9   |  26  |  0.65    Ca    9.0      10 May 2024 06:10  Phos  2.1     05-10  Mg     1.9     05-10    TPro  5.9<L>  /  Alb  3.5  /  TBili  1.0  /  DBili  x   /  AST  10  /  ALT  14  /  AlkPhos  46  05-10                        11.2   12.65 )-----------( 189      ( 10 May 2024 06:10 )             33.1     Medications  MEDICATIONS  (STANDING):  amLODIPine   Tablet 10 milliGRAM(s) Oral daily  ascorbic acid 500 milliGRAM(s) Oral two times a day  aspirin enteric coated 81 milliGRAM(s) Oral daily  atorvastatin 80 milliGRAM(s) Oral at bedtime  bisacodyl Suppository 10 milliGRAM(s) Rectal once  chlorhexidine 2% Cloths 1 Application(s) Topical daily  clopidogrel Tablet 75 milliGRAM(s) Oral daily  dextrose 5%. 1000 milliLiter(s) (50 mL/Hr) IV Continuous <Continuous>  dextrose 50% Injectable 25 milliLiter(s) IV Push every 15 minutes  dextrose 50% Injectable 50 milliLiter(s) IV Push every 15 minutes  enoxaparin Injectable 40 milliGRAM(s) SubCutaneous every 24 hours  furosemide    Tablet 40 milliGRAM(s) Oral daily  gabapentin 100 milliGRAM(s) Oral every 8 hours  glucagon  Injectable 1 milliGRAM(s) IntraMuscular once  insulin glargine Injectable (LANTUS) 14 Unit(s) SubCutaneous at bedtime  insulin lispro (ADMELOG) corrective regimen sliding scale   SubCutaneous three times a day before meals  insulin lispro (ADMELOG) corrective regimen sliding scale   SubCutaneous at bedtime  insulin lispro Injectable (ADMELOG) 4 Unit(s) SubCutaneous three times a day before meals  lidocaine   4% Patch 1 Patch Transdermal every 24 hours  metoprolol tartrate 50 milliGRAM(s) Oral every 8 hours  pantoprazole    Tablet 40 milliGRAM(s) Oral before breakfast  polyethylene glycol 3350 17 Gram(s) Oral daily  potassium phosphate / sodium phosphate Tablet (K-PHOS No. 2) 1 Tablet(s) Oral four times a day with meals  senna 2 Tablet(s) Oral at bedtime  sodium chloride 0.9%. 1000 milliLiter(s) (10 mL/Hr) IV Continuous <Continuous>  spironolactone 25 milliGRAM(s) Oral daily      Physical Exam  General: Patient comfortable in bed   Vital Signs Last 12 Hrs  T(F): 97.5 (05-10-24 @ 10:57), Max: 99.4 (05-10-24 @ 00:20)  HR: 87 (05-10-24 @ 10:57) (79 - 92)  BP: 107/59 (05-10-24 @ 10:57) (107/59 - 135/72)  BP(mean): 77 (05-10-24 @ 10:57) (77 - 83)  RR: 18 (05-10-24 @ 10:57) (18 - 18)  SpO2: 94% (05-10-24 @ 10:57) (92% - 95%)    CVS: S1S2   Respiratory: No wheezing, no crepitations  GI: Abdomen soft, bowel sounds positive  Musculoskeletal:  moves all extremities  : Voiding        Chief complaint  Patient is a 63y old  Male who presents with a chief complaint of Chest Pain (10 May 2024 10:34)         Labs and Fingersticks  CAPILLARY BLOOD GLUCOSE      POCT Blood Glucose.: 132 mg/dL (10 May 2024 11:34)  POCT Blood Glucose.: 141 mg/dL (10 May 2024 07:11)  POCT Blood Glucose.: 131 mg/dL (09 May 2024 20:46)  POCT Blood Glucose.: 175 mg/dL (09 May 2024 16:38)      Anion Gap: 12 (05-10 @ 06:10)  Anion Gap: 9 (05-09 @ 06:00)      Calcium: 9.0 (05-10 @ 06:10)  Calcium: 8.5 (05-09 @ 06:00)  Albumin: 3.5 (05-10 @ 06:10)  Albumin: 3.3 (05-09 @ 06:00)    Alanine Aminotransferase (ALT/SGPT): 14 (05-10 @ 06:10)  Alanine Aminotransferase (ALT/SGPT): 14 (05-09 @ 06:00)  Alkaline Phosphatase: 46 (05-10 @ 06:10)  Alkaline Phosphatase: 48 (05-09 @ 06:00)  Aspartate Aminotransferase (AST/SGOT): 10 (05-10 @ 06:10)  Aspartate Aminotransferase (AST/SGOT): 12 (05-09 @ 06:00)        05-10    140  |  102  |  14  ----------------------------<  149<H>  3.9   |  26  |  0.65    Ca    9.0      10 May 2024 06:10  Phos  2.1     05-10  Mg     1.9     05-10    TPro  5.9<L>  /  Alb  3.5  /  TBili  1.0  /  DBili  x   /  AST  10  /  ALT  14  /  AlkPhos  46  05-10                        11.2   12.65 )-----------( 189      ( 10 May 2024 06:10 )             33.1     Medications  MEDICATIONS  (STANDING):  amLODIPine   Tablet 10 milliGRAM(s) Oral daily  ascorbic acid 500 milliGRAM(s) Oral two times a day  aspirin enteric coated 81 milliGRAM(s) Oral daily  atorvastatin 80 milliGRAM(s) Oral at bedtime  bisacodyl Suppository 10 milliGRAM(s) Rectal once  chlorhexidine 2% Cloths 1 Application(s) Topical daily  clopidogrel Tablet 75 milliGRAM(s) Oral daily  dextrose 5%. 1000 milliLiter(s) (50 mL/Hr) IV Continuous <Continuous>  dextrose 50% Injectable 25 milliLiter(s) IV Push every 15 minutes  dextrose 50% Injectable 50 milliLiter(s) IV Push every 15 minutes  enoxaparin Injectable 40 milliGRAM(s) SubCutaneous every 24 hours  furosemide    Tablet 40 milliGRAM(s) Oral daily  gabapentin 100 milliGRAM(s) Oral every 8 hours  glucagon  Injectable 1 milliGRAM(s) IntraMuscular once  insulin glargine Injectable (LANTUS) 14 Unit(s) SubCutaneous at bedtime  insulin lispro (ADMELOG) corrective regimen sliding scale   SubCutaneous three times a day before meals  insulin lispro (ADMELOG) corrective regimen sliding scale   SubCutaneous at bedtime  insulin lispro Injectable (ADMELOG) 4 Unit(s) SubCutaneous three times a day before meals  lidocaine   4% Patch 1 Patch Transdermal every 24 hours  metoprolol tartrate 50 milliGRAM(s) Oral every 8 hours  pantoprazole    Tablet 40 milliGRAM(s) Oral before breakfast  polyethylene glycol 3350 17 Gram(s) Oral daily  potassium phosphate / sodium phosphate Tablet (K-PHOS No. 2) 1 Tablet(s) Oral four times a day with meals  senna 2 Tablet(s) Oral at bedtime  sodium chloride 0.9%. 1000 milliLiter(s) (10 mL/Hr) IV Continuous <Continuous>  spironolactone 25 milliGRAM(s) Oral daily      Physical Exam  General: Patient comfortable in bed   Vital Signs Last 12 Hrs  T(F): 97.5 (05-10-24 @ 10:57), Max: 99.4 (05-10-24 @ 00:20)  HR: 87 (05-10-24 @ 10:57) (79 - 92)  BP: 107/59 (05-10-24 @ 10:57) (107/59 - 135/72)  BP(mean): 77 (05-10-24 @ 10:57) (77 - 83)  RR: 18 (05-10-24 @ 10:57) (18 - 18)  SpO2: 94% (05-10-24 @ 10:57) (92% - 95%)    CVS: S1S2   Respiratory: No wheezing, no crepitations  GI: Abdomen soft, bowel sounds positive  Musculoskeletal:  moves all extremities  : Voiding

## 2024-05-10 NOTE — PROGRESS NOTE ADULT - PROBLEM SELECTOR PLAN 1
c/w telemetry, monitor for dysrhythmias  Hypertension:  BP control with norvasc, beta blocker, up-titrate as appropriate  Amiodarone ERP for Afib prophylaxis  Monitor CT output  c/w ASA/Statin, add plavix on Wednesday  Add home medications as appropriate   Trend CTS daily labs:  replete electrolytes with goal K>4.0 Mag >2.0  ERP: Vit C Gabapentin   GI: bowel regimen  OOB/PT  wean O2

## 2024-05-10 NOTE — PROGRESS NOTE ADULT - SUBJECTIVE AND OBJECTIVE BOX
Subjective: Pt examined in chair this morning remains on NC offers no complaints.    VITAL SIGNS  Vital Signs Last 24 Hrs  T(C): 37 (05-10-24 @ 08:00), Max: 37.4 (05-10-24 @ 00:20)  T(F): 98.6 (05-10-24 @ 08:00), Max: 99.4 (05-10-24 @ 00:20)  HR: 79 (05-10-24 @ 08:00) (79 - 92)  BP: 130/72 (05-10-24 @ 08:00) (106/56 - 135/72)  RR: 18 (05-10-24 @ 08:00) (18 - 18)  SpO2: 95% (05-10-24 @ 08:00) (92% - 96%)  on (O2)              Telemetry/Alarms:        MEDICATIONS  amLODIPine   Tablet 10 milliGRAM(s) Oral daily  ascorbic acid 500 milliGRAM(s) Oral two times a day  aspirin enteric coated 81 milliGRAM(s) Oral daily  atorvastatin 80 milliGRAM(s) Oral at bedtime  bisacodyl Suppository 10 milliGRAM(s) Rectal once  chlorhexidine 2% Cloths 1 Application(s) Topical daily  clopidogrel Tablet 75 milliGRAM(s) Oral daily  dextrose 5%. 1000 milliLiter(s) IV Continuous <Continuous>  dextrose 50% Injectable 25 milliLiter(s) IV Push every 15 minutes  dextrose 50% Injectable 50 milliLiter(s) IV Push every 15 minutes  dextrose Oral Gel 15 Gram(s) Oral once PRN  enoxaparin Injectable 40 milliGRAM(s) SubCutaneous every 24 hours  furosemide    Tablet 40 milliGRAM(s) Oral daily  gabapentin 100 milliGRAM(s) Oral every 8 hours  glucagon  Injectable 1 milliGRAM(s) IntraMuscular once  HYDROmorphone   Tablet 2 milliGRAM(s) Oral every 3 hours PRN  HYDROmorphone   Tablet 4 milliGRAM(s) Oral every 4 hours PRN  insulin glargine Injectable (LANTUS) 14 Unit(s) SubCutaneous at bedtime  insulin lispro (ADMELOG) corrective regimen sliding scale   SubCutaneous three times a day before meals  insulin lispro (ADMELOG) corrective regimen sliding scale   SubCutaneous at bedtime  insulin lispro Injectable (ADMELOG) 4 Unit(s) SubCutaneous three times a day before meals  lidocaine   4% Patch 1 Patch Transdermal every 24 hours  metoprolol tartrate 50 milliGRAM(s) Oral every 8 hours  pantoprazole    Tablet 40 milliGRAM(s) Oral before breakfast  polyethylene glycol 3350 17 Gram(s) Oral daily  potassium phosphate / sodium phosphate Tablet (K-PHOS No. 2) 1 Tablet(s) Oral four times a day with meals  senna 2 Tablet(s) Oral at bedtime  sodium chloride 0.9%. 1000 milliLiter(s) IV Continuous <Continuous>  spironolactone 25 milliGRAM(s) Oral daily      PHYSICAL EXAM  General: well nourished, well developed, no acute distress  Neurology: alert and oriented x 3, nonfocal, no gross deficits  Respiratory: clear to auscultation bilaterally  CV: regular rate and rhythm, normal S1, S2  Abdomen: soft, nontender, nondistended, positive bowel sounds, last bowel movement yesterday as per pt   Extremities: warm, well perfused. mild edema.  Incisions: midline sternal incision, + mepilex, c/d/i. sternum stable.  Chest tubes: removed  Epicardial Wires:    removed       @ 07:  -  05-10 @ 07:00  --------------------------------------------------------  IN: 720 mL / OUT: 2750 mL / NET: -2030 mL    0510 @ 07:01  -  10 @ 10:35  --------------------------------------------------------  IN: 250 mL / OUT: 750 mL / NET: -500 mL        Weights:  Daily     Daily Weight in k.8 (10 May 2024 06:00)  Admit Wt: Drug Dosing Weight  Height (cm): 167.6 (06 May 2024 23:20)  Weight (kg): 120.3 (06 May 2024 23:20)  BMI (kg/m2): 42.8 (06 May 2024 23:20)  BSA (m2): 2.25 (06 May 2024 23:20)    All laboratory results, radiology and medications reviewed.    LABS  05-10    140  |  102  |  14  ----------------------------<  149<H>  3.9   |  26  |  0.65    Ca    9.0      10 May 2024 06:10  Phos  2.1     05-10  Mg     1.9     05-10    TPro  5.9<L>  /  Alb  3.5  /  TBili  1.0  /  DBili  x   /  AST  10  /  ALT  14  /  AlkPhos  46  05-10                                 11.2   12.65 )-----------( 189      ( 10 May 2024 06:10 )             33.1            Bilirubin Total: 1.0 mg/dL (05-10 @ 06:10)    CAPILLARY BLOOD GLUCOSE      POCT Blood Glucose.: 141 mg/dL (10 May 2024 07:11)  POCT Blood Glucose.: 131 mg/dL (09 May 2024 20:46)  POCT Blood Glucose.: 175 mg/dL (09 May 2024 16:38)  POCT Blood Glucose.: 195 mg/dL (09 May 2024 11:14)               PAST MEDICAL & SURGICAL HISTORY:  Hypertension      LENIN (Obstructive Sleep Apnea)  Sleep study done 2015  uses CPAP at home      Type 2 diabetes mellitus      Morbid obesity with BMI of 50.0-59.9, adult      Venous insufficiency of both lower extremities      Anemia      History of Appendectomy        Gastric Bypass  .  Patient lost 225 lbs and has regained 125 bs in the last 9years.      Umbilical Hernia Repair        History of Abdominoplasty        Incisional Hernia Repair        S/P Cholecystectomy      H/O ventral hernia repair  12 incarcerated ventral hernia repair with mesh.         Subjective: Pt examined in chair this morning remains on NC offers no complaints.    VITAL SIGNS  Vital Signs Last 24 Hrs  T(C): 37 (05-10-24 @ 08:00), Max: 37.4 (05-10-24 @ 00:20)  T(F): 98.6 (05-10-24 @ 08:00), Max: 99.4 (05-10-24 @ 00:20)  HR: 79 (05-10-24 @ 08:00) (79 - 92)  BP: 130/72 (05-10-24 @ 08:00) (106/56 - 135/72)  RR: 18 (05-10-24 @ 08:00) (18 - 18)  SpO2: 95% (05-10-24 @ 08:00) (92% - 96%)  on (O2)              Telemetry/Alarms:        MEDICATIONS  amLODIPine   Tablet 10 milliGRAM(s) Oral daily  ascorbic acid 500 milliGRAM(s) Oral two times a day  aspirin enteric coated 81 milliGRAM(s) Oral daily  atorvastatin 80 milliGRAM(s) Oral at bedtime  bisacodyl Suppository 10 milliGRAM(s) Rectal once  chlorhexidine 2% Cloths 1 Application(s) Topical daily  clopidogrel Tablet 75 milliGRAM(s) Oral daily  dextrose 5%. 1000 milliLiter(s) IV Continuous <Continuous>  dextrose 50% Injectable 25 milliLiter(s) IV Push every 15 minutes  dextrose 50% Injectable 50 milliLiter(s) IV Push every 15 minutes  dextrose Oral Gel 15 Gram(s) Oral once PRN  enoxaparin Injectable 40 milliGRAM(s) SubCutaneous every 24 hours  furosemide    Tablet 40 milliGRAM(s) Oral daily  gabapentin 100 milliGRAM(s) Oral every 8 hours  glucagon  Injectable 1 milliGRAM(s) IntraMuscular once  HYDROmorphone   Tablet 2 milliGRAM(s) Oral every 3 hours PRN  HYDROmorphone   Tablet 4 milliGRAM(s) Oral every 4 hours PRN  insulin glargine Injectable (LANTUS) 14 Unit(s) SubCutaneous at bedtime  insulin lispro (ADMELOG) corrective regimen sliding scale   SubCutaneous three times a day before meals  insulin lispro (ADMELOG) corrective regimen sliding scale   SubCutaneous at bedtime  insulin lispro Injectable (ADMELOG) 4 Unit(s) SubCutaneous three times a day before meals  lidocaine   4% Patch 1 Patch Transdermal every 24 hours  metoprolol tartrate 50 milliGRAM(s) Oral every 8 hours  pantoprazole    Tablet 40 milliGRAM(s) Oral before breakfast  polyethylene glycol 3350 17 Gram(s) Oral daily  potassium phosphate / sodium phosphate Tablet (K-PHOS No. 2) 1 Tablet(s) Oral four times a day with meals  senna 2 Tablet(s) Oral at bedtime  sodium chloride 0.9%. 1000 milliLiter(s) IV Continuous <Continuous>  spironolactone 25 milliGRAM(s) Oral daily      PHYSICAL EXAM  General: well nourished, well developed, no acute distress  Neurology: alert and oriented x 3, nonfocal, no gross deficits  Respiratory: clear to auscultation bilaterally  CV: regular rate and rhythm, normal S1, S2  Abdomen: soft, nontender, nondistended, positive bowel sounds, last bowel movement yesterday as per pt   Extremities: warm, well perfused. mild edema.  Incisions: midline sternal incision, + mepilex, c/d/i. sternum stable.  Chest tubes: removed  Epicardial Wires:    removed       @ 07:  -  05-10 @ 07:00  --------------------------------------------------------  IN: 720 mL / OUT: 2750 mL / NET: -2030 mL    05-10 @ 07:01  -  05-10 @ 10:35  --------------------------------------------------------  IN: 250 mL / OUT: 750 mL / NET: -500 mL        Weights:  Daily     Daily Weight in k.8 (10 May 2024 06:00)  Admit Wt: Drug Dosing Weight  Height (cm): 167.6 (06 May 2024 23:20)  Weight (kg): 120.3 (06 May 2024 23:20)  BMI (kg/m2): 42.8 (06 May 2024 23:20)  BSA (m2): 2.25 (06 May 2024 23:20)    All laboratory results, radiology and medications reviewed.    LABS  05-10    140  |  102  |  14  ----------------------------<  149<H>  3.9   |  26  |  0.65    Ca    9.0      10 May 2024 06:10  Phos  2.1     0510  Mg     1.9     05-10    TPro  5.9<L>  /  Alb  3.5  /  TBili  1.0  /  DBili  x   /  AST  10  /  ALT  14  /  AlkPhos  46  05-10                                 11.2   12.65 )-----------( 189      ( 10 May 2024 06:10 )             33.1            Bilirubin Total: 1.0 mg/dL (05-10 @ 06:10)    CAPILLARY BLOOD GLUCOSE      POCT Blood Glucose.: 141 mg/dL (10 May 2024 07:11)  POCT Blood Glucose.: 131 mg/dL (09 May 2024 20:46)  POCT Blood Glucose.: 175 mg/dL (09 May 2024 16:38)  POCT Blood Glucose.: 195 mg/dL (09 May 2024 11:14)               PAST MEDICAL & SURGICAL HISTORY:  Hypertension      LENIN (Obstructive Sleep Apnea)  Sleep study done 2015  uses CPAP at home      Type 2 diabetes mellitus      Morbid obesity with BMI of 50.0-59.9, adult      Venous insufficiency of both lower extremities      Anemia      History of Appendectomy        Gastric Bypass  .  Patient lost 225 lbs and has regained 125 bs in the last 9years.      Umbilical Hernia Repair        History of Abdominoplasty        Incisional Hernia Repair        S/P Cholecystectomy      H/O ventral hernia repair  12 incarcerated ventral hernia repair with mesh.

## 2024-05-10 NOTE — PROGRESS NOTE ADULT - SUBJECTIVE AND OBJECTIVE BOX
PATIENT SEEN AND EXAMINED BY CEDRIC MONTANEZ M.D. ON :- 5/10/24  DATE OF SERVICE:    5/10/24         Interim events noted,Labs ,Radiological studies and Cardiology tests reviewed .    Patient is a 63y old  Male who presents with a chief complaint of Chest Pain (10 May 2024 11:53)      HPI:   63M with PMH of HTN, HLD, CAD s/p PCI, DM2 (last A1c 6.0), LENIN on CPAP, Vertigo presents with chest pain on exertion and dyspnea Had-cath-3/1/24--LCX: Patent OM1 stent, patent circumflex stent with 75% stenosis at distal edge of old stent s/p PCI-1 SHARON to LCx via RRA and RBV with WNL filling pressure discharged on DAPT Of note lab report on 3/4 reported Hgb 8.1 from 16 on 3/1-/Lab error,patient denies melena bleeding.        CARDIAC STUDIES:  TTE 2/27/24 - Mild LV dysfunction with EF 48%, LVH, Hypokinesis of apical cap, dilated LA, mild AS, AR, MR, TR  CATH 3/01/2024-LCX: Patent OM1 stent, patent circumflex stent with 75% stenosis at distal edge of old stent s/p PCI with new SHARON  CATH 3/29/2023-Mild-moderate nonobstructive CAD of the left cirumflex and leftanterior descending arteries. Prior stent to the OM1 is widelypatent.        (02 May 2024 10:05)      PAST MEDICAL & SURGICAL HISTORY:  Hypertension      LENIN (Obstructive Sleep Apnea)  Sleep study done 2015  uses CPAP at home      Type 2 diabetes mellitus      Morbid obesity with BMI of 50.0-59.9, adult      Venous insufficiency of both lower extremities      Anemia      History of Appendectomy  1982      Gastric Bypass  1998.  Patient lost 225 lbs and has regained 125 bs in the last 9years.      Umbilical Hernia Repair  2002      History of Abdominoplasty  2001      Incisional Hernia Repair  2005      S/P Cholecystectomy      H/O ventral hernia repair  08/24/12 incarcerated ventral hernia repair with mesh.          PREVIOUS DIAGNOSTIC TESTING:      ECHO  FINDINGS:    STRESS  FINDINGS:    CATHETERIZATION  FINDINGS:    MEDICATIONS  (STANDING):  amLODIPine   Tablet 10 milliGRAM(s) Oral daily  ascorbic acid 500 milliGRAM(s) Oral two times a day  aspirin enteric coated 81 milliGRAM(s) Oral daily  atorvastatin 80 milliGRAM(s) Oral at bedtime  bisacodyl Suppository 10 milliGRAM(s) Rectal once  chlorhexidine 2% Cloths 1 Application(s) Topical daily  clopidogrel Tablet 75 milliGRAM(s) Oral daily  dextrose 5%. 1000 milliLiter(s) (50 mL/Hr) IV Continuous <Continuous>  dextrose 50% Injectable 50 milliLiter(s) IV Push every 15 minutes  dextrose 50% Injectable 25 milliLiter(s) IV Push every 15 minutes  enoxaparin Injectable 40 milliGRAM(s) SubCutaneous every 24 hours  furosemide    Tablet 40 milliGRAM(s) Oral daily  gabapentin 100 milliGRAM(s) Oral every 8 hours  glucagon  Injectable 1 milliGRAM(s) IntraMuscular once  insulin glargine Injectable (LANTUS) 14 Unit(s) SubCutaneous at bedtime  insulin lispro (ADMELOG) corrective regimen sliding scale   SubCutaneous three times a day before meals  insulin lispro (ADMELOG) corrective regimen sliding scale   SubCutaneous at bedtime  insulin lispro Injectable (ADMELOG) 4 Unit(s) SubCutaneous three times a day before meals  lidocaine   4% Patch 1 Patch Transdermal every 24 hours  metoprolol tartrate 50 milliGRAM(s) Oral every 8 hours  pantoprazole    Tablet 40 milliGRAM(s) Oral before breakfast  polyethylene glycol 3350 17 Gram(s) Oral daily  potassium phosphate / sodium phosphate Tablet (K-PHOS No. 2) 1 Tablet(s) Oral four times a day with meals  senna 2 Tablet(s) Oral at bedtime  sodium chloride 0.9%. 1000 milliLiter(s) (10 mL/Hr) IV Continuous <Continuous>  spironolactone 25 milliGRAM(s) Oral daily    MEDICATIONS  (PRN):  dextrose Oral Gel 15 Gram(s) Oral once PRN Blood Glucose LESS THAN 70 milliGRAM(s)/deciliter  HYDROmorphone   Tablet 2 milliGRAM(s) Oral every 3 hours PRN Moderate Pain (4 - 6)  HYDROmorphone   Tablet 4 milliGRAM(s) Oral every 4 hours PRN Severe Pain (7 - 10)      FAMILY HISTORY:  Family history of diabetes mellitus in mother (Mother)  heart attack, ovarian cancer    Family history of diabetes mellitus in father (Father)        SOCIAL HISTORY:    CIGARETTES:    ALCOHOL:    REVIEW OF SYSTEMS:  CONSTITUTIONAL: No fever, weight loss, or fatigue  EYES: No eye pain, visual disturbances, or discharge  ENMT:  No difficulty hearing, tinnitus, vertigo; No sinus or throat pain  NECK: No pain or stiffness  RESPIRATORY: No cough, wheezing, chills or hemoptysis; No shortness of breath  CARDIOVASCULAR: No chest pain, palpitations, dizziness, or leg swelling  GASTROINTESTINAL: No abdominal or epigastric pain. No nausea, vomiting, or hematemesis; No diarrhea or constipation. No melena or hematochezia.  GENITOURINARY: No dysuria, frequency, hematuria, or incontinence  NEUROLOGICAL: No headaches, memory loss, loss of strength, numbness, or tremors  SKIN: No itching, burning, rashes, or lesions   LYMPH NODES: No enlarged glands  ENDOCRINE: No heat or cold intolerance; No hair loss  MUSCULOSKELETAL: No joint pain or swelling; No muscle, back, or extremity pain  PSYCHIATRIC: No depression, anxiety, mood swings, or difficulty sleeping  HEME/LYMPH: No easy bruising, or bleeding gums  ALLERY AND IMMUNOLOGIC: No hives or eczema    Vital Signs Last 24 Hrs  T(C): 36.9 (10 May 2024 21:18), Max: 37.4 (10 May 2024 00:20)  T(F): 98.4 (10 May 2024 21:18), Max: 99.4 (10 May 2024 00:20)  HR: 91 (10 May 2024 21:18) (79 - 92)  BP: 115/68 (10 May 2024 21:18) (107/59 - 135/72)  BP(mean): 84 (10 May 2024 21:18) (77 - 84)  RR: 18 (10 May 2024 21:18) (18 - 18)  SpO2: 93% (10 May 2024 21:18) (92% - 95%)    Parameters below as of 10 May 2024 21:18  Patient On (Oxygen Delivery Method): room air          PHYSICAL EXAM:  GENERAL: NAD, well-groomed, well-developed  HEAD:  Atraumatic, Normocephalic  EYES: EOMI, PERRLA, conjunctiva and sclera clear  ENMT: No tonsillar erythema, exudates, or enlargement; Moist mucous membranes, Good dentition, No lesions  NECK: Supple, No JVD, Normal thyroid  NERVOUS SYSTEM:  Alert & Oriented X3, Good concentration; Motor Strength 5/5 B/L upper and lower extremities; DTRs 2+ intact and symmetric  CHEST/LUNG: Clear to percussion bilaterally; No rales, rhonchi, wheezing, or rubs  HEART: Regular rate and rhythm; No murmurs, rubs, or gallops  ABDOMEN: Soft, Nontender, Nondistended; Bowel sounds present  EXTREMITIES:  2+ Peripheral Pulses, No clubbing, cyanosis, or edema  LYMPH: No lymphadenopathy noted  SKIN: No rashes or lesions      INTERPRETATION OF TELEMETRY:    ECG:    CHADSVASC:     LABS:                        11.2   12.65 )-----------( 189      ( 10 May 2024 06:10 )             33.1     05-10    140  |  102  |  14  ----------------------------<  149<H>  3.9   |  26  |  0.65    Ca    9.0      10 May 2024 06:10  Phos  2.1     05-10  Mg     1.9     05-10    TPro  5.9<L>  /  Alb  3.5  /  TBili  1.0  /  DBili  x   /  AST  10  /  ALT  14  /  AlkPhos  46  05-10          Urinalysis Basic - ( 10 May 2024 06:10 )    Color: x / Appearance: x / SG: x / pH: x  Gluc: 149 mg/dL / Ketone: x  / Bili: x / Urobili: x   Blood: x / Protein: x / Nitrite: x   Leuk Esterase: x / RBC: x / WBC x   Sq Epi: x / Non Sq Epi: x / Bacteria: x      Lipid Panel:   I&O's Summary    09 May 2024 07:01  -  10 May 2024 07:00  --------------------------------------------------------  IN: 720 mL / OUT: 2750 mL / NET: -2030 mL    10 May 2024 07:01  -  10 May 2024 22:24  --------------------------------------------------------  IN: 250 mL / OUT: 750 mL / NET: -500 mL        RADIOLOGY & ADDITIONAL STUDIES:

## 2024-05-10 NOTE — PROGRESS NOTE ADULT - SUBJECTIVE AND OBJECTIVE BOX
Subjective: Pt examined in chair this morning remains on NC offers no complaints.    VITAL SIGNS  Vital Signs Last 24 Hrs  T(C): 37 (05-10-24 @ 08:00), Max: 37.4 (05-10-24 @ 00:20)  T(F): 98.6 (05-10-24 @ 08:00), Max: 99.4 (05-10-24 @ 00:20)  HR: 79 (05-10-24 @ 08:00) (79 - 92)  BP: 130/72 (05-10-24 @ 08:00) (106/56 - 135/72)  RR: 18 (05-10-24 @ 08:00) (18 - 18)  SpO2: 95% (05-10-24 @ 08:00) (92% - 96%)  on (O2)              Telemetry/Alarms:        MEDICATIONS  amLODIPine   Tablet 10 milliGRAM(s) Oral daily  ascorbic acid 500 milliGRAM(s) Oral two times a day  aspirin enteric coated 81 milliGRAM(s) Oral daily  atorvastatin 80 milliGRAM(s) Oral at bedtime  bisacodyl Suppository 10 milliGRAM(s) Rectal once  chlorhexidine 2% Cloths 1 Application(s) Topical daily  clopidogrel Tablet 75 milliGRAM(s) Oral daily  dextrose 5%. 1000 milliLiter(s) IV Continuous <Continuous>  dextrose 50% Injectable 25 milliLiter(s) IV Push every 15 minutes  dextrose 50% Injectable 50 milliLiter(s) IV Push every 15 minutes  dextrose Oral Gel 15 Gram(s) Oral once PRN  enoxaparin Injectable 40 milliGRAM(s) SubCutaneous every 24 hours  furosemide    Tablet 40 milliGRAM(s) Oral daily  gabapentin 100 milliGRAM(s) Oral every 8 hours  glucagon  Injectable 1 milliGRAM(s) IntraMuscular once  HYDROmorphone   Tablet 2 milliGRAM(s) Oral every 3 hours PRN  HYDROmorphone   Tablet 4 milliGRAM(s) Oral every 4 hours PRN  insulin glargine Injectable (LANTUS) 14 Unit(s) SubCutaneous at bedtime  insulin lispro (ADMELOG) corrective regimen sliding scale   SubCutaneous three times a day before meals  insulin lispro (ADMELOG) corrective regimen sliding scale   SubCutaneous at bedtime  insulin lispro Injectable (ADMELOG) 4 Unit(s) SubCutaneous three times a day before meals  lidocaine   4% Patch 1 Patch Transdermal every 24 hours  metoprolol tartrate 50 milliGRAM(s) Oral every 8 hours  pantoprazole    Tablet 40 milliGRAM(s) Oral before breakfast  polyethylene glycol 3350 17 Gram(s) Oral daily  potassium phosphate / sodium phosphate Tablet (K-PHOS No. 2) 1 Tablet(s) Oral four times a day with meals  senna 2 Tablet(s) Oral at bedtime  sodium chloride 0.9%. 1000 milliLiter(s) IV Continuous <Continuous>  spironolactone 25 milliGRAM(s) Oral daily      PHYSICAL EXAM  General: well nourished, well developed, no acute distress  Neurology: alert and oriented x 3, nonfocal, no gross deficits  Respiratory: clear to auscultation bilaterally  CV: regular rate and rhythm, normal S1, S2  Abdomen: soft, nontender, nondistended, positive bowel sounds, last bowel movement yesterday as per pt   Extremities: warm, well perfused. mild edema.  Incisions: midline sternal incision, + mepilex, c/d/i. sternum stable.  Chest tubes: removed  Epicardial Wires:    removed       @ 07:  -  05-10 @ 07:00  --------------------------------------------------------  IN: 720 mL / OUT: 2750 mL / NET: -2030 mL    0510 @ 07:01  -  10 @ 10:35  --------------------------------------------------------  IN: 250 mL / OUT: 750 mL / NET: -500 mL        Weights:  Daily     Daily Weight in k.8 (10 May 2024 06:00)  Admit Wt: Drug Dosing Weight  Height (cm): 167.6 (06 May 2024 23:20)  Weight (kg): 120.3 (06 May 2024 23:20)  BMI (kg/m2): 42.8 (06 May 2024 23:20)  BSA (m2): 2.25 (06 May 2024 23:20)    All laboratory results, radiology and medications reviewed.    LABS  05-10    140  |  102  |  14  ----------------------------<  149<H>  3.9   |  26  |  0.65    Ca    9.0      10 May 2024 06:10  Phos  2.1     05-10  Mg     1.9     05-10    TPro  5.9<L>  /  Alb  3.5  /  TBili  1.0  /  DBili  x   /  AST  10  /  ALT  14  /  AlkPhos  46  05-10                                 11.2   12.65 )-----------( 189      ( 10 May 2024 06:10 )             33.1            Bilirubin Total: 1.0 mg/dL (05-10 @ 06:10)    CAPILLARY BLOOD GLUCOSE      POCT Blood Glucose.: 141 mg/dL (10 May 2024 07:11)  POCT Blood Glucose.: 131 mg/dL (09 May 2024 20:46)  POCT Blood Glucose.: 175 mg/dL (09 May 2024 16:38)  POCT Blood Glucose.: 195 mg/dL (09 May 2024 11:14)               PAST MEDICAL & SURGICAL HISTORY:  Hypertension      LENIN (Obstructive Sleep Apnea)  Sleep study done 2015  uses CPAP at home      Type 2 diabetes mellitus      Morbid obesity with BMI of 50.0-59.9, adult      Venous insufficiency of both lower extremities      Anemia      History of Appendectomy        Gastric Bypass  .  Patient lost 225 lbs and has regained 125 bs in the last 9years.      Umbilical Hernia Repair        History of Abdominoplasty        Incisional Hernia Repair        S/P Cholecystectomy      H/O ventral hernia repair  12 incarcerated ventral hernia repair with mesh.

## 2024-05-10 NOTE — PROGRESS NOTE ADULT - ASSESSMENT
63M with PMH of HTN, HLD, CAD s/p PCI, DM2 (last A1c 6.0), LENIN on CPAP, Vertigo presents with chest pain on exertion and dyspnea       # CAD-Progressive Angina  Hx CAD s/p PCI-2023-  -Had cath-3/1/24--LCX: Patent OM1 stent, patent circumflex stent with 75% stenosis at distal edge of old stent s/p PCI-1 SHARON to LCx  -Presenting with UHDSON and angina  -Cath 5/2/2024-LM: 60% mid/distal stenosis.  LAD: 70% ostial stenosis LCX: Patent stent in mid.    -5/6-CABG-LIMA-LAD | SVG-OM  5/7-Extubated off pressors TX to Stepdown  5/8-POD# 2 Awake no dyspnea-Amio for AF prophylaxis, resumes plavix  5/9-POD# 3 Remains in sinus rhythm-  5/10-POD#4=on O2 continue diureses        # HTN  On Metoprolol 50mg q8    # T2DM  -A1c-6.0 -2023 now 5.9%  Endocrine evaluation noted  Patient was on Mounjaro at home

## 2024-05-11 LAB
ALBUMIN SERPL ELPH-MCNC: 3.3 G/DL — SIGNIFICANT CHANGE UP (ref 3.3–5)
ALP SERPL-CCNC: 46 U/L — SIGNIFICANT CHANGE UP (ref 40–120)
ALT FLD-CCNC: 17 U/L — SIGNIFICANT CHANGE UP (ref 10–45)
ANION GAP SERPL CALC-SCNC: 12 MMOL/L — SIGNIFICANT CHANGE UP (ref 5–17)
AST SERPL-CCNC: 12 U/L — SIGNIFICANT CHANGE UP (ref 10–40)
BILIRUB SERPL-MCNC: 1 MG/DL — SIGNIFICANT CHANGE UP (ref 0.2–1.2)
BUN SERPL-MCNC: 14 MG/DL — SIGNIFICANT CHANGE UP (ref 7–23)
CALCIUM SERPL-MCNC: 8.8 MG/DL — SIGNIFICANT CHANGE UP (ref 8.4–10.5)
CHLORIDE SERPL-SCNC: 101 MMOL/L — SIGNIFICANT CHANGE UP (ref 96–108)
CO2 SERPL-SCNC: 25 MMOL/L — SIGNIFICANT CHANGE UP (ref 22–31)
CREAT SERPL-MCNC: 0.66 MG/DL — SIGNIFICANT CHANGE UP (ref 0.5–1.3)
EGFR: 107 ML/MIN/1.73M2 — SIGNIFICANT CHANGE UP
GLUCOSE BLDC GLUCOMTR-MCNC: 111 MG/DL — HIGH (ref 70–99)
GLUCOSE BLDC GLUCOMTR-MCNC: 143 MG/DL — HIGH (ref 70–99)
GLUCOSE BLDC GLUCOMTR-MCNC: 145 MG/DL — HIGH (ref 70–99)
GLUCOSE BLDC GLUCOMTR-MCNC: 146 MG/DL — HIGH (ref 70–99)
GLUCOSE SERPL-MCNC: 146 MG/DL — HIGH (ref 70–99)
HCT VFR BLD CALC: 33.9 % — LOW (ref 39–50)
HGB BLD-MCNC: 11.5 G/DL — LOW (ref 13–17)
MAGNESIUM SERPL-MCNC: 2 MG/DL — SIGNIFICANT CHANGE UP (ref 1.6–2.6)
MCHC RBC-ENTMCNC: 28.7 PG — SIGNIFICANT CHANGE UP (ref 27–34)
MCHC RBC-ENTMCNC: 33.9 GM/DL — SIGNIFICANT CHANGE UP (ref 32–36)
MCV RBC AUTO: 84.5 FL — SIGNIFICANT CHANGE UP (ref 80–100)
NRBC # BLD: 0 /100 WBCS — SIGNIFICANT CHANGE UP (ref 0–0)
PLATELET # BLD AUTO: 252 K/UL — SIGNIFICANT CHANGE UP (ref 150–400)
POTASSIUM SERPL-MCNC: 3.6 MMOL/L — SIGNIFICANT CHANGE UP (ref 3.5–5.3)
POTASSIUM SERPL-SCNC: 3.6 MMOL/L — SIGNIFICANT CHANGE UP (ref 3.5–5.3)
PROT SERPL-MCNC: 6.4 G/DL — SIGNIFICANT CHANGE UP (ref 6–8.3)
RBC # BLD: 4.01 M/UL — LOW (ref 4.2–5.8)
RBC # FLD: 13.5 % — SIGNIFICANT CHANGE UP (ref 10.3–14.5)
SODIUM SERPL-SCNC: 138 MMOL/L — SIGNIFICANT CHANGE UP (ref 135–145)
WBC # BLD: 10.73 K/UL — HIGH (ref 3.8–10.5)
WBC # FLD AUTO: 10.73 K/UL — HIGH (ref 3.8–10.5)

## 2024-05-11 RX ORDER — POTASSIUM BICARBONATE 978 MG/1
25 TABLET, EFFERVESCENT ORAL ONCE
Refills: 0 | Status: COMPLETED | OUTPATIENT
Start: 2024-05-11 | End: 2024-05-11

## 2024-05-11 RX ADMIN — INSULIN GLARGINE 14 UNIT(S): 100 INJECTION, SOLUTION SUBCUTANEOUS at 21:38

## 2024-05-11 RX ADMIN — GABAPENTIN 100 MILLIGRAM(S): 400 CAPSULE ORAL at 13:43

## 2024-05-11 RX ADMIN — Medication 81 MILLIGRAM(S): at 12:03

## 2024-05-11 RX ADMIN — Medication 1 TABLET(S): at 12:03

## 2024-05-11 RX ADMIN — Medication 50 MILLIGRAM(S): at 06:02

## 2024-05-11 RX ADMIN — Medication 50 MILLIGRAM(S): at 13:43

## 2024-05-11 RX ADMIN — SPIRONOLACTONE 25 MILLIGRAM(S): 25 TABLET, FILM COATED ORAL at 06:03

## 2024-05-11 RX ADMIN — AMLODIPINE BESYLATE 10 MILLIGRAM(S): 2.5 TABLET ORAL at 06:01

## 2024-05-11 RX ADMIN — ENOXAPARIN SODIUM 40 MILLIGRAM(S): 100 INJECTION SUBCUTANEOUS at 17:10

## 2024-05-11 RX ADMIN — Medication 1 TABLET(S): at 08:02

## 2024-05-11 RX ADMIN — Medication 4 UNIT(S): at 12:03

## 2024-05-11 RX ADMIN — CLOPIDOGREL BISULFATE 75 MILLIGRAM(S): 75 TABLET, FILM COATED ORAL at 12:03

## 2024-05-11 RX ADMIN — Medication 1 TABLET(S): at 17:10

## 2024-05-11 RX ADMIN — Medication 4 UNIT(S): at 08:02

## 2024-05-11 RX ADMIN — Medication 500 MILLIGRAM(S): at 17:10

## 2024-05-11 RX ADMIN — Medication 4 UNIT(S): at 17:09

## 2024-05-11 RX ADMIN — Medication 500 MILLIGRAM(S): at 06:01

## 2024-05-11 RX ADMIN — POTASSIUM BICARBONATE 25 MILLIEQUIVALENT(S): 978 TABLET, EFFERVESCENT ORAL at 12:03

## 2024-05-11 RX ADMIN — ATORVASTATIN CALCIUM 80 MILLIGRAM(S): 80 TABLET, FILM COATED ORAL at 21:38

## 2024-05-11 RX ADMIN — PANTOPRAZOLE SODIUM 40 MILLIGRAM(S): 20 TABLET, DELAYED RELEASE ORAL at 06:02

## 2024-05-11 RX ADMIN — Medication 1 TABLET(S): at 21:38

## 2024-05-11 RX ADMIN — Medication 40 MILLIGRAM(S): at 06:01

## 2024-05-11 RX ADMIN — Medication 50 MILLIGRAM(S): at 21:38

## 2024-05-11 RX ADMIN — CHLORHEXIDINE GLUCONATE 1 APPLICATION(S): 213 SOLUTION TOPICAL at 10:57

## 2024-05-11 RX ADMIN — GABAPENTIN 100 MILLIGRAM(S): 400 CAPSULE ORAL at 06:01

## 2024-05-11 NOTE — PROGRESS NOTE ADULT - SUBJECTIVE AND OBJECTIVE BOX
Chief complaint    Patient is a 63y old  Male who presents with a chief complaint of Chest Pain (10 May 2024 11:53)   Review of systems  Patient appears comfortable.    Labs and Fingersticks  CAPILLARY BLOOD GLUCOSE      POCT Blood Glucose.: 146 mg/dL (11 May 2024 07:43)  POCT Blood Glucose.: 150 mg/dL (10 May 2024 21:41)  POCT Blood Glucose.: 112 mg/dL (10 May 2024 16:43)  POCT Blood Glucose.: 183 mg/dL (10 May 2024 12:55)  POCT Blood Glucose.: 132 mg/dL (10 May 2024 11:34)      Anion Gap: 12 (05-11 @ 05:12)  Anion Gap: 12 (05-10 @ 06:10)      Calcium: 8.8 (05-11 @ 05:12)  Calcium: 9.0 (05-10 @ 06:10)  Albumin: 3.3 (05-11 @ 05:12)  Albumin: 3.5 (05-10 @ 06:10)    Alanine Aminotransferase (ALT/SGPT): 17 (05-11 @ 05:12)  Alanine Aminotransferase (ALT/SGPT): 14 (05-10 @ 06:10)  Alkaline Phosphatase: 46 (05-11 @ 05:12)  Alkaline Phosphatase: 46 (05-10 @ 06:10)  Aspartate Aminotransferase (AST/SGOT): 12 (05-11 @ 05:12)  Aspartate Aminotransferase (AST/SGOT): 10 (05-10 @ 06:10)        05-11    138  |  101  |  14  ----------------------------<  146<H>  3.6   |  25  |  0.66    Ca    8.8      11 May 2024 05:12  Phos  2.1     05-10  Mg     2.0     05-11    TPro  6.4  /  Alb  3.3  /  TBili  1.0  /  DBili  x   /  AST  12  /  ALT  17  /  AlkPhos  46  05-11                        11.5   10.73 )-----------( 252      ( 11 May 2024 05:12 )             33.9     Medications  MEDICATIONS  (STANDING):  amLODIPine   Tablet 10 milliGRAM(s) Oral daily  ascorbic acid 500 milliGRAM(s) Oral two times a day  aspirin enteric coated 81 milliGRAM(s) Oral daily  atorvastatin 80 milliGRAM(s) Oral at bedtime  bisacodyl Suppository 10 milliGRAM(s) Rectal once  chlorhexidine 2% Cloths 1 Application(s) Topical daily  clopidogrel Tablet 75 milliGRAM(s) Oral daily  dextrose 5%. 1000 milliLiter(s) (50 mL/Hr) IV Continuous <Continuous>  dextrose 50% Injectable 50 milliLiter(s) IV Push every 15 minutes  dextrose 50% Injectable 25 milliLiter(s) IV Push every 15 minutes  enoxaparin Injectable 40 milliGRAM(s) SubCutaneous every 24 hours  furosemide    Tablet 40 milliGRAM(s) Oral daily  gabapentin 100 milliGRAM(s) Oral every 8 hours  glucagon  Injectable 1 milliGRAM(s) IntraMuscular once  insulin glargine Injectable (LANTUS) 14 Unit(s) SubCutaneous at bedtime  insulin lispro (ADMELOG) corrective regimen sliding scale   SubCutaneous three times a day before meals  insulin lispro (ADMELOG) corrective regimen sliding scale   SubCutaneous at bedtime  insulin lispro Injectable (ADMELOG) 4 Unit(s) SubCutaneous three times a day before meals  lidocaine   4% Patch 1 Patch Transdermal every 24 hours  metoprolol tartrate 50 milliGRAM(s) Oral every 8 hours  pantoprazole    Tablet 40 milliGRAM(s) Oral before breakfast  polyethylene glycol 3350 17 Gram(s) Oral daily  potassium phosphate / sodium phosphate Tablet (K-PHOS No. 2) 1 Tablet(s) Oral four times a day with meals  senna 2 Tablet(s) Oral at bedtime  sodium chloride 0.9%. 1000 milliLiter(s) (10 mL/Hr) IV Continuous <Continuous>  spironolactone 25 milliGRAM(s) Oral daily      Physical Exam  General: Patient appears comfortable.  Vital Signs Last 12 Hrs  T(F): 98.4 (05-11-24 @ 04:58), Max: 98.4 (05-10-24 @ 21:18)  HR: 86 (05-11-24 @ 04:58) (86 - 91)  BP: 122/74 (05-11-24 @ 04:58) (115/68 - 122/74)  BP(mean): 90 (05-11-24 @ 04:58) (84 - 90)  RR: 18 (05-11-24 @ 04:58) (18 - 18)  SpO2: 91% (05-11-24 @ 04:58) (91% - 93%)  Neck: No palpable thyroid nodules.  CVS: S1S2, No murmurs  Respiratory: No wheezing, no crepitations  GI: Abdomen soft, non tender.    Diagnostics    A1C with Estimated Average Glucose: AM Sched. Collection: 05-May-2024 06:00 (05-04 @ 07:14)      Radiology:

## 2024-05-11 NOTE — PROGRESS NOTE ADULT - ASSESSMENT
Assessment  DMT2: 63y Male with DM T2 with hyperglycemia admitted with chest pain, a1c is stable 5.8%, was  on home Mounjaro injections at home s/p surgery, eating meals, on low dose insulin, sugars improving..   CAD: now s/p CABG, on medications, monitored.  HTN: On antihypertensive medications, monitored, asymptomatic.      Gena Kumar MD  Cell: [phone]  Office: [phone]

## 2024-05-11 NOTE — PROGRESS NOTE ADULT - PROBLEM SELECTOR PLAN 2
Endocrine: A1C= 6.0   Insulin gtt weaned to off  transitioned to basal/bolus    Endocrine following Endocrine: A1C= 6.0     Endocrine following  dm diet  continue lantus and admelog as per endo  discharge recs: d/c home po metformin 1000 mg po bid

## 2024-05-11 NOTE — PROGRESS NOTE ADULT - PROBLEM SELECTOR PLAN 1
c/w telemetry, monitor for dysrhythmias  Hypertension:  BP control with norvasc, beta blocker, up-titrate as appropriate  Amiodarone ERP for Afib prophylaxis  Monitor CT output  c/w ASA/Statin, add plavix on Wednesday  Add home medications as appropriate   Trend CTS daily labs:  replete electrolytes with goal K>4.0 Mag >2.0  ERP: Vit C Gabapentin   GI: bowel regimen  OOB/PT  wean O2 continue postop care  continue asa / plavix and statin  continue lop 50 mg po q8  diuresis  shower qd  pulm toilet  pain management  bowel regimen  discharge planning- home sun

## 2024-05-11 NOTE — PROGRESS NOTE ADULT - ASSESSMENT
63M with PMH of HTN, HLD, CAD s/p PCI, DM2 (last A1c 6.0), LENIN on CPAP, Vertigo presents with chest pain on exertion and dyspnea       # CAD-Progressive Angina  Hx CAD s/p PCI-2023-  -Had cath-3/1/24--LCX: Patent OM1 stent, patent circumflex stent with 75% stenosis at distal edge of old stent s/p PCI-1 SHARON to LCx  -Presenting with HUDSON and angina  -Cath 5/2/2024-LM: 60% mid/distal stenosis.  LAD: 70% ostial stenosis LCX: Patent stent in mid.    -5/6-CABG-LIMA-LAD | SVG-OM  5/7-Extubated off pressors TX to Stepdown  5/8-POD# 2 Awake no dyspnea-Amio for AF prophylaxis,resumes plavix  5/9-POD# 3 Remains in sinus rhythm-  5/10-POD#4=on O2 continue diureses  5/11-On RA no dyspnea      # HTN  On Metoprolol 50mg q8    # T2DM  -A1c-6.0 -2023 now 5.9%  Endocrine evaluation noted  Patient was on Mounjaro at home

## 2024-05-11 NOTE — PROGRESS NOTE ADULT - ASSESSMENT
63M with PMH of HTN, HLD, CAD s/p PCI, DM2 (last A1c 6.0), LENIN on CPAP, Vertigo. Recent stent to Lcx in March of 2024 discharged on DAPT. Presents to [facility] with chest pain 7/10 on exertion for the past week with associated dyspnea. The patient reports the chest pain does not radiate anywhere and gets better with rest. Diagnostic  Cath revealing LM: 60% mid/distal stenosis and LAD: 70% ostial stenosis with LCX: Patent stent. CT surgery consulted for CABG evaluation.   5/6  C-2L  LIMA-LAD | SVG-OM  ef nml  Insulin infusion  5/7  sdu  5/8  remove CT's, a line  intro  ji  May transfer to floor  5/9  d/c pw, diuresis, tx to floor  5/10 remains on NC, continue to diurese encourage IS. Tx to MB   63M with PMH of HTN, HLD, CAD s/p PCI, DM2 (last A1c 6.0), LENIN on CPAP, Vertigo. Recent stent to Lcx in March of 2024 discharged on DAPT. Presents to [facility] with chest pain 7/10 on exertion for the past week with associated dyspnea. The patient reports the chest pain does not radiate anywhere and gets better with rest. Diagnostic  Cath revealing LM: 60% mid/distal stenosis and LAD: 70% ostial stenosis with LCX: Patent stent. CT surgery consulted for CABG evaluation.   5/6  C-2L  LIMA-LAD | SVG-OM  ef nml  Insulin infusion  5/7  sdu  5/8  remove CT's, a line  intro  ji  May transfer to floor  5/9  d/c pw, diuresis, tx to floor  5/10 remains on NC, continue to diurese encourage IS. Tx to MB  5/11 VSS; RSR ; continue diuresis; supplement hypokalemia  discharge planning- home in am sun- d/c on po metformin 1000 po bid as per endo for discharge

## 2024-05-11 NOTE — PROGRESS NOTE ADULT - SUBJECTIVE AND OBJECTIVE BOX
MR#[id number]  PATIENT NAME:YAMINI CUNNINGHAM    DATE OF SERVICE: 05-11-24 @ 08:18  Patient was seen and examined by Wilfred Major MD on    05-11-24 @ 08:18 .  Interim events noted.Consultant notes ,Labs,Telemetry reviewed by me       HOSPITAL COURSE: HPI:   63M with PMH of HTN, HLD, CAD s/p PCI, DM2 (last A1c 6.0), LENIN on CPAP, Vertigo presents with chest pain on exertion and dyspnea Had-cath-3/1/24--LCX: Patent OM1 stent, patent circumflex stent with 75% stenosis at distal edge of old stent s/p PCI-1 SHARON to LCx via RRA and RBV with WNL filling pressure discharged on DAPT Of note lab report on 3/4 reported Hgb 8.1 from 16 on 3/1-/Lab error,patient denies melena bleeding.      CARDIAC STUDIES:  CATH-05/02/2024-LM-60% mid distal stenosis,LAD-70% ostial stenosis,LCx patent stent IVUS of LM and LAD showed significant plaque burden.  TTE 2/27/24 - Mild LV dysfunction with EF 48%, LVH, Hypokinesis of apical cap, dilated LA, mild AS, AR, MR, TR  CATH 3/01/2024-LCX: Patent OM1 stent, patent circumflex stent with 75% stenosis at distal edge of old stent s/p PCI with new SHARON  CATH 3/29/2023-Mild-moderate nonobstructive CAD of the left cirumflex and left anterior descending arteries. Prior stent to the OM1 is widely patent.      INTERIM EVENTS:Patient seen at bedside ,interim events noted.  5/6-CABG-LIMA-LAD | SVG-OM  5/7-Extubated transferred to stepdown  5/8-Awake alert remains in Sinus Rhythm   5/9-Awake alert OOB back pain improved  5/10 Stoill on O2 diurese        PMH -reviewed admission note, no change since admission  HEART FAILURE: Acute[ ]Chronic[ x] Systolic[ ] Diastolic[x ] Combined Systolic and Diastolic[ ]  CAD[x ] CABG[ ] PCI[ x]  DEVICES[ ] PPM[ ] ICD[ ] ILR[ ]  ATRIAL FIBRILLATION[ ] Paroxysmal[ ] Permanent[ ] CHADS2-[  ]  BISI[ ] CKD1[ ] CKD2[ ] CKD3[ ] CKD4[ ] ESRD[ ]  COPD[ ] HTN[ ]   DM[x ] Type1[ ] Type 2[ x]   CVA[ ] Paresis[ ]    AMBULATION: Assisted[ ] Cane/walker[ ] Independent[x ]    MEDICATIONS  (STANDING):  amLODIPine   Tablet 10 milliGRAM(s) Oral daily  ascorbic acid 500 milliGRAM(s) Oral two times a day  aspirin enteric coated 81 milliGRAM(s) Oral daily  atorvastatin 80 milliGRAM(s) Oral at bedtime  bisacodyl Suppository 10 milliGRAM(s) Rectal once  chlorhexidine 2% Cloths 1 Application(s) Topical daily  clopidogrel Tablet 75 milliGRAM(s) Oral daily  dextrose 5%. 1000 milliLiter(s) (50 mL/Hr) IV Continuous <Continuous>  dextrose 50% Injectable 50 milliLiter(s) IV Push every 15 minutes  dextrose 50% Injectable 25 milliLiter(s) IV Push every 15 minutes  enoxaparin Injectable 40 milliGRAM(s) SubCutaneous every 24 hours  furosemide    Tablet 40 milliGRAM(s) Oral daily  gabapentin 100 milliGRAM(s) Oral every 8 hours  glucagon  Injectable 1 milliGRAM(s) IntraMuscular once  insulin glargine Injectable (LANTUS) 14 Unit(s) SubCutaneous at bedtime  insulin lispro (ADMELOG) corrective regimen sliding scale   SubCutaneous three times a day before meals  insulin lispro (ADMELOG) corrective regimen sliding scale   SubCutaneous at bedtime  insulin lispro Injectable (ADMELOG) 4 Unit(s) SubCutaneous three times a day before meals  lidocaine   4% Patch 1 Patch Transdermal every 24 hours  metoprolol tartrate 50 milliGRAM(s) Oral every 8 hours  pantoprazole    Tablet 40 milliGRAM(s) Oral before breakfast  polyethylene glycol 3350 17 Gram(s) Oral daily  potassium phosphate / sodium phosphate Tablet (K-PHOS No. 2) 1 Tablet(s) Oral four times a day with meals  senna 2 Tablet(s) Oral at bedtime  sodium chloride 0.9%. 1000 milliLiter(s) (10 mL/Hr) IV Continuous <Continuous>  spironolactone 25 milliGRAM(s) Oral daily    MEDICATIONS  (PRN):  dextrose Oral Gel 15 Gram(s) Oral once PRN Blood Glucose LESS THAN 70 milliGRAM(s)/deciliter  HYDROmorphone   Tablet 4 milliGRAM(s) Oral every 4 hours PRN Severe Pain (7 - 10)  HYDROmorphone   Tablet 2 milliGRAM(s) Oral every 3 hours PRN Moderate Pain (4 - 6)            REVIEW OF SYSTEMS:  Constitutional: [ ] fever, [ ]weight loss,  [ ]fatigue [ ]weight gain  Eyes: [ ] visual changes  Respiratory: [ ]shortness of breath;  [ ] cough, [ ]wheezing, [ ]chills, [ ]hemoptysis  Cardiovascular: [ ] chest pain, [ ]palpitations, [ ]dizziness,  [ ]leg swelling[ ]orthopnea[ ]PND  Gastrointestinal: [ ] abdominal pain, [ ]nausea, [ ]vomiting,  [ ]diarrhea [ ]Constipation [ ]Melena  Genitourinary: [ ] dysuria, [ ] hematuria [ ]Suh  Neurologic: [ ] headaches [ ] tremors[ ]weakness [ ]Paralysis Right[ ] Left[ ]  Skin: [ ] itching, [ ]burning, [ ] rashes  Endocrine: [ ] heat or cold intolerance  Musculoskeletal: [ ] joint pain or swelling; [ ] muscle, back, or extremity pain  Psychiatric: [ ] depression, [ ]anxiety, [ ]mood swings, or [ ]difficulty sleeping  Hematologic: [ ] easy bruising, [ ] bleeding gums    [ ] All remaining systems negative except as per above.   [ ]Unable to obtain.  [x] No change in ROS since admission      Vital Signs Last 24 Hrs  T(C): 36.9 (11 May 2024 04:58), Max: 36.9 (10 May 2024 21:18)  T(F): 98.4 (11 May 2024 04:58), Max: 98.4 (10 May 2024 21:18)  HR: 86 (11 May 2024 04:58) (86 - 91)  BP: 122/74 (11 May 2024 04:58) (107/59 - 122/74)  BP(mean): 90 (11 May 2024 04:58) (77 - 90)  RR: 18 (11 May 2024 04:58) (18 - 18)  SpO2: 91% (11 May 2024 04:58) (91% - 94%)    Parameters below as of 11 May 2024 04:58  Patient On (Oxygen Delivery Method): room air      I&O's Summary    10 May 2024 07:01  -  11 May 2024 07:00  --------------------------------------------------------  IN: 490 mL / OUT: 750 mL / NET: -260 mL        PHYSICAL EXAM:  General: No acute distress BMI-42.8  HEENT: EOMI, PERRL  Neck: Supple, [ ] JVD  Lungs: Equal air entry bilaterally; [ ] rales [ ] wheezing [ ] rhonchi  Heart: Regular rate and rhythm; [x ] murmur   2/6 [ x] systolic [ ] diastolic [ ] radiation[ ] rubs [ ]  gallops  Abdomen: Nontender, bowel sounds present  Extremities: No clubbing, cyanosis, [ ] edema [ ]Pulses  equal and intact  Nervous system:  Alert & Oriented X3, no focal deficits  Psychiatric: Normal affect  Skin: No rashes or lesions    LABS:  05-11    138  |  101  |  14  ----------------------------<  146<H>  3.6   |  25  |  0.66    Ca    8.8      11 May 2024 05:12  Phos  2.1     05-10  Mg     2.0     05-11    TPro  6.4  /  Alb  3.3  /  TBili  1.0  /  DBili  x   /  AST  12  /  ALT  17  /  AlkPhos  46  05-11    Creatinine Trend: 0.66<--, 0.65<--, 0.59<--, 0.68<--, 0.70<--, 0.73<--                        11.5   10.73 )-----------( 252      ( 11 May 2024 05:12 )             33.9                  Intra-Operative Transesophageal Echo W or WO Ultrasound Enhancing Agent (05.06.24 @ 14:22) >  Post-Bypass:  No changes from baseline. Normal biventricular function. S/P CABG X 2.          Xray Chest 1 View- PORTABLE-Routine (05.08.24 @ 07:12) >  FINDINGS:  5/6/2024 7:30 PM:  The patient is status post sternotomy. An endotracheal tube seen with its   tip 4 cm above the cathy. An NG tube is seen in the stomach. A right IJ   catheter seen with its tip in the SVC. A left chest tube and mediastinal   drainare noted.  The heart is not well assessed on an AP film.  There is pulmonary vascular congestion.  There is a trace left pleural effusion  There is no pneumothorax.    5/7/2024 3:04 AM:  The endotracheal tube and NG tube were removed. Mild pulmonaryvascular  congestion remains.    5/8/2024 6:55 AM:  Pulmonary vascular congestion has improved.  There is minimal bibasilar atelectasis and trace effusions.    IMPRESSION:  Status post cardiac surgery. Tubes and lines as above.  Improved pulmonaryvascular congestion.  Small bilateral pleural effusions and atelectasis.

## 2024-05-11 NOTE — PROGRESS NOTE ADULT - SUBJECTIVE AND OBJECTIVE BOX
VITAL SIGNS    Telemetry:    Vital Signs Last 24 Hrs  T(C): 36.9 (24 @ 04:58), Max: 36.9 (05-10-24 @ 21:18)  T(F): 98.4 (24 @ 04:58), Max: 98.4 (05-10-24 @ 21:18)  HR: 86 (24 @ 04:58) (86 - 91)  BP: 122/74 (24 @ 04:58) (107/59 - 122/74)  RR: 18 (24 @ 04:58) (18 - 18)  SpO2: 91% (24 @ 04:58) (91% - 94%)            05-10 @ 07:01  -   @ 07:00  --------------------------------------------------------  IN: 490 mL / OUT: 750 mL / NET: -260 mL     07:01  -   @ 09:08  --------------------------------------------------------  IN: 0 mL / OUT: 400 mL / NET: -400 mL       Daily     Daily Weight in k.2 (11 May 2024 08:00)  Admit Wt: Drug Dosing Weight  Height (cm): 167.6 (06 May 2024 23:20)  Weight (kg): 120.3 (06 May 2024 23:20)  BMI (kg/m2): 42.8 (06 May 2024 23:20)  BSA (m2): 2.25 (06 May 2024 23:20)    Bilirubin Total: 1.0 mg/dL ( @ 05:12)    CAPILLARY BLOOD GLUCOSE      POCT Blood Glucose.: 146 mg/dL (11 May 2024 07:43)  POCT Blood Glucose.: 150 mg/dL (10 May 2024 21:41)  POCT Blood Glucose.: 112 mg/dL (10 May 2024 16:43)  POCT Blood Glucose.: 183 mg/dL (10 May 2024 12:55)  POCT Blood Glucose.: 132 mg/dL (10 May 2024 11:34)          LABS:    05-10 @ 07:01  -   @ 07:00  --------------------------------------------------------  IN: 490 mL / OUT: 750 mL / NET: -260 mL     @ 07:01  -   @ 09:08  --------------------------------------------------------  IN: 0 mL / OUT: 400 mL / NET: -400 mL    cret                        11.5   10.73 )-----------( 252      ( 11 May 2024 05:12 )             33.9     05-11    138  |  101  |  14  ----------------------------<  146<H>  3.6   |  25  |  0.66    Ca    8.8      11 May 2024 05:12  Phos  2.1     05-10  Mg     2.0         TPro  6.4  /  Alb  3.3  /  TBili  1.0  /  DBili  x   /  AST  12  /  ALT  17  /  AlkPhos  46  05-11        amLODIPine   Tablet 10 milliGRAM(s) Oral daily  ascorbic acid 500 milliGRAM(s) Oral two times a day  aspirin enteric coated 81 milliGRAM(s) Oral daily  atorvastatin 80 milliGRAM(s) Oral at bedtime  bisacodyl Suppository 10 milliGRAM(s) Rectal once  chlorhexidine 2% Cloths 1 Application(s) Topical daily  clopidogrel Tablet 75 milliGRAM(s) Oral daily  dextrose 5%. 1000 milliLiter(s) IV Continuous <Continuous>  dextrose 50% Injectable 50 milliLiter(s) IV Push every 15 minutes  dextrose 50% Injectable 25 milliLiter(s) IV Push every 15 minutes  dextrose Oral Gel 15 Gram(s) Oral once PRN  enoxaparin Injectable 40 milliGRAM(s) SubCutaneous every 24 hours  furosemide    Tablet 40 milliGRAM(s) Oral daily  gabapentin 100 milliGRAM(s) Oral every 8 hours  glucagon  Injectable 1 milliGRAM(s) IntraMuscular once  HYDROmorphone   Tablet 4 milliGRAM(s) Oral every 4 hours PRN  HYDROmorphone   Tablet 2 milliGRAM(s) Oral every 3 hours PRN  insulin glargine Injectable (LANTUS) 14 Unit(s) SubCutaneous at bedtime  insulin lispro (ADMELOG) corrective regimen sliding scale   SubCutaneous three times a day before meals  insulin lispro (ADMELOG) corrective regimen sliding scale   SubCutaneous at bedtime  insulin lispro Injectable (ADMELOG) 4 Unit(s) SubCutaneous three times a day before meals  lidocaine   4% Patch 1 Patch Transdermal every 24 hours  metoprolol tartrate 50 milliGRAM(s) Oral every 8 hours  pantoprazole    Tablet 40 milliGRAM(s) Oral before breakfast  polyethylene glycol 3350 17 Gram(s) Oral daily  potassium bicarbonate/potassium citrate 25 milliEquivalent(s) Oral once  potassium phosphate / sodium phosphate Tablet (K-PHOS No. 2) 1 Tablet(s) Oral four times a day with meals  senna 2 Tablet(s) Oral at bedtime  sodium chloride 0.9%. 1000 milliLiter(s) IV Continuous <Continuous>  spironolactone 25 milliGRAM(s) Oral daily      PHYSICAL EXAM    Subjective: "Hi.   Neurology: alert and oriented x 3, nonfocal, no gross deficits  CV : tele:  RSR  Sternal Wound :  CDI with dressing , Stable  Lungs: clear. RR easy, unlabored   Abdomen: soft, nontender, nondistended, positive bowel sounds, bowel movement   Neg N/V/D   :  pt voiding without difficulty   Extremities:   HEIN; edema, neg calf tenderness.   PPP bilaterally      PW:  Chest tubes:                 VITAL SIGNS    Telemetry:  rsr    Vital Signs Last 24 Hrs  T(C): 36.9 (24 @ 04:58), Max: 36.9 (05-10-24 @ 21:18)  T(F): 98.4 (24 @ 04:58), Max: 98.4 (05-10-24 @ 21:18)  HR: 86 (24 @ 04:58) (86 - 91)  BP: 122/74 (24 @ 04:58) (107/59 - 122/74)  RR: 18 (24 @ 04:58) (18 - 18)  SpO2: 91% (24 @ 04:58) (91% - 94%)            05-10 @ 07:01  -   @ 07:00  --------------------------------------------------------  IN: 490 mL / OUT: 750 mL / NET: -260 mL     07:01  -   @ 09:08  --------------------------------------------------------  IN: 0 mL / OUT: 400 mL / NET: -400 mL       Daily     Daily Weight in k.2 (11 May 2024 08:00)  Admit Wt: Drug Dosing Weight  Height (cm): 167.6 (06 May 2024 23:20)  Weight (kg): 120.3 (06 May 2024 23:20)  BMI (kg/m2): 42.8 (06 May 2024 23:20)  BSA (m2): 2.25 (06 May 2024 23:20)    Bilirubin Total: 1.0 mg/dL ( @ 05:12)    CAPILLARY BLOOD GLUCOSE      POCT Blood Glucose.: 146 mg/dL (11 May 2024 07:43)  POCT Blood Glucose.: 150 mg/dL (10 May 2024 21:41)  POCT Blood Glucose.: 112 mg/dL (10 May 2024 16:43)  POCT Blood Glucose.: 183 mg/dL (10 May 2024 12:55)  POCT Blood Glucose.: 132 mg/dL (10 May 2024 11:34)          LABS:    05-10 @ 07:  -   @ 07:00  --------------------------------------------------------  IN: 490 mL / OUT: 750 mL / NET: -260 mL     @ 07:01   @ 09:08  --------------------------------------------------------  IN: 0 mL / OUT: 400 mL / NET: -400 mL    cret                        11.5   10.73 )-----------( 252      ( 11 May 2024 05:12 )             33.9     05-11    138  |  101  |  14  ----------------------------<  146<H>  3.6   |  25  |  0.66    Ca    8.8      11 May 2024 05:12  Phos  2.1     05-10  Mg     2.0         TPro  6.4  /  Alb  3.3  /  TBili  1.0  /  DBili  x   /  AST  12  /  ALT  17  /  AlkPhos  46  05-11        amLODIPine   Tablet 10 milliGRAM(s) Oral daily  ascorbic acid 500 milliGRAM(s) Oral two times a day  aspirin enteric coated 81 milliGRAM(s) Oral daily  atorvastatin 80 milliGRAM(s) Oral at bedtime  bisacodyl Suppository 10 milliGRAM(s) Rectal once  chlorhexidine 2% Cloths 1 Application(s) Topical daily  clopidogrel Tablet 75 milliGRAM(s) Oral daily  dextrose 5%. 1000 milliLiter(s) IV Continuous <Continuous>  dextrose 50% Injectable 50 milliLiter(s) IV Push every 15 minutes  dextrose 50% Injectable 25 milliLiter(s) IV Push every 15 minutes  dextrose Oral Gel 15 Gram(s) Oral once PRN  enoxaparin Injectable 40 milliGRAM(s) SubCutaneous every 24 hours  furosemide    Tablet 40 milliGRAM(s) Oral daily  gabapentin 100 milliGRAM(s) Oral every 8 hours  glucagon  Injectable 1 milliGRAM(s) IntraMuscular once  HYDROmorphone   Tablet 4 milliGRAM(s) Oral every 4 hours PRN  HYDROmorphone   Tablet 2 milliGRAM(s) Oral every 3 hours PRN  insulin glargine Injectable (LANTUS) 14 Unit(s) SubCutaneous at bedtime  insulin lispro (ADMELOG) corrective regimen sliding scale   SubCutaneous three times a day before meals  insulin lispro (ADMELOG) corrective regimen sliding scale   SubCutaneous at bedtime  insulin lispro Injectable (ADMELOG) 4 Unit(s) SubCutaneous three times a day before meals  lidocaine   4% Patch 1 Patch Transdermal every 24 hours  metoprolol tartrate 50 milliGRAM(s) Oral every 8 hours  pantoprazole    Tablet 40 milliGRAM(s) Oral before breakfast  polyethylene glycol 3350 17 Gram(s) Oral daily  potassium bicarbonate/potassium citrate 25 milliEquivalent(s) Oral once  potassium phosphate / sodium phosphate Tablet (K-PHOS No. 2) 1 Tablet(s) Oral four times a day with meals  senna 2 Tablet(s) Oral at bedtime  sodium chloride 0.9%. 1000 milliLiter(s) IV Continuous <Continuous>  spironolactone 25 milliGRAM(s) Oral daily      PHYSICAL EXAM    Subjective: "I want to leave tomorrow."   Neurology: alert and oriented x 3, nonfocal, no gross deficits  CV : tele:  RSR   Sternal Wound :  CDI FERNANDO- sternum stable   Lungs: clear. RR easy, unlabored   Abdomen: soft, nontender, nondistended, positive bowel sounds, + bowel movement   Neg N/V/D; obese abdomen   :  pt voiding without difficulty   Extremities:   HEIN; trace LE edema, neg calf tenderness.   PPP bilaterally; rt svg site cdi fernando       PW: no  Chest tubes: none                 VITAL SIGNS    Telemetry:  rsr    Vital Signs Last 24 Hrs  T(C): 36.9 (24 @ 04:58), Max: 36.9 (05-10-24 @ 21:18)  T(F): 98.4 (24 @ 04:58), Max: 98.4 (05-10-24 @ 21:18)  HR: 86 (24 @ 04:58) (86 - 91)  BP: 122/74 (24 @ 04:58) (107/59 - 122/74)  RR: 18 (24 @ 04:58) (18 - 18)  SpO2: 91% (24 @ 04:58) (91% - 94%)            05-10 @ 07:01  -   @ 07:00  --------------------------------------------------------  IN: 490 mL / OUT: 750 mL / NET: -260 mL     07:01  -   @ 09:08  --------------------------------------------------------  IN: 0 mL / OUT: 400 mL / NET: -400 mL       Daily     Daily Weight in k.2 (11 May 2024 08:00)  Admit Wt: Drug Dosing Weight  Height (cm): 167.6 (06 May 2024 23:20)  Weight (kg): 120.3 (06 May 2024 23:20)  BMI (kg/m2): 42.8 (06 May 2024 23:20)  BSA (m2): 2.25 (06 May 2024 23:20)    Bilirubin Total: 1.0 mg/dL ( @ 05:12)    CAPILLARY BLOOD GLUCOSE      POCT Blood Glucose.: 146 mg/dL (11 May 2024 07:43)  POCT Blood Glucose.: 150 mg/dL (10 May 2024 21:41)  POCT Blood Glucose.: 112 mg/dL (10 May 2024 16:43)  POCT Blood Glucose.: 183 mg/dL (10 May 2024 12:55)  POCT Blood Glucose.: 132 mg/dL (10 May 2024 11:34)          LABS:    05-10 @ 07:  -   @ 07:00  --------------------------------------------------------  IN: 490 mL / OUT: 750 mL / NET: -260 mL     @ 07:01   @ 09:08  --------------------------------------------------------  IN: 0 mL / OUT: 400 mL / NET: -400 mL    cret                        11.5   10.73 )-----------( 252      ( 11 May 2024 05:12 )             33.9     05-11    138  |  101  |  14  ----------------------------<  146<H>  3.6   |  25  |  0.66    Ca    8.8      11 May 2024 05:12  Phos  2.1     05-10  Mg     2.0         TPro  6.4  /  Alb  3.3  /  TBili  1.0  /  DBili  x   /  AST  12  /  ALT  17  /  AlkPhos  46  05-11        amLODIPine   Tablet 10 milliGRAM(s) Oral daily  ascorbic acid 500 milliGRAM(s) Oral two times a day  aspirin enteric coated 81 milliGRAM(s) Oral daily  atorvastatin 80 milliGRAM(s) Oral at bedtime  bisacodyl Suppository 10 milliGRAM(s) Rectal once  chlorhexidine 2% Cloths 1 Application(s) Topical daily  clopidogrel Tablet 75 milliGRAM(s) Oral daily  dextrose 5%. 1000 milliLiter(s) IV Continuous <Continuous>  dextrose 50% Injectable 50 milliLiter(s) IV Push every 15 minutes  dextrose 50% Injectable 25 milliLiter(s) IV Push every 15 minutes  dextrose Oral Gel 15 Gram(s) Oral once PRN  enoxaparin Injectable 40 milliGRAM(s) SubCutaneous every 24 hours  furosemide    Tablet 40 milliGRAM(s) Oral daily  gabapentin 100 milliGRAM(s) Oral every 8 hours  glucagon  Injectable 1 milliGRAM(s) IntraMuscular once  HYDROmorphone   Tablet 4 milliGRAM(s) Oral every 4 hours PRN  HYDROmorphone   Tablet 2 milliGRAM(s) Oral every 3 hours PRN  insulin glargine Injectable (LANTUS) 14 Unit(s) SubCutaneous at bedtime  insulin lispro (ADMELOG) corrective regimen sliding scale   SubCutaneous three times a day before meals  insulin lispro (ADMELOG) corrective regimen sliding scale   SubCutaneous at bedtime  insulin lispro Injectable (ADMELOG) 4 Unit(s) SubCutaneous three times a day before meals  lidocaine   4% Patch 1 Patch Transdermal every 24 hours  metoprolol tartrate 50 milliGRAM(s) Oral every 8 hours  pantoprazole    Tablet 40 milliGRAM(s) Oral before breakfast  polyethylene glycol 3350 17 Gram(s) Oral daily  potassium bicarbonate/potassium citrate 25 milliEquivalent(s) Oral once  potassium phosphate / sodium phosphate Tablet (K-PHOS No. 2) 1 Tablet(s) Oral four times a day with meals  senna 2 Tablet(s) Oral at bedtime  sodium chloride 0.9%. 1000 milliLiter(s) IV Continuous <Continuous>  spironolactone 25 milliGRAM(s) Oral daily      PHYSICAL EXAM    Subjective: "I want to leave tomorrow."   Neurology: alert and oriented x 3, nonfocal, no gross deficits  CV : tele:  RSR   Sternal Wound :  CDI FERNANDO- sternum stable   Lungs: clear. RR easy, unlabored   Abdomen: soft, nontender, nondistended, positive bowel sounds, + bowel movement; Neg N/V/D; obese abdomen   :  pt voiding without difficulty   Extremities:   HEIN; trace LE edema, neg calf tenderness.   PPP bilaterally; rt svg site cdi fernando       PW: no  Chest tubes: none

## 2024-05-12 DIAGNOSIS — D72.829 ELEVATED WHITE BLOOD CELL COUNT, UNSPECIFIED: ICD-10-CM

## 2024-05-12 LAB
ANION GAP SERPL CALC-SCNC: 17 MMOL/L — SIGNIFICANT CHANGE UP (ref 5–17)
BUN SERPL-MCNC: 12 MG/DL — SIGNIFICANT CHANGE UP (ref 7–23)
CALCIUM SERPL-MCNC: 9.1 MG/DL — SIGNIFICANT CHANGE UP (ref 8.4–10.5)
CHLORIDE SERPL-SCNC: 100 MMOL/L — SIGNIFICANT CHANGE UP (ref 96–108)
CO2 SERPL-SCNC: 23 MMOL/L — SIGNIFICANT CHANGE UP (ref 22–31)
CREAT SERPL-MCNC: 0.68 MG/DL — SIGNIFICANT CHANGE UP (ref 0.5–1.3)
EGFR: 106 ML/MIN/1.73M2 — SIGNIFICANT CHANGE UP
GLUCOSE BLDC GLUCOMTR-MCNC: 137 MG/DL — HIGH (ref 70–99)
GLUCOSE BLDC GLUCOMTR-MCNC: 156 MG/DL — HIGH (ref 70–99)
GLUCOSE BLDC GLUCOMTR-MCNC: 161 MG/DL — HIGH (ref 70–99)
GLUCOSE BLDC GLUCOMTR-MCNC: 204 MG/DL — HIGH (ref 70–99)
GLUCOSE SERPL-MCNC: 128 MG/DL — HIGH (ref 70–99)
HCT VFR BLD CALC: 38.6 % — LOW (ref 39–50)
HGB BLD-MCNC: 12.9 G/DL — LOW (ref 13–17)
MCHC RBC-ENTMCNC: 28.4 PG — SIGNIFICANT CHANGE UP (ref 27–34)
MCHC RBC-ENTMCNC: 33.4 GM/DL — SIGNIFICANT CHANGE UP (ref 32–36)
MCV RBC AUTO: 84.8 FL — SIGNIFICANT CHANGE UP (ref 80–100)
NRBC # BLD: 0 /100 WBCS — SIGNIFICANT CHANGE UP (ref 0–0)
PLATELET # BLD AUTO: 305 K/UL — SIGNIFICANT CHANGE UP (ref 150–400)
POTASSIUM SERPL-MCNC: 3.6 MMOL/L — SIGNIFICANT CHANGE UP (ref 3.5–5.3)
POTASSIUM SERPL-SCNC: 3.6 MMOL/L — SIGNIFICANT CHANGE UP (ref 3.5–5.3)
RBC # BLD: 4.55 M/UL — SIGNIFICANT CHANGE UP (ref 4.2–5.8)
RBC # FLD: 13.8 % — SIGNIFICANT CHANGE UP (ref 10.3–14.5)
SODIUM SERPL-SCNC: 140 MMOL/L — SIGNIFICANT CHANGE UP (ref 135–145)
WBC # BLD: 12.87 K/UL — HIGH (ref 3.8–10.5)
WBC # FLD AUTO: 12.87 K/UL — HIGH (ref 3.8–10.5)

## 2024-05-12 PROCEDURE — 71045 X-RAY EXAM CHEST 1 VIEW: CPT | Mod: 26

## 2024-05-12 RX ORDER — POTASSIUM BICARBONATE 978 MG/1
25 TABLET, EFFERVESCENT ORAL ONCE
Refills: 0 | Status: COMPLETED | OUTPATIENT
Start: 2024-05-12 | End: 2024-05-12

## 2024-05-12 RX ORDER — SPIRONOLACTONE 25 MG/1
25 TABLET, FILM COATED ORAL DAILY
Refills: 0 | Status: DISCONTINUED | OUTPATIENT
Start: 2024-05-13 | End: 2024-05-13

## 2024-05-12 RX ORDER — SPIRONOLACTONE 25 MG/1
25 TABLET, FILM COATED ORAL
Refills: 0 | Status: DISCONTINUED | OUTPATIENT
Start: 2024-05-13 | End: 2024-05-13

## 2024-05-12 RX ORDER — VANCOMYCIN HCL 1 G
1000 VIAL (EA) INTRAVENOUS ONCE
Refills: 0 | Status: COMPLETED | OUTPATIENT
Start: 2024-05-12 | End: 2024-05-12

## 2024-05-12 RX ORDER — SPIRONOLACTONE 25 MG/1
25 TABLET, FILM COATED ORAL ONCE
Refills: 0 | Status: COMPLETED | OUTPATIENT
Start: 2024-05-12 | End: 2024-05-12

## 2024-05-12 RX ADMIN — ATORVASTATIN CALCIUM 80 MILLIGRAM(S): 80 TABLET, FILM COATED ORAL at 21:20

## 2024-05-12 RX ADMIN — POTASSIUM BICARBONATE 25 MILLIEQUIVALENT(S): 978 TABLET, EFFERVESCENT ORAL at 09:02

## 2024-05-12 RX ADMIN — Medication 4 UNIT(S): at 12:19

## 2024-05-12 RX ADMIN — Medication 50 MILLIGRAM(S): at 06:06

## 2024-05-12 RX ADMIN — Medication 1 TABLET(S): at 07:49

## 2024-05-12 RX ADMIN — Medication 50 MILLIGRAM(S): at 13:45

## 2024-05-12 RX ADMIN — POTASSIUM BICARBONATE 25 MILLIEQUIVALENT(S): 978 TABLET, EFFERVESCENT ORAL at 17:06

## 2024-05-12 RX ADMIN — Medication 50 MILLIGRAM(S): at 21:20

## 2024-05-12 RX ADMIN — Medication 81 MILLIGRAM(S): at 11:32

## 2024-05-12 RX ADMIN — Medication 4 UNIT(S): at 07:50

## 2024-05-12 RX ADMIN — LIDOCAINE 1 PATCH: 4 CREAM TOPICAL at 17:10

## 2024-05-12 RX ADMIN — LIDOCAINE 1 PATCH: 4 CREAM TOPICAL at 19:08

## 2024-05-12 RX ADMIN — Medication 4 UNIT(S): at 17:06

## 2024-05-12 RX ADMIN — Medication 2: at 17:06

## 2024-05-12 RX ADMIN — Medication 2: at 07:50

## 2024-05-12 RX ADMIN — CLOPIDOGREL BISULFATE 75 MILLIGRAM(S): 75 TABLET, FILM COATED ORAL at 11:32

## 2024-05-12 RX ADMIN — SPIRONOLACTONE 25 MILLIGRAM(S): 25 TABLET, FILM COATED ORAL at 17:05

## 2024-05-12 RX ADMIN — Medication 40 MILLIGRAM(S): at 06:06

## 2024-05-12 RX ADMIN — CHLORHEXIDINE GLUCONATE 1 APPLICATION(S): 213 SOLUTION TOPICAL at 11:30

## 2024-05-12 RX ADMIN — ENOXAPARIN SODIUM 40 MILLIGRAM(S): 100 INJECTION SUBCUTANEOUS at 19:17

## 2024-05-12 RX ADMIN — PANTOPRAZOLE SODIUM 40 MILLIGRAM(S): 20 TABLET, DELAYED RELEASE ORAL at 06:06

## 2024-05-12 RX ADMIN — SPIRONOLACTONE 25 MILLIGRAM(S): 25 TABLET, FILM COATED ORAL at 06:06

## 2024-05-12 RX ADMIN — AMLODIPINE BESYLATE 10 MILLIGRAM(S): 2.5 TABLET ORAL at 06:06

## 2024-05-12 RX ADMIN — Medication 250 MILLIGRAM(S): at 13:46

## 2024-05-12 RX ADMIN — INSULIN GLARGINE 14 UNIT(S): 100 INJECTION, SOLUTION SUBCUTANEOUS at 21:20

## 2024-05-12 NOTE — PROGRESS NOTE ADULT - PROBLEM SELECTOR PLAN 1
Continue with ASA 81 mg PO Daily.   Continue with Lopressor 50 mg PO.   Continue with Lipitor 80 mg PO HS    Plavix 75mg PO QD  Increase activity as tolerated.   Encourage Chest PT / Pulmonary toileting and Incentive spirometry every 1 hour x 10 while awake.   Continue with Protonix 40 mg PO daily and Lovenox 40 mg SQ daily for PUD and DVT prophylaxis.   Sternal precautions and wound care  Shower on POD #5.   D/C plan home once medically cleared   Plan of care discussed with attending

## 2024-05-12 NOTE — PROGRESS NOTE ADULT - SUBJECTIVE AND OBJECTIVE BOX
Chief complaint  Patient is a 63y old  Male who presents with a chief complaint of Chest Pain (12 May 2024 06:54)         Labs and Fingersticks  CAPILLARY BLOOD GLUCOSE      POCT Blood Glucose.: 156 mg/dL (12 May 2024 07:36)  POCT Blood Glucose.: 143 mg/dL (11 May 2024 21:04)  POCT Blood Glucose.: 111 mg/dL (11 May 2024 16:42)      Anion Gap: 17 (05-12 @ 07:11)  Anion Gap: 12 (05-11 @ 05:12)      Calcium: 9.1 (05-12 @ 07:11)  Calcium: 8.8 (05-11 @ 05:12)  Albumin: 3.3 (05-11 @ 05:12)    Alanine Aminotransferase (ALT/SGPT): 17 (05-11 @ 05:12)  Alkaline Phosphatase: 46 (05-11 @ 05:12)  Aspartate Aminotransferase (AST/SGOT): 12 (05-11 @ 05:12)        05-12    140  |  100  |  12  ----------------------------<  128<H>  3.6   |  23  |  0.68    Ca    9.1      12 May 2024 07:11  Mg     2.0     05-11    TPro  6.4  /  Alb  3.3  /  TBili  1.0  /  DBili  x   /  AST  12  /  ALT  17  /  AlkPhos  46  05-11                        12.9   12.87 )-----------( 305      ( 12 May 2024 07:11 )             38.6     Medications  MEDICATIONS  (STANDING):  amLODIPine   Tablet 10 milliGRAM(s) Oral daily  aspirin enteric coated 81 milliGRAM(s) Oral daily  atorvastatin 80 milliGRAM(s) Oral at bedtime  bisacodyl Suppository 10 milliGRAM(s) Rectal once  chlorhexidine 2% Cloths 1 Application(s) Topical daily  clopidogrel Tablet 75 milliGRAM(s) Oral daily  dextrose 5%. 1000 milliLiter(s) (50 mL/Hr) IV Continuous <Continuous>  dextrose 50% Injectable 50 milliLiter(s) IV Push every 15 minutes  dextrose 50% Injectable 25 milliLiter(s) IV Push every 15 minutes  enoxaparin Injectable 40 milliGRAM(s) SubCutaneous every 24 hours  furosemide    Tablet 40 milliGRAM(s) Oral daily  glucagon  Injectable 1 milliGRAM(s) IntraMuscular once  insulin glargine Injectable (LANTUS) 14 Unit(s) SubCutaneous at bedtime  insulin lispro (ADMELOG) corrective regimen sliding scale   SubCutaneous three times a day before meals  insulin lispro (ADMELOG) corrective regimen sliding scale   SubCutaneous at bedtime  insulin lispro Injectable (ADMELOG) 4 Unit(s) SubCutaneous three times a day before meals  lidocaine   4% Patch 1 Patch Transdermal every 24 hours  metoprolol tartrate 50 milliGRAM(s) Oral every 8 hours  pantoprazole    Tablet 40 milliGRAM(s) Oral before breakfast  polyethylene glycol 3350 17 Gram(s) Oral daily  senna 2 Tablet(s) Oral at bedtime  sodium chloride 0.9%. 1000 milliLiter(s) (10 mL/Hr) IV Continuous <Continuous>  vancomycin  IVPB 1000 milliGRAM(s) IV Intermittent once      Physical Exam  General: Patient comfortable in bed   Vital Signs Last 12 Hrs  T(F): 98.4 (05-12-24 @ 04:17), Max: 98.4 (05-12-24 @ 04:17)  HR: 80 (05-12-24 @ 04:17) (80 - 80)  BP: 111/71 (05-12-24 @ 04:17) (111/71 - 111/71)  BP(mean): --  RR: 18 (05-12-24 @ 04:17) (18 - 18)  SpO2: 93% (05-12-24 @ 04:17) (93% - 93%)    CVS: S1S2   Respiratory: No wheezing, no crepitations  GI: Abdomen soft, bowel sounds positive  Musculoskeletal:  moves all extremities       Chief complaint  Patient is a 63y old  Male who presents with a chief complaint of Chest Pain (12 May 2024 06:54)     Labs and Fingersticks  CAPILLARY BLOOD GLUCOSE      POCT Blood Glucose.: 156 mg/dL (12 May 2024 07:36)  POCT Blood Glucose.: 143 mg/dL (11 May 2024 21:04)  POCT Blood Glucose.: 111 mg/dL (11 May 2024 16:42)      Anion Gap: 17 (05-12 @ 07:11)  Anion Gap: 12 (05-11 @ 05:12)      Calcium: 9.1 (05-12 @ 07:11)  Calcium: 8.8 (05-11 @ 05:12)  Albumin: 3.3 (05-11 @ 05:12)    Alanine Aminotransferase (ALT/SGPT): 17 (05-11 @ 05:12)  Alkaline Phosphatase: 46 (05-11 @ 05:12)  Aspartate Aminotransferase (AST/SGOT): 12 (05-11 @ 05:12)        05-12    140  |  100  |  12  ----------------------------<  128<H>  3.6   |  23  |  0.68    Ca    9.1      12 May 2024 07:11  Mg     2.0     05-11    TPro  6.4  /  Alb  3.3  /  TBili  1.0  /  DBili  x   /  AST  12  /  ALT  17  /  AlkPhos  46  05-11                        12.9   12.87 )-----------( 305      ( 12 May 2024 07:11 )             38.6     Medications  MEDICATIONS  (STANDING):  amLODIPine   Tablet 10 milliGRAM(s) Oral daily  aspirin enteric coated 81 milliGRAM(s) Oral daily  atorvastatin 80 milliGRAM(s) Oral at bedtime  bisacodyl Suppository 10 milliGRAM(s) Rectal once  chlorhexidine 2% Cloths 1 Application(s) Topical daily  clopidogrel Tablet 75 milliGRAM(s) Oral daily  dextrose 5%. 1000 milliLiter(s) (50 mL/Hr) IV Continuous <Continuous>  dextrose 50% Injectable 50 milliLiter(s) IV Push every 15 minutes  dextrose 50% Injectable 25 milliLiter(s) IV Push every 15 minutes  enoxaparin Injectable 40 milliGRAM(s) SubCutaneous every 24 hours  furosemide    Tablet 40 milliGRAM(s) Oral daily  glucagon  Injectable 1 milliGRAM(s) IntraMuscular once  insulin glargine Injectable (LANTUS) 14 Unit(s) SubCutaneous at bedtime  insulin lispro (ADMELOG) corrective regimen sliding scale   SubCutaneous three times a day before meals  insulin lispro (ADMELOG) corrective regimen sliding scale   SubCutaneous at bedtime  insulin lispro Injectable (ADMELOG) 4 Unit(s) SubCutaneous three times a day before meals  lidocaine   4% Patch 1 Patch Transdermal every 24 hours  metoprolol tartrate 50 milliGRAM(s) Oral every 8 hours  pantoprazole    Tablet 40 milliGRAM(s) Oral before breakfast  polyethylene glycol 3350 17 Gram(s) Oral daily  senna 2 Tablet(s) Oral at bedtime  sodium chloride 0.9%. 1000 milliLiter(s) (10 mL/Hr) IV Continuous <Continuous>  vancomycin  IVPB 1000 milliGRAM(s) IV Intermittent once      Physical Exam  General: Patient comfortable in bed   Vital Signs Last 12 Hrs  T(F): 98.4 (05-12-24 @ 04:17), Max: 98.4 (05-12-24 @ 04:17)  HR: 80 (05-12-24 @ 04:17) (80 - 80)  BP: 111/71 (05-12-24 @ 04:17) (111/71 - 111/71)  BP(mean): --  RR: 18 (05-12-24 @ 04:17) (18 - 18)  SpO2: 93% (05-12-24 @ 04:17) (93% - 93%)    CVS: S1S2   Respiratory: No wheezing, no crepitations  GI: Abdomen soft, bowel sounds positive  Musculoskeletal:  moves all extremities

## 2024-05-12 NOTE — PROGRESS NOTE ADULT - SUBJECTIVE AND OBJECTIVE BOX
VITAL SIGNS    Subjective: " I feel okay" Denies CP, palpitation, SOB, HUDSON, HA, dizziness, N/V/D, fever or chills.  No acute event noted overnight.    Telemetry: NSR 70-100s       Vital Signs Last 24 Hrs  T(C): 36.9 (24 @ 04:17), Max: 37.4 (24 @ 19:41)  T(F): 98.4 (24:17), Max: 99.3 (24 @ 19:41)  HR: 80 (24:17) (80 - 101)  BP: 111/71 (24 04:17) (104/66 - 111/71)  RR: 18 (24:17) (18 - 18)  SpO2: 93% (24 04:17) (92% - 93%)                @ 07:01  -   @ 07:00  --------------------------------------------------------  IN: 680 mL / OUT: 2825 mL / NET: -2145 mL     @ 07:01  -   @ 12:26  --------------------------------------------------------  IN: 0 mL / OUT: 500 mL / NET: -500 mL        LABS      140  |  100  |  12  ----------------------------<  128<H>  3.6   |  23  |  0.68    Ca    9.1      12 May 2024 07:11  Mg     2.0         TPro  6.4  /  Alb  3.3  /  TBili  1.0  /  DBili  x   /  AST  12  /  ALT  17  /  AlkPhos  46                                   12.9   12.87 )-----------( 305      ( 12 May 2024 07:11 )             38.6                  Daily     Daily Weight in k.3 (12 May 2024 08:00)      CAPILLARY BLOOD GLUCOSE      POCT Blood Glucose.: 137 mg/dL (12 May 2024 12:05)  POCT Blood Glucose.: 156 mg/dL (12 May 2024 07:36)  POCT Blood Glucose.: 143 mg/dL (11 May 2024 21:04)  POCT Blood Glucose.: 111 mg/dL (11 May 2024 16:42)        PHYSICAL EXAM      Neurology: alert and oriented x 3, nonfocal, no gross deficits    CV: +S1, S2.     Sternal Wound: MSI -->CDI, sternum stable    Lungs: CTA B/L     Abdomen: soft, nontender, nondistended, positive bowel sounds, (+) Flatus; (+) BM     :  Voiding               Extremities:  B/L LE (+) edema; negative calf tenderness; (+) 2 DP palpable      right SVG site warm and erythematic       amLODIPine   Tablet 10 milliGRAM(s) Oral daily  aspirin enteric coated 81 milliGRAM(s) Oral daily  atorvastatin 80 milliGRAM(s) Oral at bedtime  bisacodyl Suppository 10 milliGRAM(s) Rectal once  chlorhexidine 2% Cloths 1 Application(s) Topical daily  clopidogrel Tablet 75 milliGRAM(s) Oral daily  dextrose 5%. 1000 milliLiter(s) IV Continuous <Continuous>  dextrose 50% Injectable 50 milliLiter(s) IV Push every 15 minutes  dextrose 50% Injectable 25 milliLiter(s) IV Push every 15 minutes  dextrose Oral Gel 15 Gram(s) Oral once PRN  enoxaparin Injectable 40 milliGRAM(s) SubCutaneous every 24 hours  furosemide    Tablet 40 milliGRAM(s) Oral daily  glucagon  Injectable 1 milliGRAM(s) IntraMuscular once  HYDROmorphone   Tablet 2 milliGRAM(s) Oral every 3 hours PRN  HYDROmorphone   Tablet 4 milliGRAM(s) Oral every 4 hours PRN  insulin glargine Injectable (LANTUS) 14 Unit(s) SubCutaneous at bedtime  insulin lispro (ADMELOG) corrective regimen sliding scale   SubCutaneous three times a day before meals  insulin lispro (ADMELOG) corrective regimen sliding scale   SubCutaneous at bedtime  insulin lispro Injectable (ADMELOG) 4 Unit(s) SubCutaneous three times a day before meals  lidocaine   4% Patch 1 Patch Transdermal every 24 hours  metoprolol tartrate 50 milliGRAM(s) Oral every 8 hours  pantoprazole    Tablet 40 milliGRAM(s) Oral before breakfast  polyethylene glycol 3350 17 Gram(s) Oral daily  senna 2 Tablet(s) Oral at bedtime  sodium chloride 0.9%. 1000 milliLiter(s) IV Continuous <Continuous>  vancomycin  IVPB 1000 milliGRAM(s) IV Intermittent once        Physical Therapy Rec:   Home  [ x ]   Home w/ PT  [  ]  Rehab  [  ]    Discussed with Cardiothoracic Team at AM rounds.

## 2024-05-12 NOTE — PROGRESS NOTE ADULT - SUBJECTIVE AND OBJECTIVE BOX
MR#[id number]  PATIENT NAME:YAMINI CUNNINGHAM    DATE OF SERVICE: 05-12-24 @ 06:54  Patient was seen and examined by Wlifred Major MD on    05-12-24 @ 06:54 .  Interim events noted.Consultant notes ,Labs,Telemetry reviewed by me       HOSPITAL COURSE: HPI:   63M with PMH of HTN, HLD, CAD s/p PCI, DM2 (last A1c 6.0), LENIN on CPAP, Vertigo presents with chest pain on exertion and dyspnea Had-cath-3/1/24--LCX: Patent OM1 stent, patent circumflex stent with 75% stenosis at distal edge of old stent s/p PCI-1 SHARON to LCx via RRA and RBV with WNL filling pressure discharged on DAPT Of note lab report on 3/4 reported Hgb 8.1 from 16 on 3/1-/Lab error,patient denies melena bleeding.      CARDIAC STUDIES:  CATH-05/02/2024-LM-60% mid distal stenosis,LAD-70% ostial stenosis,LCx patent stent IVUS of LM and LAD showed significant plaque burden.  TTE 2/27/24 - Mild LV dysfunction with EF 48%, LVH, Hypokinesis of apical cap, dilated LA, mild AS, AR, MR, TR  CATH 3/01/2024-LCX: Patent OM1 stent, patent circumflex stent with 75% stenosis at distal edge of old stent s/p PCI with new SHARON  CATH 3/29/2023-Mild-moderate nonobstructive CAD of the left cirumflex and left anterior descending arteries. Prior stent to the OM1 is widely patent.    INTERIM EVENTS:Patient seen at bedside ,interim events noted.  5/6-CABG-LIMA-LAD | SVG-OM  5/7-Extubated transferred to stepdown  5/8-Awake alert remains in Sinus Rhythm   5/9-Awake alert OOB back pain improved  5/10 Still on O2 diurese  5/11-Off O2 sinus rhythm  5/12-OOB possible discharge today      PMH -reviewed admission note, no change since admission    AMBULATION: Assisted[ ] Cane/walker[ ] Independent[ x]    MEDICATIONS  (STANDING):  amLODIPine   Tablet 10 milliGRAM(s) Oral daily  aspirin enteric coated 81 milliGRAM(s) Oral daily  atorvastatin 80 milliGRAM(s) Oral at bedtime  bisacodyl Suppository 10 milliGRAM(s) Rectal once  chlorhexidine 2% Cloths 1 Application(s) Topical daily  clopidogrel Tablet 75 milliGRAM(s) Oral daily  enoxaparin Injectable 40 milliGRAM(s) SubCutaneous every 24 hours  furosemide    Tablet 40 milliGRAM(s) Oral daily  glucagon  Injectable 1 milliGRAM(s) IntraMuscular once  insulin glargine Injectable (LANTUS) 14 Unit(s) SubCutaneous at bedtime  insulin lispro (ADMELOG) corrective regimen sliding scale   SubCutaneous three times a day before meals  insulin lispro (ADMELOG) corrective regimen sliding scale   SubCutaneous at bedtime  insulin lispro Injectable (ADMELOG) 4 Unit(s) SubCutaneous three times a day before meals  lidocaine   4% Patch 1 Patch Transdermal every 24 hours  metoprolol tartrate 50 milliGRAM(s) Oral every 8 hours  pantoprazole    Tablet 40 milliGRAM(s) Oral before breakfast  polyethylene glycol 3350 17 Gram(s) Oral daily  potassium phosphate / sodium phosphate Tablet (K-PHOS No. 2) 1 Tablet(s) Oral four times a day with meals  senna 2 Tablet(s) Oral at bedtime  sodium chloride 0.9%. 1000 milliLiter(s) (10 mL/Hr) IV Continuous <Continuous>  spironolactone 25 milliGRAM(s) Oral daily    MEDICATIONS  (PRN):  dextrose Oral Gel 15 Gram(s) Oral once PRN Blood Glucose LESS THAN 70 milliGRAM(s)/deciliter  HYDROmorphone   Tablet 2 milliGRAM(s) Oral every 3 hours PRN Moderate Pain (4 - 6)  HYDROmorphone   Tablet 4 milliGRAM(s) Oral every 4 hours PRN Severe Pain (7 - 10)            REVIEW OF SYSTEMS:  Constitutional: [ ] fever, [ ]weight loss,  [x ]fatigue [ ]weight gain  Eyes: [ ] visual changes  Respiratory: [ ]shortness of breath;  [ ] cough, [ ]wheezing, [ ]chills, [ ]hemoptysis  Cardiovascular: [ ] chest pain, [ ]palpitations, [ ]dizziness,  [ ]leg swelling[ ]orthopnea[ ]PND  Gastrointestinal: [ ] abdominal pain, [ ]nausea, [ ]vomiting,  [ ]diarrhea [ ]Constipation [ ]Melena  Genitourinary: [ ] dysuria, [ ] hematuria [ ]Suh  Neurologic: [ ] headaches [ ] tremors[ ]weakness [ ]Paralysis Right[ ] Left[ ]  Skin: [ ] itching, [ ]burning, [ ] rashes  Endocrine: [ ] heat or cold intolerance  Musculoskeletal: [ ] joint pain or swelling; [ ] muscle, back, or extremity pain  Psychiatric: [ ] depression, [ ]anxiety, [ ]mood swings, or [ ]difficulty sleeping  Hematologic: [ ] easy bruising, [ ] bleeding gums    [ ] All remaining systems negative except as per above.   [ ]Unable to obtain.  [x] No change in ROS since admission      Vital Signs Last 24 Hrs  T(C): 36.9 (12 May 2024 04:17), Max: 37.4 (11 May 2024 19:41)  T(F): 98.4 (12 May 2024 04:17), Max: 99.3 (11 May 2024 19:41)  HR: 80 (12 May 2024 04:17) (80 - 101)  BP: 111/71 (12 May 2024 04:17) (101/65 - 111/71)  RR: 18 (12 May 2024 04:17) (18 - 18)  SpO2: 93% (12 May 2024 04:17) (92% - 94%)    Parameters below as of 12 May 2024 04:17  Patient On (Oxygen Delivery Method): room air      I&O's Summary    10 May 2024 07:01  -  11 May 2024 07:00  --------------------------------------------------------  IN: 490 mL / OUT: 750 mL / NET: -260 mL    11 May 2024 07:01  -  12 May 2024 06:54  --------------------------------------------------------  IN: 680 mL / OUT: 2825 mL / NET: -2145 mL        PHYSICAL EXAM:  General: No acute distress BMI-42.8  HEENT: EOMI, PERRL  Neck: Supple, [ ] JVD  Lungs: Equal air entry bilaterally; [ ] rales [ ] wheezing [ ] rhonchi  Heart: Regular rate and rhythm; [x ] murmur   2/6 [ x] systolic [ ] diastolic [ ] radiation[ ] rubs [ ]  gallops  Abdomen: Nontender, bowel sounds present  Extremities: No clubbing, cyanosis, [ ] edema [ ]Pulses  equal and intact  Nervous system:  Alert & Oriented X3, no focal deficits  Psychiatric: Normal affect  Skin: No rashes or lesions    LABS:  05-11    138  |  101  |  14  ----------------------------<  146<H>  3.6   |  25  |  0.66    Ca    8.8      11 May 2024 05:12  Mg     2.0     05-11    TPro  6.4  /  Alb  3.3  /  TBili  1.0  /  DBili  x   /  AST  12  /  ALT  17  /  AlkPhos  46  05-11    Creatinine Trend: 0.66<--, 0.65<--, 0.59<--, 0.68<--, 0.70<--, 0.73<--                        11.5   10.73 )-----------( 252      ( 11 May 2024 05:12 )             33.9              Intra-Operative Transesophageal Echo W or WO Ultrasound Enhancing Agent (05.06.24 @ 14:22) >  Post-Bypass:  No changes from baseline. Normal biventricular function. S/P CABG X 2.          Xray Chest 1 View- PORTABLE-Routine (05.08.24 @ 07:12) >  FINDINGS:  5/6/2024 7:30 PM:  The patient is status post sternotomy. An endotracheal tube seen with its   tip 4 cm above the cathy. An NG tube is seen in the stomach. A right IJ   catheter seen with its tip in the SVC. A left chest tube and mediastinal   drainare noted.  The heart is not well assessed on an AP film.  There is pulmonary vascular congestion.  There is a trace left pleural effusion  There is no pneumothorax.    5/7/2024 3:04 AM:  The endotracheal tube and NG tube were removed. Mild pulmonaryvascular  congestion remains.    5/8/2024 6:55 AM:  Pulmonary vascular congestion has improved.  There is minimal bibasilar atelectasis and trace effusions.    IMPRESSION:  Status post cardiac surgery. Tubes and lines as above.  Improved pulmonaryvascular congestion.  Small bilateral pleural effusions and atelectasis.

## 2024-05-12 NOTE — PROGRESS NOTE ADULT - PROBLEM SELECTOR PLAN 1
Will continue current insulin regimen for now.   Will continue monitoring  blood sugars, will Follow up.  Patient counseled for compliance with consistent low carb diet.  He is on low dose insulins  DC on home regimen  FU outpatient

## 2024-05-12 NOTE — PROGRESS NOTE ADULT - TIME BILLING
- Review of records, telemetry, vital signs and daily labs.   - General and cardiovascular physical examination.  - Generation of cardiovascular treatment plan.  - Coordination of care.      Patient was seen and examined by me on  05/09/2024,interim events noted,labs and radiology studies reviewed.  Wilfred Major MD,FACC.  [address] [place], [place] [id number].  [phone]
- Review of records, telemetry, vital signs and daily labs.   - General and cardiovascular physical examination.  - Generation of cardiovascular treatment plan.  - Coordination of care.      Patient was seen and examined by me on  05/05/2024,interim events noted,labs and radiology studies reviewed.  Wilfred Major MD,FACC.  [address] [place], [place] [id number].  [phone]
- Review of records, telemetry, vital signs and daily labs.   - General and cardiovascular physical examination.  - Generation of cardiovascular treatment plan.  - Coordination of care.      Patient was seen and examined by me on 05/03/2024,interim events noted,labs and radiology studies reviewed.  Wilfred Major MD,FACC.  [address] [place], [place] [id number].  [phone]
- Review of records, telemetry, vital signs and daily labs.   - General and cardiovascular physical examination.  - Generation of cardiovascular treatment plan.  - Coordination of care.      Patient was seen and examined by me on  05/07/2024,interim events noted,labs and radiology studies reviewed.  Wilfred Major MD,FACC.  [address] [place], [place] [id number].  [phone]
- Review of records, telemetry, vital signs and daily labs.   - General and cardiovascular physical examination.  - Generation of cardiovascular treatment plan.  - Coordination of care.      Patient was seen and examined by me on 05/05/2024,interim events noted,labs and radiology studies reviewed.  Wilfred Major MD,FACC.  [address] [place], [place] [id number].  [phone]
- Review of records, telemetry, vital signs and daily labs.   - General and cardiovascular physical examination.  - Generation of cardiovascular treatment plan.  - Coordination of care.      Patient was seen and examined by me on 05/11/2024,interim events noted,labs and radiology studies reviewed.  Wilfred Major MD,FACC.  [address] [place], [place] [id number].  [phone]
- Review of records, telemetry, vital signs and daily labs.   - General and cardiovascular physical examination.  - Generation of cardiovascular treatment plan.  - Coordination of care.      Patient was seen and examined by me on 05/12/2024,interim events noted,labs and radiology studies reviewed.  Wilfred Major MD,FACC.  [address] [place], [place] [id number].  [phone]
- Review of records, telemetry, vital signs and daily labs.   - General and cardiovascular physical examination.  - Generation of cardiovascular treatment plan.  - Coordination of care.      Patient was seen and examined by me on  05/04/2024,interim events noted,labs and radiology studies reviewed.  Wilfred Major MD,FACC.  [address] [place], [place] [id number].  [phone]
- Review of records, telemetry, vital signs and daily labs.   - General and cardiovascular physical examination.  - Generation of cardiovascular treatment plan.  - Coordination of care.      Patient was seen and examined by me on 05/08/2024,interim events noted,labs and radiology studies reviewed.  Wilfred Major MD,FACC.  [address] [place], [place] [id number].  [phone]
- Review of records, telemetry, vital signs and daily labs.   - General and cardiovascular physical examination.  - Generation of cardiovascular treatment plan.  - Coordination of care.      Patient was seen and examined by me on 5/10/24,interim events noted,labs and radiology studies reviewed.  Wilfred Major MD,FACC.  [address] [place], [place] [id number].  [phone]
English

## 2024-05-12 NOTE — PROGRESS NOTE ADULT - ASSESSMENT
63M with PMH of HTN, HLD, CAD s/p PCI, DM2 (last A1c 6.0), LENIN on CPAP, Vertigo. Recent stent to Lcx in March of 2024 discharged on DAPT. Presents to [facility] with chest pain 7/10 on exertion for the past week with associated dyspnea. The patient reports the chest pain does not radiate anywhere and gets better with rest. Diagnostic  Cath revealing LM: 60% mid/distal stenosis and LAD: 70% ostial stenosis with LCX: Patent stent. CT surgery consulted for CABG evaluation.   5/6  C-2L  LIMA-LAD | SVG-OM  ef nml  Insulin infusion  5/7  sdu  5/8  remove CT's, a line  intro ji  May transfer to floor  5/9  d/c pw, diuresis, tx to floor  5/10 remains on NC, continue to diurese encourage IS. Tx to MB  5/11 VSS; RSR ; continue diuresis; supplement hypokalemia  5/12 VSS, Serum Potassium 3.6 --> k-lyte x 2 given. Continue diuresis. WBC 12 from 10.  Right SVG site, warm and erythematic --> 1 g vanco ordered.   Disposition: home - d/c on po metformin 1000 po bid as per endo for discharge

## 2024-05-12 NOTE — PROGRESS NOTE ADULT - ASSESSMENT
Assessment  DMT2: 63y Male with DM T2 with hyperglycemia admitted with chest pain, a1c is stable 5.8%, was  on home Mounjaro injections at home s/p surgery, eating meals, on low dose insulin, sugars improving.  CAD: now s/p CABG, on medications, monitored.  HTN: On antihypertensive medications, monitored, asymptomatic.        Discussed plan and management with Dr Stacy Jimenez NP - TEAMS  Gena Kumar MD  Cell: [phone]  Office: [phone]  Assessment  DMT2: 63y Male with DM T2 with hyperglycemia admitted with chest pain, a1c is stable 5.8%, was  on home Mounjaro injections at home s/p surgery, eating meals, on low dose insulin,  sugars improving.  CAD: now s/p CABG, on medications, monitored.  HTN: On antihypertensive medications, monitored, asymptomatic.        Discussed plan and management with Dr Stacy Jimenez NP - TEAMS  Gena Kumar MD  Cell: [phone]  Office: [phone]

## 2024-05-12 NOTE — PROGRESS NOTE ADULT - PROBLEM SELECTOR PLAN 4
VTE ppx with Lovenox,  GI PPX:  PPI
VTE ppx with Lovenox
VTE ppx with Lovenox,  GI PPX:  PPI
VTE ppx with Lovenox,  GI PPX:  PPI

## 2024-05-12 NOTE — PROGRESS NOTE ADULT - PROBLEM SELECTOR PLAN 2
Endocrine: A1C= 6.0     Endocrine following  dm diet  Lantus 14 units HS  Admelog 4 units B4 meals   discharge recs: d/c home po metformin 1000 mg po bid

## 2024-05-12 NOTE — PROGRESS NOTE ADULT - ASSESSMENT
63M with PMH of HTN, HLD, CAD s/p PCI, DM2 (last A1c 6.0), LENIN on CPAP, Vertigo presents with chest pain on exertion and dyspnea       # CAD-Progressive Angina  Hx CAD s/p PCI-2023-  -Had cath-3/1/24--LCX: Patent OM1 stent, patent circumflex stent with 75% stenosis at distal edge of old stent s/p PCI-1 SHARON to LCx  -Presenting with HUDSON and angina  -Cath 5/2/2024-LM: 60% mid/distal stenosis.  LAD: 70% ostial stenosis LCX: Patent stent in mid.    -5/6-CABG-LIMA-LAD | SVG-OM  5/7-Extubated off pressors TX to Stepdown  5/8-POD# 2 Awake no dyspnea-Amio for AF prophylaxis,resumes plavix  5/9-POD# 3 Remains in sinus rhythm-  5/10-POD#4=on O2 continue diureses  5/11-On RA no dyspnea  5/12-Sinus rhythm Sischartge-Follow up Dr Diehl/Rosio as OP      # HTN  On Metoprolol 50mg q8    # T2DM  -A1c-6.0 -2023 now 5.9%  Endocrine evaluation noted  Patient was on Mounjaro at home  Resume Metformin 1000mg on discharge

## 2024-05-12 NOTE — PROGRESS NOTE ADULT - NS ATTEND AMEND GEN_ALL_CORE FT
Chart, labs, vitals, radiology reviewed. Above H&P reviewed and edited where appropriate. Agree with history and physical exam. Agree with assessment and plan. I reviewed the overnight course of events and discussed the care with the patient/ family. All the decisions in assessment and plan are made by me.

## 2024-05-13 ENCOUNTER — TRANSCRIPTION ENCOUNTER (OUTPATIENT)
Age: 64
End: 2024-05-13

## 2024-05-13 VITALS — WEIGHT: 265.22 LBS

## 2024-05-13 LAB
ANION GAP SERPL CALC-SCNC: 14 MMOL/L — SIGNIFICANT CHANGE UP (ref 5–17)
BUN SERPL-MCNC: 15 MG/DL — SIGNIFICANT CHANGE UP (ref 7–23)
CALCIUM SERPL-MCNC: 9 MG/DL — SIGNIFICANT CHANGE UP (ref 8.4–10.5)
CHLORIDE SERPL-SCNC: 99 MMOL/L — SIGNIFICANT CHANGE UP (ref 96–108)
CO2 SERPL-SCNC: 24 MMOL/L — SIGNIFICANT CHANGE UP (ref 22–31)
CREAT SERPL-MCNC: 0.73 MG/DL — SIGNIFICANT CHANGE UP (ref 0.5–1.3)
EGFR: 104 ML/MIN/1.73M2 — SIGNIFICANT CHANGE UP
GLUCOSE BLDC GLUCOMTR-MCNC: 157 MG/DL — HIGH (ref 70–99)
GLUCOSE SERPL-MCNC: 128 MG/DL — HIGH (ref 70–99)
HCT VFR BLD CALC: 33.1 % — LOW (ref 39–50)
HGB BLD-MCNC: 11 G/DL — LOW (ref 13–17)
MAGNESIUM SERPL-MCNC: 2 MG/DL — SIGNIFICANT CHANGE UP (ref 1.6–2.6)
MCHC RBC-ENTMCNC: 28.1 PG — SIGNIFICANT CHANGE UP (ref 27–34)
MCHC RBC-ENTMCNC: 33.2 GM/DL — SIGNIFICANT CHANGE UP (ref 32–36)
MCV RBC AUTO: 84.7 FL — SIGNIFICANT CHANGE UP (ref 80–100)
NRBC # BLD: 0 /100 WBCS — SIGNIFICANT CHANGE UP (ref 0–0)
PHOSPHATE SERPL-MCNC: 3.6 MG/DL — SIGNIFICANT CHANGE UP (ref 2.5–4.5)
PLATELET # BLD AUTO: 273 K/UL — SIGNIFICANT CHANGE UP (ref 150–400)
POTASSIUM SERPL-MCNC: 3.8 MMOL/L — SIGNIFICANT CHANGE UP (ref 3.5–5.3)
POTASSIUM SERPL-SCNC: 3.8 MMOL/L — SIGNIFICANT CHANGE UP (ref 3.5–5.3)
RBC # BLD: 3.91 M/UL — LOW (ref 4.2–5.8)
RBC # FLD: 14 % — SIGNIFICANT CHANGE UP (ref 10.3–14.5)
SODIUM SERPL-SCNC: 137 MMOL/L — SIGNIFICANT CHANGE UP (ref 135–145)
WBC # BLD: 10.28 K/UL — SIGNIFICANT CHANGE UP (ref 3.8–10.5)
WBC # FLD AUTO: 10.28 K/UL — SIGNIFICANT CHANGE UP (ref 3.8–10.5)

## 2024-05-13 PROCEDURE — P9100: CPT

## 2024-05-13 PROCEDURE — 82947 ASSAY GLUCOSE BLOOD QUANT: CPT

## 2024-05-13 PROCEDURE — 36415 COLL VENOUS BLD VENIPUNCTURE: CPT

## 2024-05-13 PROCEDURE — 86923 COMPATIBILITY TEST ELECTRIC: CPT

## 2024-05-13 PROCEDURE — 81003 URINALYSIS AUTO W/O SCOPE: CPT

## 2024-05-13 PROCEDURE — 97166 OT EVAL MOD COMPLEX 45 MIN: CPT

## 2024-05-13 PROCEDURE — 84436 ASSAY OF TOTAL THYROXINE: CPT

## 2024-05-13 PROCEDURE — 86850 RBC ANTIBODY SCREEN: CPT

## 2024-05-13 PROCEDURE — 94010 BREATHING CAPACITY TEST: CPT

## 2024-05-13 PROCEDURE — 97162 PT EVAL MOD COMPLEX 30 MIN: CPT

## 2024-05-13 PROCEDURE — 93005 ELECTROCARDIOGRAM TRACING: CPT

## 2024-05-13 PROCEDURE — 87641 MR-STAPH DNA AMP PROBE: CPT

## 2024-05-13 PROCEDURE — C1889: CPT

## 2024-05-13 PROCEDURE — 80053 COMPREHEN METABOLIC PANEL: CPT

## 2024-05-13 PROCEDURE — 83880 ASSAY OF NATRIURETIC PEPTIDE: CPT

## 2024-05-13 PROCEDURE — C1729: CPT

## 2024-05-13 PROCEDURE — 86901 BLOOD TYPING SEROLOGIC RH(D): CPT

## 2024-05-13 PROCEDURE — 93454 CORONARY ARTERY ANGIO S&I: CPT

## 2024-05-13 PROCEDURE — 71045 X-RAY EXAM CHEST 1 VIEW: CPT

## 2024-05-13 PROCEDURE — 82435 ASSAY OF BLOOD CHLORIDE: CPT

## 2024-05-13 PROCEDURE — 87637 SARSCOV2&INF A&B&RSV AMP PRB: CPT

## 2024-05-13 PROCEDURE — 97530 THERAPEUTIC ACTIVITIES: CPT

## 2024-05-13 PROCEDURE — 85018 HEMOGLOBIN: CPT

## 2024-05-13 PROCEDURE — 86803 HEPATITIS C AB TEST: CPT

## 2024-05-13 PROCEDURE — 93010 ELECTROCARDIOGRAM REPORT: CPT

## 2024-05-13 PROCEDURE — 82565 ASSAY OF CREATININE: CPT

## 2024-05-13 PROCEDURE — 82803 BLOOD GASES ANY COMBINATION: CPT

## 2024-05-13 PROCEDURE — 71046 X-RAY EXAM CHEST 2 VIEWS: CPT

## 2024-05-13 PROCEDURE — 92978 ENDOLUMINL IVUS OCT C 1ST: CPT | Mod: LM

## 2024-05-13 PROCEDURE — 87640 STAPH A DNA AMP PROBE: CPT

## 2024-05-13 PROCEDURE — 83036 HEMOGLOBIN GLYCOSYLATED A1C: CPT

## 2024-05-13 PROCEDURE — 93320 DOPPLER ECHO COMPLETE: CPT

## 2024-05-13 PROCEDURE — 84443 ASSAY THYROID STIM HORMONE: CPT

## 2024-05-13 PROCEDURE — 82553 CREATINE MB FRACTION: CPT

## 2024-05-13 PROCEDURE — 94002 VENT MGMT INPAT INIT DAY: CPT

## 2024-05-13 PROCEDURE — 85025 COMPLETE CBC W/AUTO DIFF WBC: CPT

## 2024-05-13 PROCEDURE — P9045: CPT

## 2024-05-13 PROCEDURE — 93325 DOPPLER ECHO COLOR FLOW MAPG: CPT

## 2024-05-13 PROCEDURE — 85610 PROTHROMBIN TIME: CPT

## 2024-05-13 PROCEDURE — 85520 HEPARIN ASSAY: CPT

## 2024-05-13 PROCEDURE — 85396 CLOTTING ASSAY WHOLE BLOOD: CPT

## 2024-05-13 PROCEDURE — C1753: CPT

## 2024-05-13 PROCEDURE — C1751: CPT

## 2024-05-13 PROCEDURE — 84480 ASSAY TRIIODOTHYRONINE (T3): CPT

## 2024-05-13 PROCEDURE — 82330 ASSAY OF CALCIUM: CPT

## 2024-05-13 PROCEDURE — 84132 ASSAY OF SERUM POTASSIUM: CPT

## 2024-05-13 PROCEDURE — 85730 THROMBOPLASTIN TIME PARTIAL: CPT

## 2024-05-13 PROCEDURE — 96374 THER/PROPH/DIAG INJ IV PUSH: CPT

## 2024-05-13 PROCEDURE — C1894: CPT

## 2024-05-13 PROCEDURE — C1887: CPT

## 2024-05-13 PROCEDURE — 99285 EMERGENCY DEPT VISIT HI MDM: CPT

## 2024-05-13 PROCEDURE — 85027 COMPLETE CBC AUTOMATED: CPT

## 2024-05-13 PROCEDURE — 80048 BASIC METABOLIC PNL TOTAL CA: CPT

## 2024-05-13 PROCEDURE — 83605 ASSAY OF LACTIC ACID: CPT

## 2024-05-13 PROCEDURE — 83735 ASSAY OF MAGNESIUM: CPT

## 2024-05-13 PROCEDURE — C8929: CPT

## 2024-05-13 PROCEDURE — 93306 TTE W/DOPPLER COMPLETE: CPT

## 2024-05-13 PROCEDURE — 84484 ASSAY OF TROPONIN QUANT: CPT

## 2024-05-13 PROCEDURE — 84295 ASSAY OF SERUM SODIUM: CPT

## 2024-05-13 PROCEDURE — 82550 ASSAY OF CK (CPK): CPT

## 2024-05-13 PROCEDURE — 86891 AUTOLOGOUS BLOOD OP SALVAGE: CPT

## 2024-05-13 PROCEDURE — 85384 FIBRINOGEN ACTIVITY: CPT

## 2024-05-13 PROCEDURE — 97116 GAIT TRAINING THERAPY: CPT

## 2024-05-13 PROCEDURE — 86900 BLOOD TYPING SEROLOGIC ABO: CPT

## 2024-05-13 PROCEDURE — 85014 HEMATOCRIT: CPT

## 2024-05-13 PROCEDURE — C1769: CPT

## 2024-05-13 PROCEDURE — 85576 BLOOD PLATELET AGGREGATION: CPT

## 2024-05-13 PROCEDURE — P9037: CPT

## 2024-05-13 PROCEDURE — 84100 ASSAY OF PHOSPHORUS: CPT

## 2024-05-13 PROCEDURE — 82962 GLUCOSE BLOOD TEST: CPT

## 2024-05-13 RX ORDER — LOSARTAN POTASSIUM 100 MG/1
1 TABLET, FILM COATED ORAL
Refills: 0 | DISCHARGE

## 2024-05-13 RX ORDER — CLOPIDOGREL BISULFATE 75 MG/1
1 TABLET, FILM COATED ORAL
Qty: 30 | Refills: 0
Start: 2024-05-13 | End: 2024-06-11

## 2024-05-13 RX ORDER — FUROSEMIDE 40 MG
1 TABLET ORAL
Qty: 30 | Refills: 0
Start: 2024-05-13 | End: 2024-06-11

## 2024-05-13 RX ORDER — SPIRONOLACTONE 25 MG/1
1 TABLET, FILM COATED ORAL
Qty: 30 | Refills: 0
Start: 2024-05-13 | End: 2024-06-11

## 2024-05-13 RX ORDER — SENNA PLUS 8.6 MG/1
2 TABLET ORAL
Qty: 0 | Refills: 0 | DISCHARGE
Start: 2024-05-13

## 2024-05-13 RX ORDER — HYDROMORPHONE HYDROCHLORIDE 2 MG/ML
1 INJECTION INTRAMUSCULAR; INTRAVENOUS; SUBCUTANEOUS
Qty: 28 | Refills: 0
Start: 2024-05-13 | End: 2024-05-19

## 2024-05-13 RX ORDER — METOPROLOL TARTRATE 50 MG
3 TABLET ORAL
Qty: 90 | Refills: 0
Start: 2024-05-13 | End: 2024-06-11

## 2024-05-13 RX ORDER — TIRZEPATIDE 15 MG/.5ML
10 INJECTION, SOLUTION SUBCUTANEOUS
Qty: 0 | Refills: 0 | DISCHARGE

## 2024-05-13 RX ORDER — INSULIN GLARGINE 100 [IU]/ML
16 INJECTION, SOLUTION SUBCUTANEOUS AT BEDTIME
Refills: 0 | Status: DISCONTINUED | OUTPATIENT
Start: 2024-05-13 | End: 2024-05-13

## 2024-05-13 RX ORDER — ASPIRIN/CALCIUM CARB/MAGNESIUM 324 MG
1 TABLET ORAL
Qty: 30 | Refills: 0
Start: 2024-05-13 | End: 2024-06-11

## 2024-05-13 RX ORDER — ASPIRIN/CALCIUM CARB/MAGNESIUM 324 MG
1 TABLET ORAL
Qty: 0 | Refills: 0 | DISCHARGE

## 2024-05-13 RX ORDER — POLYETHYLENE GLYCOL 3350 17 G/17G
17 POWDER, FOR SOLUTION ORAL
Qty: 0 | Refills: 0 | DISCHARGE
Start: 2024-05-13

## 2024-05-13 RX ORDER — INSULIN LISPRO 100/ML
6 VIAL (ML) SUBCUTANEOUS
Refills: 0 | Status: DISCONTINUED | OUTPATIENT
Start: 2024-05-13 | End: 2024-05-13

## 2024-05-13 RX ORDER — POTASSIUM BICARBONATE 978 MG/1
25 TABLET, EFFERVESCENT ORAL ONCE
Refills: 0 | Status: COMPLETED | OUTPATIENT
Start: 2024-05-13 | End: 2024-05-13

## 2024-05-13 RX ORDER — METFORMIN HYDROCHLORIDE 850 MG/1
1 TABLET ORAL
Qty: 60 | Refills: 0
Start: 2024-05-13 | End: 2024-06-11

## 2024-05-13 RX ORDER — AMLODIPINE BESYLATE 2.5 MG/1
1 TABLET ORAL
Qty: 30 | Refills: 0
Start: 2024-05-13 | End: 2024-06-11

## 2024-05-13 RX ORDER — PANTOPRAZOLE SODIUM 20 MG/1
1 TABLET, DELAYED RELEASE ORAL
Qty: 30 | Refills: 0
Start: 2024-05-13 | End: 2024-06-11

## 2024-05-13 RX ORDER — ATORVASTATIN CALCIUM 80 MG/1
1 TABLET, FILM COATED ORAL
Qty: 30 | Refills: 0
Start: 2024-05-13 | End: 2024-06-11

## 2024-05-13 RX ADMIN — POTASSIUM BICARBONATE 25 MILLIEQUIVALENT(S): 978 TABLET, EFFERVESCENT ORAL at 09:23

## 2024-05-13 RX ADMIN — AMLODIPINE BESYLATE 10 MILLIGRAM(S): 2.5 TABLET ORAL at 05:10

## 2024-05-13 RX ADMIN — Medication 81 MILLIGRAM(S): at 11:30

## 2024-05-13 RX ADMIN — CLOPIDOGREL BISULFATE 75 MILLIGRAM(S): 75 TABLET, FILM COATED ORAL at 11:30

## 2024-05-13 RX ADMIN — SPIRONOLACTONE 25 MILLIGRAM(S): 25 TABLET, FILM COATED ORAL at 05:10

## 2024-05-13 RX ADMIN — Medication 50 MILLIGRAM(S): at 05:10

## 2024-05-13 RX ADMIN — Medication 2: at 08:31

## 2024-05-13 RX ADMIN — PANTOPRAZOLE SODIUM 40 MILLIGRAM(S): 20 TABLET, DELAYED RELEASE ORAL at 05:10

## 2024-05-13 RX ADMIN — SPIRONOLACTONE 25 MILLIGRAM(S): 25 TABLET, FILM COATED ORAL at 05:48

## 2024-05-13 RX ADMIN — Medication 40 MILLIGRAM(S): at 05:10

## 2024-05-13 RX ADMIN — LIDOCAINE 1 PATCH: 4 CREAM TOPICAL at 04:57

## 2024-05-13 RX ADMIN — POLYETHYLENE GLYCOL 3350 17 GRAM(S): 17 POWDER, FOR SOLUTION ORAL at 11:32

## 2024-05-13 NOTE — DIETITIAN INITIAL EVALUATION ADULT - PERTINENT MEDS FT
MEDICATIONS  (STANDING):  amLODIPine   Tablet 10 milliGRAM(s) Oral daily  aspirin enteric coated 81 milliGRAM(s) Oral daily  atorvastatin 80 milliGRAM(s) Oral at bedtime  bisacodyl Suppository 10 milliGRAM(s) Rectal once  chlorhexidine 2% Cloths 1 Application(s) Topical daily  clopidogrel Tablet 75 milliGRAM(s) Oral daily  dextrose 5%. 1000 milliLiter(s) (50 mL/Hr) IV Continuous <Continuous>  dextrose 50% Injectable 50 milliLiter(s) IV Push every 15 minutes  dextrose 50% Injectable 25 milliLiter(s) IV Push every 15 minutes  enoxaparin Injectable 40 milliGRAM(s) SubCutaneous every 24 hours  furosemide    Tablet 40 milliGRAM(s) Oral daily  glucagon  Injectable 1 milliGRAM(s) IntraMuscular once  insulin glargine Injectable (LANTUS) 16 Unit(s) SubCutaneous at bedtime  insulin lispro (ADMELOG) corrective regimen sliding scale   SubCutaneous three times a day before meals  insulin lispro (ADMELOG) corrective regimen sliding scale   SubCutaneous at bedtime  insulin lispro Injectable (ADMELOG) 6 Unit(s) SubCutaneous three times a day before meals  lidocaine   4% Patch 1 Patch Transdermal every 24 hours  metoprolol tartrate 50 milliGRAM(s) Oral every 8 hours  pantoprazole    Tablet 40 milliGRAM(s) Oral before breakfast  polyethylene glycol 3350 17 Gram(s) Oral daily  senna 2 Tablet(s) Oral at bedtime  sodium chloride 0.9%. 1000 milliLiter(s) (10 mL/Hr) IV Continuous <Continuous>  spironolactone 25 milliGRAM(s) Oral two times a day  spironolactone 25 milliGRAM(s) Oral daily    MEDICATIONS  (PRN):  dextrose Oral Gel 15 Gram(s) Oral once PRN Blood Glucose LESS THAN 70 milliGRAM(s)/deciliter  HYDROmorphone   Tablet 2 milliGRAM(s) Oral every 3 hours PRN Moderate Pain (4 - 6)  HYDROmorphone   Tablet 4 milliGRAM(s) Oral every 4 hours PRN Severe Pain (7 - 10)

## 2024-05-13 NOTE — DIETITIAN INITIAL EVALUATION ADULT - ADD RECOMMEND
1) Will continue to monitor PO intake, weight, labs, skin, GI status and diet 2) RD to add No Sugar Added Mighty Shake supplement daily to aid in meeting nutrient needs  1) Will continue to monitor PO intake, weight, labs, skin, GI status and diet 2) RD to add No Sugar Added Mighty Shake supplement daily to aid in meeting nutrient needs 3) nutrition risk placed in chart

## 2024-05-13 NOTE — PROGRESS NOTE ADULT - PROBLEM SELECTOR PROBLEM 1
Type 2 diabetes mellitus
Type 2 diabetes mellitus
S/P CABG x 2
Type 2 diabetes mellitus
S/P CABG x 2
S/P CABG x 2
Type 2 diabetes mellitus
S/P CABG x 2
Type 2 diabetes mellitus
Type 2 diabetes mellitus
S/P CABG x 2

## 2024-05-13 NOTE — DIETITIAN INITIAL EVALUATION ADULT - PERSON TAUGHT/METHOD
Emphasized importance of adequate lean protein intake and optimal blood glucose levels for wound healing. Advised pt to limit concentrated sweets, portion control, and importance of fiber intake. Also discussed importance of limiting sodium intake as well as heart healthy food options. Provided pt with the following packet: Consistent Carbohydrate Heart Healthy Nutrition Therapy.   Pt made aware RD to remain available./verbal instruction/written material/patient instructed

## 2024-05-13 NOTE — DISCHARGE NOTE PROVIDER - NSDCMRMEDTOKEN_GEN_ALL_CORE_FT
atorvastatin 40 mg oral tablet: 1 tab(s) orally once a day (at bedtime)  clopidogrel 75 mg oral tablet: 1 tab(s) orally once a day  Ecotrin Adult Low Strength 81 mg oral delayed release tablet: 1 tab(s) orally once a day  losartan 100 mg oral tablet: 1 tab(s) orally once a day  Mounjaro 10 mg/0.5 mL subcutaneous solution: 10 milligram(s) subcutaneously once a week Every Mondays  Vitamin D3 5000 intl units oral capsule: 1 cap(s) orally once a day   amLODIPine 10 mg oral tablet: 1 tab(s) orally once a day  aspirin 81 mg oral delayed release tablet: 1 tab(s) orally once a day  atorvastatin 80 mg oral tablet: 1 tab(s) orally once a day (at bedtime)  clopidogrel 75 mg oral tablet: 1 tab(s) orally once a day  Dilaudid 2 mg oral tablet: 1 tab(s) orally every 6 hours as needed for  moderate pain MDD: 4  doxycycline monohydrate 100 mg oral tablet: 1 tab(s) orally 2 times a day take for 7 days then stop  furosemide 40 mg oral tablet: 1 tab(s) orally once a day  metFORMIN 1000 mg oral tablet: 1 tab(s) orally 2 times a day  Mounjaro 10 mg/0.5 mL subcutaneous solution: 10 milligram(s) subcutaneously once a week Every Mondays- resume in 2 weeks  pantoprazole 40 mg oral delayed release tablet: 1 tab(s) orally once a day (before a meal)  polyethylene glycol 3350 oral powder for reconstitution: 17 gram(s) orally once a day  senna leaf extract oral tablet: 2 tab(s) orally once a day (at bedtime)  spironolactone 25 mg oral tablet: 1 tab(s) orally once a day  Toprol-XL 50 mg oral tablet, extended release: 3 tab(s) orally once a day take at 6 pm daily  - start tonight 5/13  Vitamin D3 5000 intl units oral capsule: 1 cap(s) orally once a day

## 2024-05-13 NOTE — DISCHARGE NOTE PROVIDER - NSDCPNSUBOBJ_GEN_ALL_CORE
PHYSICAL EXAM    Subjective: "I want to leave tomorrow."   Neurology: alert and oriented x 3, nonfocal, no gross deficits  CV : tele:  RSR   Sternal Wound :  CDI FERNANDO- sternum stable   Lungs: clear. RR easy, unlabored   Abdomen: soft, nontender, nondistended, positive bowel sounds, + bowel movement; Neg N/V/D; obese abdomen   :  pt voiding without difficulty   Extremities:   HEIN; trace LE edema, neg calf tenderness.   PPP bilaterally; rt svg site cdi fernando       PW: no  Chest tubes: none           PHYSICAL EXAM    Subjective: "I can go home today?"   Neurology: alert and oriented x 3, nonfocal, no gross deficits  CV : tele:  RSR   Sternal Wound :  CDI FERNANDO- sternum stable   Lungs: clear. RR easy, unlabored   Abdomen: soft, nontender, nondistended, positive bowel sounds, + bowel movement; Neg N/V/D; obese abdomen   :  pt voiding without difficulty   Extremities:   HEIN; trace LE edema, neg calf tenderness.   PPP bilaterally; rt svg site cdi fernando - minimal erythema noted     PW: no  Chest tubes: none      discharge pt home today 5/13 as per DR. Hannah with po doxy for 1 week

## 2024-05-13 NOTE — DISCHARGE NOTE PROVIDER - NSDCFUADDINST_GEN_ALL_CORE_FT
call Dr. Hannah for fever > 101  refer to cardiac surgery do and don't checklist  no driving until cleared by Dr. Hannah  check blood sugar levels before meals and at bedtime- record levels

## 2024-05-13 NOTE — PROGRESS NOTE ADULT - PROBLEM SELECTOR PLAN 1
Will continue current insulin regimen for now.   Will continue monitoring  blood sugars, will Follow up.  Patient counseled for compliance with consistent low carb diet.  Being DC today  DC on home regimen  FU outpatient

## 2024-05-13 NOTE — DISCHARGE NOTE PROVIDER - CARE PROVIDER_API CALL
Felix Hannah  Thoracic and Cardiac Surgery  [address] [place] [address]  Phone: [phone]  Fax: [phone]  Follow Up Time:     Wilfred Major  Cardiology  [address] [place] [address]  Phone: [phone]  Fax: [phone]  Follow Up Time:    Felix Hannah  Thoracic and Cardiac Surgery  [address] [place] [address]  Phone: [phone]  Fax: [phone]  Follow Up Time:     Wilfred Major  Cardiology  [address] [place] [address]  Phone: [phone]  Fax: [phone]  Follow Up Time:     Gena Kumar)  Endocrinology/Metab/Diabetes  [address] [place] [address]  Phone: [phone]  Fax: [phone]  Follow Up Time:

## 2024-05-13 NOTE — PROGRESS NOTE ADULT - PROVIDER SPECIALTY LIST ADULT
Cardiology
Cardiology
Internal Medicine
Cardiology
Critical Care
Internal Medicine
Internal Medicine
Cardiology
Cardiology
Endocrinology
Cardiology
Critical Care
Endocrinology
Cardiology
Endocrinology
CT Surgery
Endocrinology
Cardiology
CT Surgery

## 2024-05-13 NOTE — PROGRESS NOTE ADULT - ASSESSMENT
Assessment  DMT2: 63y Male with DM T2 with hyperglycemia admitted with chest pain, a1c is stable 5.8%, was  on home Mounjaro injections at home s/p surgery, eating meals, on low dose insulin,  sugars improving.  Being discharged today  CAD: now s/p CABG, on medications, monitored.  HTN: On antihypertensive medications, monitored, asymptomatic.        Discussed plan and management with Dr Stacy Jimenez NP - TEAMS  Gena Kumar MD  Cell: [phone]  Office: [phone]

## 2024-05-13 NOTE — DISCHARGE NOTE PROVIDER - PROVIDER TOKENS
PROVIDER:[TOKEN:[[id number]]],PROVIDER:[TOKEN:[[id number]]] PROVIDER:[TOKEN:[[id number]]],PROVIDER:[TOKEN:[[id number]]],PROVIDER:[TOKEN:[[id number]]]

## 2024-05-13 NOTE — DISCHARGE NOTE PROVIDER - NSDCFUSCHEDAPPT_GEN_ALL_CORE_FT
Steve Manning  [facility]  ORTHOSURG [address] [facility]  Scheduled Appointment: 06/03/2024

## 2024-05-13 NOTE — PROGRESS NOTE ADULT - PROBLEM SELECTOR PROBLEM 2
CAD (coronary artery disease)
CAD (coronary artery disease)
Type 2 diabetes mellitus
Type 2 diabetes mellitus
CAD (coronary artery disease)
Type 2 diabetes mellitus
Type 2 diabetes mellitus
CAD (coronary artery disease)
CAD (coronary artery disease)
Type 2 diabetes mellitus
CAD (coronary artery disease)
Type 2 diabetes mellitus
Type 2 diabetes mellitus

## 2024-05-13 NOTE — DISCHARGE NOTE PROVIDER - HOSPITAL COURSE
63M with PMH of HTN, HLD, CAD s/p PCI, DM2 (last A1c 6.0), LENIN on CPAP, Vertigo. Recent stent to Lcx in March of 2024 discharged on DAPT. Presents to [facility] with chest pain 7/10 on exertion for the past week with associated dyspnea. The patient reports the chest pain does not radiate anywhere and gets better with rest. Diagnostic  Cath revealing LM: 60% mid/distal stenosis and LAD: 70% ostial stenosis with LCX: Patent stent. CT surgery consulted for CABG evaluation.   5/6  C-2L  LIMA-LAD | SVG-OM  ef nml  Insulin infusion  5/7  sdu  5/8  remove CT's, a line  intro ji  May transfer to floor  5/9  d/c pw, diuresis, tx to floor  5/10 remains on NC, continue to diurese encourage IS. Tx to MB  5/11 VSS; RSR ; continue diuresis; supplement hypokalemia  5/12 VSS, Serum Potassium 3.6 --> k-lyte x 2 given. Continue diuresis. WBC 12 from 10.  Right SVG site, warm and erythematic --> 1 g vanco ordered.   Disposition: home - d/c on po metformin 1000 po bid as per endo for discharge        63M with PMH of HTN, HLD, CAD s/p PCI, DM2 (last A1c 6.0), LENIN on CPAP, Vertigo. Recent stent to Lcx in March of 2024 discharged on DAPT. Presents to [facility] with chest pain 7/10 on exertion for the past week with associated dyspnea. The patient reports the chest pain does not radiate anywhere and gets better with rest. Diagnostic  Cath revealing LM: 60% mid/distal stenosis and LAD: 70% ostial stenosis with LCX: Patent stent. CT surgery consulted for CABG evaluation.   5/6  C-2L  LIMA-LAD | SVG-OM  ef nml  Insulin infusion  5/7  sdu  5/8  remove CT's, a line  intro ji  May transfer to floor  5/9  d/c pw, diuresis, tx to floor  5/10 remains on NC, continue to diurese encourage IS. Tx to MB  5/11 VSS; RSR ; continue diuresis; supplement hypokalemia  5/12 VSS, Serum Potassium 3.6 --> k-lyte x 2 given. Continue diuresis. WBC 12 from 10.  Right SVG site, warm and erythematic --> 1 g vanco ordered.   5/13 VSS; RSR 60-80; CONTINUE DOXY as per DR. Hannah for rt svg erythema; pt afebrile; wbc decreased to 10 today   Disposition: home - d/c on po metformin 1000 po bid as per endo for discharge   discharge pt home today 5/13 as per DR. Hannah

## 2024-05-13 NOTE — DIETITIAN INITIAL EVALUATION ADULT - ORAL INTAKE PTA/DIET HISTORY
visited pt at bedside this morning. reports good PO intake PTA. confirms PMHx of DM2. sometimes checked blood glucose PTA. has changed eating habits to be healthier, less pork and red meat. less sodium. Mounjaro noted in outpatient medications.

## 2024-05-13 NOTE — DIETITIAN INITIAL EVALUATION ADULT - PERTINENT LABORATORY DATA
05-13    137  |  99  |  15  ----------------------------<  128<H>  3.8   |  24  |  0.73    Ca    9.0      13 May 2024 07:14  Phos  3.6     05-13  Mg     2.0     05-13    POCT Blood Glucose.: 157 mg/dL (05-13-24 @ 08:27)  A1C with Estimated Average Glucose Result: 5.8 % (05-05-24 @ 06:32)  A1C with Estimated Average Glucose Result: 5.9 % (05-04-24 @ 04:43)

## 2024-05-13 NOTE — PROGRESS NOTE ADULT - SUBJECTIVE AND OBJECTIVE BOX
Chief complaint  Patient is a 63y old  Male who presents with a chief complaint of Chest pain     (13 May 2024 11:05)         Labs and Fingersticks  CAPILLARY BLOOD GLUCOSE      POCT Blood Glucose.: 157 mg/dL (13 May 2024 08:27)  POCT Blood Glucose.: 204 mg/dL (12 May 2024 21:18)  POCT Blood Glucose.: 161 mg/dL (12 May 2024 17:02)      Anion Gap: 14 (05-13 @ 07:14)  Anion Gap: 17 (05-12 @ 07:11)      Calcium: 9.0 (05-13 @ 07:14)  Calcium: 9.1 (05-12 @ 07:11)          05-13    137  |  99  |  15  ----------------------------<  128<H>  3.8   |  24  |  0.73    Ca    9.0      13 May 2024 07:14  Phos  3.6     05-13  Mg     2.0     05-13                          11.0   10.28 )-----------( 273      ( 13 May 2024 07:14 )             33.1     Medications  MEDICATIONS  (STANDING):  amLODIPine   Tablet 10 milliGRAM(s) Oral daily  aspirin enteric coated 81 milliGRAM(s) Oral daily  atorvastatin 80 milliGRAM(s) Oral at bedtime  bisacodyl Suppository 10 milliGRAM(s) Rectal once  chlorhexidine 2% Cloths 1 Application(s) Topical daily  clopidogrel Tablet 75 milliGRAM(s) Oral daily  dextrose 5%. 1000 milliLiter(s) (50 mL/Hr) IV Continuous <Continuous>  dextrose 50% Injectable 50 milliLiter(s) IV Push every 15 minutes  dextrose 50% Injectable 25 milliLiter(s) IV Push every 15 minutes  enoxaparin Injectable 40 milliGRAM(s) SubCutaneous every 24 hours  furosemide    Tablet 40 milliGRAM(s) Oral daily  glucagon  Injectable 1 milliGRAM(s) IntraMuscular once  insulin glargine Injectable (LANTUS) 16 Unit(s) SubCutaneous at bedtime  insulin lispro (ADMELOG) corrective regimen sliding scale   SubCutaneous three times a day before meals  insulin lispro (ADMELOG) corrective regimen sliding scale   SubCutaneous at bedtime  insulin lispro Injectable (ADMELOG) 6 Unit(s) SubCutaneous three times a day before meals  lidocaine   4% Patch 1 Patch Transdermal every 24 hours  metoprolol tartrate 50 milliGRAM(s) Oral every 8 hours  pantoprazole    Tablet 40 milliGRAM(s) Oral before breakfast  polyethylene glycol 3350 17 Gram(s) Oral daily  senna 2 Tablet(s) Oral at bedtime  sodium chloride 0.9%. 1000 milliLiter(s) (10 mL/Hr) IV Continuous <Continuous>  spironolactone 25 milliGRAM(s) Oral daily  spironolactone 25 milliGRAM(s) Oral two times a day      Physical Exam  General: Patient comfortable in bed   Vital Signs Last 12 Hrs  T(F): 98.4 (05-13-24 @ 04:25), Max: 98.4 (05-13-24 @ 04:25)  HR: 83 (05-13-24 @ 04:25) (83 - 83)  BP: 112/63 (05-13-24 @ 04:25) (112/63 - 112/63)  BP(mean): --  RR: 18 (05-13-24 @ 04:25) (18 - 18)  SpO2: 93% (05-13-24 @ 04:25) (93% - 93%)    CVS: S1S2   Respiratory: No wheezing, no crepitations  GI: Abdomen soft, bowel sounds positive  Musculoskeletal:  moves all extremities  : Voiding

## 2024-05-13 NOTE — PROGRESS NOTE ADULT - REASON FOR ADMISSION
Chest Pain
CHEST PAIN,  CABG SURGERY
Chest Pain

## 2024-05-13 NOTE — DISCHARGE NOTE PROVIDER - NSDCCPCAREPLAN_GEN_ALL_CORE_FT
PRINCIPAL DISCHARGE DIAGNOSIS  Diagnosis: S/P CABG x 2  Assessment and Plan of Treatment: shower daily  weigh yourself daily  continue current prescriptions as ordered  increase activity as tolerated   no added salt; low fat; low cholesterol, low salt diet.   follow up with Cardiologist in 1-2 weeks. Call to schedule appointment.  follow up with cardiac surgeon       PRINCIPAL DISCHARGE DIAGNOSIS  Diagnosis: S/P CABG x 2  Assessment and Plan of Treatment: shower daily  weigh yourself daily  continue current prescriptions as ordered  increase activity as tolerated   no added salt; low fat; low cholesterol, low salt diabetic diet  check blood sugar levels before meals and at bedtime- record levels   follow up with Cardiologist in 1-2 weeks. Call to schedule appointment.  follow up with cardiac surgeon, Dr. Hannah, on May 23rd at 1:00 pm; Call to confirm appointment. [phone]  follow up with endocrinologist in 1-2 weeks for diabetic management; call to schedule appointment.

## 2024-05-13 NOTE — DISCHARGE NOTE PROVIDER - CARE PROVIDERS DIRECT ADDRESSES
[email or web address],DirectAddress_Unknown [email or web address],DirectAddress_Unknown,DirectAddress_Unknown

## 2024-05-14 ENCOUNTER — NON-APPOINTMENT (OUTPATIENT)
Age: 64
End: 2024-05-14

## 2024-05-14 RX ORDER — CLOPIDOGREL 75 MG/1
75 TABLET, FILM COATED ORAL DAILY
Refills: 0 | Status: ACTIVE | COMMUNITY

## 2024-05-14 RX ORDER — METFORMIN HYDROCHLORIDE 500 MG/1
500 TABLET, COATED ORAL
Refills: 0 | Status: DISCONTINUED | COMMUNITY
End: 2024-05-14

## 2024-05-14 RX ORDER — POLYETHYLENE GLYCOL 3350
POWDER (GRAM) MISCELLANEOUS
Qty: 1 | Refills: 0 | Status: ACTIVE | COMMUNITY

## 2024-05-14 RX ORDER — MELATONIN 3 MG
TABLET ORAL DAILY
Refills: 0 | Status: ACTIVE | COMMUNITY

## 2024-05-14 RX ORDER — PANTOPRAZOLE SODIUM 40 MG/1
40 TABLET, DELAYED RELEASE ORAL DAILY
Qty: 30 | Refills: 0 | Status: ACTIVE | COMMUNITY

## 2024-05-14 RX ORDER — METFORMIN HYDROCHLORIDE 1000 MG/1
1000 TABLET, COATED ORAL TWICE DAILY
Qty: 60 | Refills: 0 | Status: ACTIVE | COMMUNITY

## 2024-05-14 RX ORDER — FUROSEMIDE 40 MG/1
40 TABLET ORAL DAILY
Refills: 0 | Status: ACTIVE | COMMUNITY

## 2024-05-14 RX ORDER — SENNOSIDES 8.6 MG/1
8.6 CAPSULE, GELATIN COATED ORAL
Refills: 0 | Status: ACTIVE | COMMUNITY

## 2024-05-14 RX ORDER — METOPROLOL SUCCINATE 50 MG/1
50 TABLET, EXTENDED RELEASE ORAL DAILY
Refills: 0 | Status: ACTIVE | COMMUNITY

## 2024-05-14 RX ORDER — LINAGLIPTIN 5 MG/1
5 TABLET, FILM COATED ORAL
Refills: 0 | Status: DISCONTINUED | COMMUNITY
End: 2024-05-14

## 2024-05-14 RX ORDER — AMLODIPINE BESYLATE 10 MG/1
10 TABLET ORAL DAILY
Refills: 0 | Status: ACTIVE | COMMUNITY

## 2024-05-14 RX ORDER — ATORVASTATIN CALCIUM 80 MG/1
80 TABLET, FILM COATED ORAL
Refills: 0 | Status: ACTIVE | COMMUNITY

## 2024-05-15 ENCOUNTER — APPOINTMENT (OUTPATIENT)
Dept: CARE COORDINATION | Facility: HOME HEALTH | Age: 64
End: 2024-05-15
Payer: COMMERCIAL

## 2024-05-15 VITALS
OXYGEN SATURATION: 97 % | RESPIRATION RATE: 16 BRPM | DIASTOLIC BLOOD PRESSURE: 70 MMHG | SYSTOLIC BLOOD PRESSURE: 110 MMHG | HEART RATE: 80 BPM | BODY MASS INDEX: 42.61 KG/M2 | WEIGHT: 264 LBS

## 2024-05-15 DIAGNOSIS — E11.9 TYPE 2 DIABETES MELLITUS W/OUT COMPLICATIONS: ICD-10-CM

## 2024-05-15 PROCEDURE — 99024 POSTOP FOLLOW-UP VISIT: CPT

## 2024-05-15 NOTE — PHYSICAL EXAM
[] : no respiratory distress [Exaggerated Use Of Accessory Muscles For Inspiration] : no accessory muscle use [Heart Sounds] : normal S1 and S2 [Chest Visual Inspection Thoracic Asymmetry] : no chest asymmetry [Bowel Sounds] : normal bowel sounds [Oriented To Time, Place, And Person] : oriented to person, place, and time [FreeTextEntry2] : B/L LE + 1 edema  [FreeTextEntry1] : RLE SVG site fernando and well approximated. No  drainage  noted. Mild erythema noted.

## 2024-05-15 NOTE — PLAN
[TextEntry] : Post Operative Care:  Pt advised of importance of daily weights. Pt instructed on how to use incentive spirometer every hour, demonstrated proper use. Pt encouraged to ambulate as much as tolerated, avoiding extreme temperatures outdoors. Also advised to cleanse incisions daily with mild soap and water and to avoid lotions, powders, ointments or creams near or on the incision. Low salt, low fat diet encouraged and discussed. Pt advised to avoid heavy lifting or straining.     [facility] team will continue to follow up with pt status.  NP/CCC roles explained with pt understanding, contact information provided. Pt agrees to call with any questions, issues or concerns.  Worsening symptoms reviewed with patient understanding.      FOLLOW UP APPOINTMENTS:  CTS: Dr. Hannah 5/23  CARDIOLOGIST: Dr. Willingham 5/22  Endo:  pt. will call to schedule appointment to be seen within 2 weeks   PCP: Pt encouraged to follow up within one month of discharge

## 2024-05-15 NOTE — HISTORY OF PRESENT ILLNESS
[FreeTextEntry1] : 63M with PMH of HTN, HLD, CAD s/p PCI, DM2 (last A1c 6.0), LENIN on CPAP,  Vertigo. Recent stent to Lcx in March of 2024 discharged on DAPT. Presents to  [facility] with chest pain 7/10 on exertion for the past week with associated  dyspnea. The patient reports the chest pain does not radiate anywhere and gets  better with rest. Diagnostic  Cath revealing LM: 60% mid/distal stenosis and  LAD: 70% ostial stenosis with LCX: Patent stent. CT surgery consulted for CABG  evaluation.  5/6  C-2L  LIMA-LAD SVG-OM  ef nml  Insulin infusion  5/7  sdu  5/8  remove CT's, a line  intro ji  May transfer to floor  5/9  d/c pw, diuresis, tx to floor  5/10 remains on NC, continue to diurese encourage IS. Tx to MB  5/11 VSS; RSR ; continue diuresis; supplement hypokalemia  5/12 VSS, Serum Potassium 3.6 --> k-lyte x 2 given. Continue diuresis. WBC 12  from 10.  Right SVG site, warm and erythematic --> 1 g vanco ordered.  5/13 VSS; RSR 60-80; CONTINUE DOXY as per DR. Hannah for rt svg erythema; pt  afebrile; wbc decreased to 10 today  Disposition: home - d/c on po metformin 1000 po bid as per endo for discharge  discharge pt home today 5/13 as per DR. Hannah 5/15 Initial visit completed at home  CC " I am doing ok"

## 2024-05-15 NOTE — REVIEW OF SYSTEMS
[SOB on Exertion] : shortness of breath during exertion [Negative] : Gastrointestinal [FreeTextEntry9] : Right shoulder pain

## 2024-05-15 NOTE — ASSESSMENT
[FreeTextEntry1] : Pt recovering well at home s/p OHS. Reviewed all medications and dosages with pt understanding. Pt has all medications in home and is taking as prescribed. Pain controlled with current medication regimen. Pt. reports ongoing right shoulder soreness since he was at the hospital. Advised to take pain meds PRN and apply warm compress PRN. pt. encouraged to keep good glycemic control and follow consistent carbohydrate diet.

## 2024-05-22 ENCOUNTER — APPOINTMENT (OUTPATIENT)
Dept: CARDIOTHORACIC SURGERY | Facility: CLINIC | Age: 64
End: 2024-05-22
Payer: COMMERCIAL

## 2024-05-22 VITALS
TEMPERATURE: 97.7 F | BODY MASS INDEX: 40.5 KG/M2 | DIASTOLIC BLOOD PRESSURE: 67 MMHG | WEIGHT: 252 LBS | OXYGEN SATURATION: 95 % | HEIGHT: 66 IN | RESPIRATION RATE: 14 BRPM | HEART RATE: 75 BPM | SYSTOLIC BLOOD PRESSURE: 108 MMHG

## 2024-05-22 DIAGNOSIS — Z09 ENCOUNTER FOR FOLLOW-UP EXAMINATION AFTER COMPLETED TREATMENT FOR CONDITIONS OTHER THAN MALIGNANT NEOPLASM: ICD-10-CM

## 2024-05-22 PROCEDURE — 99024 POSTOP FOLLOW-UP VISIT: CPT

## 2024-05-22 RX ORDER — DOXYCYCLINE HYCLATE 100 MG/1
100 CAPSULE ORAL
Refills: 0 | Status: COMPLETED | COMMUNITY
End: 2024-05-22

## 2024-05-22 RX ORDER — HYALURONATE SODIUM, STABILIZED 88 MG/4 ML
88 SYRINGE (ML) INTRAARTICULAR
Qty: 2 | Refills: 0 | Status: COMPLETED | COMMUNITY
Start: 2023-07-17 | End: 2024-05-22

## 2024-05-22 RX ORDER — HYALURONATE SODIUM, STABILIZED 88 MG/4 ML
88 SYRINGE (ML) INTRAARTICULAR
Qty: 2 | Refills: 0 | Status: COMPLETED | COMMUNITY
Start: 2024-02-06 | End: 2024-05-22

## 2024-05-22 RX ORDER — HYALURONATE SODIUM, STABILIZED 88 MG/4 ML
88 SYRINGE (ML) INTRAARTICULAR
Qty: 2 | Refills: 0 | Status: COMPLETED | COMMUNITY
Start: 2020-09-23 | End: 2024-05-22

## 2024-05-22 RX ORDER — HYALURONATE SODIUM, STABILIZED 88 MG/4 ML
88 SYRINGE (ML) INTRAARTICULAR
Qty: 2 | Refills: 0 | Status: COMPLETED | COMMUNITY
Start: 2023-12-18 | End: 2024-05-22

## 2024-05-22 RX ORDER — HYALURONATE SODIUM, STABILIZED 88 MG/4 ML
88 SYRINGE (ML) INTRAARTICULAR
Qty: 2 | Refills: 0 | Status: COMPLETED | COMMUNITY
Start: 2021-12-23 | End: 2024-05-22

## 2024-05-22 RX ORDER — HYALURONATE SODIUM, STABILIZED 88 MG/4 ML
88 SYRINGE (ML) INTRAARTICULAR
Qty: 2 | Refills: 0 | Status: COMPLETED | COMMUNITY
Start: 2022-09-28 | End: 2024-05-22

## 2024-05-22 RX ORDER — HYALURONATE SODIUM, STABILIZED 88 MG/4 ML
88 SYRINGE (ML) INTRAARTICULAR
Qty: 1 | Refills: 0 | Status: COMPLETED | COMMUNITY
Start: 2019-01-21 | End: 2024-05-22

## 2024-05-22 RX ORDER — HYALURONATE SODIUM, STABILIZED 88 MG/4 ML
88 SYRINGE (ML) INTRAARTICULAR
Qty: 2 | Refills: 0 | Status: COMPLETED | COMMUNITY
Start: 2021-05-12 | End: 2024-05-22

## 2024-05-22 RX ORDER — HYALURONATE SODIUM, STABILIZED 88 MG/4 ML
88 SYRINGE (ML) INTRAARTICULAR
Qty: 2 | Refills: 0 | Status: COMPLETED | COMMUNITY
Start: 2019-10-28 | End: 2024-05-22

## 2024-05-22 RX ORDER — HYALURONATE SODIUM, STABILIZED 88 MG/4 ML
88 SYRINGE (ML) INTRAARTICULAR
Qty: 1 | Refills: 0 | Status: COMPLETED | COMMUNITY
Start: 2019-12-30 | End: 2024-05-22

## 2024-05-22 RX ORDER — HYALURONATE SODIUM, STABILIZED 88 MG/4 ML
88 SYRINGE (ML) INTRAARTICULAR
Qty: 1 | Refills: 0 | Status: COMPLETED | COMMUNITY
Start: 2019-04-08 | End: 2024-05-22

## 2024-05-22 NOTE — ASSESSMENT
[FreeTextEntry1] : 63M with PMH of HTN, HLD, CAD s/p PCI, DM2 (last A1c 6.0), LENIN on CPAP, Vertigo. Recent stent to Lcx in March of 2024 discharged on DAPT. Presents to [facility] with chest pain 7/10 on exertion for the past week with associated dyspnea. The patient reports the chest pain does not radiate anywhere and gets better with rest. Diagnostic  Cath revealing LM: 60% mid/distal stenosis and LAD: 70% ostial stenosis with LCX: Patent stent. CT surgery consulted for CABG evaluation.  He is S/P CABG X 2 (LIMA-LAD SVG-OM ) on 5/6/24. Post op course significant with WBC 12 from 10.  Right SVG site, warm and erythematic --> 1 g vanco ordered. 5/13 RSR 60-80; CONTINUE DOXY as per DR. Hannah for rt svg erythema; pt afebrile; wbc decreased to 10. Discharged to Home. He is here for post op visit.   Today he presents and reports that he is doing well except mild shortness of breath when lying down. He also reports occasional dizziness. Denies any chest pain, palpitations, pedal edema.   Today on exam patient's lungs clear bilaterally, normal sinus rhythm, sternum stable, incision clean, dry and intact. RT SVG site is clean, dry and intact.  No peripheral edema noted. Sutures removed today.   Instructed patient on importance of optimal glycemic control, daily showering, daily weights, any signs of fever (temperature greater than 101F, chills,  incentive spirometer use, and increase ambulation as tolerated. Instructed to call office with any signs or symptoms of infection or weight gain of 2 or more pounds in 1 day or 3 or more pounds in 1 week.    Discussed intake of plant based foods, including vegetables, fruits, and whole grain foods: legumes, nuts and seeds, fish or seafood, lean meats, and non-fat or low-fat diary foods. Plant based oils (non-tropical) in place of solid fats. Instructed patient to limit intake of high fat meats and processed meats, high-fat diary foods, dietary cholesterol and sodium, foods and beverages with added sugars.   Plan: 1) Continue current medication regimen 2) Follow up with cardiologist ( Dr. Diehl on 5/22/24) and PCP  3) May return on as needed basis  4) Ambulate as tolerated  5) Discontinue Diuretics  6) Continue to increase activity and walk daily as tolerated. Continue to use incentive spirometer.  7) Keep legs elevated above heart when resting/sitting/sleeping.  8) Call MD if you experience fever, fatigue, dizziness, confusion, syncope, shortness of breath, chest pain not relieved with analgesics, increased redness/drainage from the surgical  incision site 9) Virtual Cardiac Rehab referral

## 2024-05-22 NOTE — END OF VISIT
[FreeTextEntry3] :  I, JORGE Banda , personally performed the evaluation and management (E/M) services for this established  patient. That E/M includes conducting the initial examination, assessing all conditions, and establishing the plan of care. Today, KVNG MO  was here to observe my evaluation and management services for this patient.

## 2024-05-22 NOTE — CONSULT LETTER
[FreeTextEntry2] : Dr. Wilfred Major [FreeTextEntry3] : Felix Hannah MD  Department of cardiovascular and Thoracic surgery Professor Department of surgery [facility]

## 2024-05-22 NOTE — PHYSICAL EXAM
[] : no respiratory distress [Respiration, Rhythm And Depth] : normal respiratory rhythm and effort [Auscultation Breath Sounds / Voice Sounds] : lungs were clear to auscultation bilaterally [Apical Impulse] : the apical impulse was normal [Heart Rate And Rhythm] : heart rate was normal and rhythm regular [Heart Sounds] : normal S1 and S2 [Murmurs] : no murmurs [Clean] : clean [Dry] : dry [Healing Well] : healing well [No Edema] : no edema [FreeTextEntry5] : RT SVG site

## 2024-06-03 ENCOUNTER — APPOINTMENT (OUTPATIENT)
Dept: ORTHOPEDIC SURGERY | Facility: CLINIC | Age: 64
End: 2024-06-03
Payer: COMMERCIAL

## 2024-06-03 VITALS — WEIGHT: 260 LBS | BODY MASS INDEX: 41.78 KG/M2 | HEIGHT: 66 IN

## 2024-06-03 DIAGNOSIS — M47.812 SPONDYLOSIS W/OUT MYELOPATHY OR RADICULOPATHY, CERVICAL REGION: ICD-10-CM

## 2024-06-03 DIAGNOSIS — M17.0 BILATERAL PRIMARY OSTEOARTHRITIS OF KNEE: ICD-10-CM

## 2024-06-03 PROCEDURE — 99214 OFFICE O/P EST MOD 30 MIN: CPT

## 2024-06-03 NOTE — ADDENDUM
[FreeTextEntry1] : I, NIESHA JORDAN, acted solely as a scribe for Dr. Steve Manning on this date 06/03/2024.  All medical record entries made by the Scribe were at my, Dr. Steve Manning, direction and personally dictated by me on 06/03/2024. I have reviewed the chart and agree that the record accurately reflects my personal performance of the history, physical exam, assessment and plan. I have also personally directed, reviewed, and agreed with the chart.

## 2024-06-03 NOTE — HISTORY OF PRESENT ILLNESS
[de-identified] : 63-year-old male presents for a follow-up evaluation of neck pain. He notes he had double bypass surgery on 05/06/2024 and is doing well. He has not been in physical therapy at this time. He notes stiffness, pops and cracks of his cervical spine. He notes he continues to have breathing problems and think it's coming from his neck. Patient denies that he has any numbness or tingling. He notes he gets a "wavy" feeling of his head. He had a left knee Durolane injection on 4/8/2024. He had a right knee Durolane injection on 4/1/2024. He denies any swelling or buckling.  PMHx: DM; 4 stents and was started on Plavix in addition to ASA 81mg.  Radiology Results 4/08/2024 o Cervical Spine : AP and lateral views were obtained and revealed straightening of the normal cervical lordosis, degenerative disc disease at C5-6 with mild foraminal stenosis at C5-6 and C6-7   MRI CERVICAL SPINE WO IV CONTRAST done in July 2023 straighten cervical lordosis. Vertebral body heights appear normal. Cervical spinal cord appears interictally normal. C3- C4 and C5-6 and C6-7. Disc height narrowing, anterior budge adjacent anterior marginal osteophytes formation. Scattered discogenic degenerative endplate marrow signal changes, most prominently adjacent to the C5-6 and C6-7 disc  Radiology Results 06/15/2023 o Right Knee : Standing AP, lateral and tunnel views of the knee were obtained and revealed moderate medial and patellofemoral arthritis  o Left Knee : Standing AP, lateral and tunnel views of the knee were obtained and revealed moderate medial and severe patellofemoral arthritis

## 2024-06-03 NOTE — DISCUSSION/SUMMARY
[de-identified] : I went over the pathophysiology of the patient's symptoms in great detail with the patient. At this time, he will start a course of physical therapy for strengthening and flexibility. A prescription was provided. I informed the patient they are due for a repeat gel injection any time after October. We discussed the use of ice, Tylenol and anti-inflammatories to relieve pain. I stressed the importance of weight loss and its benefits to the patients joints and overall health.  A prescription was given for physical therapy for his neck but he will speak to his cardiologist prior to starting that.  Weight loss was discussed as well and stressed as a benefit for both his heart and his knees.  All of their questions were answered. They understand and consent to the plan.   FU in 2 months. If he does not improve, we will consider an MRI of his cervical spine upon his return.

## 2024-06-03 NOTE — PHYSICAL EXAM
[de-identified] : Constitutional o Appearance : well-nourished, well developed, alert, in no acute distress  Head and Face o Head :  Inspection : atraumatic, normocephalic o Face :  Inspection : no visible rash or discoloration Respiratory o Respiratory Effort: breathing unlabored  Neurologic o Sensation : Normal sensation  Psychiatric o Mood and Affect: mood normal, affect appropriate  Lymphatic o Additional Nodes : No palpable lymph nodes present   Cervical Spine o Inspection/Palpation :  Inspection : alignment midline, normal degree of lordosis present  Skin : normal appearance, no masses or tenderness, trachea midline  Palpation : right parascapular tenderness, no left paracervical tenderness   o Range of Motion : limited rotation right side worse than left, limited extension but full flexion,  o Tests: Negative Spurlings test,   Right Upper Extremity o Right Shoulder :  Inspection/Palpation : no tenderness, no swelling or deformities  Range of Motion : full and painless in all planes, no crepitance  Strength : forward elevation 5/5, IR 5/5, ER 5/5, ER at 90 of abduction 5/5, supraspinatus 5/5, adduction 5/5, abduction 5/5, biceps/triceps 5/5,  5/5  Stability : no joint instability on provocative testing   Tests: Watson negative, Neer negative, Landy negative, drop arm test negative, Coamo's test negative  Left Upper Extremity o Left Shoulder :  Inspection/Palpation : no tenderness, no swelling or deformities  Range of Motion : full and painless in all planes, no crepitance  Strength : forward elevation 5/5, IR 5/5, ER 5/5, ER at 90 of abduction 5/5, supraspinatus 5/5, adduction 5/5, abduction 5/5, biceps/triceps 5/5,  5/5  Stability : no joint instability on provocative testing   Tests: Watson negative, Neer negative, Landy negative, drop arm test negative, Coamo's test negative  Lumbosacral Spine o Inspection : no visible rash or discoloration o Palpation : moderate left paraspinal musculature tenderness, no sciatic notch tenderness o Range of Motion : extension and sidebending cause left paraspinal discomfort o Muscle Strength : paraspinal muscle strength and tone within normal limits o Muscle Tone : paraspinal muscle strength and tone within normal limits o Tests: straight leg test negative and ANISA test negative bilaterally   Right Lower Extremity o Buttock : no tenderness, swelling or deformities  o Right Hip :    - Inspection/Palpation : no tenderness, no swelling or deformities    - Range of Motion : full flexion, full rotation, no crepitance    - Stability : joint stability intact    - Strength : extension, flexion, adduction, abduction, internal rotation and external rotation, 5/5     - Tests: Diane's test negative  o Right Knee :  Inspection/Palpation : medial compartment tenderness, no swelling, trophic changes  Range of Motion : 0-125, no crepitance, good patellofemoral glide   Stability : no valgus or varus instability present on provocative testing  Strength : flexion and extension 5/5  Tests and Signs : negative Anterior Drawer, negative Lachman, negative Kade  Left Lower Extremity o Buttock : no tenderness, swelling or deformities  o Left Hip :    - Inspection/Palpation : no tenderness, no swelling or deformities    - Range of Motion : full flexion, full rotation, no crepitance    - Stability : joint stability intact    - Strength : extension, flexion, adduction, abduction, internal rotation and external rotation, 5/5     - Tests: Diane's test negative  o Left Knee :  Inspection/Palpation : medial compartment tenderness, no swelling, trophic changes,   Range of Motion : 0-125 decreased patellofemoral glide without crepitance  Stability : no valgus or varus instability present on provocative testing  Strength : flexion and extension 5/5  Tests and Signs : negative Anterior Drawer, negative Lachman, negative Kade  o Peripheral Vascular System :  Dorsalis Pedis Artery : pulse 2+ bilaterally  Gait and Station: Gait: normal sensation to light touch , hyper trophic changes of both legs,  no significant extremity swelling or lymphedema, trophic changes of both legs, no foot drop

## 2024-06-08 NOTE — ED ADULT NURSE NOTE - CAS DISCH TRANSFER METHOD
ED Purposeful Rounding: Patient updated with plan of care. Patient pain, bathroom needs, positioning and comfort (warm blankets, dim lights, TV, Bed positioning, personal items) needs addressed. Patient given and instructed on use of call light.       Private car

## 2024-06-12 ENCOUNTER — TRANSCRIPTION ENCOUNTER (OUTPATIENT)
Age: 64
End: 2024-06-12

## 2024-07-02 ENCOUNTER — NON-APPOINTMENT (OUTPATIENT)
Age: 64
End: 2024-07-02

## 2024-07-02 ENCOUNTER — APPOINTMENT (OUTPATIENT)
Dept: ELECTROPHYSIOLOGY | Facility: CLINIC | Age: 64
End: 2024-07-02
Payer: COMMERCIAL

## 2024-07-02 ENCOUNTER — APPOINTMENT (OUTPATIENT)
Dept: CARDIOLOGY | Facility: CLINIC | Age: 64
End: 2024-07-02

## 2024-07-02 VITALS
HEART RATE: 59 BPM | HEIGHT: 66 IN | DIASTOLIC BLOOD PRESSURE: 73 MMHG | BODY MASS INDEX: 41.78 KG/M2 | WEIGHT: 260 LBS | SYSTOLIC BLOOD PRESSURE: 118 MMHG | OXYGEN SATURATION: 97 %

## 2024-07-02 DIAGNOSIS — I48.92 UNSPECIFIED ATRIAL FLUTTER: ICD-10-CM

## 2024-07-02 DIAGNOSIS — Z95.1 PRESENCE OF AORTOCORONARY BYPASS GRAFT: ICD-10-CM

## 2024-07-02 PROCEDURE — 99203 OFFICE O/P NEW LOW 30 MIN: CPT

## 2024-07-02 PROCEDURE — 93000 ELECTROCARDIOGRAM COMPLETE: CPT

## 2024-07-02 RX ORDER — APIXABAN 5 MG/1
5 TABLET, FILM COATED ORAL
Qty: 60 | Refills: 5 | Status: ACTIVE | COMMUNITY
Start: 2024-07-02

## 2024-07-02 RX ORDER — EMPAGLIFLOZIN 10 MG/1
10 TABLET, FILM COATED ORAL
Qty: 30 | Refills: 1 | Status: ACTIVE | COMMUNITY
Start: 2024-07-02

## 2024-07-02 RX ORDER — METOPROLOL SUCCINATE 50 MG/1
50 TABLET, EXTENDED RELEASE ORAL
Refills: 3 | Status: ACTIVE | COMMUNITY
Start: 2024-07-02

## 2024-08-06 ENCOUNTER — APPOINTMENT (OUTPATIENT)
Dept: ORTHOPEDIC SURGERY | Facility: CLINIC | Age: 64
End: 2024-08-06

## 2024-08-06 PROCEDURE — 99213 OFFICE O/P EST LOW 20 MIN: CPT

## 2024-08-06 NOTE — DISCUSSION/SUMMARY
[de-identified] : I went over the pathophysiology of the patient's symptoms in great detail with the patient. At this time, he will start a course of physical therapy for strengthening and flexibility. A prescription was provided. He should avoid extending his neck beyond a neutral position as much as possible. I discussed ergonomic solutions with the patient. We discussed the use of moist heat to relieve spasm. I informed the patient they are due for a repeat gel injection any time after 10/1/2024 for right knee and 10/8/2024 for left knee.   All of their questions were answered. They understand and consent to the plan.   FU in in 6-8 weeks.

## 2024-08-06 NOTE — PHYSICAL EXAM
[de-identified] : Constitutional o Appearance : well-nourished, well developed, alert, in no acute distress  Head and Face o Head :  Inspection : atraumatic, normocephalic o Face :  Inspection : no visible rash or discoloration Respiratory o Respiratory Effort: breathing unlabored  Neurologic o Sensation : Normal sensation  Psychiatric o Mood and Affect: mood normal, affect appropriate  Lymphatic o Additional Nodes : No palpable lymph nodes present   Cervical Spine o Inspection/Palpation :  Inspection : alignment midline, normal degree of lordosis present  Skin : normal appearance, no masses or tenderness, trachea midline  Palpation : right and left parascapular tenderness  o Range of Motion : limited rotation right and left, right and left paracervical tenderness  o Tests: Negative Spurlings test,   Right Upper Extremity o Right Shoulder :  Inspection/Palpation : no tenderness, no swelling or deformities  Range of Motion : full and painless in all planes, no crepitance  Strength : forward elevation 5/5, IR 5/5, ER 5/5, ER at 90 of abduction 5/5, supraspinatus 5/5, adduction 5/5, abduction 5/5, biceps/triceps 5/5,  5/5  Stability : no joint instability on provocative testing   Tests: Watson negative, Neer negative, Landy negative, drop arm test negative, [place]b's test negative  Left Upper Extremity o Left Shoulder :  Inspection/Palpation : no tenderness, no swelling or deformities  Range of Motion : full and painless in all planes, no crepitance  Strength : forward elevation 5/5, IR 5/5, ER 5/5, ER at 90 of abduction 5/5, supraspinatus 5/5, adduction 5/5, abduction 5/5, biceps/triceps 5/5,  5/5  Stability : no joint instability on provocative testing   Tests: Watson negative, Neer negative, Landy negative, drop arm test negative, De[place]b's test negative  Lumbosacral Spine o Inspection : no visible rash or discoloration o Palpation : moderate left paraspinal musculature tenderness, no sciatic notch tenderness o Range of Motion : extension and sidebending cause left paraspinal discomfort o Muscle Strength : paraspinal muscle strength and tone within normal limits o Muscle Tone : paraspinal muscle strength and tone within normal limits o Tests: straight leg test negative and ANISA test negative bilaterally   Right Lower Extremity o Buttock : no tenderness, swelling or deformities  o Right Hip :    - Inspection/Palpation : no tenderness, no swelling or deformities    - Range of Motion : full flexion, full rotation, no crepitance    - Stability : joint stability intact    - Strength : extension, flexion, adduction, abduction, internal rotation and external rotation, 5/5     - Tests: Diane's test negative  o Right Knee :  Inspection/Palpation : medial compartment tenderness, no swelling, trophic changes  Range of Motion : 0-125, no crepitance, good patellofemoral glide   Stability : no valgus or varus instability present on provocative testing  Strength : flexion and extension 5/5  Tests and Signs : negative Anterior Drawer, negative Lachman, negative Kade  Left Lower Extremity o Buttock : no tenderness, swelling or deformities  o Left Hip :    - Inspection/Palpation : no tenderness, no swelling or deformities    - Range of Motion : full flexion, full rotation, no crepitance    - Stability : joint stability intact    - Strength : extension, flexion, adduction, abduction, internal rotation and external rotation, 5/5     - Tests: Diane's test negative  o Left Knee :  Inspection/Palpation : medial compartment tenderness, no swelling, trophic changes,   Range of Motion : 0-125 decreased patellofemoral glide without crepitance  Stability : no valgus or varus instability present on provocative testing  Strength : flexion and extension 5/5  Tests and Signs : negative Anterior Drawer, negative Lachman, negative Kade  o Peripheral Vascular System :  Dorsalis Pedis Artery : pulse 2+ bilaterally  Gait and Station: Gait: normal sensation to light touch , hyper trophic changes of both legs,  no significant extremity swelling or lymphedema, trophic changes of both legs, no foot drop

## 2024-08-06 NOTE — HISTORY OF PRESENT ILLNESS
[de-identified] : 63-year-old male presents for a follow-up evaluation of neck pain. He notes his neck has gotten worse lately due to being on a computer all day. He notes he has not been in physical therapy recently. Patient denies that he has any numbness or tingling. He notes he had double bypass surgery on 05/06/2024 and is doing well. He should avoid extending his neck beyond a neutral position as much as possible. I discussed ergonomic solutions with the patient. He had a left knee Durolane injection on 4/8/2024. He had a right knee Durolane injection on 4/1/2024. He denies any swelling or buckling. PMHx: DM; 4 stents and was started on Plavix in addition to ASA 81mg. He is on Eliquis.   Radiology Results 4/08/2024 o Cervical Spine : AP and lateral views were obtained and revealed straightening of the normal cervical lordosis, degenerative disc disease at C5-6 with mild foraminal stenosis at C5-6 and C6-7   MRI CERVICAL SPINE WO IV CONTRAST done in July 2023 straighten cervical lordosis. Vertebral body heights appear normal. Cervical spinal cord appears interictally normal. C3- C4 and C5-6 and C6-7. Disc height narrowing, anterior budge adjacent anterior marginal osteophytes formation. Scattered discogenic degenerative endplate marrow signal changes, most prominently adjacent to the C5-6 and C6-7 disc  Radiology Results 06/15/2023 o Right Knee : Standing AP, lateral and tunnel views of the knee were obtained and revealed moderate medial and patellofemoral arthritis  o Left Knee : Standing AP, lateral and tunnel views of the knee were obtained and revealed moderate medial and severe patellofemoral arthritis

## 2024-08-06 NOTE — ADDENDUM
[FreeTextEntry1] : I, NIESHA JORDAN, acted solely as a scribe for Dr. Steve Manning on this date 08/06/2024.  All medical record entries made by the Scribe were at my, Dr. Steve Manning, direction and personally dictated by me on 08/06/2024. I have reviewed the chart and agree that the record accurately reflects my personal performance of the history, physical exam, assessment and plan. I have also personally directed, reviewed, and agreed with the chart.

## 2024-08-14 NOTE — H&P ADULT - VTE RISK ASSESSMENT
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  History of classical Hodgkin lymphoma, stage II (favorable risk)   Clinically stable, and her exam is unremarkable.We reviewed her labs today including CBC, CMP and ESR.  All labs are entirely normal.  She is now 2.5 years out from initial diagnosis and treatment.  Recommend to continue clinical exam and labs again in 6 months.  Surveillance: H&P and lab every 3-6 month for 1-2 yr, then every 6-12 months year 3, then annually to complete 5 years (NCCN guidelines). Imaging as clinically indicated.     #.  History of acute bilateral pulmonary emboli without evidence of saddle embolus or central emboli in 2/2022.  Likely provoked in the setting of recent COVID-19 infection, active chemotherapy for lymphoma in the setting of ongoing birth control pill use. She has stopped birth control pills.  She tolerated Xarelto well without side effects or intolerance. She completed 6 months of anticoagulation therapy in 8/2022.  No signs and symptoms suggestive of venous thromboembolism.    ______________________________________________________________________________    History of Present Illness/ Interval History    Ms. Amna Oneil  is a 47 year old female patient who returns in follow-up.  She has a history of classical Hodgkin's lymphoma and is 2.5 years out from diagnosis/treatment.  She reports that she is feeling well and is in good health.  She denies any night sweats, new lumps or bumps, or any new pain.  She has an adequate appetite and is moving her bowels and bladder appropriately.  She denies any chest pain or shortness of  "breath.      ECOG performance status   0- Fully active, without restriction      Pain  Pain Score: No Pain (0)    ROS  A 14 point review of systems was obtained.  Positive findings noted in the history.  The remainder of the review of system is otherwise negative.      Oncology History/Treatment  Diagnosis  12/13/2021-classical Hodgkin lymphoma by left axillary lymph node core needle biopsy.  12/23/2021-PET/CT scan showed hypermetabolic lymph nodes involving basilar neck/superior mediastinum, mediastinum, right pericardial space, bilateral axillae.   -second opinion pathology evaluation at [facility] and it confirmed classical Hodgkin lymphoma.  Echocardiogram showed adequate cardiac function.  Pulmonary function test was normal   stage II (favorable risk)    IPSS-0 risk factor, FFP 88%, overall survival 98%.    Treatment to date  2/3/2022-5/23/2022-completed 4 cycles of ABVD (bleomycin was omitted in cycle #3 and #4 due to pulmonary toxicity) treatment course was complicated by pulmonary emboli, PCP pneumonia.  She achieved complete radiographic response (no uptake by PET) after 2 cycles of ABVD.      Physical Exam    /74   Pulse 71   Resp 16   Ht 1.702 m (5' 7\")   Wt 80.8 kg (178 lb 1.6 oz)   SpO2 99%   BMI 27.89 kg/m      General: Well-appearing female in no acute distress. Cooperative in conversation.  Eyes: EOMI, PERRL. No scleral icterus.  ENT: Oral mucosa is moist without lesions or thrush. No ulcerations or mucositis noted.  Lymphatic: Neck is supple without cervical, axillary, or supraclavicular lymphadenopathy.   Cardiovascular: RRR No murmurs, gallops, or rubs. No peripheral edema.  Respiratory: CTA bilaterally. No wheezes or crackles.  Gastrointestinal: BS +. Abdomen soft, non-tender. No palpable hepatosplenomegaly or masses.   Neurologic: Alert and oriented x3. Cranial nerves II through XII are grossly intact.  Skin: No rashes, petechiae, or bruising noted. Warm, dry, intact.  "     Lab Results    Recent Results (from the past 168 hour(s))   Comprehensive metabolic panel   Result Value Ref Range    Sodium 137 135 - 145 mmol/L    Potassium 4.3 3.4 - 5.3 mmol/L    Carbon Dioxide (CO2) 24 22 - 29 mmol/L    Anion Gap 8 7 - 15 mmol/L    Urea Nitrogen 23.4 (H) 6.0 - 20.0 mg/dL    Creatinine 0.95 0.51 - 0.95 mg/dL    GFR Estimate 75 >60 mL/min/1.73m2    Calcium 9.3 8.8 - 10.4 mg/dL    Chloride 105 98 - 107 mmol/L    Glucose 114 (H) 70 - 99 mg/dL    Alkaline Phosphatase 63 40 - 150 U/L    AST 22 0 - 45 U/L    ALT 10 0 - 50 U/L    Protein Total 6.4 6.4 - 8.3 g/dL    Albumin 4.3 3.5 - 5.2 g/dL    Bilirubin Total 0.4 <=1.2 mg/dL   Erythrocyte sedimentation rate auto   Result Value Ref Range    Erythrocyte Sedimentation Rate 7 0 - 20 mm/hr   CBC with platelets and differential   Result Value Ref Range    WBC Count 8.3 4.0 - 11.0 10e3/uL    RBC Count 4.15 3.80 - 5.20 10e6/uL    Hemoglobin 13.2 11.7 - 15.7 g/dL    Hematocrit 38.7 35.0 - 47.0 %    MCV 93 78 - 100 fL    MCH 31.8 26.5 - 33.0 pg    MCHC 34.1 31.5 - 36.5 g/dL    RDW 12.5 10.0 - 15.0 %    Platelet Count 351 150 - 450 10e3/uL    % Neutrophils 52 %    % Lymphocytes 33 %    % Monocytes 9 %    % Eosinophils 6 %    % Basophils 1 %    % Immature Granulocytes 0 %    NRBCs per 100 WBC 0 <1 /100    Absolute Neutrophils 4.3 1.6 - 8.3 10e3/uL    Absolute Lymphocytes 2.7 0.8 - 5.3 10e3/uL    Absolute Monocytes 0.7 0.0 - 1.3 10e3/uL    Absolute Eosinophils 0.5 0.0 - 0.7 10e3/uL    Absolute Basophils 0.1 0.0 - 0.2 10e3/uL    Absolute Immature Granulocytes 0.0 <=0.4 10e3/uL    Absolute NRBCs 0.0 10e3/uL     I reviewed the above labs today.  Imaging    No results found.      Billing  Total time 25 minutes, to include face to face visit, review of EMR, ordering, documentation and coordination of care on date of service   complexity modifier for longitudinal care.     Signed by: LORI Perez CNP    Chart documentation with Dragon Voice recognition  Software. Although reviewed after completion, some words and grammatical errors may remain.   <<--- Click to launch

## 2024-09-03 ENCOUNTER — APPOINTMENT (OUTPATIENT)
Dept: ELECTROPHYSIOLOGY | Facility: CLINIC | Age: 64
End: 2024-09-03
Payer: COMMERCIAL

## 2024-09-03 ENCOUNTER — NON-APPOINTMENT (OUTPATIENT)
Age: 64
End: 2024-09-03

## 2024-09-03 VITALS
WEIGHT: 262 LBS | HEART RATE: 80 BPM | BODY MASS INDEX: 42.11 KG/M2 | SYSTOLIC BLOOD PRESSURE: 119 MMHG | DIASTOLIC BLOOD PRESSURE: 76 MMHG | OXYGEN SATURATION: 98 % | HEIGHT: 66 IN

## 2024-09-03 DIAGNOSIS — I10 ESSENTIAL (PRIMARY) HYPERTENSION: ICD-10-CM

## 2024-09-03 DIAGNOSIS — I48.92 UNSPECIFIED ATRIAL FLUTTER: ICD-10-CM

## 2024-09-03 DIAGNOSIS — R00.2 PALPITATIONS: ICD-10-CM

## 2024-09-03 PROCEDURE — 93000 ELECTROCARDIOGRAM COMPLETE: CPT

## 2024-09-03 PROCEDURE — 99213 OFFICE O/P EST LOW 20 MIN: CPT

## 2024-09-03 NOTE — DISCUSSION/SUMMARY
[FreeTextEntry1] : Impression:  1. Atrial flutter: EKG performed today to assess for presence of atrial fibrillation and reveals NSR. Recommend 4 week Holter to assess afib/aflutter burden and need for ablation in future. Remains on metoprolol and Eliquis.   2.  HTN; Encourage heart healthy diet, sodium restriction and weight management. Continue regular f/u with Cardiologist for further HTN management.  3. LENIN: on CPAP now, has sleep study tomorrow for possible need for BiPAP; resume compliance with LENIN management to prevent future sinus node dysfunction and atrial fibrillation. Encouraged weight loss.  Plan for 4 week Holter.  [EKG obtained to assist in diagnosis and management of assessed problem(s)] : EKG obtained to assist in diagnosis and management of assessed problem(s)

## 2024-09-03 NOTE — HISTORY OF PRESENT ILLNESS
[FreeTextEntry1] : Abner Perez is a 65 y/o man with hx of CAD s/p PCI (3/2024), s/p CABG X 2 (LIMA-LAD SVG-OM) 5/6/24,  HTN, HLD, CAD, DM2 (last A1c 6.0), LENIN on CPAP, and new atrial flutter who is here today for routine f/u. Pt had presented to [facility] 5/2/24 with chest pain 7/10 on exertion for the past week with associated dyspnea. The patient reports the chest pain does not radiate anywhere and gets better with rest. Diagnostic Cath revealing LM: 60% mid/distal stenosis and LAD: 70% ostial stenosis with LCX: Patent stent. CT surgery consulted for CABG 5/6/24. Pt followed up with his cardiologist for shortness of breath and episodes of heart racing and found episodes of atrial flutter on Holter monitor. He continued his metoprolol at 50 mg and started Eliquis 5 mg bid.  CHADSVASC score =3. He was also started on furosemide with some improvement in dyspnea. Since his last visit, he has had about 3 episodes of atrial flutter with palpitations lasting only about 5-6 sec in duration, on sustained symptoms or episodes. Denies chest pain, SOB, syncope or near syncope.

## 2024-09-03 NOTE — HISTORY OF PRESENT ILLNESS
[FreeTextEntry1] : Abner Perez is a 65 y/o man with hx of CAD s/p PCI (3/2024), s/p CABG X 2 (LIMA-LAD SVG-OM) 5/6/24,  HTN, HLD, CAD, DM2 (last A1c 6.0), LENIN on CPAP, and new atrial flutter who is here today for routine f/u. Pt had presented to [facility] 5/2/24 with chest pain 7/10 on exertion for the past week with associated dyspnea. The patient reports the chest pain does not radiate anywhere and gets better with rest. Diagnostic Cath revealing LM: 60% mid/distal stenosis and LAD: 70% ostial stenosis with LCX: Patent stent. CT surgery consulted for CABG 5/6/24. Pt followed up with his cardiologist for shortness of breath and episodes of heart racing and found episodes of atrial flutter on Holter monitor. He continued his metoprolol at 50 mg and started Eliquis 5 mg bid.  CHADSVASC score =3. He was also started on furosemide with some improvement in dyspnea. Since his last visit, he has had about 3 episodes of atrial flutter with palpitations lasting only about 5-6 sec in duration, on sustained symptoms or episodes. Denies chest pain, SOB, syncope or near syncope.

## 2024-09-03 NOTE — CARDIOLOGY SUMMARY
[de-identified] : 7/2/24 NSR 78 with PAC's [de-identified] : 5/3/24 TTE; EF 76% 1. Left ventricular systolic function is hyperdynamic. There are no regional wall motion abnormalities seen.  2. Moderate aortic stenosis.  3. No prior echocardiogram is available for comparison.  [de-identified] : 5/2024 Diagnostic Cath revealing LM: 60% mid/distal stenosis and LAD: 70% ostial stenosis with LCX: Patent stent. S/P CABG 5/6/24

## 2024-09-03 NOTE — CARDIOLOGY SUMMARY
[de-identified] : 7/2/24 NSR 78 with PAC's [de-identified] : 5/3/24 TTE; EF 76% 1. Left ventricular systolic function is hyperdynamic. There are no regional wall motion abnormalities seen.  2. Moderate aortic stenosis.  3. No prior echocardiogram is available for comparison.  [de-identified] : 5/2024 Diagnostic Cath revealing LM: 60% mid/distal stenosis and LAD: 70% ostial stenosis with LCX: Patent stent. S/P CABG 5/6/24

## 2024-09-26 ENCOUNTER — APPOINTMENT (OUTPATIENT)
Dept: ORTHOPEDIC SURGERY | Facility: CLINIC | Age: 64
End: 2024-09-26
Payer: COMMERCIAL

## 2024-09-26 VITALS — WEIGHT: 262 LBS | HEIGHT: 66 IN | BODY MASS INDEX: 42.11 KG/M2

## 2024-09-26 DIAGNOSIS — M48.02 SPINAL STENOSIS, CERVICAL REGION: ICD-10-CM

## 2024-09-26 DIAGNOSIS — M17.0 BILATERAL PRIMARY OSTEOARTHRITIS OF KNEE: ICD-10-CM

## 2024-09-26 PROCEDURE — 99214 OFFICE O/P EST MOD 30 MIN: CPT

## 2024-09-26 RX ORDER — HYALURONATE SODIUM, STABILIZED 60 MG/3 ML
60 SYRINGE (ML) INTRAARTICULAR
Qty: 2 | Refills: 0 | Status: ACTIVE | COMMUNITY
Start: 2024-09-26

## 2024-09-26 NOTE — DISCUSSION/SUMMARY
[de-identified] : I went over the pathophysiology of the patient's symptoms in great detail with the patient. He should avoid extending his neck beyond a neutral position as much as possible. I discussed ergonomic solutions with the patient. We discussed the use of moist heat to relieve spasm. I informed the patient they are due for a repeat gel injection any time after 10/1/2024 for right knee and 10/8/2024 for left knee. Viscosupplementation was discussed as a solution to the patient's symptoms, and we are requesting Durolane for bilateral knees. I am recommending the patient continues to go to physical therapy to obtain increases in their strength and mobility. A prescription was provided. We discussed the use of a traction device for his neck.   All of their questions were answered. They understand and consent to the plan.   FU after authorization.

## 2024-09-26 NOTE — HISTORY OF PRESENT ILLNESS
[de-identified] : 63-year-old male presents for a follow-up evaluation of neck pain. He is doing PT and chiropractic treatments for his cervical spine at this time. He still has some pain and clicking but he feels the chiropractor treatments are helping.  He will take Tylenol as needed.  He notes he had double bypass surgery on 05/06/2024 and is doing well. He does not think physical therapy is helping. He notes restricted motion of his neck. He notes he has experienced dizziness and nauseousness. He does not have a traction device at home. He is almost due for gel injections of both knees. PMHx: DM; 4 stents and was started on Plavix in  addition  He is on Eliquis.   Radiology Results 4/08/2024 o Cervical Spine : AP and lateral views were obtained and revealed straightening of the normal cervical lordosis, degenerative disc disease at C5-6 with mild foraminal stenosis at C5-6 and C6-7   MRI CERVICAL SPINE WO IV CONTRAST done in July 2023 straighten cervical lordosis. Vertebral body heights appear normal. Cervical spinal cord appears interictally normal. C3- C4 and C5-6 and C6-7. Disc height narrowing, anterior budge adjacent anterior marginal osteophytes formation. Scattered discogenic degenerative endplate marrow signal changes, most prominently adjacent to the C5-6 and C6-7 disc  Radiology Results 06/15/2023 o Right Knee : Standing AP, lateral and tunnel views of the knee were obtained and revealed moderate medial and patellofemoral arthritis  o Left Knee : Standing AP, lateral and tunnel views of the knee were obtained and revealed moderate medial and severe patellofemoral arthritis

## 2024-09-26 NOTE — ADDENDUM
[FreeTextEntry1] : I, NIESHA JORDAN, acted solely as a scribe for Dr. Steve Manning on this date 09/26/2024.  All medical record entries made by the Scribe were at my, Dr. Steve Manning, direction and personally dictated by me on 09/26/2024. I have reviewed the chart and agree that the record accurately reflects my personal performance of the history, physical exam, assessment and plan. I have also personally directed, reviewed, and agreed with the chart.

## 2024-09-26 NOTE — PHYSICAL EXAM
[de-identified] : Constitutional o Appearance : well-nourished, well developed, alert, in no acute distress  Head and Face o Head :  Inspection : atraumatic, normocephalic o Face :  Inspection : no visible rash or discoloration Respiratory o Respiratory Effort: breathing unlabored  Neurologic o Sensation : Normal sensation  Psychiatric o Mood and Affect: mood normal, affect appropriate  Lymphatic o Additional Nodes : No palpable lymph nodes present   Cervical Spine o Inspection/Palpation :  Inspection : alignment midline, normal degree of lordosis present  Skin : normal appearance, no masses or tenderness, trachea midline  Palpation : right and left parascapular tenderness,  o Range of Motion : limited rotation right and left, right and left paracervical tenderness  o Tests: Negative Spurlings test,   Right Upper Extremity o Right Shoulder :  Inspection/Palpation : no tenderness, no swelling or deformities  Range of Motion : full and painless in all planes, no crepitance  Strength : forward elevation 5/5, IR 5/5, ER 5/5, ER at 90 of abduction 5/5, supraspinatus 5/5, adduction 5/5, abduction 5/5, biceps/triceps 5/5,  5/5  Stability : no joint instability on provocative testing   Tests: Watson negative, Neer negative, Landy negative, drop arm test negative, Smicksburg's test negative  Left Upper Extremity o Left Shoulder :  Inspection/Palpation : no tenderness, no swelling or deformities  Range of Motion : full and painless in all planes, no crepitance  Strength : forward elevation 5/5, IR 5/5, ER 5/5, ER at 90 of abduction 5/5, supraspinatus 5/5, adduction 5/5, abduction 5/5, biceps/triceps 5/5,  5/5  Stability : no joint instability on provocative testing   Tests: Watson negative, Neer negative, Landy negative, drop arm test negative, Smicksburg's test negative  Lumbosacral Spine o Inspection : no visible rash or discoloration o Palpation : moderate left paraspinal musculature tenderness, no sciatic notch tenderness o Range of Motion : extension and sidebending cause left paraspinal discomfort o Muscle Strength : paraspinal muscle strength and tone within normal limits o Muscle Tone : paraspinal muscle strength and tone within normal limits o Tests: straight leg test negative and ANISA test negative bilaterally   Right Lower Extremity o Buttock : no tenderness, swelling or deformities  o Right Hip :    - Inspection/Palpation : no tenderness, no swelling or deformities    - Range of Motion : full flexion, full rotation, no crepitance    - Stability : joint stability intact    - Strength : extension, flexion, adduction, abduction, internal rotation and external rotation, 5/5     - Tests: Diane's test negative  o Right Knee :  Inspection/Palpation : medial compartment tenderness, no swelling, trophic changes  Range of Motion : 0-120, no crepitance, good patellofemoral glide   Stability : no valgus or varus instability present on provocative testing  Strength : flexion and extension 5/5  Tests and Signs : negative Anterior Drawer, negative Lachman, negative Kade  Left Lower Extremity o Buttock : no tenderness, swelling or deformities  o Left Hip :    - Inspection/Palpation : no tenderness, no swelling or deformities    - Range of Motion : full flexion, full rotation, no crepitance    - Stability : joint stability intact    - Strength : extension, flexion, adduction, abduction, internal rotation and external rotation, 5/5     - Tests: Diane's test negative  o Left Knee :  Inspection/Palpation : medial compartment tenderness, no swelling, trophic changes,   Range of Motion : 0-120 decreased patellofemoral glide without crepitance  Stability : no valgus or varus instability present on provocative testing  Strength : flexion and extension 5/5  Tests and Signs : negative Anterior Drawer, negative Lachman, negative Kade  o Peripheral Vascular System :  Dorsalis Pedis Artery : pulse 2+ bilaterally  Gait and Station: Gait: normal sensation to light touch , hyper trophic changes of both legs,  no significant extremity swelling or lymphedema, trophic changes of both legs, no foot drop

## 2024-10-10 ENCOUNTER — NON-APPOINTMENT (OUTPATIENT)
Age: 64
End: 2024-10-10

## 2024-10-25 ENCOUNTER — APPOINTMENT (OUTPATIENT)
Dept: VASCULAR SURGERY | Facility: CLINIC | Age: 64
End: 2024-10-25
Payer: COMMERCIAL

## 2024-10-25 VITALS — HEART RATE: 80 BPM | SYSTOLIC BLOOD PRESSURE: 132 MMHG | DIASTOLIC BLOOD PRESSURE: 80 MMHG

## 2024-10-25 VITALS
WEIGHT: 260 LBS | TEMPERATURE: 98.1 F | HEART RATE: 79 BPM | SYSTOLIC BLOOD PRESSURE: 127 MMHG | HEIGHT: 66 IN | DIASTOLIC BLOOD PRESSURE: 80 MMHG | BODY MASS INDEX: 41.78 KG/M2

## 2024-10-25 PROCEDURE — 93970 EXTREMITY STUDY: CPT

## 2024-10-25 PROCEDURE — 99213 OFFICE O/P EST LOW 20 MIN: CPT

## 2024-12-03 ENCOUNTER — APPOINTMENT (OUTPATIENT)
Dept: ORTHOPEDIC SURGERY | Facility: CLINIC | Age: 64
End: 2024-12-03
Payer: COMMERCIAL

## 2024-12-03 VITALS — HEIGHT: 66 IN | WEIGHT: 260 LBS | BODY MASS INDEX: 41.78 KG/M2

## 2024-12-03 DIAGNOSIS — M17.0 BILATERAL PRIMARY OSTEOARTHRITIS OF KNEE: ICD-10-CM

## 2024-12-03 PROCEDURE — 99213 OFFICE O/P EST LOW 20 MIN: CPT

## 2024-12-10 ENCOUNTER — APPOINTMENT (OUTPATIENT)
Dept: ORTHOPEDIC SURGERY | Facility: CLINIC | Age: 64
End: 2024-12-10

## 2024-12-23 ENCOUNTER — APPOINTMENT (OUTPATIENT)
Dept: ORTHOPEDIC SURGERY | Facility: CLINIC | Age: 64
End: 2024-12-23
Payer: COMMERCIAL

## 2024-12-23 DIAGNOSIS — E66.01 MORBID (SEVERE) OBESITY DUE TO EXCESS CALORIES: ICD-10-CM

## 2024-12-23 PROCEDURE — 20611 DRAIN/INJ JOINT/BURSA W/US: CPT | Mod: LT

## 2024-12-23 PROCEDURE — 99213 OFFICE O/P EST LOW 20 MIN: CPT | Mod: 25

## 2024-12-30 ENCOUNTER — APPOINTMENT (OUTPATIENT)
Dept: ORTHOPEDIC SURGERY | Facility: CLINIC | Age: 64
End: 2024-12-30
Payer: COMMERCIAL

## 2024-12-30 VITALS — HEIGHT: 66 IN | WEIGHT: 260 LBS | BODY MASS INDEX: 41.78 KG/M2

## 2024-12-30 DIAGNOSIS — M17.0 BILATERAL PRIMARY OSTEOARTHRITIS OF KNEE: ICD-10-CM

## 2024-12-30 PROCEDURE — 20611 DRAIN/INJ JOINT/BURSA W/US: CPT | Mod: RT

## 2024-12-30 PROCEDURE — 73564 X-RAY EXAM KNEE 4 OR MORE: CPT | Mod: LT

## 2024-12-30 PROCEDURE — 99214 OFFICE O/P EST MOD 30 MIN: CPT | Mod: 25

## 2025-01-23 ENCOUNTER — NON-APPOINTMENT (OUTPATIENT)
Age: 65
End: 2025-01-23

## 2025-01-24 ENCOUNTER — APPOINTMENT (OUTPATIENT)
Dept: VASCULAR SURGERY | Facility: CLINIC | Age: 65
End: 2025-01-24
Payer: COMMERCIAL

## 2025-01-24 VITALS
WEIGHT: 260 LBS | HEIGHT: 66 IN | HEART RATE: 68 BPM | DIASTOLIC BLOOD PRESSURE: 75 MMHG | SYSTOLIC BLOOD PRESSURE: 124 MMHG | TEMPERATURE: 98 F | BODY MASS INDEX: 41.78 KG/M2

## 2025-01-24 VITALS — SYSTOLIC BLOOD PRESSURE: 127 MMHG | DIASTOLIC BLOOD PRESSURE: 75 MMHG | HEART RATE: 61 BPM

## 2025-01-24 PROCEDURE — 99213 OFFICE O/P EST LOW 20 MIN: CPT

## 2025-01-24 PROCEDURE — 93970 EXTREMITY STUDY: CPT

## 2025-02-10 ENCOUNTER — APPOINTMENT (OUTPATIENT)
Dept: ORTHOPEDIC SURGERY | Facility: CLINIC | Age: 65
End: 2025-02-10
Payer: COMMERCIAL

## 2025-02-10 ENCOUNTER — NON-APPOINTMENT (OUTPATIENT)
Age: 65
End: 2025-02-10

## 2025-02-10 DIAGNOSIS — M17.0 BILATERAL PRIMARY OSTEOARTHRITIS OF KNEE: ICD-10-CM

## 2025-02-10 DIAGNOSIS — M47.812 SPONDYLOSIS W/OUT MYELOPATHY OR RADICULOPATHY, CERVICAL REGION: ICD-10-CM

## 2025-02-10 PROCEDURE — 99214 OFFICE O/P EST MOD 30 MIN: CPT

## 2025-02-27 ENCOUNTER — APPOINTMENT (OUTPATIENT)
Dept: ORTHOPEDIC SURGERY | Facility: CLINIC | Age: 65
End: 2025-02-27
Payer: COMMERCIAL

## 2025-02-27 DIAGNOSIS — M48.02 SPINAL STENOSIS, CERVICAL REGION: ICD-10-CM

## 2025-02-27 DIAGNOSIS — M47.812 SPONDYLOSIS W/OUT MYELOPATHY OR RADICULOPATHY, CERVICAL REGION: ICD-10-CM

## 2025-02-27 PROCEDURE — 99214 OFFICE O/P EST MOD 30 MIN: CPT

## 2025-04-21 ENCOUNTER — APPOINTMENT (OUTPATIENT)
Dept: ORTHOPEDIC SURGERY | Facility: CLINIC | Age: 65
End: 2025-04-21
Payer: COMMERCIAL

## 2025-04-21 VITALS — WEIGHT: 260 LBS | BODY MASS INDEX: 41.78 KG/M2 | HEIGHT: 66 IN

## 2025-04-21 DIAGNOSIS — M47.812 SPONDYLOSIS W/OUT MYELOPATHY OR RADICULOPATHY, CERVICAL REGION: ICD-10-CM

## 2025-04-21 DIAGNOSIS — M48.02 SPINAL STENOSIS, CERVICAL REGION: ICD-10-CM

## 2025-04-21 DIAGNOSIS — M17.0 BILATERAL PRIMARY OSTEOARTHRITIS OF KNEE: ICD-10-CM

## 2025-04-21 PROCEDURE — 99214 OFFICE O/P EST MOD 30 MIN: CPT

## 2025-04-21 RX ORDER — HYALURONATE SODIUM, STABILIZED 88 MG/4 ML
88 SYRINGE (ML) INTRAARTICULAR
Qty: 2 | Refills: 0 | Status: ACTIVE | COMMUNITY
Start: 2025-04-21

## 2025-04-25 ENCOUNTER — APPOINTMENT (OUTPATIENT)
Dept: VASCULAR SURGERY | Facility: CLINIC | Age: 65
End: 2025-04-25

## 2025-05-06 NOTE — PATIENT PROFILE ADULT - INFLUENZA IMMUNIZATION DATE (APPROXIMATE)
"----- Message from Brad sent at 5/6/2025 11:41 AM CDT -----  Scheduling RequestPatient Status: EstScheduling Appt: FibroscanTime/Date Preference:  5/9 @ 2 pmContact Preference?: [phone] (home) Treating Provider: Kate Notes:"Thank you for all that you do for our patients"  " 30-Sep-2022

## 2025-05-26 ENCOUNTER — EMERGENCY (EMERGENCY)
Facility: HOSPITAL | Age: 65
LOS: 1 days | End: 2025-05-26
Attending: STUDENT IN AN ORGANIZED HEALTH CARE EDUCATION/TRAINING PROGRAM
Payer: COMMERCIAL

## 2025-05-26 VITALS
TEMPERATURE: 97 F | HEART RATE: 99 BPM | DIASTOLIC BLOOD PRESSURE: 81 MMHG | WEIGHT: 259.93 LBS | HEIGHT: 66 IN | RESPIRATION RATE: 18 BRPM | OXYGEN SATURATION: 97 % | SYSTOLIC BLOOD PRESSURE: 160 MMHG

## 2025-05-26 PROCEDURE — 99283 EMERGENCY DEPT VISIT LOW MDM: CPT

## 2025-05-26 NOTE — ED ADULT TRIAGE NOTE - CHIEF COMPLAINT QUOTE
Hx of diabetes, CAD w/ 4 stents, CABG on plavix and eliquis, states bumped R leg into bed frame, now with "bump" to R leg, sore to touch.

## 2025-05-27 PROCEDURE — 99283 EMERGENCY DEPT VISIT LOW MDM: CPT | Mod: 25

## 2025-05-27 PROCEDURE — 73590 X-RAY EXAM OF LOWER LEG: CPT | Mod: 26,RT

## 2025-05-27 PROCEDURE — 73590 X-RAY EXAM OF LOWER LEG: CPT

## 2025-05-27 RX ORDER — ACETAMINOPHEN 500 MG/5ML
975 LIQUID (ML) ORAL ONCE
Refills: 0 | Status: COMPLETED | OUTPATIENT
Start: 2025-05-27 | End: 2025-05-27

## 2025-05-27 RX ADMIN — Medication 975 MILLIGRAM(S): at 00:37

## 2025-05-27 NOTE — ED ADULT NURSE NOTE - OBJECTIVE STATEMENT
64M aaox4 ambulatory came as walk in from home with c/o rt shin pain s/p accidentally hit his rt leg on a bedframe 2 hours ago c/o swelling and mild pain, pain subsided after icing the area, patient concerned because he takes blood thinner. Patient with h/o diabetes, CAD w/ 4 stents, CABG on plavix and eliquis. On exam, noted mild swelling in area, no hematoma, distal pulses strong, sensations are equal, dry and temps are equal. VS WDL, patient able to walk without difficulty. had 2 beers today.

## 2025-05-27 NOTE — ED PROVIDER NOTE - PHYSICAL EXAMINATION
Andre Balderas DO (PGY1)   Physical Exam:    Gen: NAD, AOx3  Head: NCAT  HEENT: EOMI, PEERL  Lung: CTAB, no respiratory distress, no wheezes/rhonchi/rales B/L  CV: RRR, no murmurs, rubs or gallops  Abd: soft, NT, ND  MSK: no visible deformities, ROM normal in UE/LE, no back pain  Neuro: No focal sensory or motor deficits. Sensation intact to light touch all extremities.  Full strength bilateral lower extremities  Vasc: DP pulses intact bilaterally.  Skin: Warm, well perfused,  raised bump on the right proximal tib-fib, tender to palpation, chronic venous changes noted in the lower distal shins bilaterally.   Psych: normal affect, calm

## 2025-05-27 NOTE — ED PROVIDER NOTE - ATTENDING CONTRIBUTION TO CARE
63 yo M with PMHx of diabetes and CAD s/p SHARON on plavix and eliquis presents with right lower extremity injury. Reports bumping his leg onto a bed post two hours prior to arrival and sustained a bump but has been ambulatory since.     PE: well appearing, nontoxic, no respiratory distress.  Neuro nonfocal.  Small raised hematoma to anterior right lower extremity. Psych normal mood.    MDM: Differential diagnosis includes but is not limited to contusion, fracture, hematoma  Will assesses with XR and provide pain medication

## 2025-05-27 NOTE — ED PROVIDER NOTE - NSFOLLOWUPINSTRUCTIONS_ED_ALL_ED_FT
09/26/17    Parent of:  Joseph Marroquin  [address] [place] [address]        
You were seen in the ER for a right lower leg injury and were diagnosed with a hematoma    You should use the compression dressing provided, ice the injury, rest, and elevate your leg.    You can take tylenol 650 mg every 6 hours for pain control     Return to the ER for worsening pain, leg swelling, inability to walk    You should follow up with your PCP in the next 2-3 days for continued monitoring and management

## 2025-05-27 NOTE — ED PROVIDER NOTE - PROGRESS NOTE DETAILS
Attending Joana Barrett MD: XR wet read no acute fracture   applied pressure dressing for hematoma and advised tylenol and RICE

## 2025-05-27 NOTE — ED PROVIDER NOTE - PATIENT PORTAL LINK FT
You can access the FollowMyHealth Patient Portal offered by [facility] by registering at the following website: [email or web address]. By joining [facility]’s FollowMyHealth portal, you will also be able to view your health information using other applications (apps) compatible with our system.

## 2025-05-27 NOTE — ED PROVIDER NOTE - NSICDXPASTMEDICALHX_GEN_ALL_CORE_FT
PAST MEDICAL HISTORY:  Anemia     Hypertension     Morbid obesity with BMI of 50.0-59.9, adult     LENNI (Obstructive Sleep Apnea) Sleep study done 2015  uses CPAP at home    Type 2 diabetes mellitus     Venous insufficiency of both lower extremities

## 2025-05-27 NOTE — ED PROVIDER NOTE - CLINICAL SUMMARY MEDICAL DECISION MAKING FREE TEXT BOX
64-year-old male past medical history of DM, CAD status post 4 stents on Plavix/Eliquis presents to ED with complaints of right leg pain.  States that he bumped his right shin into a bedpost 2 hours ago.  Noted that there was subsequent swelling and pain.  States that he was concerned given the fact that he was on blood thinners.  Otherwise states that he has been able to bear weight and walk.  Notes currently only having pain with palpation.  ROS otherwise negative.    Vitals show blood pressure 160/81, heart rate 99.  Patient afebrile.  Able to ambulate without difficulty.  Exam reveals raised bump on the right proximal tib-fib, tender to palpation.  Full range of motion in the lower extremities bilateral.  Full strength bilateral lower extremities.  DP pulses intact bilaterally.  Sensation intact bilateral lower extremity.  Chronic venous changes noted in the lower distal shins bilaterally.     Can consider hematoma secondary to injury, no concern for compartment syndrome at this time, likely isolated.  Will also obtain x-ray of the right tib-fib to evaluate for fractures.  Will treat with pain medication and reassess.

## 2025-06-24 ENCOUNTER — APPOINTMENT (OUTPATIENT)
Dept: ORTHOPEDIC SURGERY | Facility: CLINIC | Age: 65
End: 2025-06-24

## 2025-06-24 PROCEDURE — 20611 DRAIN/INJ JOINT/BURSA W/US: CPT | Mod: RT

## 2025-06-24 PROCEDURE — 99213 OFFICE O/P EST LOW 20 MIN: CPT | Mod: 25

## 2025-07-01 ENCOUNTER — APPOINTMENT (OUTPATIENT)
Dept: ORTHOPEDIC SURGERY | Facility: CLINIC | Age: 65
End: 2025-07-01
Payer: COMMERCIAL

## 2025-07-01 VITALS — HEIGHT: 66 IN | WEIGHT: 260 LBS | BODY MASS INDEX: 41.78 KG/M2

## 2025-07-01 PROCEDURE — 20611 DRAIN/INJ JOINT/BURSA W/US: CPT | Mod: LT

## 2025-07-01 PROCEDURE — 99213 OFFICE O/P EST LOW 20 MIN: CPT | Mod: 25

## 2025-08-19 ENCOUNTER — APPOINTMENT (OUTPATIENT)
Dept: ORTHOPEDIC SURGERY | Facility: CLINIC | Age: 65
End: 2025-08-19
Payer: COMMERCIAL

## 2025-08-19 DIAGNOSIS — E66.01 MORBID (SEVERE) OBESITY DUE TO EXCESS CALORIES: ICD-10-CM

## 2025-08-19 DIAGNOSIS — M17.0 BILATERAL PRIMARY OSTEOARTHRITIS OF KNEE: ICD-10-CM

## 2025-08-19 PROCEDURE — 99213 OFFICE O/P EST LOW 20 MIN: CPT

## 2025-08-19 PROCEDURE — 73564 X-RAY EXAM KNEE 4 OR MORE: CPT | Mod: 50

## 2025-08-28 ENCOUNTER — TRANSCRIPTION ENCOUNTER (OUTPATIENT)
Age: 65
End: 2025-08-28

## (undated) DEVICE — GLV 8.5 PROTEXIS (WHITE)

## (undated) DEVICE — SUT DOUBLE 6 WIRE STERNAL

## (undated) DEVICE — SUT PROLENE 7-0 30" BV-1

## (undated) DEVICE — DRAPE MAYO STAND 30"

## (undated) DEVICE — SPECIMEN CONTAINER 100ML

## (undated) DEVICE — DRAPE IOBAN 33" X 23"

## (undated) DEVICE — GLV 8.5 PROTEXIS (BLUE)

## (undated) DEVICE — PREP CHLORAPREP HI-LITE ORANGE 26ML

## (undated) DEVICE — AORTIC PUNCH 4.0MM LONG LENGTH HANDLE

## (undated) DEVICE — CONNECTOR STRAIGHT 3/8 X 1/2"

## (undated) DEVICE — SUT POLYSORB 3-0 30" V-20 UNDYED

## (undated) DEVICE — CHEST DRAIN PLEUR-EVAC DRY/WET ADULT-PEDS SINGLE (QUICK)

## (undated) DEVICE — VESSEL LOOP MAXI-RED  0.120" X 16"

## (undated) DEVICE — SUT SILK 2-0 18" SH (POP-OFF)

## (undated) DEVICE — SUT ETHIBOND 1 30" CT-1

## (undated) DEVICE — SUT VICRYL 1 36" CTX UNDYED

## (undated) DEVICE — SOL NORMOSOL-R PH7.4 1000ML

## (undated) DEVICE — GLV 7.5 PROTEXIS (WHITE)

## (undated) DEVICE — GLV 6 PROTEXIS (WHITE)

## (undated) DEVICE — DRSG DERMABOND PRINEO 22CM

## (undated) DEVICE — SUT PLEDGET 9MM X 4MM X 1.5MM

## (undated) DEVICE — DRSG OPSITE 2.5 X 2"

## (undated) DEVICE — SUT PROLENE 7-0 24" BV-1

## (undated) DEVICE — DRAPE TOWEL BLUE 17" X 24"

## (undated) DEVICE — DRSG KLING 6"

## (undated) DEVICE — POSITIONER FOAM EGG CRATE ULNAR 2PCS (PINK)

## (undated) DEVICE — SUT STAINLESS STEEL 5 18" CCS

## (undated) DEVICE — SUT SOFSILK 0 18" TIES

## (undated) DEVICE — SAW BLADE STRYKER STERNUM 31MM X 6.27 X .79

## (undated) DEVICE — LAP PAD 18 X 18"

## (undated) DEVICE — SUT VICRYL 0 36" CTX UNDYED

## (undated) DEVICE — Device

## (undated) DEVICE — SUCTION YANKAUER BULBOUS TIP NO VENT

## (undated) DEVICE — SET PERF Y TYPE 13.5IN STRL

## (undated) DEVICE — WARMING BLANKET FULL ADULT

## (undated) DEVICE — TOURNIQUET SET 12FR (1 RED, 1 BLUE, 1 SNARE) 7"

## (undated) DEVICE — ELCTR BOVIE PENCIL HANDPIECE ROCKER SWITCH 15FT

## (undated) DEVICE — MEDTRONIC CLEARVIEW BLOWER MISTER KIT W TUBING SET

## (undated) DEVICE — DRAPE SLUSH / WARMER 44 X 66"

## (undated) DEVICE — BEAVER BLADE MINI SHARP ALL ROUND (BLUE)

## (undated) DEVICE — SYR LUER LOK 20CC

## (undated) DEVICE — SOL IRR POUR H2O 250ML

## (undated) DEVICE — STABILIZER HAND ASSISTANT ATTACHMENT W STABLESOFT 2S

## (undated) DEVICE — SUT BOOT STANDARD (YELLOW) 5 PAIR

## (undated) DEVICE — SUT PLEDGET SOFT LARGE 3/8" X 3/16" X 1/16" X6

## (undated) DEVICE — CONNECTOR STRAIGHT 3/8 X 3/8"

## (undated) DEVICE — SUT SOFSILK 0 30" TIES

## (undated) DEVICE — PACK CARDIAC YELLOW

## (undated) DEVICE — SYNOVIS VASCULAR PROBE 1.5MM 15CM

## (undated) DEVICE — FOLEY TRAY 16FR 5CC LF LUBRISIL ADVANCE TEMP CLOSED

## (undated) DEVICE — DRSG TEGADERM 6"X8"

## (undated) DEVICE — SAW BLADE MICROAIRE STERNUM 1X34X9.4MM

## (undated) DEVICE — SUT MONOCRYL 4-0 18" PS-2

## (undated) DEVICE — BULLDOG SPRING CLIP LATIS/LATIS 6MM 1/2 FORCE (BLUE)

## (undated) DEVICE — SOL IRR POUR NS 0.9% 500ML

## (undated) DEVICE — SUT BOOT STANDARD (ASSORTED) 5 PAIR

## (undated) DEVICE — SUT VICRYL 3-0 27" CT-1

## (undated) DEVICE — SUT PROLENE 8-0 24" BV175-6

## (undated) DEVICE — PACING CABLE A/V TEMP SCREW DOWN 6FT

## (undated) DEVICE — VASOVIEW HEMOPRO 2

## (undated) DEVICE — SYS VEIN HARVESTING VIRTUOSAPH PLUS W/ RADIAL

## (undated) DEVICE — SUT SOFSILK 0 30" V-20

## (undated) DEVICE — PACK UNIVERSAL CARDIAC

## (undated) DEVICE — SUT PROLENE 6-0 30" C-1

## (undated) DEVICE — POSITIONER CARDIAC BUMP

## (undated) DEVICE — DRSG ACE BANDAGE 6"

## (undated) DEVICE — SPECIMEN CONTAINER 4OZ

## (undated) DEVICE — SUT SILK 5-0 60" TIES

## (undated) DEVICE — SYR TB 1CC 25G X 5/8 (BLUE)

## (undated) DEVICE — PACING CABLE (BROWN) A/V TEMP SCREW DOWN 12FT

## (undated) DEVICE — DRSG OPSITE 13.75 X 4"

## (undated) DEVICE — FOLEY TRAY 16FR SURESTEP LF URINE METER TEMP SENSING STATLOCK

## (undated) DEVICE — VENTING ADAPTER "Y" (RED/BLUE) 7.5"

## (undated) DEVICE — STOPCOCK 4-WAY (BLUE) DISCOFIX SPIN-LOCK CONNECTOR

## (undated) DEVICE — GLV 6.5 PROTEXIS (WHITE)

## (undated) DEVICE — VESSEL LOOP MAXI-BLUE 0.120" X 16"

## (undated) DEVICE — SUT PROLENE 4-0 30" SH-1

## (undated) DEVICE — CHEST DRAIN OASIS DRY SUCTION WATER SEAL

## (undated) DEVICE — SUT VICRYL 2-0 27" CT-1 UNDYED

## (undated) DEVICE — DRSG DERMABOND PRINEO 60CM

## (undated) DEVICE — ELCTR BOVIE TIP CLEANER SCRATCH PAD

## (undated) DEVICE — SUT SILK 0 30" TIES